# Patient Record
Sex: FEMALE | Race: WHITE | Employment: UNEMPLOYED | URBAN - METROPOLITAN AREA
[De-identification: names, ages, dates, MRNs, and addresses within clinical notes are randomized per-mention and may not be internally consistent; named-entity substitution may affect disease eponyms.]

---

## 2018-11-18 ENCOUNTER — HOSPITAL ENCOUNTER (EMERGENCY)
Age: 63
Discharge: HOME | End: 2018-11-18
Payer: COMMERCIAL

## 2018-11-18 ENCOUNTER — APPOINTMENT (OUTPATIENT)
Dept: GENERAL RADIOLOGY | Age: 63
End: 2018-11-18
Payer: COMMERCIAL

## 2018-11-18 VITALS
RESPIRATION RATE: 16 BRPM | BODY MASS INDEX: 34.02 KG/M2 | WEIGHT: 147 LBS | OXYGEN SATURATION: 96 % | DIASTOLIC BLOOD PRESSURE: 72 MMHG | HEIGHT: 55 IN | SYSTOLIC BLOOD PRESSURE: 130 MMHG | HEART RATE: 69 BPM | TEMPERATURE: 97.9 F

## 2018-11-18 DIAGNOSIS — G89.29 CHRONIC PAIN OF BOTH SHOULDERS: ICD-10-CM

## 2018-11-18 DIAGNOSIS — M79.604 RIGHT LEG PAIN: Primary | ICD-10-CM

## 2018-11-18 DIAGNOSIS — M79.601 RIGHT ARM PAIN: ICD-10-CM

## 2018-11-18 DIAGNOSIS — M25.512 CHRONIC PAIN OF BOTH SHOULDERS: ICD-10-CM

## 2018-11-18 DIAGNOSIS — M25.511 CHRONIC PAIN OF BOTH SHOULDERS: ICD-10-CM

## 2018-11-18 LAB
ALBUMIN SERPL-MCNC: 3.8 G/DL (ref 3.5–5.2)
ALP BLD-CCNC: 80 U/L (ref 35–104)
ALT SERPL-CCNC: 26 U/L (ref 0–32)
ANION GAP SERPL CALCULATED.3IONS-SCNC: 11 MMOL/L (ref 7–16)
AST SERPL-CCNC: 24 U/L (ref 0–31)
BASOPHILS ABSOLUTE: 0.03 E9/L (ref 0–0.2)
BASOPHILS RELATIVE PERCENT: 0.5 % (ref 0–2)
BILIRUB SERPL-MCNC: 0.7 MG/DL (ref 0–1.2)
BUN BLDV-MCNC: 19 MG/DL (ref 8–23)
CALCIUM SERPL-MCNC: 7.7 MG/DL (ref 8.6–10.2)
CHLORIDE BLD-SCNC: 105 MMOL/L (ref 98–107)
CO2: 28 MMOL/L (ref 22–29)
CREAT SERPL-MCNC: 0.8 MG/DL (ref 0.5–1)
EOSINOPHILS ABSOLUTE: 0.01 E9/L (ref 0.05–0.5)
EOSINOPHILS RELATIVE PERCENT: 0.2 % (ref 0–6)
GFR AFRICAN AMERICAN: >60
GFR NON-AFRICAN AMERICAN: >60 ML/MIN/1.73
GLUCOSE BLD-MCNC: 102 MG/DL (ref 74–99)
HCT VFR BLD CALC: 48.7 % (ref 34–48)
HEMOGLOBIN: 16.7 G/DL (ref 11.5–15.5)
IMMATURE GRANULOCYTES #: 0.01 E9/L
IMMATURE GRANULOCYTES %: 0.2 % (ref 0–5)
LYMPHOCYTES ABSOLUTE: 2.11 E9/L (ref 1.5–4)
LYMPHOCYTES RELATIVE PERCENT: 36.3 % (ref 20–42)
MAGNESIUM: 1.9 MG/DL (ref 1.6–2.6)
MCH RBC QN AUTO: 32.2 PG (ref 26–35)
MCHC RBC AUTO-ENTMCNC: 34.3 % (ref 32–34.5)
MCV RBC AUTO: 93.8 FL (ref 80–99.9)
MONOCYTES ABSOLUTE: 0.44 E9/L (ref 0.1–0.95)
MONOCYTES RELATIVE PERCENT: 7.6 % (ref 2–12)
NEUTROPHILS ABSOLUTE: 3.22 E9/L (ref 1.8–7.3)
NEUTROPHILS RELATIVE PERCENT: 55.2 % (ref 43–80)
PDW BLD-RTO: 13.3 FL (ref 11.5–15)
PLATELET # BLD: 132 E9/L (ref 130–450)
PMV BLD AUTO: 10.3 FL (ref 7–12)
POTASSIUM SERPL-SCNC: 4 MMOL/L (ref 3.5–5)
RBC # BLD: 5.19 E12/L (ref 3.5–5.5)
SODIUM BLD-SCNC: 144 MMOL/L (ref 132–146)
TOTAL PROTEIN: 6.6 G/DL (ref 6.4–8.3)
WBC # BLD: 5.8 E9/L (ref 4.5–11.5)

## 2018-11-18 PROCEDURE — 85025 COMPLETE CBC W/AUTO DIFF WBC: CPT

## 2018-11-18 PROCEDURE — 36415 COLL VENOUS BLD VENIPUNCTURE: CPT

## 2018-11-18 PROCEDURE — 96372 THER/PROPH/DIAG INJ SC/IM: CPT

## 2018-11-18 PROCEDURE — 72040 X-RAY EXAM NECK SPINE 2-3 VW: CPT

## 2018-11-18 PROCEDURE — 80053 COMPREHEN METABOLIC PANEL: CPT

## 2018-11-18 PROCEDURE — 83735 ASSAY OF MAGNESIUM: CPT

## 2018-11-18 PROCEDURE — 99283 EMERGENCY DEPT VISIT LOW MDM: CPT

## 2018-11-18 PROCEDURE — 6360000002 HC RX W HCPCS: Performed by: NURSE PRACTITIONER

## 2018-11-18 PROCEDURE — 6370000000 HC RX 637 (ALT 250 FOR IP): Performed by: NURSE PRACTITIONER

## 2018-11-18 RX ORDER — NAPROXEN 500 MG/1
500 TABLET ORAL 2 TIMES DAILY WITH MEALS
COMMUNITY
End: 2019-04-08 | Stop reason: ALTCHOICE

## 2018-11-18 RX ORDER — CYCLOBENZAPRINE HCL 10 MG
10 TABLET ORAL ONCE
Status: COMPLETED | OUTPATIENT
Start: 2018-11-18 | End: 2018-11-18

## 2018-11-18 RX ORDER — DEXAMETHASONE SODIUM PHOSPHATE 10 MG/ML
10 INJECTION, SOLUTION INTRAMUSCULAR; INTRAVENOUS ONCE
Status: COMPLETED | OUTPATIENT
Start: 2018-11-18 | End: 2018-11-18

## 2018-11-18 RX ORDER — CYCLOBENZAPRINE HCL 10 MG
10 TABLET ORAL 3 TIMES DAILY PRN
Qty: 15 TABLET | Refills: 0 | Status: SHIPPED | OUTPATIENT
Start: 2018-11-18 | End: 2018-11-28

## 2018-11-18 RX ORDER — PREDNISONE 20 MG/1
TABLET ORAL
Qty: 18 TABLET | Refills: 0 | Status: SHIPPED | OUTPATIENT
Start: 2018-11-18 | End: 2018-11-28

## 2018-11-18 RX ORDER — KETOROLAC TROMETHAMINE 30 MG/ML
60 INJECTION, SOLUTION INTRAMUSCULAR; INTRAVENOUS ONCE
Status: COMPLETED | OUTPATIENT
Start: 2018-11-18 | End: 2018-11-18

## 2018-11-18 RX ADMIN — KETOROLAC TROMETHAMINE 60 MG: 30 INJECTION, SOLUTION INTRAMUSCULAR at 16:24

## 2018-11-18 RX ADMIN — DEXAMETHASONE SODIUM PHOSPHATE 10 MG: 10 INJECTION INTRAMUSCULAR; INTRAVENOUS at 16:24

## 2018-11-18 RX ADMIN — CYCLOBENZAPRINE HYDROCHLORIDE 10 MG: 10 TABLET, FILM COATED ORAL at 16:23

## 2018-11-18 ASSESSMENT — PAIN DESCRIPTION - ORIENTATION: ORIENTATION: RIGHT;LEFT

## 2018-11-18 ASSESSMENT — PAIN DESCRIPTION - PAIN TYPE: TYPE: ACUTE PAIN

## 2018-11-18 ASSESSMENT — PAIN SCALES - GENERAL
PAINLEVEL_OUTOF10: 10
PAINLEVEL_OUTOF10: 10

## 2018-11-18 ASSESSMENT — PAIN DESCRIPTION - LOCATION: LOCATION: ARM;HAND;LEG

## 2018-11-18 ASSESSMENT — PAIN DESCRIPTION - ONSET: ONSET: ON-GOING

## 2018-11-18 ASSESSMENT — PAIN DESCRIPTION - DESCRIPTORS: DESCRIPTORS: ACHING

## 2018-11-18 ASSESSMENT — PAIN DESCRIPTION - FREQUENCY: FREQUENCY: CONTINUOUS

## 2018-11-18 NOTE — ED NOTES
Pt states she was diagnosed with arthritis. Pt vague and does not know how to explain her pain. Pt states both legs and arms hurt, states she fell yesterday and hit head.  No LOC     Denia Velásquez RN  11/18/18 5080

## 2018-11-19 NOTE — ED PROVIDER NOTES
diminished range with pain. Skin:  tenderness, swelling and no erythema, rash or wounds noted. Neurovascular: Motor deficit: none. Sensory deficit: none. Pulse deficit: none. Capillary refill: normal.  · Lymphatics: No lymphangitis or adenopathy noted. · Neurological:  Oriented. Motor functions intact.     Lab / Imaging Results   (All laboratory and radiology results have been personally reviewed by myself)  Labs:  Results for orders placed or performed during the hospital encounter of 11/18/18   CBC Auto Differential   Result Value Ref Range    WBC 5.8 4.5 - 11.5 E9/L    RBC 5.19 3.50 - 5.50 E12/L    Hemoglobin 16.7 (H) 11.5 - 15.5 g/dL    Hematocrit 48.7 (H) 34.0 - 48.0 %    MCV 93.8 80.0 - 99.9 fL    MCH 32.2 26.0 - 35.0 pg    MCHC 34.3 32.0 - 34.5 %    RDW 13.3 11.5 - 15.0 fL    Platelets 747 552 - 470 E9/L    MPV 10.3 7.0 - 12.0 fL    Neutrophils % 55.2 43.0 - 80.0 %    Immature Granulocytes % 0.2 0.0 - 5.0 %    Lymphocytes % 36.3 20.0 - 42.0 %    Monocytes % 7.6 2.0 - 12.0 %    Eosinophils % 0.2 0.0 - 6.0 %    Basophils % 0.5 0.0 - 2.0 %    Neutrophils # 3.22 1.80 - 7.30 E9/L    Immature Granulocytes # 0.01 E9/L    Lymphocytes # 2.11 1.50 - 4.00 E9/L    Monocytes # 0.44 0.10 - 0.95 E9/L    Eosinophils # 0.01 (L) 0.05 - 0.50 E9/L    Basophils # 0.03 0.00 - 0.20 E9/L   Comprehensive Metabolic Panel   Result Value Ref Range    Sodium 144 132 - 146 mmol/L    Potassium 4.0 3.5 - 5.0 mmol/L    Chloride 105 98 - 107 mmol/L    CO2 28 22 - 29 mmol/L    Anion Gap 11 7 - 16 mmol/L    Glucose 102 (H) 74 - 99 mg/dL    BUN 19 8 - 23 mg/dL    CREATININE 0.8 0.5 - 1.0 mg/dL    GFR Non-African American >60 >=60 mL/min/1.73    GFR African American >60     Calcium 7.7 (L) 8.6 - 10.2 mg/dL    Total Protein 6.6 6.4 - 8.3 g/dL    Alb 3.8 3.5 - 5.2 g/dL    Total Bilirubin 0.7 0.0 - 1.2 mg/dL    Alkaline Phosphatase 80 35 - 104 U/L    ALT List as of 11/18/2018  4:03 PM      START taking these medications    Details   cyclobenzaprine (FLEXERIL) 10 MG tablet Take 1 tablet by mouth 3 times daily as needed for Muscle spasms, Disp-15 tablet, R-0Print      predniSONE (DELTASONE) 20 MG tablet Sig.: Take 60mg  Po qd x 3 days, then 40mg po qd x3 days, then 20mg po qd x 3 days. QS x 9 days, Disp-18 tablet, R-0Print      diclofenac sodium (VOLTAREN) 1 % GEL Apply 4 g topically 4 times daily, Topical, 4 TIMES DAILY Starting Sun 11/18/2018, Until Tue 12/18/2018, 120 doses, Disp-5 Tube, R-0, Print           Electronically signed by EASTON Valadez CNP   DD: 11/18/18  **This report was transcribed using voice recognition software. Every effort was made to ensure accuracy; however, inadvertent computerized transcription errors may be present.   END OF ED PROVIDER NOTE      EASTON Valadez CNP  11/18/18 3815

## 2019-01-15 ENCOUNTER — HOSPITAL ENCOUNTER (OUTPATIENT)
Dept: MRI IMAGING | Age: 64
Discharge: HOME | End: 2019-01-17
Payer: COMMERCIAL

## 2019-01-15 DIAGNOSIS — M50.01 CERVICAL DISC DISORDER WITH MYELOPATHY, HIGH CERVICAL REGION: ICD-10-CM

## 2019-01-15 PROCEDURE — 72141 MRI NECK SPINE W/O DYE: CPT

## 2019-04-08 NOTE — PROGRESS NOTES
Ste[facility] PRE-ADMISSION TESTING GENERAL INSTRUCTIONS- [facility]-phone number:[phone]    GENERAL INSTRUCTIONS  ? Antibacterial Soap shower Night before and/or AM of Surgery   X  ? Chacorta wipe instruction sheet and wipes given. X ? Nothing by mouth after midnight, including gum, candy, mints, or water. X ? You may brush your teeth, gargle, but do NOT swallow water. ? Hibiclens shower  the night before and the morning of surgery. Do not use             Hibiclens on your face or head. X ? No smoking, chewing tobacco, illegal drugs, or alcohol within 24 hours of your surgery. X ? Jewelry, valuables or body piercing's should not be brought to the hospital. All body and/or tongue piercing's must be removed prior to arriving to hospital.  ALL hair pins must be removed. X ? Do not wear makeup, lotions, powders, deodorant. Nail polish as directed by the nurse. X ? Arrange transportation with a responsible adult  to and from the hospital. If you do not have a responsible adult  to transport you, you will need to make arrangements with a medical transportation company (i.e. [employer]. A Uber/taxi/bus is not appropriate unless you are accompanied by a responsible adult ). Arrange for someone to be with you for the remainder of the day and for 24 hours after your procedure due to having had anesthesia. Who will be your  for transportation?____HUSBAND, MILE______________   Who will be staying with you for 24 hrs after your procedure?_______HUSBANDMILE___________  ? Bring insurance card and photo ID. X ? Transfusion Bracelet: Please bring with you to hospital, day of surgery  ? Bring urine specimen day of surgery. Any small container is acceptable. ? Use inhalers the morning of surgery and bring with you to hospital.  ? Bring copy of living will or healthcare power of  papers to be placed in your electronic record.   ? CPAP/BI-PAP: Please bring your machine if you are to spend the night in the hospital.     PARKING INSTRUCTIONS:    X ? Arrival Time:___0530__________  ·  X ? Parking lot '\"I\"  is located on [facility] (the corner of [facility] and [facility]). To enter, press the button and the gate will lift. A free token will be provided to exit the lot. One car per patient is allowed to park in this lot. All other cars are to park on [address] [place] [address] either in the parking garage or the handicap lot. ? Free  parking is available on [address] [place] [address]. · ? To reach the [facility] lobby from [address] [place] [address], upon entering the hospital, take elevator B to the 3rd floor. EDUCATION INSTRUCTIONS:      ? Knee or hip replacement booklet & exercise pamphlets given. ? Jeni 77 placed in chart. X ? Pre-admission Testing educational folder given   X ? Incentive Spirometry,coughing & deep breathing exercises reviewed. X ? Medication information sheet(s)    X ? Fluoroscopy-Xray used in surgery reviewed with patient. Educational pamphlet placed in chart. X ? Pain: Post-op pain is normal and to be expected. You will be asked to rate your pain from 0-10(a zero is not acceptable-education is needed). Your post-op pain goal is:   X ? Ask your nurse for your pain medication. ? Joint camp offered. ? Joint replacement booklets given. ? Other:___________________________    MEDICATION INSTRUCTIONS:    X ? Bring a complete list of your medications, please write the last time you took the medicine, give this list to the nurse. X ? Take the following medications the morning of surgery with 1-2 ounces of water:  SEE LIST   X ? Stop herbal supplements and vitamins 5 days before your surgery. ? DO NOT take any diabetic medicine the morning of surgery. Follow instructions for insulin the day before surgery.   ? If you are diabetic and your blood sugar is low or you feel symptomatic, you may drink 1-2 ounces of apple juice or take a glucose tablet. The morning of your procedure, you may call the pre-op area if you have concerns about your blood sugar [phone].   ? Use your inhalers the morning of surgery. Bring your emergency inhaler with you day of surgery. X ? Follow physician instructions regarding any blood thinners you may be taking. WHAT TO EXPECT:   X ? The day of surgery you will be greeted and checked in by the [employer].  In addition, you will be registered in the [facility] by a Patient Access Representative. A nurse will greet you in accordance to the time you are needed in the pre-op area to prepare you for surgery. Please do not be discouraged if you are not greeted in the order you arrive as there are many variables that are involved in patient preparation. Your patience is greatly appreciated as you wait for your nurse. Please bring in items such as: books, magazines, newspapers, electronics, or any other items  to occupy your time in the waiting area. X ? Delays may occur with surgery and staff will make a sincere effort to keep you informed of delays. If any delays occur with your procedure, we apologize ahead of time for your inconvenience as we recognize the value of your time.

## 2019-04-16 ENCOUNTER — ANESTHESIA EVENT (OUTPATIENT)
Dept: OPERATING ROOM | Age: 64
End: 2019-04-16

## 2019-04-16 ENCOUNTER — HOSPITAL ENCOUNTER (OUTPATIENT)
Dept: PREADMISSION TESTING | Age: 64
Discharge: HOME | End: 2019-04-16
Payer: COMMERCIAL

## 2019-04-16 ENCOUNTER — HOSPITAL ENCOUNTER (OUTPATIENT)
Dept: GENERAL RADIOLOGY | Age: 64
Discharge: HOME | End: 2019-04-18
Payer: COMMERCIAL

## 2019-04-16 VITALS
RESPIRATION RATE: 20 BRPM | HEIGHT: 57 IN | TEMPERATURE: 97.6 F | HEART RATE: 60 BPM | OXYGEN SATURATION: 97 % | WEIGHT: 148 LBS | SYSTOLIC BLOOD PRESSURE: 148 MMHG | BODY MASS INDEX: 31.93 KG/M2 | DIASTOLIC BLOOD PRESSURE: 66 MMHG

## 2019-04-16 DIAGNOSIS — Z01.812 PRE-OPERATIVE LABORATORY EXAMINATION: ICD-10-CM

## 2019-04-16 DIAGNOSIS — Z01.810 PRE-OPERATIVE CARDIOVASCULAR EXAMINATION: ICD-10-CM

## 2019-04-16 LAB
ABO/RH: NORMAL
ABO/RH: NORMAL
ANTIBODY SCREEN: NORMAL
HCT VFR BLD CALC: 48.4 % (ref 34–48)
HEMOGLOBIN: 16.8 G/DL (ref 11.5–15.5)
MCH RBC QN AUTO: 31.6 PG (ref 26–35)
MCHC RBC AUTO-ENTMCNC: 34.7 % (ref 32–34.5)
MCV RBC AUTO: 91.1 FL (ref 80–99.9)
PDW BLD-RTO: 12.9 FL (ref 11.5–15)
PLATELET # BLD: 162 E9/L (ref 130–450)
PMV BLD AUTO: 9.8 FL (ref 7–12)
RBC # BLD: 5.31 E12/L (ref 3.5–5.5)
WBC # BLD: 5.8 E9/L (ref 4.5–11.5)

## 2019-04-16 PROCEDURE — 93005 ELECTROCARDIOGRAM TRACING: CPT | Performed by: NEUROLOGICAL SURGERY

## 2019-04-16 PROCEDURE — 71046 X-RAY EXAM CHEST 2 VIEWS: CPT

## 2019-04-16 PROCEDURE — 87081 CULTURE SCREEN ONLY: CPT

## 2019-04-16 PROCEDURE — 86900 BLOOD TYPING SEROLOGIC ABO: CPT

## 2019-04-16 PROCEDURE — 36415 COLL VENOUS BLD VENIPUNCTURE: CPT

## 2019-04-16 PROCEDURE — 86850 RBC ANTIBODY SCREEN: CPT

## 2019-04-16 PROCEDURE — 85027 COMPLETE CBC AUTOMATED: CPT

## 2019-04-16 PROCEDURE — 86901 BLOOD TYPING SEROLOGIC RH(D): CPT

## 2019-04-16 NOTE — PROGRESS NOTES
Saw pt in PAT, reviewed:    Surgical Procedure-reviewed procedure and post-op events:pre-op/PACU, Unit admission, PT/OT evaluation, Discharge Kenji 18 Stay expectations-reviewed importance of early mobilization. Instructions of Spine Precautions given-PT to review on evaluation. Surgical Pathway Booklet-reviewed and given  Post-op/Discharge Instructions-reviewed activity allowances/restrictions  Use of Incentive Spirometer-instructed on importance of use post-op and continued use @ home to prevent complications. Instructions on use given  Setting realistic pain goals-reaching goal before discharge. ORTHOTICS: Await orders    Is smoker, given pamphlet re: Smoking Cessation and Spine Health. Spoke to pt regarding benefits of smoking cessation and achieving better outcomes continuing to not smoke prior to/post-op    **Reviewed Cervical Fusion Discharge Instructions    Discharge Plans- home with self/family care. Verbalized understanding of all instructions and questions answered. Contact number given.     Farheen Garcia RN, Spine Navigator  [phone] Office  [phone] Cell

## 2019-04-16 NOTE — PROGRESS NOTES
Faxed & called chest xray to martín at dr Aragon Rising office Also faxed to family dr Jeronimo Purdy

## 2019-04-16 NOTE — PROGRESS NOTES
Called martín at dr Joe Givens office notified of abnormal ekg report faxed to family  who gave medical clearance  Notified martín pt coughing hx smoking for 50 years  Chest x ray ordered Notified sister who is here with pt today not to smoke around pt

## 2019-04-17 LAB
EKG ATRIAL RATE: 58 BPM
EKG P AXIS: 35 DEGREES
EKG P-R INTERVAL: 162 MS
EKG Q-T INTERVAL: 452 MS
EKG QRS DURATION: 72 MS
EKG QTC CALCULATION (BAZETT): 443 MS
EKG R AXIS: -41 DEGREES
EKG T AXIS: 18 DEGREES
EKG VENTRICULAR RATE: 58 BPM
MRSA CULTURE ONLY: NORMAL

## 2019-04-17 PROCEDURE — 93010 ELECTROCARDIOGRAM REPORT: CPT | Performed by: INTERNAL MEDICINE

## 2019-04-22 ENCOUNTER — ANESTHESIA (OUTPATIENT)
Dept: OPERATING ROOM | Age: 64
End: 2019-04-22

## 2019-05-13 ENCOUNTER — HOSPITAL ENCOUNTER (OUTPATIENT)
Age: 64
Discharge: HOME | End: 2019-05-13
Payer: COMMERCIAL

## 2019-05-13 ENCOUNTER — HOSPITAL ENCOUNTER (OUTPATIENT)
Dept: CT IMAGING | Age: 64
Discharge: HOME | End: 2019-05-15
Payer: COMMERCIAL

## 2019-05-13 DIAGNOSIS — R91.8 LUNG MASS: ICD-10-CM

## 2019-05-13 LAB
BUN BLDV-MCNC: 18 MG/DL (ref 8–23)
CREAT SERPL-MCNC: 0.7 MG/DL (ref 0.5–1)
GFR AFRICAN AMERICAN: >60
GFR NON-AFRICAN AMERICAN: >60 ML/MIN/1.73

## 2019-05-13 PROCEDURE — 71270 CT THORAX DX C-/C+: CPT

## 2019-05-13 PROCEDURE — 82565 ASSAY OF CREATININE: CPT

## 2019-05-13 PROCEDURE — 84520 ASSAY OF UREA NITROGEN: CPT

## 2019-05-13 PROCEDURE — 36415 COLL VENOUS BLD VENIPUNCTURE: CPT

## 2019-05-13 PROCEDURE — 2580000003 HC RX 258: Performed by: RADIOLOGY

## 2019-05-13 PROCEDURE — 6360000004 HC RX CONTRAST MEDICATION: Performed by: RADIOLOGY

## 2019-05-13 RX ORDER — SODIUM CHLORIDE 0.9 % (FLUSH) 0.9 %
10 SYRINGE (ML) INJECTION PRN
Status: COMPLETED | OUTPATIENT
Start: 2019-05-13 | End: 2019-05-13

## 2019-05-13 RX ADMIN — Medication 10 ML: at 12:20

## 2019-05-13 RX ADMIN — IOPAMIDOL 80 ML: 755 INJECTION, SOLUTION INTRAVENOUS at 12:25

## 2019-05-29 NOTE — PROGRESS NOTES
Stew 36 PRE-ADMISSION TESTING GENERAL INSTRUCTIONS- [facility]-phone number:[phone]    GENERAL INSTRUCTIONS  [x] Antibacterial Soap shower Night before FOLLOWED BY:  [x] Chacorta wipe instruction sheet and wipes given. [x] Nothing by mouth after midnight, including gum, candy, mints, or water. [x] You may brush your teeth, gargle, but do NOT swallow water. [x] Jewelry, valuables or body piercing's should not be brought to the hospital. All body and/or tongue piercing's must be removed prior to arriving to hospital.  ALL hair pins must be removed. [x] Do not wear makeup, lotions, powders, deodorant. Nail polish as directed by the nurse. [x] Transfusion Bracelet: Please bring with you to hospital, day of surgery     PARKING INSTRUCTIONS:   [x] Arrival Time:__0630___________  · [x] Parking lot '\"I\"  is located on [facility] (the corner of [facility] and [facility]). To enter, press the button and the gate will lift. A free token will be provided to exit the lot. One car per patient is allowed to park in this lot. All other cars are to park on [address] [place] [address] either in the parking garage or the handicap lot. [] To reach the [facility] from [address] [place] [address], upon entering the hospital, take elevator B to the 3rd floor. EDUCATION INSTRUCTIONS:    .     [] Pre-admission Testing educational folder given  [x] Incentive Spirometry,coughing & deep breathing exercises reviewed. [x]Medication information sheet(s)   [x]Fluoroscopy-Xray used in surgery reviewed with patient. Educational pamphlet placed in chart. [x]Pain: Post-op pain is normal and to be expected. You will be asked to rate your pain from 0-10(a zero is not acceptable-education is needed). Your post-op pain goal is:  [x] Ask your nurse for your pain medication.     WHAT TO EXPECT:  [x] The day of surgery you will be greeted and checked in by the [employer].  In addition, you will be registered in the [facility] by a Patient Access Representative. Please bring your photo ID and insurance card. A nurse will greet you in accordance to the time you are needed in the pre-op area to prepare you for surgery. Please do not be discouraged if you are not greeted in the order you arrive as there are many variables that are involved in patient preparation. Your patience is greatly appreciated as you wait for your nurse. Please bring in items such as: books, magazines, newspapers, electronics, or any other items  to occupy your time in the waiting area. []  Delays may occur with surgery and staff will make a sincere effort to keep you informed of delays. If any delays occur with your procedure, we apologize ahead of time for your inconvenience as we recognize the value of your time.

## 2019-05-30 ENCOUNTER — HOSPITAL ENCOUNTER (OUTPATIENT)
Dept: PREADMISSION TESTING | Age: 64
Discharge: HOME | End: 2019-05-30
Payer: COMMERCIAL

## 2019-05-30 VITALS
TEMPERATURE: 97.9 F | SYSTOLIC BLOOD PRESSURE: 136 MMHG | DIASTOLIC BLOOD PRESSURE: 60 MMHG | HEART RATE: 58 BPM | OXYGEN SATURATION: 97 % | BODY MASS INDEX: 35.68 KG/M2 | WEIGHT: 154.2 LBS | RESPIRATION RATE: 18 BRPM | HEIGHT: 55 IN

## 2019-05-30 DIAGNOSIS — Z01.812 PRE-OPERATIVE LABORATORY EXAMINATION: ICD-10-CM

## 2019-05-30 LAB
ABO/RH: NORMAL
ANTIBODY SCREEN: NORMAL
HCT VFR BLD CALC: 45.8 % (ref 34–48)
HEMOGLOBIN: 15.3 G/DL (ref 11.5–15.5)
MCH RBC QN AUTO: 31 PG (ref 26–35)
MCHC RBC AUTO-ENTMCNC: 33.4 % (ref 32–34.5)
MCV RBC AUTO: 92.7 FL (ref 80–99.9)
PDW BLD-RTO: 13.2 FL (ref 11.5–15)
PLATELET # BLD: 168 E9/L (ref 130–450)
PMV BLD AUTO: 9.7 FL (ref 7–12)
RBC # BLD: 4.94 E12/L (ref 3.5–5.5)
WBC # BLD: 5.2 E9/L (ref 4.5–11.5)

## 2019-05-30 PROCEDURE — 86900 BLOOD TYPING SEROLOGIC ABO: CPT

## 2019-05-30 PROCEDURE — 85027 COMPLETE CBC AUTOMATED: CPT

## 2019-05-30 PROCEDURE — 87081 CULTURE SCREEN ONLY: CPT

## 2019-05-30 PROCEDURE — 86850 RBC ANTIBODY SCREEN: CPT

## 2019-05-30 PROCEDURE — 36415 COLL VENOUS BLD VENIPUNCTURE: CPT

## 2019-05-30 PROCEDURE — 86901 BLOOD TYPING SEROLOGIC RH(D): CPT

## 2019-05-30 ASSESSMENT — PAIN SCALES - GENERAL: PAINLEVEL_OUTOF10: 5

## 2019-05-30 ASSESSMENT — PAIN DESCRIPTION - FREQUENCY: FREQUENCY: INTERMITTENT

## 2019-05-30 ASSESSMENT — PAIN DESCRIPTION - DESCRIPTORS: DESCRIPTORS: SHARP

## 2019-05-30 ASSESSMENT — PAIN DESCRIPTION - LOCATION: LOCATION: HEAD;NECK

## 2019-05-31 LAB — MRSA CULTURE ONLY: NORMAL

## 2019-06-02 ENCOUNTER — ANESTHESIA EVENT (OUTPATIENT)
Dept: OPERATING ROOM | Age: 64
End: 2019-06-02
Payer: COMMERCIAL

## 2019-06-03 ENCOUNTER — APPOINTMENT (OUTPATIENT)
Dept: CT IMAGING | Age: 64
End: 2019-06-03
Attending: NEUROLOGICAL SURGERY
Payer: COMMERCIAL

## 2019-06-03 ENCOUNTER — APPOINTMENT (OUTPATIENT)
Dept: GENERAL RADIOLOGY | Age: 64
End: 2019-06-03
Attending: NEUROLOGICAL SURGERY
Payer: COMMERCIAL

## 2019-06-03 ENCOUNTER — HOSPITAL ENCOUNTER (OUTPATIENT)
Age: 64
Setting detail: OBSERVATION
Discharge: HOME HEALTH | End: 2019-06-07
Attending: NEUROLOGICAL SURGERY | Admitting: NEUROLOGICAL SURGERY
Payer: COMMERCIAL

## 2019-06-03 ENCOUNTER — ANESTHESIA (OUTPATIENT)
Dept: OPERATING ROOM | Age: 64
End: 2019-06-03
Payer: COMMERCIAL

## 2019-06-03 VITALS
DIASTOLIC BLOOD PRESSURE: 87 MMHG | OXYGEN SATURATION: 100 % | RESPIRATION RATE: 19 BRPM | SYSTOLIC BLOOD PRESSURE: 147 MMHG

## 2019-06-03 DIAGNOSIS — Z01.812 PRE-OPERATIVE LABORATORY EXAMINATION: Primary | ICD-10-CM

## 2019-06-03 PROBLEM — M43.22 FUSION OF SPINE OF CERVICAL REGION: Status: ACTIVE | Noted: 2019-06-03

## 2019-06-03 PROCEDURE — 2580000003 HC RX 258: Performed by: NEUROLOGICAL SURGERY

## 2019-06-03 PROCEDURE — 6370000000 HC RX 637 (ALT 250 FOR IP): Performed by: NEUROLOGICAL SURGERY

## 2019-06-03 PROCEDURE — G0378 HOSPITAL OBSERVATION PER HR: HCPCS

## 2019-06-03 PROCEDURE — 95940 IONM IN OPERATNG ROOM 15 MIN: CPT | Performed by: AUDIOLOGIST

## 2019-06-03 PROCEDURE — 6360000002 HC RX W HCPCS: Performed by: NEUROLOGICAL SURGERY

## 2019-06-03 PROCEDURE — 7100000000 HC PACU RECOVERY - FIRST 15 MIN: Performed by: NEUROLOGICAL SURGERY

## 2019-06-03 PROCEDURE — 95938 SOMATOSENSORY TESTING: CPT | Performed by: AUDIOLOGIST

## 2019-06-03 PROCEDURE — 3700000000 HC ANESTHESIA ATTENDED CARE: Performed by: NEUROLOGICAL SURGERY

## 2019-06-03 PROCEDURE — 2500000003 HC RX 250 WO HCPCS: Performed by: NEUROLOGICAL SURGERY

## 2019-06-03 PROCEDURE — 88311 DECALCIFY TISSUE: CPT

## 2019-06-03 PROCEDURE — 2709999900 HC NON-CHARGEABLE SUPPLY: Performed by: NEUROLOGICAL SURGERY

## 2019-06-03 PROCEDURE — 2500000003 HC RX 250 WO HCPCS

## 2019-06-03 PROCEDURE — C9362 IMPLNT,BON VOID FILLER-STRIP: HCPCS | Performed by: NEUROLOGICAL SURGERY

## 2019-06-03 PROCEDURE — 3209999900 FLUORO FOR SURGICAL PROCEDURES

## 2019-06-03 PROCEDURE — 2720000010 HC SURG SUPPLY STERILE: Performed by: NEUROLOGICAL SURGERY

## 2019-06-03 PROCEDURE — 6360000002 HC RX W HCPCS

## 2019-06-03 PROCEDURE — 88304 TISSUE EXAM BY PATHOLOGIST: CPT

## 2019-06-03 PROCEDURE — 6360000002 HC RX W HCPCS: Performed by: ANESTHESIOLOGY

## 2019-06-03 PROCEDURE — 3600000004 HC SURGERY LEVEL 4 BASE: Performed by: NEUROLOGICAL SURGERY

## 2019-06-03 PROCEDURE — 86923 COMPATIBILITY TEST ELECTRIC: CPT

## 2019-06-03 PROCEDURE — 3700000001 HC ADD 15 MINUTES (ANESTHESIA): Performed by: NEUROLOGICAL SURGERY

## 2019-06-03 PROCEDURE — 7100000001 HC PACU RECOVERY - ADDTL 15 MIN: Performed by: NEUROLOGICAL SURGERY

## 2019-06-03 PROCEDURE — 72125 CT NECK SPINE W/O DYE: CPT

## 2019-06-03 PROCEDURE — C1713 ANCHOR/SCREW BN/BN,TIS/BN: HCPCS | Performed by: NEUROLOGICAL SURGERY

## 2019-06-03 PROCEDURE — 2580000003 HC RX 258

## 2019-06-03 PROCEDURE — 3600000014 HC SURGERY LEVEL 4 ADDTL 15MIN: Performed by: NEUROLOGICAL SURGERY

## 2019-06-03 DEVICE — XTEND 4.2MM VARIABLE ANGLE SCREW, SELF-DRILLING, 14MM
Type: IMPLANTABLE DEVICE | Site: SPINE CERVICAL | Status: FUNCTIONAL
Brand: XTEND

## 2019-06-03 DEVICE — IMPLANTABLE DEVICE: Type: IMPLANTABLE DEVICE | Site: SPINE CERVICAL | Status: FUNCTIONAL

## 2019-06-03 RX ORDER — VANCOMYCIN HYDROCHLORIDE 500 MG/10ML
INJECTION, POWDER, LYOPHILIZED, FOR SOLUTION INTRAVENOUS PRN
Status: DISCONTINUED | OUTPATIENT
Start: 2019-06-03 | End: 2019-06-03 | Stop reason: ALTCHOICE

## 2019-06-03 RX ORDER — DEXTROSE AND SODIUM CHLORIDE 5; .45 G/100ML; G/100ML
INJECTION, SOLUTION INTRAVENOUS CONTINUOUS
Status: DISCONTINUED | OUTPATIENT
Start: 2019-06-03 | End: 2019-06-07 | Stop reason: HOSPADM

## 2019-06-03 RX ORDER — CEFAZOLIN SODIUM 1 G/50ML
1 SOLUTION INTRAVENOUS EVERY 8 HOURS
Status: COMPLETED | OUTPATIENT
Start: 2019-06-03 | End: 2019-06-03

## 2019-06-03 RX ORDER — ZOLPIDEM TARTRATE 5 MG/1
5 TABLET ORAL NIGHTLY PRN
Status: DISCONTINUED | OUTPATIENT
Start: 2019-06-03 | End: 2019-06-07 | Stop reason: HOSPADM

## 2019-06-03 RX ORDER — SODIUM CHLORIDE, SODIUM LACTATE, POTASSIUM CHLORIDE, CALCIUM CHLORIDE 600; 310; 30; 20 MG/100ML; MG/100ML; MG/100ML; MG/100ML
INJECTION, SOLUTION INTRAVENOUS CONTINUOUS PRN
Status: COMPLETED | OUTPATIENT
Start: 2019-06-03 | End: 2019-06-03

## 2019-06-03 RX ORDER — GLYCOPYRROLATE 1 MG/5 ML
SYRINGE (ML) INTRAVENOUS PRN
Status: DISCONTINUED | OUTPATIENT
Start: 2019-06-03 | End: 2019-06-03 | Stop reason: SDUPTHER

## 2019-06-03 RX ORDER — ROCURONIUM BROMIDE 10 MG/ML
INJECTION, SOLUTION INTRAVENOUS PRN
Status: DISCONTINUED | OUTPATIENT
Start: 2019-06-03 | End: 2019-06-03 | Stop reason: SDUPTHER

## 2019-06-03 RX ORDER — SODIUM CHLORIDE 9 MG/ML
INJECTION, SOLUTION INTRAVENOUS CONTINUOUS PRN
Status: DISCONTINUED | OUTPATIENT
Start: 2019-06-03 | End: 2019-06-03 | Stop reason: SDUPTHER

## 2019-06-03 RX ORDER — METAXALONE 800 MG/1
800 TABLET ORAL 3 TIMES DAILY
Status: DISCONTINUED | OUTPATIENT
Start: 2019-06-03 | End: 2019-06-07 | Stop reason: HOSPADM

## 2019-06-03 RX ORDER — LIDOCAINE HYDROCHLORIDE 20 MG/ML
INJECTION, SOLUTION INTRAVENOUS PRN
Status: DISCONTINUED | OUTPATIENT
Start: 2019-06-03 | End: 2019-06-03 | Stop reason: SDUPTHER

## 2019-06-03 RX ORDER — SODIUM CHLORIDE 9 MG/ML
INJECTION, SOLUTION INTRAVENOUS CONTINUOUS PRN
Status: COMPLETED | OUTPATIENT
Start: 2019-06-03 | End: 2019-06-03

## 2019-06-03 RX ORDER — LABETALOL HYDROCHLORIDE 5 MG/ML
10 INJECTION, SOLUTION INTRAVENOUS EVERY 10 MIN PRN
Status: DISCONTINUED | OUTPATIENT
Start: 2019-06-03 | End: 2019-06-03 | Stop reason: HOSPADM

## 2019-06-03 RX ORDER — NEOSTIGMINE METHYLSULFATE 1 MG/ML
INJECTION, SOLUTION INTRAVENOUS PRN
Status: DISCONTINUED | OUTPATIENT
Start: 2019-06-03 | End: 2019-06-03 | Stop reason: SDUPTHER

## 2019-06-03 RX ORDER — SODIUM CHLORIDE 0.9 % (FLUSH) 0.9 %
10 SYRINGE (ML) INJECTION PRN
Status: DISCONTINUED | OUTPATIENT
Start: 2019-06-03 | End: 2019-06-03 | Stop reason: HOSPADM

## 2019-06-03 RX ORDER — FENTANYL CITRATE 50 UG/ML
INJECTION, SOLUTION INTRAMUSCULAR; INTRAVENOUS PRN
Status: DISCONTINUED | OUTPATIENT
Start: 2019-06-03 | End: 2019-06-03 | Stop reason: SDUPTHER

## 2019-06-03 RX ORDER — LIDOCAINE HYDROCHLORIDE AND EPINEPHRINE 10; 10 MG/ML; UG/ML
INJECTION, SOLUTION INFILTRATION; PERINEURAL PRN
Status: DISCONTINUED | OUTPATIENT
Start: 2019-06-03 | End: 2019-06-03 | Stop reason: ALTCHOICE

## 2019-06-03 RX ORDER — SODIUM CHLORIDE 0.9 % (FLUSH) 0.9 %
10 SYRINGE (ML) INJECTION EVERY 12 HOURS SCHEDULED
Status: DISCONTINUED | OUTPATIENT
Start: 2019-06-03 | End: 2019-06-03 | Stop reason: HOSPADM

## 2019-06-03 RX ORDER — PROPOFOL 10 MG/ML
INJECTION, EMULSION INTRAVENOUS PRN
Status: DISCONTINUED | OUTPATIENT
Start: 2019-06-03 | End: 2019-06-03 | Stop reason: SDUPTHER

## 2019-06-03 RX ORDER — PROMETHAZINE HYDROCHLORIDE 25 MG/ML
12.5 INJECTION, SOLUTION INTRAMUSCULAR; INTRAVENOUS
Status: COMPLETED | OUTPATIENT
Start: 2019-06-03 | End: 2019-06-03

## 2019-06-03 RX ORDER — DEXAMETHASONE SODIUM PHOSPHATE 10 MG/ML
INJECTION INTRAMUSCULAR; INTRAVENOUS PRN
Status: DISCONTINUED | OUTPATIENT
Start: 2019-06-03 | End: 2019-06-03 | Stop reason: SDUPTHER

## 2019-06-03 RX ORDER — MIDAZOLAM HYDROCHLORIDE 1 MG/ML
INJECTION INTRAMUSCULAR; INTRAVENOUS PRN
Status: DISCONTINUED | OUTPATIENT
Start: 2019-06-03 | End: 2019-06-03 | Stop reason: SDUPTHER

## 2019-06-03 RX ORDER — MORPHINE SULFATE 2 MG/ML
2 INJECTION, SOLUTION INTRAMUSCULAR; INTRAVENOUS
Status: DISCONTINUED | OUTPATIENT
Start: 2019-06-03 | End: 2019-06-07 | Stop reason: HOSPADM

## 2019-06-03 RX ORDER — ACETAMINOPHEN 650 MG
TABLET, EXTENDED RELEASE ORAL PRN
Status: DISCONTINUED | OUTPATIENT
Start: 2019-06-03 | End: 2019-06-03 | Stop reason: ALTCHOICE

## 2019-06-03 RX ORDER — MORPHINE SULFATE 2 MG/ML
1 INJECTION, SOLUTION INTRAMUSCULAR; INTRAVENOUS EVERY 5 MIN PRN
Status: DISCONTINUED | OUTPATIENT
Start: 2019-06-03 | End: 2019-06-03 | Stop reason: HOSPADM

## 2019-06-03 RX ADMIN — Medication 0.6 MG: at 12:02

## 2019-06-03 RX ADMIN — ROCURONIUM BROMIDE 30 MG: 10 INJECTION, SOLUTION INTRAVENOUS at 09:02

## 2019-06-03 RX ADMIN — FENTANYL CITRATE 100 MCG: 50 INJECTION, SOLUTION INTRAMUSCULAR; INTRAVENOUS at 09:33

## 2019-06-03 RX ADMIN — FENTANYL CITRATE 50 MCG: 50 INJECTION, SOLUTION INTRAMUSCULAR; INTRAVENOUS at 09:42

## 2019-06-03 RX ADMIN — SODIUM CHLORIDE: 9 INJECTION, SOLUTION INTRAVENOUS at 09:48

## 2019-06-03 RX ADMIN — Medication 2 G: at 08:48

## 2019-06-03 RX ADMIN — PROPOFOL 100 MG: 10 INJECTION, EMULSION INTRAVENOUS at 08:38

## 2019-06-03 RX ADMIN — MORPHINE SULFATE 2 MG: 2 INJECTION, SOLUTION INTRAMUSCULAR; INTRAVENOUS at 16:44

## 2019-06-03 RX ADMIN — LIDOCAINE HYDROCHLORIDE 100 MG: 20 INJECTION, SOLUTION INTRAVENOUS at 08:38

## 2019-06-03 RX ADMIN — MIDAZOLAM HYDROCHLORIDE 2 MG: 1 INJECTION, SOLUTION INTRAMUSCULAR; INTRAVENOUS at 08:34

## 2019-06-03 RX ADMIN — PHENYLEPHRINE HYDROCHLORIDE 100 MCG: 10 INJECTION INTRAVENOUS at 10:50

## 2019-06-03 RX ADMIN — SODIUM CHLORIDE: 9 INJECTION, SOLUTION INTRAVENOUS at 08:34

## 2019-06-03 RX ADMIN — DEXAMETHASONE SODIUM PHOSPHATE 10 MG: 10 INJECTION INTRAMUSCULAR; INTRAVENOUS at 08:58

## 2019-06-03 RX ADMIN — PHENYLEPHRINE HYDROCHLORIDE 100 MCG: 10 INJECTION INTRAVENOUS at 10:55

## 2019-06-03 RX ADMIN — Medication 3 MG: at 12:02

## 2019-06-03 RX ADMIN — CEFAZOLIN SODIUM 1 G: 1 SOLUTION INTRAVENOUS at 19:01

## 2019-06-03 RX ADMIN — PROMETHAZINE HYDROCHLORIDE 6.25 MG: 25 INJECTION INTRAMUSCULAR; INTRAVENOUS at 13:42

## 2019-06-03 RX ADMIN — ROCURONIUM BROMIDE 50 MG: 10 INJECTION, SOLUTION INTRAVENOUS at 08:38

## 2019-06-03 RX ADMIN — ROCURONIUM BROMIDE 10 MG: 10 INJECTION, SOLUTION INTRAVENOUS at 11:18

## 2019-06-03 RX ADMIN — FENTANYL CITRATE 50 MCG: 50 INJECTION, SOLUTION INTRAMUSCULAR; INTRAVENOUS at 08:38

## 2019-06-03 RX ADMIN — FENTANYL CITRATE 50 MCG: 50 INJECTION, SOLUTION INTRAMUSCULAR; INTRAVENOUS at 10:40

## 2019-06-03 RX ADMIN — ROCURONIUM BROMIDE 10 MG: 10 INJECTION, SOLUTION INTRAVENOUS at 11:37

## 2019-06-03 RX ADMIN — FENTANYL CITRATE 50 MCG: 50 INJECTION, SOLUTION INTRAMUSCULAR; INTRAVENOUS at 09:16

## 2019-06-03 RX ADMIN — METAXALONE 800 MG: 800 TABLET ORAL at 22:49

## 2019-06-03 ASSESSMENT — PULMONARY FUNCTION TESTS
PIF_VALUE: 22
PIF_VALUE: 24
PIF_VALUE: 24
PIF_VALUE: 23
PIF_VALUE: 24
PIF_VALUE: 25
PIF_VALUE: 22
PIF_VALUE: 24
PIF_VALUE: 22
PIF_VALUE: 24
PIF_VALUE: 29
PIF_VALUE: 25
PIF_VALUE: 23
PIF_VALUE: 22
PIF_VALUE: 23
PIF_VALUE: 25
PIF_VALUE: 25
PIF_VALUE: 26
PIF_VALUE: 25
PIF_VALUE: 22
PIF_VALUE: 24
PIF_VALUE: 24
PIF_VALUE: 0
PIF_VALUE: 22
PIF_VALUE: 24
PIF_VALUE: 22
PIF_VALUE: 21
PIF_VALUE: 24
PIF_VALUE: 24
PIF_VALUE: 25
PIF_VALUE: 22
PIF_VALUE: 21
PIF_VALUE: 22
PIF_VALUE: 24
PIF_VALUE: 24
PIF_VALUE: 25
PIF_VALUE: 25
PIF_VALUE: 23
PIF_VALUE: 21
PIF_VALUE: 28
PIF_VALUE: 22
PIF_VALUE: 30
PIF_VALUE: 24
PIF_VALUE: 22
PIF_VALUE: 24
PIF_VALUE: 22
PIF_VALUE: 25
PIF_VALUE: 22
PIF_VALUE: 24
PIF_VALUE: 0
PIF_VALUE: 1
PIF_VALUE: 24
PIF_VALUE: 22
PIF_VALUE: 0
PIF_VALUE: 22
PIF_VALUE: 28
PIF_VALUE: 23
PIF_VALUE: 25
PIF_VALUE: 23
PIF_VALUE: 24
PIF_VALUE: 22
PIF_VALUE: 29
PIF_VALUE: 25
PIF_VALUE: 25
PIF_VALUE: 22
PIF_VALUE: 22
PIF_VALUE: 25
PIF_VALUE: 22
PIF_VALUE: 25
PIF_VALUE: 24
PIF_VALUE: 22
PIF_VALUE: 24
PIF_VALUE: 25
PIF_VALUE: 23
PIF_VALUE: 23
PIF_VALUE: 24
PIF_VALUE: 25
PIF_VALUE: 24
PIF_VALUE: 4
PIF_VALUE: 1
PIF_VALUE: 25
PIF_VALUE: 5
PIF_VALUE: 25
PIF_VALUE: 24
PIF_VALUE: 24
PIF_VALUE: 22
PIF_VALUE: 24
PIF_VALUE: 24
PIF_VALUE: 23
PIF_VALUE: 24
PIF_VALUE: 22
PIF_VALUE: 22
PIF_VALUE: 4
PIF_VALUE: 0
PIF_VALUE: 24
PIF_VALUE: 22
PIF_VALUE: 21
PIF_VALUE: 25
PIF_VALUE: 24
PIF_VALUE: 23
PIF_VALUE: 25
PIF_VALUE: 22
PIF_VALUE: 24
PIF_VALUE: 23
PIF_VALUE: 22
PIF_VALUE: 24
PIF_VALUE: 22
PIF_VALUE: 5
PIF_VALUE: 25
PIF_VALUE: 24
PIF_VALUE: 22
PIF_VALUE: 25
PIF_VALUE: 24
PIF_VALUE: 22
PIF_VALUE: 24
PIF_VALUE: 26
PIF_VALUE: 24
PIF_VALUE: 23
PIF_VALUE: 24
PIF_VALUE: 5
PIF_VALUE: 27
PIF_VALUE: 22
PIF_VALUE: 23
PIF_VALUE: 21
PIF_VALUE: 22
PIF_VALUE: 24
PIF_VALUE: 22
PIF_VALUE: 21
PIF_VALUE: 22
PIF_VALUE: 22
PIF_VALUE: 23
PIF_VALUE: 22
PIF_VALUE: 23
PIF_VALUE: 22
PIF_VALUE: 0
PIF_VALUE: 22
PIF_VALUE: 24
PIF_VALUE: 23
PIF_VALUE: 22
PIF_VALUE: 1
PIF_VALUE: 24
PIF_VALUE: 22
PIF_VALUE: 25
PIF_VALUE: 24
PIF_VALUE: 28
PIF_VALUE: 22
PIF_VALUE: 23
PIF_VALUE: 22
PIF_VALUE: 27
PIF_VALUE: 24
PIF_VALUE: 24
PIF_VALUE: 22
PIF_VALUE: 22
PIF_VALUE: 24
PIF_VALUE: 25
PIF_VALUE: 0
PIF_VALUE: 22
PIF_VALUE: 22
PIF_VALUE: 27
PIF_VALUE: 23
PIF_VALUE: 22
PIF_VALUE: 0
PIF_VALUE: 22
PIF_VALUE: 3
PIF_VALUE: 23
PIF_VALUE: 24
PIF_VALUE: 26
PIF_VALUE: 25
PIF_VALUE: 22
PIF_VALUE: 23
PIF_VALUE: 21
PIF_VALUE: 25
PIF_VALUE: 22
PIF_VALUE: 24
PIF_VALUE: 22
PIF_VALUE: 23
PIF_VALUE: 24
PIF_VALUE: 4
PIF_VALUE: 23
PIF_VALUE: 24
PIF_VALUE: 24
PIF_VALUE: 22
PIF_VALUE: 24
PIF_VALUE: 22
PIF_VALUE: 27
PIF_VALUE: 22
PIF_VALUE: 24

## 2019-06-03 ASSESSMENT — PAIN DESCRIPTION - DESCRIPTORS: DESCRIPTORS: NUMBNESS;TINGLING

## 2019-06-03 ASSESSMENT — PAIN SCALES - GENERAL
PAINLEVEL_OUTOF10: 0
PAINLEVEL_OUTOF10: 0
PAINLEVEL_OUTOF10: 5
PAINLEVEL_OUTOF10: 0
PAINLEVEL_OUTOF10: 0
PAINLEVEL_OUTOF10: 5
PAINLEVEL_OUTOF10: 1
PAINLEVEL_OUTOF10: 0

## 2019-06-03 ASSESSMENT — PAIN - FUNCTIONAL ASSESSMENT
PAIN_FUNCTIONAL_ASSESSMENT: PREVENTS OR INTERFERES WITH MANY ACTIVE NOT PASSIVE ACTIVITIES
PAIN_FUNCTIONAL_ASSESSMENT: 0-10

## 2019-06-03 NOTE — PROGRESS NOTES
Called dr Rivas Greer for orders and he wants patient to go to a monitored floor unit, he will put in orders asap and ok to send patient to floor when recovered.  Za Kevin

## 2019-06-03 NOTE — PROGRESS NOTES
INTRAOPERATIVE MONITORING EMG REPORT    Diagnosis: Cervical stenosis  Procedure: ACDF C3-5, corpectomy C4   Anesthesia: Isoflurane, Fentanyl  Surgeon: Alena Shaw M.D. Intra-op Monitorin.75 hours    Procedure:     EMG recording electrodes were placed over the trapezious, deltoid, bicep and triceps musles for recording spontaneous EMG activity. A silent control was performed to test the integrity of the system prior to the procedure. Results:  Spontaneous EMG: No spontaneous EMG activity was observed throughout the procedure. However, muscle relaxant was used throughout the procedure.

## 2019-06-03 NOTE — BRIEF OP NOTE
Brief Postoperative Note  ______________________________________________________________    Patient: Kell Rothman  YOB: 1955  MRN: [id number]  Date of Procedure: 6/3/2019    Pre-Op Diagnosis: CERVICAL HERNIATED DISC, RADICULOPATHY    Post-Op Diagnosis: Same       Procedure(s):  ANTERIOR CERVICAL FUSION C3-C4, C4-C5  AND CORPECTOMY C4  WITH PLATES, SCREWS, BONE GRAFT, SSEP -- GLOBUS    Anesthesia: General    Surgeon(s):  Lilliam Villa MD    Assistant: Garrett Dickerson    Estimated Blood Loss (mL): 167    Complications: None    Specimens:   ID Type Source Tests Collected by Time Destination   A : [address]  Lilliam Villa MD 6/3/2019 1049        Implants:  Implant Name Type Inv. Item Serial No.  Lot No. LRB No. Used   PAVAN-GRAFT BONE TRICORT ILIUM STRIP 5.5X2. 2CM - [id number] Bone/Graft/Tissue/Human/Synth PAVAN-GRAFT BONE TRICORT ILIUM STRIP 5.5X2. 2CM [id number] MUSCULOSKELETAL TRANSPLANT FND  N/A 1   SCREW AZAEL ANGL XTEND SELF-DRILL 4.2X14MM Spine SCREW AZAEL ANGL XTEND SELF-DRILL 4.2X14MM  [employer]  N/A 4   GLOBUS PLATE 34 MM      N/A 1         Drains: * No LDAs found *    Findings: As Dariana Vu MD  Date: 6/3/2019  Time: 6:29 PM

## 2019-06-03 NOTE — ANESTHESIA POSTPROCEDURE EVALUATION
Department of Anesthesiology  Postprocedure Note    Patient: Rosemary Bennett  MRN: [id number]  YOB: 1955  Date of evaluation: 6/3/2019  Time:  1:38 PM     Procedure Summary     Date:  06/03/19 [place]  [facility] [place] [address]    Anesthesia Start:  8725 Anesthesia Stop:  2161    Procedure:  ANTERIOR CERVICAL FUSION C3-C4, C4-C5  AND CORPECTOMY C4  WITH PLATES, SCREWS, BONE GRAFT, SSEP -- GLOBUS (N/A ) Diagnosis:  (CERVICAL HERNIATED DISC, RADICULOPATHY)    Surgeon:  Cat Corbett MD Responsible Provider:  Kizzy Rahman MD    Anesthesia Type:  general ASA Status:  2          Anesthesia Type: general    Fili Phase I: Fili Score: 10    Fili Phase II:      Last vitals: Reviewed and per EMR flowsheets.        Anesthesia Post Evaluation    Patient location during evaluation: PACU  Patient participation: complete - patient participated  Level of consciousness: awake  Pain score: 0  Airway patency: patent  Nausea & Vomiting: no nausea and no vomiting  Complications: no  Cardiovascular status: hemodynamically stable  Respiratory status: acceptable  Hydration status: euvolemic

## 2019-06-03 NOTE — ANESTHESIA PRE PROCEDURE
Time of last solid consumption: 1730                        Date of last liquid consumption: 06/02/19                        Date of last solid food consumption: 06/02/19    BMI:   Wt Readings from Last 3 Encounters:   06/03/19 154 lb (69.9 kg)   05/30/19 154 lb 3.2 oz (69.9 kg)   04/08/19 148 lb (67.1 kg)     Body mass index is 35.79 kg/m². CBC:   Lab Results   Component Value Date    WBC 5.2 05/30/2019    RBC 4.94 05/30/2019    HGB 15.3 05/30/2019    HCT 45.8 05/30/2019    MCV 92.7 05/30/2019    RDW 13.2 05/30/2019     05/30/2019       CMP:   Lab Results   Component Value Date     11/18/2018    K 4.0 11/18/2018     11/18/2018    CO2 28 11/18/2018    BUN 18 05/13/2019    CREATININE 0.7 05/13/2019    GFRAA >60 05/13/2019    LABGLOM >60 05/13/2019    GLUCOSE 102 11/18/2018    PROT 6.6 11/18/2018    CALCIUM 7.7 11/18/2018    BILITOT 0.7 11/18/2018    ALKPHOS 80 11/18/2018    AST 24 11/18/2018    ALT 26 11/18/2018       POC Tests: No results for input(s): POCGLU, POCNA, POCK, POCCL, POCBUN, POCHEMO, POCHCT in the last 72 hours.     Coags: No results found for: PROTIME, INR, APTT    HCG (If Applicable): No results found for: PREGTESTUR, PREGSERUM, HCG, HCGQUANT     ABGs: No results found for: PHART, PO2ART, GCN0SOE, CEV9KKZ, BEART, K6BJSREN     Type & Screen (If Applicable):  No results found for: LABABO, LABRH     EKG 4/16/19  Ventricular Rate 58  BPM Final 04/16/2019 10:01 AM [facility]   Atrial Rate 58  BPM Final 04/16/2019 10:01 AM [facility]   P-R Interval 162  ms Final 04/16/2019 10:01 AM [facility]   QRS Duration 72  ms Final 04/16/2019 10:01 AM [facility]   Q-T Interval 452  ms Final 04/16/2019 10:01 AM [facility]   QTc Calculation (Bazett) 443  ms Final 04/16/2019 10:01 AM [facility]   P [place] 35  degrees Final 04/16/2019 10:01 AM [facility]   R [place] -41  degrees Final 04/16/2019 10:01 AM [facility]   T [place] 18  degrees Final 04/16/2019 10:01 AM [facility]   Testing Performed By     Lab - Abbreviation Name Director Address Valid Date Range   [facility] Unknown Unknown 04/18/16 0721-Present   Narrative   Performed by: [facility]   Sinus bradycardia  Left axis deviation  Septal infarct , age undetermined  Inferior infarct , age undetermined  Abnormal ECG         Anesthesia Evaluation  Patient summary reviewed and Nursing notes reviewed no history of anesthetic complications:   Airway: Mallampati: III  TM distance: >3 FB   Neck ROM: limited  Mouth opening: > = 3 FB Dental:    (+) upper dentures, lower dentures and edentulous      Pulmonary:normal exam  breath sounds clear to auscultation      Smoker: Former smoker- quit 3/5/19. Cardiovascular:Negative CV ROS          ECG reviewed  Rhythm: regular  Rate: normal                    Neuro/Psych:                ROS comment: Right leg pain  Chronic pain of both shoulders  Right arm pain     GI/Hepatic/Renal:   (+) GERD: no interval change,           Endo/Other:    (+) : arthritis:., .                 Abdominal:           Vascular: negative vascular ROS. Anesthesia Plan      general     ASA 2     (#20 R Hand)  Induction: intravenous. MIPS: Postoperative opioids intended and Prophylactic antiemetics administered. Anesthetic plan and risks discussed with patient. Use of blood products discussed with patient whom consented to blood products. Plan discussed with CRNA and attending.                   Fabiola Cuello RN   6/3/2019

## 2019-06-04 PROCEDURE — 97535 SELF CARE MNGMENT TRAINING: CPT

## 2019-06-04 PROCEDURE — G0378 HOSPITAL OBSERVATION PER HR: HCPCS

## 2019-06-04 PROCEDURE — 97530 THERAPEUTIC ACTIVITIES: CPT

## 2019-06-04 PROCEDURE — 6370000000 HC RX 637 (ALT 250 FOR IP): Performed by: NEUROLOGICAL SURGERY

## 2019-06-04 PROCEDURE — 97166 OT EVAL MOD COMPLEX 45 MIN: CPT

## 2019-06-04 PROCEDURE — 97162 PT EVAL MOD COMPLEX 30 MIN: CPT

## 2019-06-04 RX ADMIN — METAXALONE 800 MG: 800 TABLET ORAL at 14:19

## 2019-06-04 RX ADMIN — METAXALONE 800 MG: 800 TABLET ORAL at 20:43

## 2019-06-04 RX ADMIN — METAXALONE 800 MG: 800 TABLET ORAL at 09:07

## 2019-06-04 ASSESSMENT — PAIN SCALES - GENERAL
PAINLEVEL_OUTOF10: 0

## 2019-06-04 NOTE — OP NOTE
[address]                  [address] [facility],  [facility]                                OPERATIVE REPORT    PATIENT NAME: Freddie Cosby                   :        1955  MED REC NO:   [id number]                            ROOM:       45  ACCOUNT NO:   [de-identified]                           ADMIT DATE: 2019  PROVIDER:     Torrie Yoon MD    DATE OF PROCEDURE:  2019    PREOPERATIVE DIAGNOSIS:  Severe spinal canal stenosis, C3-C4, C4-C5 with  myelopathy. POSTOPERATIVE DIAGNOSES:  Severe spinal canal stenosis, C3-C4, C4-C5  with myelopathy and  instability of the level of C3-C4, C4-C5. PROCEDURES:  1. Anterior cervical diskectomy and fusion C3 to C5 by cervical  corpectomy C4 under microscopic magnification. 2.  Stabilization using [employer] XTEND plate and four screws. 3.  Intraoperative monitoring of EMG, SSEP and use of C-arm. SURGEON:  Torrie Yoon MD    OPERATIVE PROCEDURE:  The patient was placed on the operating room table  in the supine position and after satisfactory endotracheal general  anesthesia had been administered, the patient's head was fixed in the  Gunn headrest.  The neck was slightly hyperextended. The anterior  part of the neck and upper part of the chest were prepared with Betadine  scrub and solution and routinely draped. A horizontal incision was made  in the neck on the right side in the middle crease of the neck which  extended from the midline laterally for 5 cm. The incision was taken  down through the skin and subcutaneous tissue. The fibers of the  platysma muscle were split along the longitudinal axis of the fibers. A  plane was developed between the sternocleidomastoid muscles laterally  and the strap muscles of the neck medially. This plane was taken down  through the anterior aspect of the vertebral body.   The trachea,  esophagus, and the strap muscles were retracted medially while the  carotid vessels and the sternocleidomastoid muscles were retracted  laterally. The prevertebral fascia was opened. The level of C3-C4 was  identified and confirmed by axilla of the cervical spine using  fluoroscopy. The longus coli muscle on either side of the midline was   from its attachment to the anterior aspect of the vertebral  body and self-retaining Shadow-Line retractor was used to keep them  retracted. Then, the spur arising from the lower part of the body of C3  and upper part of the body of C4 was trimmed with the [place] rongeur. The disk space entered into and it was cleaned up of the degenerated  disk fragments as much as possible. In a similar fashion, incision are  made in the anterior longitudinal ligament at the level of C4-C5. Disk  space was cleaned up of degenerated disk fragment as much as possible. Then, using [employer] drill, a rectangular hole was made in the body of C4  which included the lower part of body of C3 and upper part of body of  C5. This was taken down through the middle of the body. Then, further  dissection was carried out until the posterior longitudinal ligament  could be visualized at the level of C3-C4 and C4-C5. Then, using micro  K[place] rongeurs, the posterior plate of C4 was removed piecemeal.  It  was noted that there was a large disk herniation which was partially  calcified and was stuck to the dura and to the posterior longitudinal  ligament at the level of C4 more so on the right side. It extended  again to the body of C3 to some extent. This was stuck to the dura. It  was  from the dura and removed piecemeal under microscopic  magnification.   The lower part of body of C3 and upper part of body of  C5 were also trimmed, thus decompressing the canal.  The herniated disk  and the tissue which was apparently stuck to the dura which was  granulation tissue which was partially calcified were removed piecemeal,  thus decompressing the canal from the level of C3 to C5. Bilateral  neuroforaminotomy accomplished and nerve roots were followed on the  neural foramina. Then, ledge was created in the upper part of body of  C5 to seat the bone plug and also in the lower part of body of C3. Microscope was moved away from the field. Using [place] vertebral body  distractor, the disk space at C3-C4 and C4-C5 was distracted. The  distance between the lower part of body of C3 and upper part of body of  C5 was measured. It was about 28 mm. A bone strut was cut out of the  tricortical iliac crest bone to fit in the hole between C3 and C5. The  bone plug was jammed into the space between C3 to C5 and it was held in  place once [place] distraction was removed. Then, it was decided to  stabilize C3 to C5 as there was moderate amount of instability noted  during procedure. An XTEND plate from [employer] was then used to stabilize  C3 to C5. This 34-mm plate was attached to the body of C3 with two  14-mm screws and to the body of C5 with two 14-mm screws. This was  accomplished under fluoroscopic guidance using AP and lateral  fluoroscopy. Once the plate had been fixed and hemostasis secured, the  self-retaining retractors were removed. The trachea, esophagus, strap  muscles, carotid vessels, and sternocleidomastoid muscles were let to  fall into their normal anatomical positions. The incision area was  irrigated with antibiotic solution. Vancomycin powder was sprinkled  over the closure and the exposure. The fascia covering the  sternocleidomastoid muscles and the strap muscles was approximated with  3-0 Surgilon suture. The fibers of the platysma muscle were also  approximated with 3-0 Surgilon suture. The subcuticular closure was  accomplished using 3-0 and 4-0 Vicryl sutures. Steri-Strips were  applied over the incision area. The patient was sent to recovery room  in satisfactory state. The patient tolerated the procedure well.    Estimated blood loss was about 400 to 450 mL.         Agustín Sanchez MD    D: 06/03/2019 18:44:33       T: 06/03/2019 21:27:47     [email or web address]  Job#: [id number]     Doc#: [id number]    CC:

## 2019-06-04 NOTE — PROGRESS NOTES
Occupational Therapy  OCCUPATIONAL THERAPY INITIAL EVALUATION      Date:2019  Patient Name: Tone Marie  MRN: [id number]  : 1955  Room: [address] [place] [address]     Evaluating OT: Layne Roca OTR/L [id number]  AM-PAC Daily Activity Raw Score:   Recommended Adaptive Equipment: tub bench, AE & rw     Diagnosis: cervical herniated disc with radiculopathy, s/p ACDF C3-4, C4-5 & corpectomy C4    Modified Palomo Scale (MRS)  Score     Description  0             No symptoms  1             No significant disability despite symptoms  2             Slight disability; able to look after own affairs  3             Moderate disability; able to ambulate without assist/ requires assist with ADLs  4             Moderate/Severe disability;requires assist to ambulate/assist with ADLs  5             Severe disability;bedridden/incontinent   6               Score:  4  Pertinent Medical History: arthritis, hx catarcts  Precautions:  Falls, spinal precautions    Home Living: Pt lives with , sister & nephew in ground level apt. [address], 0 handrail- Sister report high step up into the apt building  Bathroom setup: tub-curtain.  Per pt & sister she sponged bathed prior to admission due to fear of falling in the shower  Equipment owned: rollator    Prior Level of Function: assistance w/ bathing, independent with additional ADLs, assistance with IADLs; ambulated w/ rollator  Driving: no    Pain Level: Pt c/o 6/10 pain in her neck & throat     Cognition: A&O: 3/4; Follows 1-2 step directions- impulsivity noted during OT session     Memory:  Fair   Sequencing:  fair    Problem solving:  fair -   Judgement/safety:  fair -     Functional Assessment:   Initial Eval Status  Date: 19 Treatment Status  Date: Short Term Goals  Treatment frequency: PRN    Feeding Min A- assist to open small packages & containers   Modified Floyd    Grooming Minimal Assist   SBA standing   UB Dressing Mod A  Min A   LB Dressing Mod A   Min A life.    mod  Profile and History- med (extensive chart review)  Assessment of Occupational Performance and Identification of Deficits- med  Clinical Decision Making- med    Evaluation time includes thorough review of current medical information, gathering information on past medical history/social history and prior level of function, completion of standardized testing/informal observation of tasks, assessment of data, and development of POC/Goals. Assessment of current deficits   Functional mobility [x]  ADLs [x] Strength [x]  Cognition []  Functional transfers  [x] IADLs [x] Safety Awareness [x]  Endurance [x]  Fine Motor Coordination [x] Balance [x] Vision/perception [] Sensation []   Gross Motor Coordination [x] ROM [x] Delirium []                  Motor Control [x]    Plan of Care:   ADL retraining [x]   Equipment needs [x]   Neuromuscular re-education [x] Energy Conservation Techniques [x]  Functional Transfer training [x] Patient and/or Family Education [x]  Functional Mobility training [x]  Environmental Modifications [x]  Cognitive re-training []   Compensatory techniques for ADLs [x]  Splinting Needs []   Positioning to improve overall function [x]   Therapeutic Activity [x]  Therapeutic Exercise  [x]  Visual/Perceptual: []    Delirium prevention/treatment  []   Other:  []    Rehab Potential: Good for established goals    Patient / Family Goal:  \"Go to the bathroom & wipe myself. \"     Patient and/or family were instructed on diagnosis, prognosis/goals and plan of care. Family demonstrated good- understanding. Family involved in pt's care    [] Malnutrition indicators have been identified and nursing has been notified to ensure a dietitian consult is ordered.        Mod Evaluation completed +  Timed Treatment:  15 minutes  Tx Time in: 1355  TxTime out: [address] OTR/L [id number]

## 2019-06-04 NOTE — CARE COORDINATION
Spoke with therapy. They are recommending ARU. Met with the patient, her sister Tylor Polanco, and her  Shan Pagan at the bedside. Discussed input fromn therapy and recommendation for ARU for rehab prior to returning home. Choices offered, they would like to stay here at [facility]. PM&R consult obtained from Dr Marissa Gonzalez and call to [facility], liaison with ARU at [facility]. Therapy to place notes soon. Await acceptance and initiation of pre-cert. Will continue to follow.      Oliver Katz.

## 2019-06-04 NOTE — PROGRESS NOTES
Physical Therapy  Initial Assessment     Name: Bita Milligan  : 1955  MRN: [id number]    Date of Service: 2019    Evaluating PT: Kalia Maldonado, PT, DPT [id number]    Room #:  8029/5334-Z    Diagnosis: Cervical herniated disc with radiculopathy, cervical fusion  Precautions: Falls, spinal neutral, soft cervical collar, bed alarm, impulsive  Procedures: Anterior cervical fusion C3-C4, C4-C5, and corpectomy C4 (6/3)      Pt lives with sister and  on first floor of apartment with 1 stair and 0 rails to enter. All living activities are on one floor, patient has assistance available at all times. Pt ambulated with rollator walker Mod Independent prior to admission. Initial Evaluation  Date: 19 Treatment Date: Short Term/ Long Term   Goals   AM-PAC 6 Clicks      Was pt agreeable to Eval/treatment? yes     Does pt have pain? Yes, mild pain around anterior neck incision from surgery and in C-Spine     Bed Mobility  Rolling: Min A  Supine to sit: Min A  Sit to supine: NT  Scooting: Min A toward EOB  Rolling: Supervision  Supine to sit: Supervision  Sit to supine: Supervision  Scooting: Supervision   Transfers Sit to stand: Min A  Stand to sit: Min A  Stand pivot: Min A with [employer]  Sit to stand: Supervision  Stand to sit: Supervision  Stand pivot: Supervision with [employer]   Ambulation   15 feet with [employer] with Min A  50 feet with [employer] with Supervision   Stair negotiation: NT  1 stair with 1 rail with SBA   ROM B UE: See OT Note  B LE: WFL     Strength B UE: See OT Note  B LE: 4/5 Globally     Balance Sitting EOB: Supervision  Dynamic standing: Min A with [employer]  Sitting EOB: Independent  Dynamic standing: Supervision with [employer]     Pt is A & O x: x4 person, place, month/year, and situation. Sensation: Pt reports B UE numbness and tingling. Coordination: NT   Edema: NT    ASSESSMENT    Patient education  Pt educated on cervical precautions.     Patient response to education:   Pt verbalized understanding Pt demonstrated skill Pt requires further education in this area   yes partial yes     Comments:  Pt was supine in bed upon entry, was agreeable to PT. Pt is very motivated and impulsive. Pt was on 2 L O2/min and O2 sat was 95% at rest prior to activity. Pt was removed from O2 and saturation levels ranged from 93-97% on RA. Vitals and symptoms monitored throughout session; RN cleared pt to remain on RA. Pt was able to stand and ambulate, reported fatigue, nausea, and neck irritation afterwards. Pt ambulated with slow, mildly unsteady gait and required occasional assistance for [employer] management in close quarters. With sitting, nausea and fatigue subsided. Pt was left with OT seated in chair next to bed with all needs met. RN and CM notified of pt's performance. Pts/family goals:  Return to home with use of [employer]. Patient and or family understand(s) diagnosis, prognosis, and plan of care:  yes    PLAN  PT care will be provided in accordance with the objectives noted above. Whenever appropriate, clear delegation orders will be provided for nursing staff. Exercises and functional mobility practice will be used as well as appropriate assistive devices or modalities to obtain goals. Patient and family education will also be administered as needed. Frequency of treatments: 2-5x/week x 3-5 days.     Time in: 1325  Time out: Yeni [address], [address] [facility], D[place]  [id number]

## 2019-06-04 NOTE — PROGRESS NOTES
Acute Rehab Pre-Admission Screen      Referral date: 6/4/19  Onset/Hospital Admit Date: 6/3/2019  5:55 AM  Current Location: [address] [place] [address]  Admitting Diagnosis: Fusion of cervical spine   Surgery /  Date:  6/3/19 ANTERIOR CERVICAL FUSION C3-C4, C4-C5  AND CORPECTOMY C4  WITH PLATES, SCREWS, BONE GRAFT, SSEP -- GLOBUS  Name: William Shore  YOB: 1955  Age: 71 y.o. Address: [address] [place] [address].  [address] [place] [address]  Home Phone: [phone] (home)  Social Security #:     Sex: female  Race:   Marital Status:    Ethnic/Cultural/Denominational Considerations: Jainism    Advanced Directives: [x] Full Code  [] [address] [] Medications only       [] Living Will  [] DPOA      []Organ donor      [] No mechanical breathing or ventilation     [] no tube feeding, nutrition or hydration      [x] Patient does not have advanced directives or living will      COVERAGE INFORMATION   Primary Insurer: [employer]  Payor Contact:   Phone:   FAX:  Authorization #:   Medicare #:   Medicaid #:   Verified coverage: [] Patient  [] Family/caregiver    [x] financial department [] insurance carrier    PHYSICIAN / Mai Torrez INFORMATION    Referring Physician: Soniya Monahan  Attending Physician: Dl Peace MD  Primary Care Physician: Alan Ortega MD  Consultants/Opinions (see full consult notes on chart): none    SOCIAL INFORMATION    Primary  Contact: Memory Graft  Relationship: spouse  Primary Phone: [phone]    Secondary  Contact: Mary Ann Dawson  Relationship: sister  Primary Phone: [phone]      Previous Community Services: family assisted   Caregiver available: [x] Yes [] No Hours per day available: as needed  Patient previously employed:  [] Yes [] Part Time [] Full Time [x] No [] Retired  Occupation/Profession: unemployed  Prior living arrangements: [x] Home  [] Assisted living  [] SNF [] Other  Lived with:  [] Alone  [] Spouse  [x] Family  [] Other  Lived with: Harvey Sever phone: [phone]  Home:  [address] [employer] home  1 entry steps  Rails: 0   Bedroom: [x] 1st floor  [] 2nd floor    Bathroom:  [x] 1st floor  [] 2nd floor    Prior Functional Level: Independent for: ADLs except bathing, ambulated with rollator  Assistance for: assist with bathing, IADLs  Dependent for: driving  Dominant hand: [x] Right  [] Left    Previous Home Equipment:  [] Cane [] Grab bars [] Orthotic / prosthetic   [] Shower chair [] Tub bench  [] 3-in-1 Commode [] Long handle sponge   [] Oxygen [] Sock aide  [] Wheelchair  [] motorized wc/scooter  [] Wheelchair cushion   [] Crutches [] Long handle shoehorn  [] Reachers [] Toilet seat elevator  [] Walker(wheeled)   [x]rollator [] Mechanical lift    [] None of the above    MEDICAL UPDATE:  History of present admission: Pt with cervical stenosis and admitted on 6/3/19 for ANTERIOR CERVICAL FUSION C3-C4, C4-C5  AND CORPECTOMY C4. Past Medical:  Past Medical History:   Diagnosis Date    Arthritis         Influenza vaccine given:  [] yes   [] no Date:  [] n/a (out of season)    Has patient had 2 or more falls in the past year? [] yes   [] no Date  Has patient had any fall with injury in the past year? [] yes   [] no  [] unknown  Has patient had major surgery during past 100 days prior to admission?    [] yes   [] no Type/ Date:       Surgical History:  Past Surgical History:   Procedure Laterality Date    CATARACT REMOVAL      CERVICAL FUSION N/A 6/3/2019    ANTERIOR CERVICAL FUSION C3-C4, C4-C5  AND CORPECTOMY C4  WITH PLATES, SCREWS, BONE GRAFT, SSEP -- GLOBUS performed by Livan Resendiz MD at [address]         Current Co-morbidities:  [] Alzheimer's   [] Dysphasia     [] Parkinsonism  [] Amputation   [] Edema of larynx  [] Peripheral artery disease   [] Anemia      [] Encephalopathy  [] Peripheral vascular disease  [] Anxiety   [] Gangrene   [] Pneumonia  [] Aphasia   [] Gout    [] Polyneuropathy  [x] Arthritis   [] Heart Failure (diastolic) [] Post-polio syndrome  [] Contact   [] Respiratory   [] Protective     [] Droplet    [] Weight Bearing precautions:         [] Non Weight Bearing        [] Toe Touch Weight Bearing        [] Partial Weight Bearing        [] Weight Bearing as Tolerated        [] Fall Risk:   [] Recent history of falls [] Falls risk level (Martinez Scale):      [] Bed Alarm    [] Do not leave alone in the bathroom    [] Chair Alarm    [] Cognitive impairment      [] One to One supervision  [] Sitter / Maria Fernanda Aver sitter   [] Safety enclosure bed  [] Decreased balance     SPECIAL REHABILITATION NEEDS:   [] IV Therapy: [] PRN Adapter  [] Midline  [] PICC      [] Central Line    [] TPN       [] Oxygen: [] Trach [] Bi-PAP [] CPAP  [] Nasal cannula  [] Liters:     [] Wound Care:   [] Pressure ulcers(stage and location)    [] Wound vac   [] Wound or incision care    [] Pain Management (level of pain, meds):      [] Incontinence Bladder [] Huerta  Insertion date:   []Hemodialysis and  Frequency:   [] Incontinence Bowel    [] Last bowel movement :    [] Substance use history:  [] Tobacco  [] Alcohol  [] Other     [] Ethnic  [] Cultural  [] Spiritual  [] Language [] Needs  [] Other than English  [] Hearing Impaired  [] Visually Impaired  [] Speaking Impaired  [] Blind    [] Special equipment:  [] Devices/Splints  [] Type   [] Brace   [] Type  [] Bariatric bed  [] Extra wide commode  [] Extra wide wheelchair [] Extra wide walker  [] Tariq walker  [] Tariq wheelchair  [] Transfer lift    [] Other equipment     FUNCTIONAL STATUS PT / [place] / Soha Arroyo:  FIM / EVAL Discipline Initial: 6/4/19 Follow Up:  Current:    Eating OT Minimum assistance       Grooming OT Minimum assistance       Bathing OT Moderate Assist       Dressing Upper Extremity OT Moderate Assist       Dressing Lower Extremity OT Moderate Assist       Toileting OT nt     Toilet Transfers OT Minimum assistance       Tub/Shower Transfers OT nt     Homemaking OT nt     Bed Mobility PT Minimum assistance Bed/Wheelchair Transfers PT Minimum assistance       Locomotion Walk / Wheelchair  Device:  Distance: PT 15 feet with [employer] with [employer] A     Endurance PT      Expression SP      Social Interaction SP      Problem Solving SP      Memory SP      Comprehension SP      Swallowing SP      Bowel Management NSG      Bladder Management NSG        Comments on Functional Status:     [x] Able to participate a minimum of 3 hours per day of therapy intervention    Required treatments/services: [x] Rehabilitation nursing [] Dietitian / nurtition                 [x] Case management  [] Respiratory Therapy      [x] Social work   [] Other     Required Therapy:  Therapy Hours per Day Days per Week Therapeutic Interventions Required   [x] Physical Therapy      [x] Occupational Therapy      [] Speech Pathology      [] Prosthetics / Orthotics       []         Anticipated Discharge Plan:   Anticipated DME Needs:  [] Home     [] Commode   [] Alone    [] Wheelchair   [] Supervised    [] Walker   [] Assist    [] Oxygen  [] LTAC     [] Hospital Bed  [] Assisted Living    [] Ramp  [] [facility]   [] To Be Determined      Anticipated Home Health Services:  Anticipated Outpatient Services:  [] PT       [] PT  [] OT      [] OT  [] Speech     [] Speech  [] Nursing     [] Dialysis  [] Aide      [] To Be Determined  [] To Be Determined    Anticipated support group:  [] Amputation  [] Multiple Sclerosis  [] Stroke  [] Brain Injury  [] Spinal cord injury  [] Other     Barriers to discharge:     Discharge Support: [] Patient lives alone and does not have a caregiver available     [] Patient has a caregiver available     [] Discharge plan has been verified with patient's caregiver    [] Caregiver is in agreement with the discharge plan     Expected functional status for safe discharge:     Patient/support person goals:     Expected length of stay:     Discussed expected length of stay and agreeable to IRF plan: [] Yes   [] No    Impairment ____________________________________________________________________  ____________________________________________________________________  ____________________________________________________________________  ____________________________________________________________________  ____________________________________________________________________                                                                                                                                                                                                                                                                                                                                            Physician Signature:_____________________________________    Print Signature:_________________________________________    Date:     Time:

## 2019-06-04 NOTE — PROGRESS NOTES
Met with pt to discuss ARU. She is agreeable. Discussed with Dr. Tiera Ross and will accept pt to ARU pending precert, medical stability and completion of testing. Will initiate pre-cert today.

## 2019-06-04 NOTE — PROGRESS NOTES
1. Interval anterior cervical fusion with bone graft placement with   screws noted at the C3 and C6 vertebral body. Postoperative changes   and air noted at the postoperative site in the right paratracheal   region.

## 2019-06-05 PROCEDURE — 6370000000 HC RX 637 (ALT 250 FOR IP): Performed by: NEUROLOGICAL SURGERY

## 2019-06-05 PROCEDURE — 97530 THERAPEUTIC ACTIVITIES: CPT

## 2019-06-05 PROCEDURE — G0378 HOSPITAL OBSERVATION PER HR: HCPCS

## 2019-06-05 RX ADMIN — METAXALONE 800 MG: 800 TABLET ORAL at 16:06

## 2019-06-05 RX ADMIN — METAXALONE 800 MG: 800 TABLET ORAL at 08:29

## 2019-06-05 RX ADMIN — METAXALONE 800 MG: 800 TABLET ORAL at 23:23

## 2019-06-05 ASSESSMENT — PAIN SCALES - GENERAL
PAINLEVEL_OUTOF10: 0
PAINLEVEL_OUTOF10: 5

## 2019-06-05 ASSESSMENT — PAIN DESCRIPTION - FREQUENCY: FREQUENCY: CONTINUOUS

## 2019-06-05 ASSESSMENT — PAIN DESCRIPTION - LOCATION: LOCATION: NECK

## 2019-06-05 ASSESSMENT — PAIN DESCRIPTION - ORIENTATION: ORIENTATION: ANTERIOR;POSTERIOR

## 2019-06-05 ASSESSMENT — PAIN - FUNCTIONAL ASSESSMENT: PAIN_FUNCTIONAL_ASSESSMENT: PREVENTS OR INTERFERES SOME ACTIVE ACTIVITIES AND ADLS

## 2019-06-05 ASSESSMENT — PAIN DESCRIPTION - PROGRESSION: CLINICAL_PROGRESSION: GRADUALLY IMPROVING

## 2019-06-05 ASSESSMENT — PAIN DESCRIPTION - PAIN TYPE: TYPE: ACUTE PAIN;SURGICAL PAIN

## 2019-06-05 ASSESSMENT — PAIN DESCRIPTION - ONSET: ONSET: ON-GOING

## 2019-06-05 ASSESSMENT — PAIN DESCRIPTION - DESCRIPTORS: DESCRIPTORS: ACHING;SORE;DISCOMFORT

## 2019-06-05 NOTE — PRE-CERTIFICATION NOTE
Precertification initiated and clinicals faxed to payor source. Will advise when determination is made.

## 2019-06-05 NOTE — PRE-CERTIFICATION NOTE
Have placed 5 phone calls to payor source and spoke to 4 different people regarding precertification. Left voice message on the precertification line requesting to speak to a representative in that department to proceed with precert as no other person was able to assist in this endeavor. Will await return call. Luanne Longoria ,  notified of this situation.

## 2019-06-05 NOTE — PROGRESS NOTES
Physical Therapy  Facility/Department: [address] NEURO SPINE  Daily Treatment Note  NAME: Clarissa Young  : 1955  MRN: [id number]    Date of Service: 2019    Evaluating PT: Leonardo Ken, PT, DPT [id number]     Room #:  4030/8152-E     Diagnosis: Cervical herniated disc with radiculopathy, cervical fusion  Precautions: Falls, spinal neutral, soft cervical collar, bed alarm, impulsive  Procedures: Anterior cervical fusion C3-C4, C4-C5, and corpectomy C4 (6/3)        Pt lives with sister and  on first floor of apartment with 1 stair and 0 rails to enter. All living activities are on one floor, patient has assistance available at all times. Pt ambulated with rollator walker Mod Independent prior to admission.                Initial Evaluation  Date: 19 Treatment Date: 19 Short Term/ Long Term   Goals   AM-PAC 6 Clicks 59/99       Was pt agreeable to Eval/treatment? yes  yes     Does pt have pain?  Yes, mild pain around anterior neck incision from surgery and in C-Spine Yes, pain around neck incision and in C-spine, tightness in L temporal region; RN notified      Bed Mobility  Rolling: Min A  Supine to sit: Min A  Sit to supine: NT  Scooting: Min A toward EOB Rolling: Min A  Supine to sit: Min A  Sit to supine: Min A  Scooting: Min A toward EOB Rolling: Supervision  Supine to sit: Supervision  Sit to supine: Supervision  Scooting: Supervision   Transfers Sit to stand: Min A  Stand to sit: Min A  Stand pivot: Min A with [address] Sit to stand: Min A  Stand to sit: Min A  Stand pivot: Min A with [address] Sit to stand: Supervision  Stand to sit: Supervision  Stand pivot: Supervision with [address]   Ambulation   15 feet with [address] with Min A 50 feet with [address] with Min A 200 feet with [address] with Supervision   Stair negotiation: NT NT 1 stair with 1 rail with SBA   ROM B UE: See OT Note  B LE: WFL NT     Strength B UE: See OT Note  B LE: 4/5 Globally NT     Balance Sitting EOB: Supervision  Dynamic standing: Min A with [address] Sitting EOB: SBA  Dynamic Standing: Min A with [employer] Sitting EOB: Independent  Dynamic standing: Supervision with [employer]     Patient education  Pt educated on safety during transfers. Patient response to education:   Pt verbalized understanding Pt demonstrated skill Pt requires further education in this area   yes partial yes     Comments:  Pt was asleep in bed supine upon entry, awoke and they were agreeable to treatment. Pt appeared to be fatigued but was motivated to perform activities. Pt was instructed on proper supine to sit and sit to stand transfers, and required moderate cueing to perform; assistance required for trunk mobility. Pt was able to ambulate down hallway with [employer], but deviates to the right and was able to correct with cueing for [employer] management. Pt was impulsive and required cueing for safety. Pt was left sitting in bed with nurse and with all needs met. Patient is making fair progress toward established physical therapy goals. Continue with physical therapy current plan of care.     Time in: 1515  Time out: 10 [facility], [address] [facility], [place]  [id number]

## 2019-06-05 NOTE — PROGRESS NOTES
Admit Date: 6/3/2019    Subjective:     Still with tingling hands     Objective:     Patient Vitals for the past 8 hrs:   BP Temp Temp src Pulse Resp SpO2   06/05/19 0540 138/64 98.4 °F (36.9 °C) Temporal 80 18 --   06/04/19 2315 (!) 124/56 99.2 °F (37.3 °C) Oral 74 16 95 %     I/O last 3 completed shifts: In: 720 [P.O.:720]  Out: 500 [Urine:500]  I/O this shift:  In: 120 [P.O.:120]  Out: -     HEENT: Normal  NECK: Thyroid normal. No carotid bruit. No lymphphadenopathy. CVS: RRR  RS: Clear. No wheeze. No rhonchi. Good airflow bilaterally. ABD: Soft. Non tender. No mass. Normal BS. EXT: No edema. Non tender. Pulses present. Skin intact.   NEURO: no focal deficit       Scheduled Meds:   metaxalone  800 mg Oral TID     Continuous Infusions:   dextrose 5 % and 0.45 % NaCl         CBC with Differential:    Lab Results   Component Value Date    WBC 5.2 05/30/2019    RBC 4.94 05/30/2019    HGB 15.3 05/30/2019    HCT 45.8 05/30/2019     05/30/2019    MCV 92.7 05/30/2019    MCH 31.0 05/30/2019    MCHC 33.4 05/30/2019    RDW 13.2 05/30/2019    LYMPHOPCT 36.3 11/18/2018    MONOPCT 7.6 11/18/2018    BASOPCT 0.5 11/18/2018    MONOSABS 0.44 11/18/2018    LYMPHSABS 2.11 11/18/2018    EOSABS 0.01 11/18/2018    BASOSABS 0.03 11/18/2018     CMP:    Lab Results   Component Value Date     11/18/2018    K 4.0 11/18/2018     11/18/2018    CO2 28 11/18/2018    BUN 18 05/13/2019    CREATININE 0.7 05/13/2019    GFRAA >60 05/13/2019    LABGLOM >60 05/13/2019    PROT 6.6 11/18/2018    LABALBU 3.8 11/18/2018    CALCIUM 7.7 11/18/2018    BILITOT 0.7 11/18/2018    ALKPHOS 80 11/18/2018    AST 24 11/18/2018    ALT 26 11/18/2018     PT/INR:  No results found for: PROTIME, INR    Assessment:     Active Problems:    Fusion of spine of cervical region      Plan:   Continue same await rehab.transfer

## 2019-06-05 NOTE — CARE COORDINATION
Spoke with Yonas Farfan, liaison with ARU at [facility]. Authorization initiated for pre-cert for transition to ARU. Await authorization for the patient to transition to rehab.      Annette Duarte.

## 2019-06-06 PROCEDURE — 97530 THERAPEUTIC ACTIVITIES: CPT

## 2019-06-06 PROCEDURE — G0378 HOSPITAL OBSERVATION PER HR: HCPCS

## 2019-06-06 PROCEDURE — 6370000000 HC RX 637 (ALT 250 FOR IP): Performed by: NEUROLOGICAL SURGERY

## 2019-06-06 RX ADMIN — METAXALONE 800 MG: 800 TABLET ORAL at 14:48

## 2019-06-06 RX ADMIN — METAXALONE 800 MG: 800 TABLET ORAL at 20:34

## 2019-06-06 RX ADMIN — METAXALONE 800 MG: 800 TABLET ORAL at 10:00

## 2019-06-06 ASSESSMENT — PAIN DESCRIPTION - ORIENTATION: ORIENTATION: ANTERIOR

## 2019-06-06 ASSESSMENT — PAIN DESCRIPTION - ONSET: ONSET: ON-GOING

## 2019-06-06 ASSESSMENT — PAIN SCALES - GENERAL: PAINLEVEL_OUTOF10: 4

## 2019-06-06 ASSESSMENT — PAIN DESCRIPTION - LOCATION: LOCATION: NECK

## 2019-06-06 ASSESSMENT — PAIN DESCRIPTION - PAIN TYPE: TYPE: ACUTE PAIN;SURGICAL PAIN

## 2019-06-06 ASSESSMENT — PAIN - FUNCTIONAL ASSESSMENT: PAIN_FUNCTIONAL_ASSESSMENT: PREVENTS OR INTERFERES SOME ACTIVE ACTIVITIES AND ADLS

## 2019-06-06 ASSESSMENT — PAIN DESCRIPTION - PROGRESSION: CLINICAL_PROGRESSION: GRADUALLY IMPROVING

## 2019-06-06 ASSESSMENT — PAIN DESCRIPTION - FREQUENCY: FREQUENCY: CONTINUOUS

## 2019-06-06 ASSESSMENT — PAIN DESCRIPTION - DESCRIPTORS: DESCRIPTORS: ACHING;DULL;SORE

## 2019-06-06 NOTE — PROGRESS NOTES
Strength in the extremities improving. Rehab placement in progress. Incision healthy.   Feeling better

## 2019-06-06 NOTE — PROGRESS NOTES
Spoke with representative at [employer]. Patient does not meet medical necessity for an ARU stay. Will consider [facility]  Level of care.

## 2019-06-06 NOTE — PROGRESS NOTES
Admit Date: 6/3/2019    Subjective:     Doing fine still with tingling     Objective:     Patient Vitals for the past 8 hrs:   BP Temp Temp src Pulse Resp SpO2   06/06/19 0800 (!) 116/59 97.6 °F (36.4 °C) Temporal 84 16 93 %   06/06/19 0318 138/61 98.5 °F (36.9 °C) Temporal 70 16 --     I/O last 3 completed shifts: In: 240 [P.O.:240]  Out: -   No intake/output data recorded. HEENT: Normal  NECK: Thyroid normal. No carotid bruit. No lymphphadenopathy. CVS: RRR  RS: Clear. No wheeze. No rhonchi. Good airflow bilaterally. ABD: Soft. Non tender. No mass. Normal BS. EXT: No edema. Non tender. Pulses present. Skin intact.   NEURO: no focal deficit       Scheduled Meds:   metaxalone  800 mg Oral TID     Continuous Infusions:   dextrose 5 % and 0.45 % NaCl         CBC with Differential:    Lab Results   Component Value Date    WBC 5.2 05/30/2019    RBC 4.94 05/30/2019    HGB 15.3 05/30/2019    HCT 45.8 05/30/2019     05/30/2019    MCV 92.7 05/30/2019    MCH 31.0 05/30/2019    MCHC 33.4 05/30/2019    RDW 13.2 05/30/2019    LYMPHOPCT 36.3 11/18/2018    MONOPCT 7.6 11/18/2018    BASOPCT 0.5 11/18/2018    MONOSABS 0.44 11/18/2018    LYMPHSABS 2.11 11/18/2018    EOSABS 0.01 11/18/2018    BASOSABS 0.03 11/18/2018     CMP:    Lab Results   Component Value Date     11/18/2018    K 4.0 11/18/2018     11/18/2018    CO2 28 11/18/2018    BUN 18 05/13/2019    CREATININE 0.7 05/13/2019    GFRAA >60 05/13/2019    LABGLOM >60 05/13/2019    PROT 6.6 11/18/2018    LABALBU 3.8 11/18/2018    CALCIUM 7.7 11/18/2018    BILITOT 0.7 11/18/2018    ALKPHOS 80 11/18/2018    AST 24 11/18/2018    ALT 26 11/18/2018     PT/INR:  No results found for: PROTIME, INR    Assessment:     Active Problems:    Fusion of spine of cervical region    Depression       Plan:   Continue same as per neurosurgery

## 2019-06-06 NOTE — CARE COORDINATION
We are still awaiting the auth for the patient to go to acute rehab , if gets denied the patient can return home as she  lives with her  and sister in a 1 story home with 1 step to get in. The patient ambulates with a Rollator at home.  I will follow Thanks Nahum-Juan M

## 2019-06-06 NOTE — PROGRESS NOTES
Modified Lares    Grooming Minimal Assist  Mod A  Mod A to comb hair while seated, decreased by hand . Min A to wash hands while standing at sink.  SBA standing   UB Dressing Mod A Mod A  To don/doff gown while seated  Min A   LB Dressing Mod A  Min A  To don/doff socks while seated EOB.  Min A with AE    Bathing Moderate Assist  Mod A  Per eval Minimal Assist    Toileting NT  Min A- toilet transfer  Cuing for safety  Minimal Assist    Bed Mobility  Sit to supine: NT N/T  Rolling: Supervision   Supine to sit: Stand by Assist   Sit to supine: Stand by Assist    Functional Transfers Min A using a rw & vc's for safety & insight SBA- sit<->stand  Cuing for safety  CGA with a rw & in vc's for safety   Functional Mobility Min A with a rw & vc's for safety & to ensure G  with her R hand Min A  To and from the bathroom using no AD  CGA with a rw   Balance Sitting: SBA (static)  Min A (dynamic)  Standing: Min A w/ ww Sitting: SBA (static)  CGA (dynamic)  Standing: Min A      Activity Tolerance F Fair  Fair+   Visual/  Perceptual WFL                     Comments: Upon arrival pt standing at sink. Pt educated on cervical precautions, demonstrating fair understanding. Pt educated on techniques to increase independence and safety during ADL's and functional transfers. At end of session pt left seated in bedside chair, call light within reach. · Pt has made fair progress towards set goals.      · Continue with current plan of care    Time in: 8:30  Time out: 8:45  Total Tx Time: 15 mins    Rico Tony

## 2019-06-06 NOTE — PROGRESS NOTES
Physical Therapy  Facility/Department: [address] NEURO SPINE  Daily Treatment Note  NAME: Arnoldo Holland  : 1955  MRN: [id number]    Date of Service: 2019    Evaluating PT: Harley Pa, PT, DPT [id number]     Room #:  4434 B      Diagnosis: Cervical herniated disc with radiculopathy, cervical fusion  Precautions: Falls, spinal neutral, soft cervical collar, bed alarm, impulsive  Procedures: Anterior cervical fusion C3-C4, C4-C5, and corpectomy C4 (6/3)        Pt lives with sister and  on first floor of apartment with 1 stair and 0 rails to enter. All living activities are on one floor, patient has assistance available at all times. Pt ambulated with rollator walker Mod Independent prior to admission.                Initial Evaluation  Date: 19 Treatment Date: 19 Short Term/ Long Term   Goals   AM-PAC 6 Clicks 96/35  63/27     Was pt agreeable to Eval/treatment? yes  yes     Does pt have pain?  Yes, mild pain around anterior neck incision from surgery and in C-Spine Yes, pain around cervical rates pain as 4/10      Bed Mobility  Rolling: Min A  Supine to sit: Min A  Sit to supine: NT  Scooting: Min A toward EOB Rolling: Min A  Supine to sit: NT  Sit to supine: Min A  Scooting: SBA along EOB Rolling: Supervision  Supine to sit: Supervision  Sit to supine: Supervision  Scooting: Supervision   Transfers Sit to stand: Min A  Stand to sit: Min A  Stand pivot: Min A with [employer] Sit to stand: Min A  Stand to sit: Min A  Stand pivot: Min A with [employer] Sit to stand: Supervision  Stand to sit: Supervision  Stand pivot: Supervision with [employer]   Ambulation   15 feet with [employer] with Min A 50 feet x2 with [employer] with Min A 200 feet with [employer] with Supervision   Stair negotiation: NT 2 steps step-to L rail min A ascending; ModA descending 1 stair with 1 rail with SBA   ROM B UE: See OT Note  B LE: WFL NT     Strength B UE: See OT Note  B LE: 4/5 Globally BL LE grossly 4/5     Balance Sitting EOB: Supervision  Dynamic standing: Min A with [employer] Sitting EOB: SBA  Dynamic Standing: Min A with [employer] Sitting EOB: Independent  Dynamic standing: Supervision with [employer]     Pt is alert and oriented x3    Pt performed therapeutic exercise of the following: LAQ, ankle pumps, sit <> stand x3      Patient education  Pt educated on cervical spinal precautions, hand placement with transfers, safety, seating posture and posture in bed. Patient response to education:   Pt verbalized understanding Pt demonstrated skill Pt requires further education in this area   yes yes yes     Comments:  Patient presents inclined in bed with cervical collar on. Patient has c/o dulled sensory perception in both UE to fingers and LE to toes. Patient ambulated 50 feet with seated rest prior to performing 2 stairs with L rail with greater difficulty descending. Patient ambulated back to bedside and performed sit<>stand x2 to EOB cuing for hand placement. Pt mildly unsteady with gait. Patient left inclined in bed in semi-chair position with pillow support for both arms, cervical collar in place; call light left by patient    Trial 4\" platform step next session. Patient is making good progress toward established physical therapy goals. Continue with physical therapy current plan of care.     Time in: 40 [facility]  Time out: 54 [address]

## 2019-06-07 VITALS
HEART RATE: 74 BPM | OXYGEN SATURATION: 99 % | TEMPERATURE: 97 F | DIASTOLIC BLOOD PRESSURE: 57 MMHG | HEIGHT: 55 IN | BODY MASS INDEX: 35.64 KG/M2 | RESPIRATION RATE: 18 BRPM | SYSTOLIC BLOOD PRESSURE: 122 MMHG | WEIGHT: 154 LBS

## 2019-06-07 PROCEDURE — G0378 HOSPITAL OBSERVATION PER HR: HCPCS

## 2019-06-07 PROCEDURE — 6370000000 HC RX 637 (ALT 250 FOR IP): Performed by: NEUROLOGICAL SURGERY

## 2019-06-07 PROCEDURE — 97530 THERAPEUTIC ACTIVITIES: CPT

## 2019-06-07 RX ORDER — TIZANIDINE 4 MG/1
2 TABLET ORAL EVERY 8 HOURS PRN
Qty: 60 TABLET | Refills: 0 | Status: SHIPPED | OUTPATIENT
Start: 2019-06-07 | End: 2019-06-27

## 2019-06-07 RX ORDER — ACETAMINOPHEN 325 MG/1
650 TABLET ORAL EVERY 4 HOURS PRN
Status: DISCONTINUED | OUTPATIENT
Start: 2019-06-07 | End: 2019-06-07 | Stop reason: HOSPADM

## 2019-06-07 RX ADMIN — ACETAMINOPHEN 650 MG: 325 TABLET, FILM COATED ORAL at 15:42

## 2019-06-07 RX ADMIN — METAXALONE 800 MG: 800 TABLET ORAL at 09:28

## 2019-06-07 RX ADMIN — METAXALONE 800 MG: 800 TABLET ORAL at 14:05

## 2019-06-07 ASSESSMENT — PAIN DESCRIPTION - PROGRESSION: CLINICAL_PROGRESSION: GRADUALLY IMPROVING

## 2019-06-07 ASSESSMENT — PAIN DESCRIPTION - ONSET: ONSET: ON-GOING

## 2019-06-07 ASSESSMENT — PAIN DESCRIPTION - ORIENTATION: ORIENTATION: ANTERIOR

## 2019-06-07 ASSESSMENT — PAIN SCALES - GENERAL
PAINLEVEL_OUTOF10: 4
PAINLEVEL_OUTOF10: 1
PAINLEVEL_OUTOF10: 0

## 2019-06-07 ASSESSMENT — PAIN DESCRIPTION - FREQUENCY: FREQUENCY: INTERMITTENT

## 2019-06-07 ASSESSMENT — PAIN DESCRIPTION - PAIN TYPE: TYPE: ACUTE PAIN;SURGICAL PAIN

## 2019-06-07 ASSESSMENT — PAIN DESCRIPTION - DESCRIPTORS: DESCRIPTORS: ACHING;DULL;SORE

## 2019-06-07 ASSESSMENT — PAIN DESCRIPTION - LOCATION: LOCATION: NECK

## 2019-06-07 NOTE — PROGRESS NOTES
PO#5  ACF C3-C5  Strength in the extremities improving. Incision healthy.   Feeling better   Will be discharged with home care

## 2019-06-07 NOTE — CARE COORDINATION
Received a call  From [facility] that they are not in network with  [facility] ref made to [address] [place] [address] to Gena morataya if acceppted.  I did make a ref to Kendra Leahy for the patients walker all info faxed to Kendra Leahy and the walker will be delivered this pm. Thanks [employer]

## 2019-06-07 NOTE — CARE COORDINATION
The patient insurance denied the patient to go to [facility] and I spoke to the patient and the her  and the plan is for the patient to go home hopefully later today. They are asking for [facility] and a wheeled walker and we will need orders. Ref made to [facility] and a ref was made to Angel Lucas for the wheeled walker to be delivered to the patient s home later tonight.  Thanks Colovore-Juan M

## 2019-06-07 NOTE — PLAN OF CARE
Problem: Falls - Risk of:  Goal: Will remain free from falls  Description  Will remain free from falls  6/6/2019 1011 by Alessandra Tucker RN  Outcome: Met This Shift
Problem: Falls - Risk of:  Goal: Will remain free from falls  Description  Will remain free from falls  Outcome: Met This Shift  Goal: Absence of physical injury  Description  Absence of physical injury  Outcome: Met This Shift     Problem: Pain:  Goal: Pain level will decrease  Description  Pain level will decrease  Outcome: Met This Shift
Problem: Falls - Risk of:  Goal: Will remain free from falls  Description  Will remain free from falls  Outcome: Met This Shift  Goal: Absence of physical injury  Description  Absence of physical injury  Outcome: Met This Shift     Problem: Pain:  Goal: Pain level will decrease  Description  Pain level will decrease  Outcome: Met This Shift  Goal: Control of acute pain  Description  Control of acute pain  Outcome: Met This Shift  Goal: Control of chronic pain  Description  Control of chronic pain  Outcome: Met This Shift
Problem: Falls - Risk of:  Goal: Will remain free from falls  Description  Will remain free from falls  Outcome: Ongoing  Goal: Absence of physical injury  Description  Absence of physical injury  Outcome: Ongoing     Problem: Pain:  Goal: Pain level will decrease  Description  Pain level will decrease  Outcome: Ongoing
Problem: Falls - Risk of:  Goal: Will remain free from falls  Description  Will remain free from falls  Outcome: Ongoing  Goal: Absence of physical injury  Description  Absence of physical injury  Outcome: Ongoing     Problem: Pain:  Goal: Pain level will decrease  Description  Pain level will decrease  Outcome: Ongoing  Goal: Control of acute pain  Description  Control of acute pain  Outcome: Ongoing     Problem: Discharge Planning:  Goal: Discharged to appropriate level of care  Description  Discharged to appropriate level of care  Outcome: Ongoing     Problem: Infection - Surgical Site:  Goal: Will show no infection signs and symptoms  Description  Will show no infection signs and symptoms  Outcome: Ongoing     Problem: Mobility - Impaired:  Goal: Mobility will improve to maximum level  Description  Mobility will improve to maximum level  Outcome: Ongoing  Goal: Able to ambulate independently  Description  Able to ambulate independently  Outcome: Ongoing  Goal: Compliance with physical therapy regimen will improve  Description  Compliance with physical therapy regimen will improve  Outcome: Ongoing  Goal: Able to perform range-of-motion exercises independently  Description  Able to perform range-of-motion exercises independently  Outcome: Ongoing     Problem: Urinary Retention:  Goal: Urinary elimination within specified parameters  Description  Urinary elimination within specified parameters  Outcome: Ongoing     Problem: Venous Thromboembolism:  Goal: Will show no signs or symptoms of venous thromboembolism  Description  Will show no signs or symptoms of venous thromboembolism  Outcome: Ongoing
Ongoing  Goal: Able to ambulate independently  Description  Able to ambulate independently  6/7/2019 1614 by Sravani Muhammad RN  Outcome: Ongoing  6/7/2019 0939 by Sravani Muhammad RN  Outcome: Ongoing  Goal: Compliance with physical therapy regimen will improve  Description  Compliance with physical therapy regimen will improve  Outcome: Ongoing  Goal: Able to perform range-of-motion exercises independently  Description  Able to perform range-of-motion exercises independently  Outcome: Ongoing

## 2019-06-07 NOTE — PROGRESS NOTES
Physical Therapy  Facility/Department: [address] NEURO SPINE  Daily Treatment Note  NAME: Wendy Avina  : 1955  MRN: [id number]     Date of Service: 2019     Evaluating PT: Salome Fernandez PT, DPT [id number]     Room #:  9174 B      Diagnosis: Cervical herniated disc with radiculopathy, cervical fusion  Precautions: Falls, spinal neutral, soft cervical collar, bed alarm, impulsive  Procedures: Anterior cervical fusion C3-C4, C4-C5, and corpectomy C4 (6/3)        Pt lives with sister and  on first floor of apartment with 1 stair and 0 rails to enter. All living activities are on one floor, patient has assistance available at all times. Pt ambulated with rollator walker Mod Independent prior to admission.      Initial Evaluation  Date: 19 Treatment Date: 19 Short Term/ Long Term   Goals   AM-PAC 6 Clicks 96/57       Was pt agreeable to Eval/treatment? yes  yes     Does pt have pain?  Yes, mild pain around anterior neck incision from surgery and in C-Spine Yes,  4/10      Bed Mobility  Rolling: Min A  Supine to sit: Min A  Sit to supine: NT  Scooting: Min A toward EOB Rolling: Min A  Supine to sit: NT  Sit to supine: Min A  Scooting: SBA along EOB Rolling: Supervision  Supine to sit: Supervision  Sit to supine: Supervision  Scooting: Supervision   Transfers Sit to stand: Min A  Stand to sit: Min A  Stand pivot: Min A with [employer] Sit to stand: Min A  Stand to sit: Min A  Stand pivot: Min A with [employer] Sit to stand: Supervision  Stand to sit: Supervision  Stand pivot: Supervision with [employer]   Ambulation   15 feet with [facility] Min A 100 feet x 2 with [facility] Min A   200 feet with [employer] with Supervision   Stair negotiation: NT  1 stair with 1 rail with SBA   ROM B UE: See OT Note  B LE: WFL NT     Strength B UE: See OT Note  B LE: 4/5 Globally BL LE grossly 4/5     Balance Sitting EOB: Supervision  Dynamic standing: Min A with [employer] Sitting EOB: SBA  Dynamic Standing: Min A with [employer] Sitting EOB: Independent  Dynamic standing: Supervision with [employer]      Pt is alert and oriented x3     Pt performed therapeutic exercise of the following: LAQ, ankle pumps, resisted hip ab and hip add.       Patient education  Pt educated on cervical spinal precautions, hand placement with transfers, safety, seating posture and posture in bed.      Patient response to education:   Pt verbalized understanding Pt demonstrated skill Pt requires further education in this area   yes yes yes      Comments:  Patient presents inclined in bed with cervical collar on. Patient continues to report  dulled sensory perception in both UE to fingers and LE to toes. Patient ambulated 100 x 2 reps   Patient ambulated back to bedside and performed BLE exercises with cues for sequencing. Following treatment patient up in bedside chair for lunch. Pt mildly unsteady with gait. Patient is making good progress toward established physical therapy goals.   Continue with physical therapy current plan of care.     Time in: 1 Eddie George  Time out: 1600 [facility], [address] [facility]

## 2019-06-08 NOTE — DISCHARGE SUMMARY
[address]                  [address] [facility]  [facility]                               DISCHARGE SUMMARY    PATIENT NAME: Millie Brito                   :        1955  MED REC NO:   [id number]                            ROOM:       5216  ACCOUNT NO:   [de-identified]                           ADMIT DATE: 2019  PROVIDER:     Syed Hartman MD                  DISCHARGE DATE:  2019    HOSPITAL COURSE:  This 79-year-old female was admitted for surgery on  2019 because of pain in the neck and weakness in the upper and  lower extremities. Her symptoms have progressively worsened over the  past one year. The pain involved both shoulder areas and head. She has  profound weakness in the upper extremities with strength in the right  handgrip 2/5, left handgrip 3/5, biceps and triceps 3/5 on the right,  and 3+/5 on the left. Shoulder abduction and biceps and triceps is 3/5  on the right and 3+/5 on the left, while the shoulder abduction is 2/5  on the left and 3/5 on the right. Dorsiflexion of the right foot 2/5  and 3+/5, and 3/5 dorsiflexion on the left. Hyperreflexia in the legs  _____. Following admission, she underwent corpectomy of C4 and anterior  cervical diskectomy and stabilization C3 to C5 and microscopic  magnification. The surgical area was stabilized with a cervical plate. Postoperatively, she did very well and showed improvement in her  behavior and in the shoulder abduction. The handgrip and strength in  the upper and lower extremities have improved. Incision is healthy. Postop CAT scan the following day after surgery was satisfactory. She is being discharged and will follow up in the office in the near  future, in fact, in the meanwhile, she had been advised to take some  physical therapy. FINAL DIAGNOSES:  Cervical radiculopathy, cervical instability, and  cervical stenosis.     POSTOPERATIVE DIAGNOSES: Cervical radiculopathy, cervical instability,  and cervical stenosis. PROCEDURE:  Anterior cervical discectomy and fusion by C4 corpectomy. She has shown significant improvement in strength in the upper and lower  extremities following the surgery. She will followup in the office for  further management.         Sara Yang MD    D: 06/07/2019 16:05:33       T: 06/07/2019 17:54:06     [email or web address]  Job#: [id number]     Doc#: [id number]    CC:

## 2019-06-14 LAB
BLOOD BANK DISPENSE STATUS: NORMAL
BLOOD BANK DISPENSE STATUS: NORMAL
BLOOD BANK PRODUCT CODE: NORMAL
BLOOD BANK PRODUCT CODE: NORMAL
BPU ID: NORMAL
BPU ID: NORMAL
DESCRIPTION BLOOD BANK: NORMAL
DESCRIPTION BLOOD BANK: NORMAL

## 2022-02-03 ENCOUNTER — APPOINTMENT (OUTPATIENT)
Dept: CT IMAGING | Age: 67
DRG: 177 | End: 2022-02-03
Payer: MEDICARE

## 2022-02-03 ENCOUNTER — APPOINTMENT (OUTPATIENT)
Dept: GENERAL RADIOLOGY | Age: 67
DRG: 177 | End: 2022-02-03
Payer: MEDICARE

## 2022-02-03 ENCOUNTER — HOSPITAL ENCOUNTER (INPATIENT)
Age: 67
LOS: 8 days | Discharge: OTHER INSTITUTION | DRG: 177 | End: 2022-02-11
Attending: EMERGENCY MEDICINE | Admitting: INTERNAL MEDICINE
Payer: MEDICARE

## 2022-02-03 DIAGNOSIS — J18.9 PNEUMONIA DUE TO INFECTIOUS ORGANISM, UNSPECIFIED LATERALITY, UNSPECIFIED PART OF LUNG: ICD-10-CM

## 2022-02-03 DIAGNOSIS — E83.51 HYPOCALCEMIA: Primary | ICD-10-CM

## 2022-02-03 DIAGNOSIS — R17 ELEVATED BILIRUBIN: ICD-10-CM

## 2022-02-03 DIAGNOSIS — E83.42 HYPOMAGNESEMIA: ICD-10-CM

## 2022-02-03 DIAGNOSIS — R62.7 FAILURE TO THRIVE IN ADULT: ICD-10-CM

## 2022-02-03 PROBLEM — E87.8 ELECTROLYTE ABNORMALITY: Status: ACTIVE | Noted: 2022-02-03

## 2022-02-03 LAB
ACETAMINOPHEN LEVEL: <5 MCG/ML (ref 10–30)
ALBUMIN SERPL-MCNC: 3.1 G/DL (ref 3.5–5.2)
ALBUMIN SERPL-MCNC: 3.2 G/DL (ref 3.5–5.2)
ALP BLD-CCNC: 80 U/L (ref 35–104)
ALP BLD-CCNC: 82 U/L (ref 35–104)
ALT SERPL-CCNC: 178 U/L (ref 0–32)
ALT SERPL-CCNC: 181 U/L (ref 0–32)
AMMONIA: 13 UMOL/L (ref 11–51)
AMPHETAMINE SCREEN, URINE: NOT DETECTED
ANION GAP SERPL CALCULATED.3IONS-SCNC: 14 MMOL/L (ref 7–16)
AST SERPL-CCNC: 131 U/L (ref 0–31)
AST SERPL-CCNC: 135 U/L (ref 0–31)
BACTERIA: ABNORMAL /HPF
BARBITURATE SCREEN URINE: NOT DETECTED
BASOPHILS ABSOLUTE: 0.04 E9/L (ref 0–0.2)
BASOPHILS RELATIVE PERCENT: 0.7 % (ref 0–2)
BENZODIAZEPINE SCREEN, URINE: NOT DETECTED
BILIRUB SERPL-MCNC: 1.6 MG/DL (ref 0–1.2)
BILIRUB SERPL-MCNC: 1.6 MG/DL (ref 0–1.2)
BILIRUBIN DIRECT: 0.7 MG/DL (ref 0–0.3)
BILIRUBIN URINE: ABNORMAL
BILIRUBIN, INDIRECT: 0.9 MG/DL (ref 0–1)
BLOOD, URINE: ABNORMAL
BUN BLDV-MCNC: 12 MG/DL (ref 6–23)
CALCIUM IONIZED: 0.71 MMOL/L (ref 1.15–1.33)
CALCIUM SERPL-MCNC: 5.4 MG/DL (ref 8.6–10.2)
CANNABINOID SCREEN URINE: NOT DETECTED
CHLORIDE BLD-SCNC: 100 MMOL/L (ref 98–107)
CLARITY: ABNORMAL
CO2: 27 MMOL/L (ref 22–29)
COCAINE METABOLITE SCREEN URINE: NOT DETECTED
COLOR: YELLOW
CREAT SERPL-MCNC: 0.8 MG/DL (ref 0.5–1)
EOSINOPHILS ABSOLUTE: 0.15 E9/L (ref 0.05–0.5)
EOSINOPHILS RELATIVE PERCENT: 2.6 % (ref 0–6)
EPITHELIAL CELLS, UA: ABNORMAL /HPF
ETHANOL: <10 MG/DL (ref 0–0.08)
FENTANYL SCREEN, URINE: NOT DETECTED
GFR AFRICAN AMERICAN: >60
GFR NON-AFRICAN AMERICAN: >60 ML/MIN/1.73
GLUCOSE BLD-MCNC: 97 MG/DL (ref 74–99)
GLUCOSE URINE: NEGATIVE MG/DL
HCT VFR BLD CALC: 50.1 % (ref 34–48)
HEMOGLOBIN: 17 G/DL (ref 11.5–15.5)
IMMATURE GRANULOCYTES #: 0.02 E9/L
IMMATURE GRANULOCYTES %: 0.3 % (ref 0–5)
KETONES, URINE: ABNORMAL MG/DL
LACTIC ACID: 1.6 MMOL/L (ref 0.5–2.2)
LACTIC ACID: 2.2 MMOL/L (ref 0.5–2.2)
LEUKOCYTE ESTERASE, URINE: ABNORMAL
LYMPHOCYTES ABSOLUTE: 1.59 E9/L (ref 1.5–4)
LYMPHOCYTES RELATIVE PERCENT: 27.8 % (ref 20–42)
Lab: NORMAL
MAGNESIUM: 1.4 MG/DL (ref 1.6–2.6)
MCH RBC QN AUTO: 31.2 PG (ref 26–35)
MCHC RBC AUTO-ENTMCNC: 33.9 % (ref 32–34.5)
MCV RBC AUTO: 91.9 FL (ref 80–99.9)
METHADONE SCREEN, URINE: NOT DETECTED
MONOCYTES ABSOLUTE: 0.5 E9/L (ref 0.1–0.95)
MONOCYTES RELATIVE PERCENT: 8.7 % (ref 2–12)
NEUTROPHILS ABSOLUTE: 3.42 E9/L (ref 1.8–7.3)
NEUTROPHILS RELATIVE PERCENT: 59.9 % (ref 43–80)
NITRITE, URINE: POSITIVE
OPIATE SCREEN URINE: NOT DETECTED
OXYCODONE URINE: NOT DETECTED
PDW BLD-RTO: 15.2 FL (ref 11.5–15)
PH UA: 5.5 (ref 5–9)
PHENCYCLIDINE SCREEN URINE: NOT DETECTED
PLATELET # BLD: 179 E9/L (ref 130–450)
PMV BLD AUTO: 11 FL (ref 7–12)
POTASSIUM REFLEX MAGNESIUM: 4 MMOL/L (ref 3.5–5)
PROTEIN UA: 30 MG/DL
RBC # BLD: 5.45 E12/L (ref 3.5–5.5)
RBC UA: ABNORMAL /HPF (ref 0–2)
REASON FOR REJECTION: NORMAL
REJECTED TEST: NORMAL
SALICYLATE, SERUM: <0.3 MG/DL (ref 0–30)
SARS-COV-2, NAAT: NOT DETECTED
SODIUM BLD-SCNC: 141 MMOL/L (ref 132–146)
SPECIFIC GRAVITY UA: 1.02 (ref 1–1.03)
TOTAL PROTEIN: 6.6 G/DL (ref 6.4–8.3)
TOTAL PROTEIN: 6.7 G/DL (ref 6.4–8.3)
TRICYCLIC ANTIDEPRESSANTS SCREEN SERUM: NEGATIVE NG/ML
TROPONIN, HIGH SENSITIVITY: 13 NG/L (ref 0–9)
TROPONIN, HIGH SENSITIVITY: 22 NG/L (ref 0–9)
UROBILINOGEN, URINE: 4 E.U./DL
WBC # BLD: 5.7 E9/L (ref 4.5–11.5)
WBC UA: ABNORMAL /HPF (ref 0–5)

## 2022-02-03 PROCEDURE — 82077 ASSAY SPEC XCP UR&BREATH IA: CPT

## 2022-02-03 PROCEDURE — 87040 BLOOD CULTURE FOR BACTERIA: CPT

## 2022-02-03 PROCEDURE — 80143 DRUG ASSAY ACETAMINOPHEN: CPT

## 2022-02-03 PROCEDURE — 2580000003 HC RX 258: Performed by: EMERGENCY MEDICINE

## 2022-02-03 PROCEDURE — 83970 ASSAY OF PARATHORMONE: CPT

## 2022-02-03 PROCEDURE — 6360000002 HC RX W HCPCS: Performed by: EMERGENCY MEDICINE

## 2022-02-03 PROCEDURE — 93005 ELECTROCARDIOGRAM TRACING: CPT

## 2022-02-03 PROCEDURE — 83605 ASSAY OF LACTIC ACID: CPT

## 2022-02-03 PROCEDURE — 80076 HEPATIC FUNCTION PANEL: CPT

## 2022-02-03 PROCEDURE — 2140000000 HC CCU INTERMEDIATE R&B

## 2022-02-03 PROCEDURE — 87635 SARS-COV-2 COVID-19 AMP PRB: CPT

## 2022-02-03 PROCEDURE — 0202U NFCT DS 22 TRGT SARS-COV-2: CPT

## 2022-02-03 PROCEDURE — 84484 ASSAY OF TROPONIN QUANT: CPT

## 2022-02-03 PROCEDURE — 80053 COMPREHEN METABOLIC PANEL: CPT

## 2022-02-03 PROCEDURE — 80179 DRUG ASSAY SALICYLATE: CPT

## 2022-02-03 PROCEDURE — 82140 ASSAY OF AMMONIA: CPT

## 2022-02-03 PROCEDURE — 80307 DRUG TEST PRSMV CHEM ANLYZR: CPT

## 2022-02-03 PROCEDURE — 85025 COMPLETE CBC W/AUTO DIFF WBC: CPT

## 2022-02-03 PROCEDURE — 71045 X-RAY EXAM CHEST 1 VIEW: CPT

## 2022-02-03 PROCEDURE — 74177 CT ABD & PELVIS W/CONTRAST: CPT

## 2022-02-03 PROCEDURE — 87150 DNA/RNA AMPLIFIED PROBE: CPT

## 2022-02-03 PROCEDURE — 81001 URINALYSIS AUTO W/SCOPE: CPT

## 2022-02-03 PROCEDURE — 6360000004 HC RX CONTRAST MEDICATION: Performed by: RADIOLOGY

## 2022-02-03 PROCEDURE — 87088 URINE BACTERIA CULTURE: CPT

## 2022-02-03 PROCEDURE — 82330 ASSAY OF CALCIUM: CPT

## 2022-02-03 PROCEDURE — 36415 COLL VENOUS BLD VENIPUNCTURE: CPT

## 2022-02-03 PROCEDURE — 99285 EMERGENCY DEPT VISIT HI MDM: CPT

## 2022-02-03 PROCEDURE — 83735 ASSAY OF MAGNESIUM: CPT

## 2022-02-03 PROCEDURE — 70450 CT HEAD/BRAIN W/O DYE: CPT

## 2022-02-03 RX ORDER — MAGNESIUM SULFATE IN WATER 40 MG/ML
2000 INJECTION, SOLUTION INTRAVENOUS ONCE
Status: COMPLETED | OUTPATIENT
Start: 2022-02-03 | End: 2022-02-04

## 2022-02-03 RX ADMIN — CALCIUM GLUCONATE 1000 MG: 98 INJECTION, SOLUTION INTRAVENOUS at 23:33

## 2022-02-03 RX ADMIN — IOPAMIDOL 90 ML: 755 INJECTION, SOLUTION INTRAVENOUS at 21:41

## 2022-02-03 RX ADMIN — MAGNESIUM SULFATE 2000 MG: 2 INJECTION INTRAVENOUS at 23:34

## 2022-02-03 NOTE — LETTER
[place] [employer] Medicaid  CERTIFICATION OF NECESSITY  FOR NON-EMERGENCY TRANSPORTATION   BY GROUND AMBULANCE      Individual Information   1. Name: Devorah Yeager 2. [place] Medicaid Billing Number:    3. Address: [address]  [address] [place] [address]      Transportation Provider Information   4. Provider Name:    5. [place] [employer] Provider Number:  National Provider Identifier (NPI):      Certification  7. Criteria:  During transport, this individual requires:  [x] Medical treatment or continuous     supervision by an EMT. [] The administration or regulation of oxygen by another person. [] Supervised protective restraint. 8. Period Beginning Date:    5. Length   [x] Not more than 1 day(s)  [] One Year     Additional Information Relevant to Certification   10. Comments or Explanations, If Necessary or Appropriate   Covid pneumonia, Electrolyte abnormality, unable to maintain BUE and unable to sit without assist, decreased balance, decreased functional mobility, Hypocalcemia, Hypomagnesemia, Failure to thrive in adult, Electrolyte abnormality, Elevated bilirubin      Certifying Practitioner Information   11. Name of Practitioner: Larry Ventura MD   12. [place] Medicaid Provider Number, If Applicable:  [address] Provider Identifier (NPI):      Signature Information   14. Date of Signature:  13. Name of Person Signing: Obie Trujillo   12. Signature and Professional Designation:      [facility] [id number]  Rev. 7/2015    [address] [place] [address] Encounter Date/Time: 2/3/2022 [id number] Wal[address] Miguel Baezpatricia Account: [de-identified]    MRN: [id number]    Patient: Devorah Yeager    Contact Serial #: [id number]      ENCOUNTER          Patient Class: I Private Enc?   No Unit RM [facility] Service: Intermediate   Encounter DX: Hypocalcemia [E83.51]   ADM Provider: Larry Ventura MD   Procedure:     ATT Provider: Larry Ventura MD   REF Provider:        Admission DX: Hypocalcemia, Hypomagnesemia, Failure to

## 2022-02-04 LAB
ADENOVIRUS BY PCR: NOT DETECTED
ANION GAP SERPL CALCULATED.3IONS-SCNC: 19 MMOL/L (ref 7–16)
BORDETELLA PARAPERTUSSIS BY PCR: NOT DETECTED
BORDETELLA PERTUSSIS BY PCR: NOT DETECTED
BUN BLDV-MCNC: 10 MG/DL (ref 6–23)
CALCIUM SERPL-MCNC: 6.1 MG/DL (ref 8.6–10.2)
CHLAMYDOPHILIA PNEUMONIAE BY PCR: NOT DETECTED
CHLORIDE BLD-SCNC: 96 MMOL/L (ref 98–107)
CO2: 21 MMOL/L (ref 22–29)
CORONAVIRUS 229E BY PCR: NOT DETECTED
CORONAVIRUS HKU1 BY PCR: NOT DETECTED
CORONAVIRUS NL63 BY PCR: NOT DETECTED
CORONAVIRUS OC43 BY PCR: NOT DETECTED
CREAT SERPL-MCNC: 0.6 MG/DL (ref 0.5–1)
GFR AFRICAN AMERICAN: >60
GFR NON-AFRICAN AMERICAN: >60 ML/MIN/1.73
GLUCOSE BLD-MCNC: 78 MG/DL (ref 74–99)
HUMAN METAPNEUMOVIRUS BY PCR: NOT DETECTED
HUMAN RHINOVIRUS/ENTEROVIRUS BY PCR: NOT DETECTED
INFLUENZA A BY PCR: NOT DETECTED
INFLUENZA B BY PCR: NOT DETECTED
MAGNESIUM: 2 MG/DL (ref 1.6–2.6)
MYCOPLASMA PNEUMONIAE BY PCR: NOT DETECTED
PARAINFLUENZA VIRUS 1 BY PCR: NOT DETECTED
PARAINFLUENZA VIRUS 2 BY PCR: NOT DETECTED
PARAINFLUENZA VIRUS 3 BY PCR: NOT DETECTED
PARAINFLUENZA VIRUS 4 BY PCR: NOT DETECTED
PARATHYROID HORMONE INTACT: 18 PG/ML (ref 15–65)
POTASSIUM SERPL-SCNC: 3.6 MMOL/L (ref 3.5–5)
RESPIRATORY SYNCYTIAL VIRUS BY PCR: NOT DETECTED
SARS-COV-2, PCR: DETECTED
SODIUM BLD-SCNC: 136 MMOL/L (ref 132–146)

## 2022-02-04 PROCEDURE — 2580000003 HC RX 258: Performed by: EMERGENCY MEDICINE

## 2022-02-04 PROCEDURE — 36415 COLL VENOUS BLD VENIPUNCTURE: CPT

## 2022-02-04 PROCEDURE — 83735 ASSAY OF MAGNESIUM: CPT

## 2022-02-04 PROCEDURE — 6370000000 HC RX 637 (ALT 250 FOR IP): Performed by: INTERNAL MEDICINE

## 2022-02-04 PROCEDURE — 2140000000 HC CCU INTERMEDIATE R&B

## 2022-02-04 PROCEDURE — 80048 BASIC METABOLIC PNL TOTAL CA: CPT

## 2022-02-04 PROCEDURE — 2500000003 HC RX 250 WO HCPCS: Performed by: EMERGENCY MEDICINE

## 2022-02-04 PROCEDURE — 6360000002 HC RX W HCPCS: Performed by: EMERGENCY MEDICINE

## 2022-02-04 RX ORDER — ZINC SULFATE 50(220)MG
50 CAPSULE ORAL DAILY
Status: DISCONTINUED | OUTPATIENT
Start: 2022-02-04 | End: 2022-02-11 | Stop reason: HOSPADM

## 2022-02-04 RX ORDER — ASCORBIC ACID 500 MG
500 TABLET ORAL DAILY
Status: DISCONTINUED | OUTPATIENT
Start: 2022-02-04 | End: 2022-02-11 | Stop reason: HOSPADM

## 2022-02-04 RX ORDER — VITAMIN B COMPLEX
1000 TABLET ORAL DAILY
Status: DISCONTINUED | OUTPATIENT
Start: 2022-02-04 | End: 2022-02-05

## 2022-02-04 RX ADMIN — Medication 50 MG: at 11:31

## 2022-02-04 RX ADMIN — DOXYCYCLINE 100 MG: 100 INJECTION, POWDER, LYOPHILIZED, FOR SOLUTION INTRAVENOUS at 00:48

## 2022-02-04 RX ADMIN — DOXYCYCLINE 100 MG: 100 INJECTION, POWDER, LYOPHILIZED, FOR SOLUTION INTRAVENOUS at 21:45

## 2022-02-04 RX ADMIN — OXYCODONE HYDROCHLORIDE AND ACETAMINOPHEN 500 MG: 500 TABLET ORAL at 11:31

## 2022-02-04 RX ADMIN — DOXYCYCLINE 100 MG: 100 INJECTION, POWDER, LYOPHILIZED, FOR SOLUTION INTRAVENOUS at 11:20

## 2022-02-04 RX ADMIN — Medication 1000 UNITS: at 11:32

## 2022-02-04 RX ADMIN — CEFTRIAXONE 1000 MG: 1 INJECTION, POWDER, FOR SOLUTION INTRAMUSCULAR; INTRAVENOUS at 00:49

## 2022-02-04 ASSESSMENT — ENCOUNTER SYMPTOMS
NAUSEA: 0
COUGH: 0
TROUBLE SWALLOWING: 0
ABDOMINAL PAIN: 0
DIARRHEA: 0
BACK PAIN: 0
VOMITING: 0
SHORTNESS OF BREATH: 0
PHOTOPHOBIA: 0
BLOOD IN STOOL: 0
WHEEZING: 0
VOICE CHANGE: 0

## 2022-02-04 ASSESSMENT — PAIN SCALES - GENERAL
PAINLEVEL_OUTOF10: 0

## 2022-02-04 NOTE — ED PROVIDER NOTES
78y.o. year old female presenting to the emergency room with complaints of fatigue and weakness x 1 week. EMS reports that family that is in close proximity with patient all recently had COVID. Family reported that patient has been having increased fatigue over the past week, and has not gotten out of bed secondary to weakness. EMS reports that patient had some difficulty answering questions orientations on the ambulance. Currently ANO x4. Patient reports that symptom's onset week prior. Worsen with nothing. Improves with nothing. Patient report severity of moderate, 0 out of 10 pain, with no radiation. Symptoms are constant in timing. Symptoms described as weakness. Patient reports no associated symptoms . Patient  in  no acute distress. Review of Systems   Constitutional: Positive for fatigue. Negative for chills and fever. HENT: Positive for congestion. Negative for trouble swallowing and voice change. Eyes: Negative for photophobia and visual disturbance. Respiratory: Negative for cough, shortness of breath and wheezing. Cardiovascular: Negative for chest pain and palpitations. Gastrointestinal: Negative for abdominal pain, blood in stool, diarrhea, nausea and vomiting. Genitourinary: Negative for frequency and urgency. Musculoskeletal: Negative for arthralgias, back pain and neck pain. Skin: Positive for rash. Negative for wound. Neurological: Positive for weakness. Negative for dizziness, syncope, numbness and headaches. Psychiatric/Behavioral: Positive for confusion. Negative for behavioral problems. Physical Exam  Vitals reviewed. Constitutional:       General: She is not in acute distress. Interventions: She is not intubated. HENT:      Head: Normocephalic. Right Ear: External ear normal.      Left Ear: External ear normal.      Nose: Nose normal.      Mouth/Throat:      Pharynx: Oropharynx is clear.    Eyes:      General:         Right eye: No discharge. Left eye: No discharge. Conjunctiva/sclera: Conjunctivae normal.      Pupils: Pupils are equal, round, and reactive to light. Cardiovascular:      Rate and Rhythm: Normal rate and regular rhythm. Pulses: Normal pulses. Heart sounds: No gallop. Pulmonary:      Effort: Pulmonary effort is normal. No accessory muscle usage or respiratory distress. She is not intubated. Breath sounds: Rhonchi present. No wheezing or rales. Abdominal:      General: Abdomen is flat. Bowel sounds are normal.      Palpations: Abdomen is soft. Tenderness: There is no abdominal tenderness. There is no right CVA tenderness, left CVA tenderness, guarding or rebound. Musculoskeletal:         General: No tenderness. Normal range of motion. Cervical back: Normal range of motion and neck supple. No rigidity or tenderness. Skin:     General: Skin is warm. Capillary Refill: Capillary refill takes less than 2 seconds. Findings: Rash present. Neurological:      Mental Status: She is alert and oriented to person, place, and time. Sensory: No sensory deficit. Motor: No weakness. Psychiatric:         Behavior: Behavior is slowed. Behavior is cooperative. Procedures     MDM  Number of Diagnoses or Management Options  Elevated bilirubin  Failure to thrive in adult  Hypocalcemia  Hypomagnesemia  Pneumonia due to infectious organism, unspecified laterality, unspecified part of lung  Diagnosis management comments: 53-year-old female presenting to the emergency department with complaints of fatigue x1 week. Patient in no acute distress on exam, greater than 90% of triage on room air. Patient ANO x4, but displays some abnormal mentation. Rapid Covid negative. Urinalysis demonstrated findings concerning for UTI. Urine drug screen calcium found to be 5.4, calcium gluconate started.   ALT AST elevated ionized calcium 0.71 magnesium one-point4 CT head demonstrates no Leukocyte Esterase, Urine TRACE (A) Negative   Lactic Acid, Plasma   Result Value Ref Range    Lactic Acid 2.2 0.5 - 2.2 mmol/L   Comprehensive Metabolic Panel w/ Reflex to MG   Result Value Ref Range    Sodium 141 132 - 146 mmol/L    Potassium reflex Magnesium 4.0 3.5 - 5.0 mmol/L    Chloride 100 98 - 107 mmol/L    CO2 27 22 - 29 mmol/L    Anion Gap 14 7 - 16 mmol/L    Glucose 97 74 - 99 mg/dL    BUN 12 6 - 23 mg/dL    CREATININE 0.8 0.5 - 1.0 mg/dL    GFR Non-African American >60 >=60 mL/min/1.73    GFR African American >60     Calcium 5.4 (LL) 8.6 - 10.2 mg/dL    Total Protein 6.7 6.4 - 8.3 g/dL    Albumin 3.2 (L) 3.5 - 5.2 g/dL    Total Bilirubin 1.6 (H) 0.0 - 1.2 mg/dL    Alkaline Phosphatase 82 35 - 104 U/L     (H) 0 - 32 U/L     (H) 0 - 31 U/L   Troponin   Result Value Ref Range    Troponin, High Sensitivity 22 (H) 0 - 9 ng/L   SPECIMEN REJECTION   Result Value Ref Range    Rejected Test CMP, TRP     Reason for Rejection see below    Microscopic Urinalysis   Result Value Ref Range    WBC, UA 0-1 0 - 5 /HPF    RBC, UA 2-5 0 - 2 /HPF    Epithelial Cells, UA FEW /HPF    Bacteria, UA MODERATE (A) None Seen /HPF   Hepatic function panel   Result Value Ref Range    Total Protein 6.6 6.4 - 8.3 g/dL    Albumin 3.1 (L) 3.5 - 5.2 g/dL    Alkaline Phosphatase 80 35 - 104 U/L     (H) 0 - 32 U/L     (H) 0 - 31 U/L    Total Bilirubin 1.6 (H) 0.0 - 1.2 mg/dL    Bilirubin, Direct 0.7 (H) 0.0 - 0.3 mg/dL    Bilirubin, Indirect 0.9 0.0 - 1.0 mg/dL   CALCIUM, IONIZED   Result Value Ref Range    Calcium, Ion 0.71 (LL) 1.15 - 1.33 mmol/L   Magnesium   Result Value Ref Range    Magnesium 1.4 (L) 1.6 - 2.6 mg/dL   Troponin   Result Value Ref Range    Troponin, High Sensitivity 13 (H) 0 - 9 ng/L   Lactic Acid, Plasma   Result Value Ref Range    Lactic Acid 1.6 0.5 - 2.2 mmol/L   Ammonia   Result Value Ref Range    Ammonia 13.0 11.0 - 51.0 umol/L   Serum Drug Screen   Result Value Ref Range    Ethanol Lvl <10 mg/dL    Acetaminophen Level <5.0 (L) 10.0 - 53.6 mcg/mL    Salicylate, Serum <4.7 0.0 - 30.0 mg/dL    TCA Scrn NEGATIVE Cutoff:300 ng/mL   Urine Drug Screen   Result Value Ref Range    Amphetamine Screen, Urine NOT DETECTED Negative <1000 ng/mL    Barbiturate Screen, Ur NOT DETECTED Negative < 200 ng/mL    Benzodiazepine Screen, Urine NOT DETECTED Negative < 200 ng/mL    Cannabinoid Scrn, Ur NOT DETECTED Negative < 50ng/mL    Cocaine Metabolite Screen, Urine NOT DETECTED Negative < 300 ng/mL    Opiate Scrn, Ur NOT DETECTED Negative < 300ng/mL    PCP Screen, Urine NOT DETECTED Negative < 25 ng/mL    Methadone Screen, Urine NOT DETECTED Negative <300 ng/mL    Oxycodone Urine NOT DETECTED Negative <100 ng/mL    FENTANYL SCREEN, URINE NOT DETECTED Negative <1 ng/mL    Drug Screen Comment: see below        RADIOLOGY:  CT ABDOMEN PELVIS W IV CONTRAST Additional Contrast? None   Final Result   Minimal patchy opacities in the lung bases, concerning for early infiltrates,   possibly due to viral infection. Correlate clinically. Fluid attenuation in the central uterus may represent fluid in the   endometrial canal.  A follow-up nonemergent pelvic ultrasound is recommended   to further assess. Findings suggest minimal biliary ductal prominence in the hepatic hilum,   although not significantly changed compared with CT of the chest dated   05/13/2019 and therefore of questionable significance. Correlate clinically. If indicated, MRCP could also be considered. Findings are consistent with chronic small bowel malrotation as detailed   above but no acute bowel related inflammatory change identified. RECOMMENDATIONS:   Unavailable         CT Head WO Contrast   Final Result   No acute intracranial abnormality. Age-appropriate atrophy and small-vessel ischemic disease. Pansinusitis. Mucosal thickening is also noted in the right mastoid air   cells.       RECOMMENDATIONS:   Unavailable         XR CHEST PORTABLE   Final Result   Hazy and patchy perihilar and bibasilar densities concerning for pneumonia. EKG:  This EKG is signed and interpreted by me. Rate: 55  Rhythm: Sinus  Interpretation: sinus bradycardia  Comparison: changes compared to previous EKG and no previous EKG available      ------------------------- NURSING NOTES AND VITALS REVIEWED ---------------------------  Date / Time Roomed:  2/3/2022  7:18 PM  ED Bed Assignment:  20/20    The nursing notes within the ED encounter and vital signs as below have been reviewed. Patient Vitals for the past 24 hrs:   BP Temp Pulse Resp SpO2 Height Weight   02/04/22 0100 (!) 181/69  60 22 94 %     02/04/22 0030 (!) 157/46  54 17 98 %     02/04/22 0015   58 21 96 %     02/04/22 0000 132/65  54 17 94 %     02/03/22 2345   56 15      02/03/22 2337 (!) 154/59    93 %     02/03/22 2330   59 21 92 %     02/03/22 2130   58 15      02/03/22 1933 (!) 156/63  58 19 95 %     02/03/22 1920 136/78 97.9 °F (36.6 °C) 62 18 95 % 4' 7\" (1.397 m) 150 lb (68 kg)       Oxygen Saturation Interpretation: Normal    ------------------------------------------ PROGRESS NOTES ------------------------------------------  Re-evaluation(s):  Patients symptoms show no change  Repeat physical examination is not changed    Counseling:  I have spoken with the patient and discussed todays results, in addition to providing specific details for the plan of care and counseling regarding the diagnosis and prognosis. Their questions are answered at this time and they are agreeable with the plan of admission.    --------------------------------- ADDITIONAL PROVIDER NOTES ---------------------------------  Consultations:   Spoke with Dr. Ellen Santillan. Discussed case. They will admit the patient.   This patient's ED course included: a personal history and physicial examination, re-evaluation prior to disposition, multiple bedside re-evaluations, IV medications, cardiac monitoring and continuous pulse oximetry    This patient has remained hemodynamically stable during their ED course. Diagnosis:  1. Hypocalcemia    2. Pneumonia due to infectious organism, unspecified laterality, unspecified part of lung    3. Hypomagnesemia    4. Elevated bilirubin    5. Failure to thrive in adult        Disposition:  Patient's disposition: Admit   Patient's condition is stable.                Josefa Cowan DO  Resident  02/04/22 0130

## 2022-02-04 NOTE — ED NOTES
Bed: 20  Expected date:   Expected time:   Means of arrival:   Comments:  Room [address] [place] [address], [address] [place] [address]  02/03/22 2244

## 2022-02-04 NOTE — CARE COORDINATION
Care Coordination + covid  The patient was admitted into the Ed due to complaints of fatigue and weakness x 1 week. The patients whole family  has had covid recently. Chest film is + for hazy and patchy perihilar and bibasilar densities concerning for pneumonia. Ct of the head is negative. General diet. Blood c/s to be done, ua c/s, blood c/s x . The patient is on iv doxy 100 mg iv q12. The patient is on room air 94%. ua was done and its turbid and yellow + nitrates sangita is 6.1. I called the patients  Mr Krzysztof Meléndez @ [phone] and he told me that  His wife lives with him in a 1 story home with 1 step pta. Has a Rolator at home. Has no pmh of skilled care now [facility], her PCP is Dr Marah Ely and she gets her meds filled at [facility] . He said she was independent pta. Pt Ot has been ordered. Plan is home unless does not do well with therapies. Her  is her ride home.  I will follow

## 2022-02-04 NOTE — ED NOTES
Floor called, SBAR faxed and patient placed in transport      [place], [address] [facility]  02/04/22 2296

## 2022-02-04 NOTE — ED NOTES
Bed: 34  Expected date:   Expected time:   Means of arrival:   Comments:  lanes Hartley Phillips, RN  02/03/22 1918

## 2022-02-04 NOTE — H&P
Candy Walker is a 78 y.o. female  presenting to the emergency room with complaints of fatigue and weakness x 1 week. EMS reports that family that is in close proximity with patient all recently had COVID. Family reported that patient has been having increased fatigue over the past week, and has not gotten out of bed secondary to weakness. EMS reports that patient had some difficulty answering questions orientations on the ambulance. Patient reports that symptom's onset week prior. Worsen with nothing. Improves with nothing. Patient report severity of moderate. Symptoms are constant in timing. Symptoms described as weakness. Patient reports no associated symptoms . Patient  in  no acute distress. found with hypocalcemia, Covid 19 pneumonia patient same what confused and difficult to get much information  given Calcium and admitted for treatment     Past Medical History:   Diagnosis Date    Arthritis        Past Surgical History:   Procedure Laterality Date    CATARACT REMOVAL      CERVICAL FUSION N/A 6/3/2019    ANTERIOR CERVICAL FUSION C3-C4, C4-C5  AND CORPECTOMY C4  WITH PLATES, SCREWS, BONE GRAFT, SSEP -- GLOBUS performed by Krupa Salas MD at [address] [place] [address]       History reviewed. No pertinent family history. Prior to Admission medications    Not on File        Allergies: Patient has no known allergies.     Social History     Tobacco Use    Smoking status: Former Smoker     Packs/day: 1.00     Types: Cigarettes    Smokeless tobacco: Never Used   Substance Use Topics    Alcohol use: No        Review of Systems:  Respiratory: negative for cough and hemoptysis  Cardiovascular: negative for chest pain and dyspnea  Gastrointestinal: negative for abdominal pain, diarrhea, nausea and vomiting  Genitourinary:negative for dysuria and hematuria  Derm: negative for rash and skin lesion(s)  Neurological: negative for seizures and tremors  Endocrine: negative for diabetic symptoms including polydipsia and polyuria    Physical Exam:  Vitals:    02/04/22 0215   BP: (!) 137/52   Pulse: 56   Resp: 22   Temp: 97.7 °F (36.5 °C)   SpO2: 95%      Skin:  Warm and dry. No rash or bruises  HEENT:  PERRLA, EOMI  Neck:  No JVD, No thyromegaly, No carotid bruit  Cardiac:  RRR, No gallop or murmur  Lungs:  Rales rhonchi   Abdomen: Normal bowel sounds, no HSM, non-tender  Extremities:  No clubbing, edema or cyanosis  Neurological:  Moves all extremities. Labs:    CBC with Differential:    Lab Results   Component Value Date    WBC 5.7 02/03/2022    RBC 5.45 02/03/2022    HGB 17.0 02/03/2022    HCT 50.1 02/03/2022     02/03/2022    MCV 91.9 02/03/2022    MCH 31.2 02/03/2022    MCHC 33.9 02/03/2022    RDW 15.2 02/03/2022    LYMPHOPCT 27.8 02/03/2022    MONOPCT 8.7 02/03/2022    BASOPCT 0.7 02/03/2022    MONOSABS 0.50 02/03/2022    LYMPHSABS 1.59 02/03/2022    EOSABS 0.15 02/03/2022    BASOSABS 0.04 02/03/2022     CMP:    Lab Results   Component Value Date     02/04/2022    K 3.6 02/04/2022    K 4.0 02/03/2022    CL 96 02/04/2022    CO2 21 02/04/2022    BUN 10 02/04/2022    CREATININE 0.6 02/04/2022    GFRAA >60 02/04/2022    LABGLOM >60 02/04/2022    GLUCOSE 78 02/04/2022    PROT 6.6 02/03/2022    LABALBU 3.1 02/03/2022    CALCIUM 6.1 02/04/2022    BILITOT 1.6 02/03/2022    ALKPHOS 80 02/03/2022     02/03/2022     02/03/2022      Imaging:Ct scan abdomen :  Minimal patchy opacities in the lung bases, concerning for early infiltrates,   possibly due to viral infection.  Correlate clinically.       Fluid attenuation in the central uterus may represent fluid in the   endometrial canal.  A follow-up nonemergent pelvic ultrasound is recommended   to further assess.       Findings suggest minimal biliary ductal prominence in the hepatic hilum,   although not significantly changed compared with CT of the chest dated   05/13/2019 and therefore of questionable significance.  Correlate clinically.    If indicated, MRCP could also be considered.       Findings are consistent with chronic small bowel malrotation as detailed   above but no acute bowel related inflammatory change identified. Ct scan head:  No acute intracranial abnormality.       Age-appropriate atrophy and small-vessel ischemic disease.       Pansinusitis.  Mucosal thickening is also noted in the right mastoid air   cells.          Assessment and Plan:    Patient Active Problem List   Diagnosis    Hypocalcemia     Hypomagnesemia     covid 19 pneumonia     Sinusitis     Smoker

## 2022-02-05 LAB
ACINETOBACTER CALCOAC BAUMANNII COMPLEX BY PCR: NOT DETECTED
ALBUMIN SERPL-MCNC: 3 G/DL (ref 3.5–5.2)
ALP BLD-CCNC: 81 U/L (ref 35–104)
ALT SERPL-CCNC: 170 U/L (ref 0–32)
ANION GAP SERPL CALCULATED.3IONS-SCNC: 19 MMOL/L (ref 7–16)
AST SERPL-CCNC: 110 U/L (ref 0–31)
BACTEROIDES FRAGILIS BY PCR: NOT DETECTED
BILIRUB SERPL-MCNC: 1.5 MG/DL (ref 0–1.2)
BOTTLE TYPE: NORMAL
BUN BLDV-MCNC: 11 MG/DL (ref 6–23)
CALCIUM SERPL-MCNC: 6 MG/DL (ref 8.6–10.2)
CANDIDA ALBICANS BY PCR: NOT DETECTED
CANDIDA AURIS BY PCR: NOT DETECTED
CANDIDA GLABRATA BY PCR: NOT DETECTED
CANDIDA KRUSEI BY PCR: NOT DETECTED
CANDIDA PARAPSILOSIS BY PCR: NOT DETECTED
CANDIDA TROPICALIS BY PCR: NOT DETECTED
CHLORIDE BLD-SCNC: 99 MMOL/L (ref 98–107)
CO2: 21 MMOL/L (ref 22–29)
CREAT SERPL-MCNC: 0.6 MG/DL (ref 0.5–1)
CRYPTOCOCCUS NEOFORMANS/GATTII BY PCR: NOT DETECTED
EKG ATRIAL RATE: 55 BPM
EKG P AXIS: 90 DEGREES
EKG P-R INTERVAL: 172 MS
EKG Q-T INTERVAL: 504 MS
EKG QRS DURATION: 74 MS
EKG QTC CALCULATION (BAZETT): 482 MS
EKG R AXIS: -47 DEGREES
EKG T AXIS: 29 DEGREES
EKG VENTRICULAR RATE: 55 BPM
ENTEROBACTER CLOACAE COMPLEX BY PCR: NOT DETECTED
ENTEROBACTERALES BY PCR: NOT DETECTED
ENTEROCOCCUS FAECALIS BY PCR: NOT DETECTED
ENTEROCOCCUS FAECIUM BY PCR: NOT DETECTED
ESCHERICHIA COLI BY PCR: NOT DETECTED
GFR AFRICAN AMERICAN: >60
GFR NON-AFRICAN AMERICAN: >60 ML/MIN/1.73
GLUCOSE BLD-MCNC: 82 MG/DL (ref 74–99)
HAEMOPHILUS INFLUENZAE BY PCR: NOT DETECTED
KLEBSIELLA AEROGENES BY PCR: NOT DETECTED
KLEBSIELLA OXYTOCA BY PCR: NOT DETECTED
KLEBSIELLA PNEUMONIAE GROUP BY PCR: NOT DETECTED
LISTERIA MONOCYTOGENES BY PCR: NOT DETECTED
NEISSERIA MENINGITIDIS BY PCR: NOT DETECTED
ORDER NUMBER: NORMAL
POTASSIUM SERPL-SCNC: 3.5 MMOL/L (ref 3.5–5)
PROTEUS SPECIES BY PCR: NOT DETECTED
PSEUDOMONAS AERUGINOSA BY PCR: NOT DETECTED
SALMONELLA SPECIES BY PCR: NOT DETECTED
SERRATIA MARCESCENS BY PCR: NOT DETECTED
SODIUM BLD-SCNC: 139 MMOL/L (ref 132–146)
SOURCE OF BLOOD CULTURE: NORMAL
STAPHYLOCOCCUS AUREUS BY PCR: NOT DETECTED
STAPHYLOCOCCUS EPIDERMIDIS BY PCR: NOT DETECTED
STAPHYLOCOCCUS LUGDUNENSIS BY PCR: NOT DETECTED
STAPHYLOCOCCUS SPECIES BY PCR: NOT DETECTED
STENOTROPHOMONAS MALTOPHILIA BY PCR: NOT DETECTED
STREPTOCOCCUS AGALACTIAE BY PCR: NOT DETECTED
STREPTOCOCCUS PNEUMONIAE BY PCR: NOT DETECTED
STREPTOCOCCUS PYOGENES  BY PCR: NOT DETECTED
STREPTOCOCCUS SPECIES BY PCR: NOT DETECTED
TOTAL PROTEIN: 6.6 G/DL (ref 6.4–8.3)
VITAMIN D 25-HYDROXY: 8 NG/ML (ref 30–100)

## 2022-02-05 PROCEDURE — 6370000000 HC RX 637 (ALT 250 FOR IP): Performed by: INTERNAL MEDICINE

## 2022-02-05 PROCEDURE — 93010 ELECTROCARDIOGRAM REPORT: CPT | Performed by: INTERNAL MEDICINE

## 2022-02-05 PROCEDURE — 80053 COMPREHEN METABOLIC PANEL: CPT

## 2022-02-05 PROCEDURE — 6360000002 HC RX W HCPCS: Performed by: INTERNAL MEDICINE

## 2022-02-05 PROCEDURE — 2580000003 HC RX 258: Performed by: INTERNAL MEDICINE

## 2022-02-05 PROCEDURE — 2580000003 HC RX 258: Performed by: EMERGENCY MEDICINE

## 2022-02-05 PROCEDURE — 2140000000 HC CCU INTERMEDIATE R&B

## 2022-02-05 PROCEDURE — 2500000003 HC RX 250 WO HCPCS: Performed by: EMERGENCY MEDICINE

## 2022-02-05 PROCEDURE — 82306 VITAMIN D 25 HYDROXY: CPT

## 2022-02-05 PROCEDURE — 36415 COLL VENOUS BLD VENIPUNCTURE: CPT

## 2022-02-05 PROCEDURE — 2500000003 HC RX 250 WO HCPCS: Performed by: INTERNAL MEDICINE

## 2022-02-05 RX ORDER — CALCIUM ACETATE 667 MG/1
1 CAPSULE ORAL
Status: DISCONTINUED | OUTPATIENT
Start: 2022-02-05 | End: 2022-02-11 | Stop reason: HOSPADM

## 2022-02-05 RX ORDER — CHOLECALCIFEROL (VITAMIN D3) 50 MCG
2000 TABLET ORAL DAILY
Status: DISCONTINUED | OUTPATIENT
Start: 2022-02-06 | End: 2022-02-08

## 2022-02-05 RX ORDER — CALCIUM GLUCONATE 94 MG/ML
1000 INJECTION, SOLUTION INTRAVENOUS 2 TIMES DAILY
Status: DISCONTINUED | OUTPATIENT
Start: 2022-02-05 | End: 2022-02-05 | Stop reason: SDUPTHER

## 2022-02-05 RX ADMIN — Medication 50 MG: at 09:00

## 2022-02-05 RX ADMIN — Medication 1000 MG: at 15:04

## 2022-02-05 RX ADMIN — CALCIUM ACETATE 667 MG: 667 CAPSULE ORAL at 17:26

## 2022-02-05 RX ADMIN — Medication 1000 MG: at 20:04

## 2022-02-05 RX ADMIN — OXYCODONE HYDROCHLORIDE AND ACETAMINOPHEN 500 MG: 500 TABLET ORAL at 09:00

## 2022-02-05 RX ADMIN — DOXYCYCLINE 100 MG: 100 INJECTION, POWDER, LYOPHILIZED, FOR SOLUTION INTRAVENOUS at 10:21

## 2022-02-05 RX ADMIN — DOXYCYCLINE 100 MG: 100 INJECTION, POWDER, LYOPHILIZED, FOR SOLUTION INTRAVENOUS at 21:36

## 2022-02-05 RX ADMIN — Medication 1000 UNITS: at 09:01

## 2022-02-05 ASSESSMENT — PAIN SCALES - GENERAL
PAINLEVEL_OUTOF10: 0

## 2022-02-05 NOTE — PROGRESS NOTES
Admit Date: 2/3/2022    Subjective:     Still weak little better Ca+ still low PTH normal     Objective:     Patient Vitals for the past 8 hrs:   Temp Pulse Resp SpO2   02/05/22 0845 97 °F (36.1 °C) 79 16 96 %     I/O last 3 completed shifts: In: 220 [P.O.:120; IV Piggyback:100]  Out: 400 [Urine:400]  I/O this shift:  In: -   Out: 300 [Urine:300]    HEENT: Normal  NECK: Thyroid normal. No carotid bruit. No lymphphadenopathy. CVS: RRR  RS: Clear. No wheeze. No rhonchi. Good airflow bilaterally. ABD: Soft. Non tender. No mass. Normal BS. EXT: No edema. Non tender. Pulses present. Luz Triana   NEURO: no focal deficit       Scheduled Meds:   zinc sulfate  50 mg Oral Daily    Vitamin D  1,000 Units Oral Daily    ascorbic acid  500 mg Oral Daily    doxycycline (VIBRAMYCIN) IV  100 mg IntraVENous Q12H     Continuous Infusions:    CBC with Differential:    Lab Results   Component Value Date    WBC 5.7 02/03/2022    RBC 5.45 02/03/2022    HGB 17.0 02/03/2022    HCT 50.1 02/03/2022     02/03/2022    MCV 91.9 02/03/2022    MCH 31.2 02/03/2022    MCHC 33.9 02/03/2022    RDW 15.2 02/03/2022    LYMPHOPCT 27.8 02/03/2022    MONOPCT 8.7 02/03/2022    BASOPCT 0.7 02/03/2022    MONOSABS 0.50 02/03/2022    LYMPHSABS 1.59 02/03/2022    EOSABS 0.15 02/03/2022    BASOSABS 0.04 02/03/2022     CMP:    Lab Results   Component Value Date     02/05/2022    K 3.5 02/05/2022    K 4.0 02/03/2022    CL 99 02/05/2022    CO2 21 02/05/2022    BUN 11 02/05/2022    CREATININE 0.6 02/05/2022    GFRAA >60 02/05/2022    LABGLOM >60 02/05/2022    PROT 6.6 02/05/2022    LABALBU 3.0 02/05/2022    CALCIUM 6.0 02/05/2022    BILITOT 1.5 02/05/2022    ALKPHOS 81 02/05/2022     02/05/2022     02/05/2022     PT/INR:  No results found for: PROTIME, INR    Assessment:     Active Problems:       Hypocalcemia     Hypomagnesemia     covid 19 pneumonia     Sinusitis     Smoker          Plan:   Check Vitamin D level ,Ca+ supplement ,monttor

## 2022-02-05 NOTE — PLAN OF CARE
Farhana speaking to fr/fam on ph when they called. Contd nonprod cough and wet respirs. Willing to sit up in chair. Also, tried her on bedside commode. Pt unable to bear weight and assist in xfers. Had to be carried. However stated she liked sitting up in chair. Waits for assist.   Leans a bit at times but maintains good posture. Claims no bm x 4 d however po intake remains light. No impaction palpated.

## 2022-02-06 LAB
ACINETOBACTER CALCOAC BAUMANNII COMPLEX BY PCR: NOT DETECTED
ALBUMIN SERPL-MCNC: 3 G/DL (ref 3.5–5.2)
ALP BLD-CCNC: 82 U/L (ref 35–104)
ALT SERPL-CCNC: 163 U/L (ref 0–32)
ANION GAP SERPL CALCULATED.3IONS-SCNC: 16 MMOL/L (ref 7–16)
AST SERPL-CCNC: 103 U/L (ref 0–31)
BACTEROIDES FRAGILIS BY PCR: NOT DETECTED
BILIRUB SERPL-MCNC: 1.5 MG/DL (ref 0–1.2)
BOTTLE TYPE: NORMAL
BUN BLDV-MCNC: 10 MG/DL (ref 6–23)
CALCIUM SERPL-MCNC: 6.2 MG/DL (ref 8.6–10.2)
CANDIDA ALBICANS BY PCR: NOT DETECTED
CANDIDA AURIS BY PCR: NOT DETECTED
CANDIDA GLABRATA BY PCR: NOT DETECTED
CANDIDA KRUSEI BY PCR: NOT DETECTED
CANDIDA PARAPSILOSIS BY PCR: NOT DETECTED
CANDIDA TROPICALIS BY PCR: NOT DETECTED
CHLORIDE BLD-SCNC: 101 MMOL/L (ref 98–107)
CO2: 23 MMOL/L (ref 22–29)
CREAT SERPL-MCNC: 0.6 MG/DL (ref 0.5–1)
CRYPTOCOCCUS NEOFORMANS/GATTII BY PCR: NOT DETECTED
ENTEROBACTER CLOACAE COMPLEX BY PCR: NOT DETECTED
ENTEROBACTERALES BY PCR: NOT DETECTED
ENTEROCOCCUS FAECALIS BY PCR: NOT DETECTED
ENTEROCOCCUS FAECIUM BY PCR: NOT DETECTED
ESCHERICHIA COLI BY PCR: NOT DETECTED
GFR AFRICAN AMERICAN: >60
GFR NON-AFRICAN AMERICAN: >60 ML/MIN/1.73
GLUCOSE BLD-MCNC: 90 MG/DL (ref 74–99)
HAEMOPHILUS INFLUENZAE BY PCR: NOT DETECTED
KLEBSIELLA AEROGENES BY PCR: NOT DETECTED
KLEBSIELLA OXYTOCA BY PCR: NOT DETECTED
KLEBSIELLA PNEUMONIAE GROUP BY PCR: NOT DETECTED
LISTERIA MONOCYTOGENES BY PCR: NOT DETECTED
METER GLUCOSE: 108 MG/DL (ref 74–99)
NEISSERIA MENINGITIDIS BY PCR: NOT DETECTED
ORDER NUMBER: NORMAL
ORGANISM: ABNORMAL
POTASSIUM SERPL-SCNC: 3.4 MMOL/L (ref 3.5–5)
PROTEUS SPECIES BY PCR: NOT DETECTED
PSEUDOMONAS AERUGINOSA BY PCR: NOT DETECTED
SALMONELLA SPECIES BY PCR: NOT DETECTED
SERRATIA MARCESCENS BY PCR: NOT DETECTED
SODIUM BLD-SCNC: 140 MMOL/L (ref 132–146)
SOURCE OF BLOOD CULTURE: NORMAL
STAPHYLOCOCCUS AUREUS BY PCR: NOT DETECTED
STAPHYLOCOCCUS EPIDERMIDIS BY PCR: NOT DETECTED
STAPHYLOCOCCUS LUGDUNENSIS BY PCR: NOT DETECTED
STAPHYLOCOCCUS SPECIES BY PCR: NOT DETECTED
STENOTROPHOMONAS MALTOPHILIA BY PCR: NOT DETECTED
STREPTOCOCCUS AGALACTIAE BY PCR: NOT DETECTED
STREPTOCOCCUS PNEUMONIAE BY PCR: NOT DETECTED
STREPTOCOCCUS PYOGENES  BY PCR: NOT DETECTED
STREPTOCOCCUS SPECIES BY PCR: NOT DETECTED
TOTAL PROTEIN: 6.3 G/DL (ref 6.4–8.3)
URINE CULTURE, ROUTINE: ABNORMAL
URINE CULTURE, ROUTINE: ABNORMAL

## 2022-02-06 PROCEDURE — 2580000003 HC RX 258: Performed by: INTERNAL MEDICINE

## 2022-02-06 PROCEDURE — 2500000003 HC RX 250 WO HCPCS: Performed by: INTERNAL MEDICINE

## 2022-02-06 PROCEDURE — 82962 GLUCOSE BLOOD TEST: CPT

## 2022-02-06 PROCEDURE — 6360000002 HC RX W HCPCS: Performed by: INTERNAL MEDICINE

## 2022-02-06 PROCEDURE — 6370000000 HC RX 637 (ALT 250 FOR IP): Performed by: INTERNAL MEDICINE

## 2022-02-06 PROCEDURE — 80053 COMPREHEN METABOLIC PANEL: CPT

## 2022-02-06 PROCEDURE — 2140000000 HC CCU INTERMEDIATE R&B

## 2022-02-06 PROCEDURE — 36415 COLL VENOUS BLD VENIPUNCTURE: CPT

## 2022-02-06 PROCEDURE — 2580000003 HC RX 258: Performed by: EMERGENCY MEDICINE

## 2022-02-06 PROCEDURE — 2500000003 HC RX 250 WO HCPCS: Performed by: EMERGENCY MEDICINE

## 2022-02-06 RX ORDER — ONDANSETRON 2 MG/ML
4 INJECTION INTRAMUSCULAR; INTRAVENOUS EVERY 6 HOURS PRN
Status: DISCONTINUED | OUTPATIENT
Start: 2022-02-06 | End: 2022-02-11 | Stop reason: HOSPADM

## 2022-02-06 RX ORDER — SODIUM CHLORIDE 9 MG/ML
INJECTION, SOLUTION INTRAVENOUS CONTINUOUS
Status: DISCONTINUED | OUTPATIENT
Start: 2022-02-06 | End: 2022-02-11

## 2022-02-06 RX ORDER — TRAMADOL HYDROCHLORIDE 50 MG/1
50 TABLET ORAL EVERY 6 HOURS PRN
Status: DISCONTINUED | OUTPATIENT
Start: 2022-02-06 | End: 2022-02-11

## 2022-02-06 RX ORDER — ACETAMINOPHEN 325 MG/1
650 TABLET ORAL EVERY 4 HOURS PRN
Status: DISCONTINUED | OUTPATIENT
Start: 2022-02-06 | End: 2022-02-08

## 2022-02-06 RX ADMIN — ACETAMINOPHEN 650 MG: 325 TABLET ORAL at 04:16

## 2022-02-06 RX ADMIN — Medication 2000 UNITS: at 08:50

## 2022-02-06 RX ADMIN — TRAMADOL HYDROCHLORIDE 50 MG: 50 TABLET, COATED ORAL at 16:54

## 2022-02-06 RX ADMIN — SODIUM CHLORIDE: 9 INJECTION, SOLUTION INTRAVENOUS at 16:50

## 2022-02-06 RX ADMIN — CALCIUM ACETATE 667 MG: 667 CAPSULE ORAL at 13:00

## 2022-02-06 RX ADMIN — Medication 50 MG: at 08:51

## 2022-02-06 RX ADMIN — CALCIUM ACETATE 667 MG: 667 CAPSULE ORAL at 08:51

## 2022-02-06 RX ADMIN — DOXYCYCLINE 100 MG: 100 INJECTION, POWDER, LYOPHILIZED, FOR SOLUTION INTRAVENOUS at 22:21

## 2022-02-06 RX ADMIN — ACETAMINOPHEN 650 MG: 325 TABLET ORAL at 08:50

## 2022-02-06 RX ADMIN — DOXYCYCLINE 100 MG: 100 INJECTION, POWDER, LYOPHILIZED, FOR SOLUTION INTRAVENOUS at 10:40

## 2022-02-06 RX ADMIN — CALCIUM ACETATE 667 MG: 667 CAPSULE ORAL at 18:00

## 2022-02-06 RX ADMIN — Medication 1000 MG: at 08:50

## 2022-02-06 RX ADMIN — OXYCODONE HYDROCHLORIDE AND ACETAMINOPHEN 500 MG: 500 TABLET ORAL at 08:51

## 2022-02-06 RX ADMIN — Medication 1000 MG: at 20:59

## 2022-02-06 ASSESSMENT — PAIN SCALES - GENERAL
PAINLEVEL_OUTOF10: 10
PAINLEVEL_OUTOF10: 0
PAINLEVEL_OUTOF10: 5
PAINLEVEL_OUTOF10: 3
PAINLEVEL_OUTOF10: 6

## 2022-02-06 NOTE — PROGRESS NOTES
Admit Date: 2/3/2022    Subjective:     Awake doing little better still quit weak     Objective:     Patient Vitals for the past 8 hrs:   BP Temp Pulse Resp SpO2   02/06/22 0745 139/63 98.1 °F (36.7 °C) 69 18 94 %     I/O last 3 completed shifts: In: 260 [P.O.:260]  Out: 600 [Urine:600]  No intake/output data recorded. HEENT: Normal  NECK: Thyroid normal. No carotid bruit. No lymphphadenopathy. CVS: RRR  RS: Clear. No wheeze. No rhonchi. Good airflow bilaterally. ABD: Soft. Non tender. No mass. Normal BS. EXT: No edema. Non tender. Pulses present. Skin intact.   NEURO: no focal deficit       Scheduled Meds:   Vitamin D  2,000 Units Oral Daily    calcium acetate  1 capsule Oral TID WC    calcium gluconate  1,000 mg IntraVENous BID    zinc sulfate  50 mg Oral Daily    ascorbic acid  500 mg Oral Daily    doxycycline (VIBRAMYCIN) IV  100 mg IntraVENous Q12H     Continuous Infusions:    CBC with Differential:    Lab Results   Component Value Date    WBC 5.7 02/03/2022    RBC 5.45 02/03/2022    HGB 17.0 02/03/2022    HCT 50.1 02/03/2022     02/03/2022    MCV 91.9 02/03/2022    MCH 31.2 02/03/2022    MCHC 33.9 02/03/2022    RDW 15.2 02/03/2022    LYMPHOPCT 27.8 02/03/2022    MONOPCT 8.7 02/03/2022    BASOPCT 0.7 02/03/2022    MONOSABS 0.50 02/03/2022    LYMPHSABS 1.59 02/03/2022    EOSABS 0.15 02/03/2022    BASOSABS 0.04 02/03/2022     CMP:    Lab Results   Component Value Date     02/06/2022    K 3.4 02/06/2022    K 4.0 02/03/2022     02/06/2022    CO2 23 02/06/2022    BUN 10 02/06/2022    CREATININE 0.6 02/06/2022    GFRAA >60 02/06/2022    LABGLOM >60 02/06/2022    PROT 6.3 02/06/2022    LABALBU 3.0 02/06/2022    CALCIUM 6.2 02/06/2022    BILITOT 1.5 02/06/2022    ALKPHOS 82 02/06/2022     02/06/2022     02/06/2022     PT/INR:  No results found for: PROTIME, INR    Assessment:     Active Problems:    Hypocalcemia     Hypomagnesemia    Vitamin D deficiency     Covid 19 infection     Smoker       Plan:   Continue CA+ and vitamin D supplement

## 2022-02-06 NOTE — PROGRESS NOTES
Patient states her pain is not controlled with current medication. RN repositioned. Dr. Monica Johnson paged.

## 2022-02-06 NOTE — PROGRESS NOTES
RN contacted Dr. Alejandro Lema for patient c/o pain not controlled by tylenol. Patient also has had low urine output today and orders received for fluids and additional pain medication.

## 2022-02-07 LAB
ANION GAP SERPL CALCULATED.3IONS-SCNC: 12 MMOL/L (ref 7–16)
BLOOD CULTURE, ROUTINE: ABNORMAL
BUN BLDV-MCNC: 11 MG/DL (ref 6–23)
CALCIUM SERPL-MCNC: 6.7 MG/DL (ref 8.6–10.2)
CHLORIDE BLD-SCNC: 101 MMOL/L (ref 98–107)
CO2: 23 MMOL/L (ref 22–29)
CREAT SERPL-MCNC: 0.5 MG/DL (ref 0.5–1)
GFR AFRICAN AMERICAN: >60
GFR NON-AFRICAN AMERICAN: >60 ML/MIN/1.73
GLUCOSE BLD-MCNC: 97 MG/DL (ref 74–99)
ORGANISM: ABNORMAL
POTASSIUM SERPL-SCNC: 3.6 MMOL/L (ref 3.5–5)
SODIUM BLD-SCNC: 136 MMOL/L (ref 132–146)

## 2022-02-07 PROCEDURE — 97161 PT EVAL LOW COMPLEX 20 MIN: CPT

## 2022-02-07 PROCEDURE — 36415 COLL VENOUS BLD VENIPUNCTURE: CPT

## 2022-02-07 PROCEDURE — 97166 OT EVAL MOD COMPLEX 45 MIN: CPT

## 2022-02-07 PROCEDURE — 80048 BASIC METABOLIC PNL TOTAL CA: CPT

## 2022-02-07 PROCEDURE — 6370000000 HC RX 637 (ALT 250 FOR IP): Performed by: EMERGENCY MEDICINE

## 2022-02-07 PROCEDURE — 6370000000 HC RX 637 (ALT 250 FOR IP): Performed by: INTERNAL MEDICINE

## 2022-02-07 PROCEDURE — 2140000000 HC CCU INTERMEDIATE R&B

## 2022-02-07 PROCEDURE — 97535 SELF CARE MNGMENT TRAINING: CPT

## 2022-02-07 PROCEDURE — 97530 THERAPEUTIC ACTIVITIES: CPT

## 2022-02-07 PROCEDURE — 2580000003 HC RX 258: Performed by: INTERNAL MEDICINE

## 2022-02-07 PROCEDURE — 6360000002 HC RX W HCPCS: Performed by: INTERNAL MEDICINE

## 2022-02-07 RX ORDER — CALCIUM CARBONATE 200(500)MG
500 TABLET,CHEWABLE ORAL 3 TIMES DAILY PRN
Status: DISCONTINUED | OUTPATIENT
Start: 2022-02-07 | End: 2022-02-08

## 2022-02-07 RX ORDER — DOXYCYCLINE HYCLATE 100 MG/1
100 CAPSULE ORAL EVERY 12 HOURS SCHEDULED
Status: DISCONTINUED | OUTPATIENT
Start: 2022-02-07 | End: 2022-02-11 | Stop reason: HOSPADM

## 2022-02-07 RX ADMIN — Medication 1000 MG: at 20:19

## 2022-02-07 RX ADMIN — SODIUM CHLORIDE: 9 INJECTION, SOLUTION INTRAVENOUS at 21:36

## 2022-02-07 RX ADMIN — TRAMADOL HYDROCHLORIDE 50 MG: 50 TABLET, COATED ORAL at 20:17

## 2022-02-07 RX ADMIN — TRAMADOL HYDROCHLORIDE 50 MG: 50 TABLET, COATED ORAL at 11:45

## 2022-02-07 RX ADMIN — CALCIUM CARBONATE 500 MG: 500 TABLET, CHEWABLE ORAL at 11:45

## 2022-02-07 RX ADMIN — Medication 2000 UNITS: at 11:45

## 2022-02-07 RX ADMIN — DOXYCYCLINE 100 MG: 100 CAPSULE ORAL at 12:46

## 2022-02-07 RX ADMIN — SODIUM CHLORIDE: 9 INJECTION, SOLUTION INTRAVENOUS at 05:13

## 2022-02-07 RX ADMIN — OXYCODONE HYDROCHLORIDE AND ACETAMINOPHEN 500 MG: 500 TABLET ORAL at 11:45

## 2022-02-07 RX ADMIN — Medication 1000 MG: at 09:45

## 2022-02-07 RX ADMIN — Medication 50 MG: at 11:45

## 2022-02-07 RX ADMIN — DOXYCYCLINE 100 MG: 100 CAPSULE ORAL at 20:17

## 2022-02-07 RX ADMIN — ONDANSETRON 4 MG: 2 INJECTION INTRAMUSCULAR; INTRAVENOUS at 08:33

## 2022-02-07 RX ADMIN — TRAMADOL HYDROCHLORIDE 50 MG: 50 TABLET, COATED ORAL at 00:06

## 2022-02-07 ASSESSMENT — PAIN DESCRIPTION - PAIN TYPE: TYPE: OTHER (COMMENT)

## 2022-02-07 ASSESSMENT — PAIN SCALES - GENERAL
PAINLEVEL_OUTOF10: 6
PAINLEVEL_OUTOF10: 4
PAINLEVEL_OUTOF10: 7
PAINLEVEL_OUTOF10: 6
PAINLEVEL_OUTOF10: 3
PAINLEVEL_OUTOF10: 3

## 2022-02-07 ASSESSMENT — PAIN DESCRIPTION - LOCATION: LOCATION: GENERALIZED

## 2022-02-07 NOTE — PROGRESS NOTES
Physical Therapy   Initial Assessment     Name: Aneesh Rajput  : 1955  MRN: [id number]      Date of Service: 2022    Evaluating PT:  Kamaljit Mckenzie Adjutant, [id number]    Room #:  2000/6470-K  Diagnosis:  Hypocalcemia [E83.51]  Hypomagnesemia [E83.42]  Failure to thrive in adult [R62.7]  Electrolyte abnormality [E87.8]  Elevated bilirubin [R17]  Pneumonia due to infectious organism, unspecified laterality, unspecified part of lung [J18.9]  PMHx/PSHx:     has a past medical history of Arthritis. has a past surgical history that includes Cataract removal and cervical fusion (N/A, 6/3/2019). Precautions:  Fall risk, droplet+, ataxic BUE    SUBJECTIVE:    Pt lives with her , sister, and nephew in a 1 story home with 2 stairs to enter and no rail. Pt ambulated with no AD PTA, pt reports her  owns a walker. OBJECTIVE:   Initial Evaluation  Date: 22 Treatment Short Term/ Long Term   Goals   AM-PAC 6 Clicks      Was pt agreeable to Eval/treatment? Yes     Does pt have pain?  Denied at rest, stomach pain with movement     Bed Mobility  Rolling: MaxA  Supine to sit: MaxA  Sit to supine: MaxA  Scooting: MaxA  Pedrito   Transfers Sit to stand: Dependent  Stand to sit: Dependent  Stand pivot: NT  Pedrito   Ambulation    NT  No ambulation goal at this time   Stair negotiation: ascended and descended  NT  No stair goal at this time   BLE ROM WFL     BLE Strength Grossly 4-/5     Balance Sitting: Min/ModA  Standing: Depedent  Sitting: Supervision  Standing: Pedrito     Pt is A & O x 4  Sensation:  Reported B arm tingline  Edema:  Mild BLE edema    Therapeutic Exercises:    Seated balance activities at EOB:  -static holds with cues for hand placement with Pedrito  -weight shifts in coronal and sagittal planes with Pedrito  -ipsilateral and contralateral reaches outside SUKUMAR with ModA  -PNF hold-relax vs therapist resistance to increase core strength with [address]    Patient education  Pt educated on bed mobility, sitting balance strategies, safety concerns with mobility. Patient response to education:   Pt verbalized understanding Pt demonstrated skill Pt requires further education in this area   Yes With cues/assist Yes     ASSESSMENT:    Conditions Requiring Skilled Therapeutic Intervention:    [x]Decreased strength     []Decreased ROM  [x]Decreased functional mobility  [x]Decreased balance   [x]Decreased endurance   []Decreased posture  []Decreased sensation  []Decreased coordination   []Decreased vision  [x]Decreased safety awareness   []Increased pain       Comments: The pt required heavy assistance to sit EOB, had difficulty maintain BUE on bed to increase her base and improve her seated balance, able to sit SBA for only very brief periods of time and required more assistance when asked to perform reaching or resisted sitting. The pt was assisted to the EOB to place her feet on the floor, became anxious and did not follow instruction, ataxia and anxiety caused her to slide from the EOB, due to her short stature she was assisted to a standing position prior to returning to supine, the pt is not safe to sit EOB, discussed this with nurse and nursing assistants as the pt is an extremely high risk for falls. Treatment:  Patient practiced and was instructed in the following treatment:     Bed mobility training - pt given verbal and tactile cues to facilitate proper sequencing and safety during rolling and supine>sit as well as provided with physical assistance to complete task   Seated balance activities: performed to improve core strength/stability, increase [place] with seated ADLs, and progress toward advanced transfers.  Skilled repositioning in bed to promote improved posture and improved cardiorespiratory function. Pt positioned with bed in semi-chair position to enhance skin/joint protection. Pt's/ family goals   1. To return home    Prognosis is good for reaching above PT goals.     Patient and or family understand(s) diagnosis, prognosis, and plan of care. Yes    PHYSICAL THERAPY PLAN OF CARE:    PT POC is established based on physician order and patient diagnosis     Referring provider/PT Order:  Yee Parisi MD  Diagnosis:  Hypocalcemia [E83.51]  Hypomagnesemia [E83.42]  Failure to thrive in adult [R62.7]  Electrolyte abnormality [E87.8]  Elevated bilirubin [R17]  Pneumonia due to infectious organism, unspecified laterality, unspecified part of lung [J18.9]  Specific instructions for next treatment:  Progress ambulation as able    Current Treatment Recommendations:     [x] Strengthening to improve independence with functional mobility   [] ROM to improve independence with functional mobility   [x] Balance Training to improve static/dynamic balance and to reduce fall risk  [x] Endurance Training to improve activity tolerance during functional mobility   [x] Transfer Training to improve safety and independence with all functional transfers   [x] Gait Training to improve gait mechanics, endurance and assess need for appropriate assistive device  [x] Stair Training in preparation for safe discharge home and/or into the community   [] Positioning to prevent skin breakdown and contractures  [x] Safety and Education Training   [] Patient/Caregiver Education   [] HEP  [] Other     PT long term treatment goals are located in above grid    Frequency of treatments: 2-5x/week x 1-2 weeks. Time in  1100  Time out  1125    Total Treatment Time  10 minutes     Evaluation Time includes thorough review of current medical information, gathering information on past medical history/social history and prior level of function, completion of standardized testing/informal observation of tasks, assessment of data and education on plan of care and goals.     CPT codes:  [x] Low Complexity PT evaluation 64510  [] Moderate Complexity PT evaluation 12380  [] High Complexity PT evaluation 51459  [] PT Re-evaluation 18311  [] Gait training 81906 0 minutes  [] Manual therapy 43723 0 minutes  [x] Therapeutic activities 59755 10 minutes  [] Therapeutic exercises 23757 0 minutes  [] Neuromuscular reeducation 74921 0 minutes     Julene Meckel.  [place], [place]  [id number]

## 2022-02-07 NOTE — PROGRESS NOTES
[address]      Date:2022                                                  Patient Name: Shayla Dominguez  MRN: [id number]  : 1955  Room: [address] [place] [address]    Evaluating OT: Cely Jason, [address] [place], OTR/L [id number]  Referring Marcos Joiner MD  Specific Provider Orders: OT eval and treat 2/4  Recommended Adaptive Equipment: 24 hr physical assist     Diagnosis: failure to thrive   Surgery: none  Pertinent Medical History: none on file    Precautions:  Fall Risk, BUE and BLE ataxia, droplet +    Assessment of current deficits   [x] Functional mobility  [x]ADLs  [x] Strength               [x]Cognition   [x] Functional transfers   [x] IADLs         [x] Safety Awareness   [x]Endurance   [] Fine Coordination              [x] Balance      [] Vision/perception   [x]Sensation    []Gross Motor Coordination  [] ROM  [] Delirium                   [] Motor Control     OT PLAN OF CARE   OT POC based on physician orders, patient diagnosis and results of clinical assessment    Frequency/Duration: 2-3 days/wk for 2 weeks PRN   Specific OT Treatment to include:   * Instruction/training on adapted ADL techniques and AE recommendations to increase functional independence within precautions       * Training on energy conservation strategies, correct breathing pattern and techniques to improve independence/tolerance for self-care routine  * Functional transfer/mobility training/DME recommendations for increased independence, safety, and fall prevention  * Patient/Family education to increase follow through with safety techniques and functional independence  * Recommendation of environmental modifications for increased safety with functional transfers/mobility and ADLs  * Therapeutic exercise to improve motor endurance, ROM, and functional strength for ADLs/functional transfers  * Therapeutic activities to facilitate/challenge dynamic balance, stand tolerance for increased safety and independence with ADLs  * Neuro-muscular re-education: facilitation of righting/equilibrium reactions, midline orientation, scapular stability/mobility, normalization of muscle tone, and facilitation of volitional active controled movement    Home Living: Pt lives with  and sister in an apartment; bed/bath on 1 level when entering   Bathroom setup: tub shower unit   Equipment owned: none  Prior Level of Function: IND with ADLs , assist with IADLs; using no AD for functional mobility   Driving: yes    Pain Level: 0/10  Cognition: A&O: 3/4; Follows 1 step directions, with repetition and increased time   Memory:  fair    Sequencing:  fair    Problem solving:  fair    Judgement/safety:  fair     Functional Assessment:  AM-PAC Daily Activity Raw Score: 9/24   Initial Eval Status  Date: 2/7/22 Treatment Status  Date: STGs=LTGs  Time Frame: 10-14 days   Feeding Mod A (assist with packages and cutting up food, steadying drink to mouth)   Min A   Grooming Mod A (assist with oral care, completed facial washing)   Min A   UB Dressing Max A (due to limited ROM)   Mod A   LB Dressing Dep (assist with socks)  Max A    Bathing Dep (simulated, 2 person)  Max A    Toileting Dep (2 person assist with bed level)  Max A   Bed Mobility  Log roll: Max A  Supine to sit: Max A   Sit to supine: Max A   Log roll Min A  Supine to sit: Min A   Sit to supine: Min A   Functional Transfers Sit to stand:attempted, unsafe with 1 assist   Stand to sit:NT  Commode: NT  Mod A   Functional Mobility NT  TBA   Balance Sitting:  Mod A (ataxic)  Standing: attempted, unsafe with 1 assist     Activity Tolerance Fair-     Visual/  Perceptual Glasses: no              UE ROM: BUE: elbow flex WFL, shoulder flex grossly 80'  Strength: RUE: grossly 3/5 LUE: grossly 3/5   Strength: fair  Fine Motor Coordination: poor, ataxic, finger to nose assessment poor    Hearing: WFL  Sensation: c/o numbness/tingling in BLEs  Tone:  WFL  Edema: BLEs                            Comments:Cleared by RN to see pt. Upon arrival, patient supine in bed and agreeable to OT session. At end of session, patient sitting upright in bed with call light and phone within reach, all lines and tubes intact. Pt would benefit from continued OT to increase functional independence and quality of life. Treatment:  Pt appeared latent with following commands and answering questions. Required 2 person assist for repositioning to [facility]. Pt required vc's and physical assist for proper technique/safety with hand placement/body mechanics/posture for bed mobility/ADLs/functional tranfers. Pt required vc's for sequencing/initiation of ADLs/functional transfers. Pt able to  sit EOB ~10 mins to increase core strength/balance/activity tolerance for ease with ADLs. Pt required increased time to complete ADLs/functional transfers due to safety, ataxia, and physical assist. Pt required skilled monitoring of SpO2 during session, which was >93% RA. Pt instructed on use of call light for assistance and fall prevention. Pt demo'ing fair understanding of education provided. Continue to educate. Eval Complexity: moderate  · History: Expanded chart review of medical records and additional review of physical, cognitive, or psychosocial history related to current functional performance  · Exam: 3+ performance deficits  · Assistance/Modification: mod/max assistance or modifications required to perform tasks. May have comorbidities that affect occupational performance. Rehab Potential: Good for established goals, pt. assisted in establishment of goals. LTG: maximize independence with ADLs to return to PLOF    Patient  instructed on diagnosis, prognosis/goals and plan of care. Demonstrated fair understanding. [] Malnutrition indicators have been identified and nursing has been notified to ensure a dietitian consult is ordered.      Evaluation time includes thorough review of current medical information, gathering information on past medical & social history & PLOF, completion of standardized testing, informal observation of tasks, consultation with other medical professions/disciplines, assessment of data & development of POC/goals.      Time In: 9213       Time Out: 0920     Total treatment time: 15       Treatment Charges: Mins Units   OT Eval Low 81592     OT Eval Medium 72652 X    OT Eval High [id number]     OT Re-Eval [id number]     Ther Ex  [id number]       Manual Therapy 47346       Thera Activities 28796       ADL/Home Mgt 12002 15    Neuro Re-ed 81363       Group Therapy        Orthotic manage/training  21991       Non-Billable Time           Francoise Go, OTR/L [id number]

## 2022-02-07 NOTE — PROGRESS NOTES
Admit Date: 2/3/2022    Subjective:     Feels better intolerance to po calcium     Objective:     No data found. I/O last 3 completed shifts: In: 100 [P.O.:100]  Out: 700 [Urine:700]  No intake/output data recorded. HEENT: Normal  NECK: Thyroid normal. No carotid bruit. No lymphphadenopathy. CVS: RRR  RS: Clear. No wheeze. No rhonchi. Good airflow bilaterally. ABD: Soft. Non tender. No mass. Normal BS. EXT: No edema. Non tender. Pulses present. Skin intact.   NEURO: Intact      Scheduled Meds:   Vitamin D  2,000 Units Oral Daily    [Held by provider] calcium acetate  1 capsule Oral TID WC    calcium gluconate  1,000 mg IntraVENous BID    zinc sulfate  50 mg Oral Daily    ascorbic acid  500 mg Oral Daily    doxycycline (VIBRAMYCIN) IV  100 mg IntraVENous Q12H     Continuous Infusions:   sodium chloride 75 mL/hr at 02/07/22 0513       CBC with Differential:    Lab Results   Component Value Date    WBC 5.7 02/03/2022    RBC 5.45 02/03/2022    HGB 17.0 02/03/2022    HCT 50.1 02/03/2022     02/03/2022    MCV 91.9 02/03/2022    MCH 31.2 02/03/2022    MCHC 33.9 02/03/2022    RDW 15.2 02/03/2022    LYMPHOPCT 27.8 02/03/2022    MONOPCT 8.7 02/03/2022    BASOPCT 0.7 02/03/2022    MONOSABS 0.50 02/03/2022    LYMPHSABS 1.59 02/03/2022    EOSABS 0.15 02/03/2022    BASOSABS 0.04 02/03/2022     CMP:    Lab Results   Component Value Date     02/07/2022    K 3.6 02/07/2022    K 4.0 02/03/2022     02/07/2022    CO2 23 02/07/2022    BUN 11 02/07/2022    CREATININE 0.5 02/07/2022    GFRAA >60 02/07/2022    LABGLOM >60 02/07/2022    PROT 6.3 02/06/2022    LABALBU 3.0 02/06/2022    CALCIUM 6.7 02/07/2022    BILITOT 1.5 02/06/2022    ALKPHOS 82 02/06/2022     02/06/2022     02/06/2022     PT/INR:  No results found for: PROTIME, INR    Assessment:     Active Problems:   Hypocalcemia improving     Hypomagnesemia    Vitamin D deficiency     Covid 19 infection     Smoker       Plan:   Continue same

## 2022-02-07 NOTE — PROGRESS NOTES
Notification of IV to PO conversion: This patient's order for doxycycline IV has been changed to PO as approved by the Pharmacy & Therapeutics and Medical Executive Committees. If the patient should become strict NPO while on this therapy, contact the prescriber for further orders.     Thank you,     Brenden Johnson, Pharm D 2/7/2022 9:48 AM

## 2022-02-08 ENCOUNTER — APPOINTMENT (OUTPATIENT)
Dept: ULTRASOUND IMAGING | Age: 67
DRG: 177 | End: 2022-02-08
Payer: MEDICARE

## 2022-02-08 ENCOUNTER — CLINICAL DOCUMENTATION (OUTPATIENT)
Dept: GENERAL RADIOLOGY | Age: 67
End: 2022-02-08

## 2022-02-08 ENCOUNTER — APPOINTMENT (OUTPATIENT)
Dept: CT IMAGING | Age: 67
DRG: 177 | End: 2022-02-08
Payer: MEDICARE

## 2022-02-08 LAB
ANION GAP SERPL CALCULATED.3IONS-SCNC: 11 MMOL/L (ref 7–16)
BUN BLDV-MCNC: 11 MG/DL (ref 6–23)
CALCIUM SERPL-MCNC: 6.6 MG/DL (ref 8.6–10.2)
CHLORIDE BLD-SCNC: 102 MMOL/L (ref 98–107)
CO2: 22 MMOL/L (ref 22–29)
CREAT SERPL-MCNC: 0.5 MG/DL (ref 0.5–1)
GFR AFRICAN AMERICAN: >60
GFR NON-AFRICAN AMERICAN: >60 ML/MIN/1.73
GLUCOSE BLD-MCNC: 87 MG/DL (ref 74–99)
POTASSIUM SERPL-SCNC: 4 MMOL/L (ref 3.5–5)
SODIUM BLD-SCNC: 135 MMOL/L (ref 132–146)

## 2022-02-08 PROCEDURE — 80048 BASIC METABOLIC PNL TOTAL CA: CPT

## 2022-02-08 PROCEDURE — 6360000002 HC RX W HCPCS: Performed by: INTERNAL MEDICINE

## 2022-02-08 PROCEDURE — 76856 US EXAM PELVIC COMPLETE: CPT

## 2022-02-08 PROCEDURE — 2580000003 HC RX 258: Performed by: INTERNAL MEDICINE

## 2022-02-08 PROCEDURE — 6370000000 HC RX 637 (ALT 250 FOR IP): Performed by: EMERGENCY MEDICINE

## 2022-02-08 PROCEDURE — 36415 COLL VENOUS BLD VENIPUNCTURE: CPT

## 2022-02-08 PROCEDURE — 2500000003 HC RX 250 WO HCPCS: Performed by: INTERNAL MEDICINE

## 2022-02-08 PROCEDURE — 6370000000 HC RX 637 (ALT 250 FOR IP): Performed by: INTERNAL MEDICINE

## 2022-02-08 PROCEDURE — 71250 CT THORAX DX C-: CPT

## 2022-02-08 PROCEDURE — 2140000000 HC CCU INTERMEDIATE R&B

## 2022-02-08 RX ORDER — CALCIUM CARBONATE 200(500)MG
500 TABLET,CHEWABLE ORAL 3 TIMES DAILY
Status: DISCONTINUED | OUTPATIENT
Start: 2022-02-08 | End: 2022-02-11 | Stop reason: HOSPADM

## 2022-02-08 RX ORDER — CHOLECALCIFEROL (VITAMIN D3) 50 MCG
4000 TABLET ORAL DAILY
Status: DISCONTINUED | OUTPATIENT
Start: 2022-02-08 | End: 2022-02-11 | Stop reason: HOSPADM

## 2022-02-08 RX ORDER — ACETAMINOPHEN 325 MG/1
650 TABLET ORAL EVERY 4 HOURS PRN
Status: DISCONTINUED | OUTPATIENT
Start: 2022-02-08 | End: 2022-02-11 | Stop reason: HOSPADM

## 2022-02-08 RX ORDER — HYDROCODONE BITARTRATE AND ACETAMINOPHEN 5; 325 MG/1; MG/1
1 TABLET ORAL EVERY 4 HOURS PRN
Status: DISCONTINUED | OUTPATIENT
Start: 2022-02-08 | End: 2022-02-09

## 2022-02-08 RX ADMIN — CALCIUM CARBONATE 500 MG: 500 TABLET, CHEWABLE ORAL at 14:45

## 2022-02-08 RX ADMIN — CALCIUM CARBONATE 500 MG: 500 TABLET, CHEWABLE ORAL at 21:44

## 2022-02-08 RX ADMIN — HYDROCODONE BITARTRATE AND ACETAMINOPHEN 1 TABLET: 5; 325 TABLET ORAL at 18:19

## 2022-02-08 RX ADMIN — Medication 1000 MG: at 21:45

## 2022-02-08 RX ADMIN — Medication 50 MG: at 09:10

## 2022-02-08 RX ADMIN — OXYCODONE HYDROCHLORIDE AND ACETAMINOPHEN 500 MG: 500 TABLET ORAL at 09:11

## 2022-02-08 RX ADMIN — DOXYCYCLINE 100 MG: 100 CAPSULE ORAL at 09:10

## 2022-02-08 RX ADMIN — Medication 1000 MG: at 09:10

## 2022-02-08 RX ADMIN — HYDROCODONE BITARTRATE AND ACETAMINOPHEN 1 TABLET: 5; 325 TABLET ORAL at 09:11

## 2022-02-08 RX ADMIN — CALCIUM CARBONATE 500 MG: 500 TABLET, CHEWABLE ORAL at 09:10

## 2022-02-08 RX ADMIN — HYDROCODONE BITARTRATE AND ACETAMINOPHEN 1 TABLET: 5; 325 TABLET ORAL at 21:44

## 2022-02-08 RX ADMIN — TRAMADOL HYDROCHLORIDE 50 MG: 50 TABLET, COATED ORAL at 02:59

## 2022-02-08 RX ADMIN — ACETAMINOPHEN 650 MG: 325 TABLET ORAL at 00:13

## 2022-02-08 RX ADMIN — Medication 4000 UNITS: at 09:17

## 2022-02-08 RX ADMIN — DOXYCYCLINE 100 MG: 100 CAPSULE ORAL at 21:44

## 2022-02-08 ASSESSMENT — PAIN SCALES - GENERAL
PAINLEVEL_OUTOF10: 9
PAINLEVEL_OUTOF10: 8
PAINLEVEL_OUTOF10: 8
PAINLEVEL_OUTOF10: 9
PAINLEVEL_OUTOF10: 6
PAINLEVEL_OUTOF10: 4
PAINLEVEL_OUTOF10: 6

## 2022-02-08 NOTE — PROGRESS NOTES
Admit Date: 2/3/2022    Subjective:     Feels better Ca+ still low     Objective:     No data found. I/O last 3 completed shifts: In: 200 [P.O.:200]  Out: 700 [Urine:700]  I/O this shift:  In: -   Out: 400 [Urine:400]    HEENT: Normal  NECK: Thyroid normal. No carotid bruit. No lymphphadenopathy. CVS: RRR  RS: Clear. No wheeze. No rhonchi. Good airflow bilaterally. ABD: Soft. Non tender. No mass. Normal BS. EXT: No edema. Non tender. Pulses present. Skin intact.   NEURO: Intact      Scheduled Meds:   calcium carbonate  500 mg Oral TID    doxycycline hyclate  100 mg Oral 2 times per day    vitamin D  2,000 Units Oral Daily    [Held by provider] calcium acetate  1 capsule Oral TID WC    calcium gluconate  1,000 mg IntraVENous BID    zinc sulfate  50 mg Oral Daily    ascorbic acid  500 mg Oral Daily     Continuous Infusions:   sodium chloride 75 mL/hr at 02/07/22 2136       CBC with Differential:    Lab Results   Component Value Date    WBC 5.7 02/03/2022    RBC 5.45 02/03/2022    HGB 17.0 02/03/2022    HCT 50.1 02/03/2022     02/03/2022    MCV 91.9 02/03/2022    MCH 31.2 02/03/2022    MCHC 33.9 02/03/2022    RDW 15.2 02/03/2022    LYMPHOPCT 27.8 02/03/2022    MONOPCT 8.7 02/03/2022    BASOPCT 0.7 02/03/2022    MONOSABS 0.50 02/03/2022    LYMPHSABS 1.59 02/03/2022    EOSABS 0.15 02/03/2022    BASOSABS 0.04 02/03/2022     CMP:    Lab Results   Component Value Date     02/08/2022    K 4.0 02/08/2022    K 4.0 02/03/2022     02/08/2022    CO2 22 02/08/2022    BUN 11 02/08/2022    CREATININE 0.5 02/08/2022    GFRAA >60 02/08/2022    LABGLOM >60 02/08/2022    PROT 6.3 02/06/2022    LABALBU 3.0 02/06/2022    CALCIUM 6.6 02/08/2022    BILITOT 1.5 02/06/2022    ALKPHOS 82 02/06/2022     02/06/2022     02/06/2022     PT/INR:  No results found for: PROTIME, INR    Assessment:     Active Problems:   Hypocalcemia improving     Hypomagnesemia    Vitamin D deficiency     Covid 19 infection   Smoker       Plan:   Continue Ca+ supplement monitor

## 2022-02-08 NOTE — PROGRESS NOTES
Spoke with Kaylin Cherry on [employer]. She was aware patient cannot get breast ultrasound here at this time due to isolation precautions. Per Helene Parmar CT scan was ordered.

## 2022-02-09 LAB
ANION GAP SERPL CALCULATED.3IONS-SCNC: 11 MMOL/L (ref 7–16)
BUN BLDV-MCNC: 8 MG/DL (ref 6–23)
CALCIUM SERPL-MCNC: 6.7 MG/DL (ref 8.6–10.2)
CHLORIDE BLD-SCNC: 99 MMOL/L (ref 98–107)
CO2: 24 MMOL/L (ref 22–29)
CREAT SERPL-MCNC: 0.5 MG/DL (ref 0.5–1)
CULTURE, BLOOD 2: ABNORMAL
GFR AFRICAN AMERICAN: >60
GFR NON-AFRICAN AMERICAN: >60 ML/MIN/1.73
GLUCOSE BLD-MCNC: 88 MG/DL (ref 74–99)
ORGANISM: ABNORMAL
POTASSIUM SERPL-SCNC: 3.6 MMOL/L (ref 3.5–5)
SODIUM BLD-SCNC: 134 MMOL/L (ref 132–146)

## 2022-02-09 PROCEDURE — 2140000000 HC CCU INTERMEDIATE R&B

## 2022-02-09 PROCEDURE — 2580000003 HC RX 258: Performed by: INTERNAL MEDICINE

## 2022-02-09 PROCEDURE — 2500000003 HC RX 250 WO HCPCS: Performed by: INTERNAL MEDICINE

## 2022-02-09 PROCEDURE — 6370000000 HC RX 637 (ALT 250 FOR IP): Performed by: INTERNAL MEDICINE

## 2022-02-09 PROCEDURE — 6370000000 HC RX 637 (ALT 250 FOR IP): Performed by: EMERGENCY MEDICINE

## 2022-02-09 PROCEDURE — 36415 COLL VENOUS BLD VENIPUNCTURE: CPT

## 2022-02-09 PROCEDURE — 6360000002 HC RX W HCPCS: Performed by: INTERNAL MEDICINE

## 2022-02-09 PROCEDURE — 80048 BASIC METABOLIC PNL TOTAL CA: CPT

## 2022-02-09 RX ORDER — DOCUSATE SODIUM 100 MG/1
100 CAPSULE, LIQUID FILLED ORAL 2 TIMES DAILY
Status: DISCONTINUED | OUTPATIENT
Start: 2022-02-09 | End: 2022-02-11 | Stop reason: HOSPADM

## 2022-02-09 RX ADMIN — CALCIUM CARBONATE 500 MG: 500 TABLET, CHEWABLE ORAL at 20:34

## 2022-02-09 RX ADMIN — Medication 1000 MG: at 20:34

## 2022-02-09 RX ADMIN — DOXYCYCLINE 100 MG: 100 CAPSULE ORAL at 20:34

## 2022-02-09 RX ADMIN — DOCUSATE SODIUM 100 MG: 100 CAPSULE, LIQUID FILLED ORAL at 08:56

## 2022-02-09 RX ADMIN — ONDANSETRON 4 MG: 2 INJECTION INTRAMUSCULAR; INTRAVENOUS at 20:48

## 2022-02-09 RX ADMIN — Medication 4000 UNITS: at 08:56

## 2022-02-09 RX ADMIN — Medication 1000 MG: at 13:51

## 2022-02-09 RX ADMIN — DOXYCYCLINE 100 MG: 100 CAPSULE ORAL at 10:39

## 2022-02-09 RX ADMIN — TRAMADOL HYDROCHLORIDE 50 MG: 50 TABLET, COATED ORAL at 20:48

## 2022-02-09 RX ADMIN — SODIUM CHLORIDE: 9 INJECTION, SOLUTION INTRAVENOUS at 20:49

## 2022-02-09 RX ADMIN — HYDROCODONE BITARTRATE AND ACETAMINOPHEN 1 TABLET: 5; 325 TABLET ORAL at 01:55

## 2022-02-09 RX ADMIN — OXYCODONE HYDROCHLORIDE AND ACETAMINOPHEN 500 MG: 500 TABLET ORAL at 08:56

## 2022-02-09 RX ADMIN — CALCIUM CARBONATE 500 MG: 500 TABLET, CHEWABLE ORAL at 13:51

## 2022-02-09 RX ADMIN — CALCIUM CARBONATE 500 MG: 500 TABLET, CHEWABLE ORAL at 08:56

## 2022-02-09 RX ADMIN — Medication 1000 MG: at 08:56

## 2022-02-09 RX ADMIN — ONDANSETRON 4 MG: 2 INJECTION INTRAMUSCULAR; INTRAVENOUS at 06:22

## 2022-02-09 RX ADMIN — Medication 50 MG: at 08:56

## 2022-02-09 RX ADMIN — DOCUSATE SODIUM 100 MG: 100 CAPSULE, LIQUID FILLED ORAL at 20:34

## 2022-02-09 ASSESSMENT — PAIN SCALES - GENERAL
PAINLEVEL_OUTOF10: 7
PAINLEVEL_OUTOF10: 8
PAINLEVEL_OUTOF10: 0
PAINLEVEL_OUTOF10: 0
PAINLEVEL_OUTOF10: 7

## 2022-02-09 NOTE — PLAN OF CARE
Problem: Airway Clearance - Ineffective  Goal: Achieve or maintain patent airway  Outcome: Met This Shift     Problem: Gas Exchange - Impaired  Goal: Absence of hypoxia  Outcome: Met This Shift     Problem: Gas Exchange - Impaired  Goal: Promote optimal lung function  Outcome: Met This Shift     Problem: Breathing Pattern - Ineffective  Goal: Ability to achieve and maintain a regular respiratory rate  Outcome: Met This Shift     Problem: Body Temperature -  Risk of, Imbalanced  Goal: Ability to maintain a body temperature within defined limits  Outcome: Met This Shift     Problem: Body Temperature -  Risk of, Imbalanced  Goal: Will regain or maintain usual level of consciousness  Outcome: Met This Shift     Problem:  Body Temperature -  Risk of, Imbalanced  Goal: Complications related to the disease process, condition or treatment will be avoided or minimized  Outcome: Met This Shift     Problem: Isolation Precautions - Risk of Spread of Infection  Goal: Prevent transmission of infection  Outcome: Met This Shift     Problem: Nutrition Deficits  Goal: Optimize nutritional status  Outcome: Met This Shift     Problem: Risk for Fluid Volume Deficit  Goal: Maintain normal heart rhythm  Outcome: Met This Shift     Problem: Risk for Fluid Volume Deficit  Goal: Maintain absence of muscle cramping  Outcome: Ongoing     Problem: Risk for Fluid Volume Deficit  Goal: Maintain normal serum potassium, sodium, calcium, phosphorus, and pH  Outcome: Ongoing     Problem: Loneliness or Risk for Loneliness  Goal: Demonstrate positive use of time alone when socialization is not possible  Outcome: Met This Shift     Problem: Fatigue  Goal: Verbalize increase energy and improved vitality  Outcome: Ongoing     Problem: Skin Integrity:  Goal: Will show no infection signs and symptoms  Description: Will show no infection signs and symptoms  Outcome: Met This Shift     Problem: Skin Integrity:  Goal: Absence of new skin breakdown  Description: Absence of new skin breakdown  Outcome: Met This Shift     Problem: Falls - Risk of:  Goal: Will remain free from falls  Description: Will remain free from falls  Outcome: Met This Shift     Problem: Falls - Risk of:  Goal: Absence of physical injury  Description: Absence of physical injury  Outcome: Met This Shift

## 2022-02-09 NOTE — CARE COORDINATION
Care Coordination:  Call to  and then he placed sister on phone as well. They do not feel they can care for her at home at the moment. Will need short term rehab. They will call into room and discuss with her as well. I attempted to call to room as covid isolation and no answer. Therapy scores on 2/7/22 are 8/24. Family agreeable to [facility] and if no beds, with [facility] as second choice as pt is only day 6.  Referral made to [facility] at [facility] and spoke to Farhan Garcia in rehab for updated notes first thing in morning    Carolina Sorensen

## 2022-02-09 NOTE — PROGRESS NOTES
Admit Date: 2/3/2022    Subjective:     Doing little better constipated     Objective:     No data found. I/O last 3 completed shifts:  In: -   Out: 400 [Urine:400]  No intake/output data recorded. HEENT: Normal  NECK: Thyroid normal. No carotid bruit. No lymphphadenopathy. CVS: RRR  RS: Clear. No wheeze. No rhonchi. Good airflow bilaterally. ABD: Soft. Non tender. No mass. Normal BS. EXT: No edema. Non tender. Pulses present. Skin intact.   NEURO: no focal deficit       Scheduled Meds:   calcium gluconate  1,000 mg IntraVENous TID    calcium carbonate  500 mg Oral TID    vitamin D  4,000 Units Oral Daily    doxycycline hyclate  100 mg Oral 2 times per day    [Held by provider] calcium acetate  1 capsule Oral TID WC    zinc sulfate  50 mg Oral Daily    ascorbic acid  500 mg Oral Daily     Continuous Infusions:   sodium chloride 75 mL/hr at 02/07/22 2136       CBC with Differential:    Lab Results   Component Value Date    WBC 5.7 02/03/2022    RBC 5.45 02/03/2022    HGB 17.0 02/03/2022    HCT 50.1 02/03/2022     02/03/2022    MCV 91.9 02/03/2022    MCH 31.2 02/03/2022    MCHC 33.9 02/03/2022    RDW 15.2 02/03/2022    LYMPHOPCT 27.8 02/03/2022    MONOPCT 8.7 02/03/2022    BASOPCT 0.7 02/03/2022    MONOSABS 0.50 02/03/2022    LYMPHSABS 1.59 02/03/2022    EOSABS 0.15 02/03/2022    BASOSABS 0.04 02/03/2022     CMP:    Lab Results   Component Value Date     02/09/2022    K 3.6 02/09/2022    K 4.0 02/03/2022    CL 99 02/09/2022    CO2 24 02/09/2022    BUN 8 02/09/2022    CREATININE 0.5 02/09/2022    GFRAA >60 02/09/2022    LABGLOM >60 02/09/2022    PROT 6.3 02/06/2022    LABALBU 3.0 02/06/2022    CALCIUM 6.7 02/09/2022    BILITOT 1.5 02/06/2022    ALKPHOS 82 02/06/2022     02/06/2022     02/06/2022     PT/INR:  No results found for: PROTIME, INR    Assessment:     Active Problems:    Hypocalcemia improving     Hypomagnesemia    Vitamin D deficiency     Covid 19 infection   Smoker     Constipation due to narcotic       Plan:    Will increase Ca+ supplement ,will hold norco ,stool softener ,monitor

## 2022-02-10 LAB
ANION GAP SERPL CALCULATED.3IONS-SCNC: 12 MMOL/L (ref 7–16)
BUN BLDV-MCNC: 8 MG/DL (ref 6–23)
CALCIUM SERPL-MCNC: 7.3 MG/DL (ref 8.6–10.2)
CHLORIDE BLD-SCNC: 97 MMOL/L (ref 98–107)
CO2: 26 MMOL/L (ref 22–29)
CREAT SERPL-MCNC: 0.6 MG/DL (ref 0.5–1)
GFR AFRICAN AMERICAN: >60
GFR NON-AFRICAN AMERICAN: >60 ML/MIN/1.73
GLUCOSE BLD-MCNC: 93 MG/DL (ref 74–99)
POTASSIUM SERPL-SCNC: 3.2 MMOL/L (ref 3.5–5)
SODIUM BLD-SCNC: 135 MMOL/L (ref 132–146)

## 2022-02-10 PROCEDURE — 36415 COLL VENOUS BLD VENIPUNCTURE: CPT

## 2022-02-10 PROCEDURE — 97530 THERAPEUTIC ACTIVITIES: CPT

## 2022-02-10 PROCEDURE — 2140000000 HC CCU INTERMEDIATE R&B

## 2022-02-10 PROCEDURE — 6370000000 HC RX 637 (ALT 250 FOR IP): Performed by: EMERGENCY MEDICINE

## 2022-02-10 PROCEDURE — 97535 SELF CARE MNGMENT TRAINING: CPT

## 2022-02-10 PROCEDURE — 80048 BASIC METABOLIC PNL TOTAL CA: CPT

## 2022-02-10 PROCEDURE — 6370000000 HC RX 637 (ALT 250 FOR IP): Performed by: INTERNAL MEDICINE

## 2022-02-10 PROCEDURE — 6360000002 HC RX W HCPCS: Performed by: INTERNAL MEDICINE

## 2022-02-10 PROCEDURE — 2580000003 HC RX 258: Performed by: INTERNAL MEDICINE

## 2022-02-10 RX ADMIN — SODIUM CHLORIDE: 9 INJECTION, SOLUTION INTRAVENOUS at 13:32

## 2022-02-10 RX ADMIN — Medication 50 MG: at 09:09

## 2022-02-10 RX ADMIN — ONDANSETRON 4 MG: 2 INJECTION INTRAMUSCULAR; INTRAVENOUS at 20:31

## 2022-02-10 RX ADMIN — CALCIUM CARBONATE 500 MG: 500 TABLET, CHEWABLE ORAL at 09:09

## 2022-02-10 RX ADMIN — DOCUSATE SODIUM 100 MG: 100 CAPSULE, LIQUID FILLED ORAL at 20:26

## 2022-02-10 RX ADMIN — Medication 1000 MG: at 14:15

## 2022-02-10 RX ADMIN — CALCIUM CARBONATE 500 MG: 500 TABLET, CHEWABLE ORAL at 14:15

## 2022-02-10 RX ADMIN — DOXYCYCLINE 100 MG: 100 CAPSULE ORAL at 09:08

## 2022-02-10 RX ADMIN — DOXYCYCLINE 100 MG: 100 CAPSULE ORAL at 20:26

## 2022-02-10 RX ADMIN — Medication 1000 MG: at 07:43

## 2022-02-10 RX ADMIN — Medication 4000 UNITS: at 09:08

## 2022-02-10 RX ADMIN — Medication 1000 MG: at 20:26

## 2022-02-10 RX ADMIN — CALCIUM CARBONATE 500 MG: 500 TABLET, CHEWABLE ORAL at 20:26

## 2022-02-10 RX ADMIN — OXYCODONE HYDROCHLORIDE AND ACETAMINOPHEN 500 MG: 500 TABLET ORAL at 09:09

## 2022-02-10 RX ADMIN — TRAMADOL HYDROCHLORIDE 50 MG: 50 TABLET, COATED ORAL at 20:31

## 2022-02-10 RX ADMIN — DOCUSATE SODIUM 100 MG: 100 CAPSULE, LIQUID FILLED ORAL at 09:09

## 2022-02-10 ASSESSMENT — PAIN SCALES - GENERAL
PAINLEVEL_OUTOF10: 4
PAINLEVEL_OUTOF10: 0

## 2022-02-10 NOTE — PROGRESS NOTES
Physical Therapy   Treatment    Name: Sahil Patel  : 1955  MRN: [id number]      Date of Service: 2/10/2022    Evaluating PT:  Christina Posada Rian Cooper, [id number]    Room #:  4712/8851-S  Diagnosis:  Hypocalcemia [E83.51]  Hypomagnesemia [E83.42]  Failure to thrive in adult [R62.7]  Electrolyte abnormality [E87.8]  Elevated bilirubin [R17]  Pneumonia due to infectious organism, unspecified laterality, unspecified part of lung [J18.9]  PMHx/PSHx:     has a past medical history of Arthritis. has a past surgical history that includes Cataract removal and cervical fusion (N/A, 6/3/2019). Precautions:  Fall risk, droplet+, ataxic BUE    SUBJECTIVE:    Pt lives with her , sister, and nephew in a 1 story home with 2 stairs to enter and no rail. Pt ambulated with no AD PTA, pt reports her  owns a walker. OBJECTIVE:   Initial Evaluation  Date: 22 Treatment  2/10/2022 Short Term/ Long Term   Goals   AM-PAC 6 Clicks     Was pt agreeable to Eval/treatment? Yes Yes    Does pt have pain? Denied at rest, stomach pain with movement Generalized BUE, BLE    Bed Mobility  Rolling: MaxA  Supine to sit: MaxA  Sit to supine: MaxA  Scooting: MaxA Rolling: MaxA   Supine to sit: ModA x2  Sit to supine: MaxA x2  Scooting: MaxA to EOB Pedrito   Transfers Sit to stand: Dependent  Stand to sit: Dependent  Stand pivot: NT Sit to stand: MaxA x2 (partial)  Stand to sit: MaxA x2  Stand pivot: NT Pedrito   Ambulation    NT NT No ambulation goal at this time   Stair negotiation: ascended and descended  NT NT No stair goal at this time   BLE ROM WFL     BLE Strength Grossly 4-/5     Balance Sitting: Min/ModA  Standing: Depedent Sitting: ModA  Standing: MaxA x2 (partial stand) Sitting: Supervision  Standing: Pedrito     Pt is A & O x 4  Sensation:  Reported numbness/tingling to B hands and B feet  Edema:  Unremarkable    Vitals:  SpO2 94% at rest.  HR 65, SpO2 95% during activity.   HR 88, SpO2 95% following activity. Therapeutic Exercises:    - Seated anterior/posterior weight shifts (x5)  - Seated lateral weight shifts (x5 to R, x5 to L) with emphasis on bearing weight through UE    Patient education  Pt educated on safety during functional mobility, use of call light for assistance. Patient response to education:   Pt verbalized understanding Pt demonstrated skill Pt requires further education in this area   Yes Yes Yes     ASSESSMENT:    Conditions Requiring Skilled Therapeutic Intervention:    [x]Decreased strength     []Decreased ROM  [x]Decreased functional mobility  [x]Decreased balance   [x]Decreased endurance   [x]Decreased posture  [x]Decreased sensation  []Decreased coordination   []Decreased vision  [x]Decreased safety awareness   [x]Increased pain       Comments:  Patient semi-supine in bed upon arrival, agreeable to PT session. Required increased time, assistance of trunk and BLE to perform bed mobility. Exhibited anterior trunk lean in sitting. Tolerated sitting EOB for extended period of time, requiring BUE support, B knee block and steadying assistance to maintain static sitting balance. Patient demonstrated poor control of BUE. Patient attempted multiple sit <> stand transfers with B knee block; unable to complete full stand. Activity limited by fatigue. Vitals monitored and noted above. Patient left semi-supine in bed, positioned optimally, with call light in reach.     Treatment:  Patient practiced and was instructed in the following treatment:     Bed mobility - verbal cues to facilitate proper positioning and sequencing; physical assistance provided during activity   Static sitting - performed to promote upright tolerance, postural awareness, and balance maintenance   Dynamic sitting - anterior/posterior and lateral weight shifts to promote balance maintenance   Functional transfers - multiple sit <> stand transfer attempts; verbal cues to facilitate proper positioning and sequencing, particularly related to hand and foot placement; physical assistance provided during activity   Skilled positioning - patient positioned optimally to protect skin/joint integrity and promote comfort      PHYSICAL THERAPY PLAN OF CARE:    PT POC is established based on physician order and patient diagnosis     Referring provider/PT Order:  Adriana Allen MD  Diagnosis:  Hypocalcemia [E83.51]  Hypomagnesemia [E83.42]  Failure to thrive in adult [R62.7]  Electrolyte abnormality [E87.8]  Elevated bilirubin [R17]  Pneumonia due to infectious organism, unspecified laterality, unspecified part of lung [J18.9]  Specific instructions for next treatment:  Continue to facilitate safe performance of functional mobility; progress as appropriate    Current Treatment Recommendations:     [x] Strengthening to improve independence with functional mobility   [] ROM to improve independence with functional mobility   [x] Balance Training to improve static/dynamic balance and to reduce fall risk  [x] Endurance Training to improve activity tolerance during functional mobility   [x] Transfer Training to improve safety and independence with all functional transfers   [x] Gait Training to improve gait mechanics, endurance and assess need for appropriate assistive device  [x] Stair Training in preparation for safe discharge home and/or into the community   [x] Positioning to prevent skin breakdown and contractures  [x] Safety and Education Training   [x] Patient/Caregiver Education   [] HEP  [] Other     PT long term treatment goals are located in above grid    Frequency of treatments: 2-5x/week x 1-2 weeks.     Time in  0810  Time out  0835    Total Treatment Time  25 minutes     Evaluation Time includes thorough review of current medical information, gathering information on past medical history/social history and prior level of function, completion of standardized testing/informal observation of tasks, assessment of data and education on plan of care and goals.     CPT codes:  [] Low Complexity PT evaluation 09727  [] Moderate Complexity PT evaluation 08804  [] High Complexity PT evaluation 52897  [] PT Re-evaluation 21689  [] Gait training 01635 0 minutes  [] Manual therapy 51600 0 minutes  [x] Therapeutic activities 82077 25 minutes  [] Therapeutic exercises 64479 0 minutes  [] Neuromuscular reeducation 59135 0 minutes     Laverne Larson, PT, DPT  [id number]

## 2022-02-10 NOTE — PROGRESS NOTES
Comprehensive Nutrition Assessment    Type and Reason for Visit:  Initial,RD Nutrition Re-Screen/LOS    Nutrition Recommendations/Plan: continue current diet regimen. Nutrition Assessment:  Pt adm d/t weakness with recent COVID-19. Pt w/ hypocalcemia, hypomagnesemia. Pt tolerating current diet with good intake, continue current regimen and monitor. Nutrition Related Findings:  active bowel sounds, noted constipation, currently on  room air, tolerating diet at 75% on average. Wounds:  None       Current Nutrition Therapies:    ADULT DIET; Regular    Anthropometric Measures:  · Height: 4' 7\" (139.7 cm)  · Current Body Weight: 150 lb (68 kg) (2/3- estimated)   · Admission Body Weight:      · Usual Body Weight:  (UTO- no diet hx available in EMR)     · Ideal Body Weight: 75 lbs; % Ideal Body Weight 200 %   · BMI: 34.9   · BMI Categories: Obese Class 1 (BMI 30.0-34. 9)       Nutrition Diagnosis:   No nutrition diagnosis at this time     Nutrition Interventions:   Food and/or Nutrient Delivery:  Continue Current Diet  Nutrition Education/Counseling:  No recommendation at this time   Coordination of Nutrition Care:  Continue to monitor while inpatient    Goals:  consume >75% of meals.        Nutrition Monitoring and Evaluation:   Food/Nutrient Intake Outcomes:  Food and Nutrient Intake  Physical Signs/Symptoms Outcomes:  Biochemical Data,Constipation,Weight,Nutrition Focused Physical Findings     Discharge Planning:    No discharge needs at this time     Electronically signed by Thelma Weston RD, LD on 2/10/22 at 4:29 PM EST    Contact: ext [phone]

## 2022-02-10 NOTE — PROGRESS NOTES
Admit Date: 2/3/2022    Subjective:     Awake weak Ca+ up 7.3    Objective:     Patient Vitals for the past 8 hrs:   BP Temp Temp src Pulse Resp SpO2   02/10/22 0000 137/65 97.9 °F (36.6 °C) Axillary 95 18 95 %     I/O last 3 completed shifts:  In: -   Out: 1150 [Urine:1150]  No intake/output data recorded. HEENT: Normal  NECK: Thyroid normal. No carotid bruit. No lymphphadenopathy. CVS: RRR  RS: scattered rhonchi   ABD: Soft. Non tender. No mass. Normal BS. EXT: No edema. Non tender. Pulses present. Skin intact.   NEURO:moving all extremities       Scheduled Meds:   calcium gluconate  1,000 mg IntraVENous TID    docusate sodium  100 mg Oral BID    calcium carbonate  500 mg Oral TID    vitamin D  4,000 Units Oral Daily    doxycycline hyclate  100 mg Oral 2 times per day    [Held by provider] calcium acetate  1 capsule Oral TID WC    zinc sulfate  50 mg Oral Daily    ascorbic acid  500 mg Oral Daily     Continuous Infusions:   sodium chloride 75 mL/hr at 02/09/22 2049       CBC with Differential:    Lab Results   Component Value Date    WBC 5.7 02/03/2022    RBC 5.45 02/03/2022    HGB 17.0 02/03/2022    HCT 50.1 02/03/2022     02/03/2022    MCV 91.9 02/03/2022    MCH 31.2 02/03/2022    MCHC 33.9 02/03/2022    RDW 15.2 02/03/2022    LYMPHOPCT 27.8 02/03/2022    MONOPCT 8.7 02/03/2022    BASOPCT 0.7 02/03/2022    MONOSABS 0.50 02/03/2022    LYMPHSABS 1.59 02/03/2022    EOSABS 0.15 02/03/2022    BASOSABS 0.04 02/03/2022     CMP:    Lab Results   Component Value Date     02/10/2022    K 3.2 02/10/2022    K 4.0 02/03/2022    CL 97 02/10/2022    CO2 26 02/10/2022    BUN 8 02/10/2022    CREATININE 0.6 02/10/2022    GFRAA >60 02/10/2022    LABGLOM >60 02/10/2022    PROT 6.3 02/06/2022    LABALBU 3.0 02/06/2022    CALCIUM 7.3 02/10/2022    BILITOT 1.5 02/06/2022    ALKPHOS 82 02/06/2022     02/06/2022     02/06/2022     PT/INR:  No results found for: PROTIME, INR    Assessment: Active Problems:   Hypocalcemia improving     Hypomagnesemia    Vitamin D deficiency     Covid 19 infection     Smoker     Constipation due to narcotic       Plan:   Improvement,continue same transfer next 24 hr if ok

## 2022-02-10 NOTE — PROGRESS NOTES
[address]      Date:2/10/2022                                                  Patient Name: Erum Currie  MRN: [id number]  : 1955  Room: [address] [place] [address]    Evaluating OT: Ledy Pennington, [facility]L [id number]  Referring Temi Phan MD  Specific Provider Orders: OT eval and treat 2/4  Recommended Adaptive Equipment: 24 hr physical assist     Diagnosis: failure to thrive   Surgery: none  Pertinent Medical History: none on file    Precautions:  Fall Risk, BUE and BLE ataxia, droplet +    Assessment of current deficits   [x] Functional mobility  [x]ADLs  [x] Strength               [x]Cognition   [x] Functional transfers   [x] IADLs         [x] Safety Awareness   [x]Endurance   [] Fine Coordination              [x] Balance      [] Vision/perception   [x]Sensation    []Gross Motor Coordination  [] ROM  [] Delirium                   [] Motor Control     OT PLAN OF CARE   OT POC based on physician orders, patient diagnosis and results of clinical assessment    Frequency/Duration: 2-3 days/wk for 2 weeks PRN   Specific OT Treatment to include:   * Instruction/training on adapted ADL techniques and AE recommendations to increase functional independence within precautions       * Training on energy conservation strategies, correct breathing pattern and techniques to improve independence/tolerance for self-care routine  * Functional transfer/mobility training/DME recommendations for increased independence, safety, and fall prevention  * Patient/Family education to increase follow through with safety techniques and functional independence  * Recommendation of environmental modifications for increased safety with functional transfers/mobility and ADLs  * Therapeutic exercise to improve motor endurance, ROM, and functional strength for ADLs/functional transfers  * Therapeutic activities to facilitate/challenge dynamic balance, stand tolerance for increased safety and independence with ADLs  * Neuro-muscular re-education: facilitation of righting/equilibrium reactions, midline orientation, scapular stability/mobility, normalization of muscle tone, and facilitation of volitional active controled movement    Home Living: Pt lives with  and sister in an apartment; bed/bath on 1 level when entering   Bathroom setup: tub shower unit   Equipment owned: none  Prior Level of Function: IND with ADLs , assist with IADLs; using no AD for functional mobility   Driving: yes    Pain Level: 0/10  Cognition: A&O: 3/4; Follows 2 step directions   Memory:  fair    Sequencing:  fair    Problem solving:  fair    Judgement/safety:  fair     Functional Assessment:  AM-PAC Daily Activity Raw Score: 9/24   Initial Eval Status  Date: 2/7/22 Treatment Status  Date: 2/10 tx STGs=LTGs  Time Frame: 10-14 days   Feeding Mod A (assist with packages and cutting up food, steadying drink to mouth)  Max A    + ataxia; required hand over hand assist. Increased spillage Min A   Grooming Mod A (assist with oral care, completed facial washing)  Mod A    Face / hand wash; + ataxia; steadying assist  Min A   UB Dressing Max A (due to limited ROM)  Max A      [place] / donning gown; assist threading UEs Mod A   LB Dressing Dep (assist with socks) Dep Max A    Bathing Dep (simulated, 2 person) Dep Max A    Toileting Dep (2 person assist with bed level) Dep Max A   Bed Mobility  Log roll: Max A  Supine to sit: Max A   Sit to supine: Max A  Max A x2 Log roll Min A  Supine to sit: Min A   Sit to supine: Min A   Functional Transfers Sit to stand:attempted, unsafe with 1 assist   Stand to sit:NT  Commode: NT Dep x2    Partial sit to stand Mod A   Functional Mobility NT NT TBA   Balance Sitting: Mod A (ataxic)  Standing: attempted, unsafe with 1 assist Sitting;  Mod - max A    (+ ataxia)    Activity Tolerance Fair-     Visual/  Perceptual Glasses: no                Vitals: On RA  O2 sat 94-97%    Comments: Upon arrival pt supine in bed and agreeable to OT session. At end of session semi-supine in bed all lines and tubes intact, call light within reach. Treatment: Facilitation of bed mobility,  sitting balance (impacting ADLs; addressing posture, weight shifting, dynamic reaching) and attempted sit to stand transfer- skilled cuing on sequencing, hand over hand assist, hand placement, posture, body mechanics, energy conservation techniques and safety. Therapist facilitated self-care retraining: UB/LB self-care tasks (gown, partial bathing, socks), toileting hygiene task,  grooming tasks and self-feeding tasks while providing hand over hand steadying assist due to ataxia and cuing on sequencing, modified techniques, posture, safety and energy conservation techniques. Skilled monitoring of HR, O2 sats and pts response to treatment. · Pt has made fair progress towards set goals. · Continue with current plan of care: addressing adl retraining, transfer training and functional ambulation.        Treatment Time In:755            Treatment Time Out: 840              Treatment Charges: Mins Units   Ther Ex  20393     Manual Therapy 29377     Thera Activities 20404 13 1   ADL/Home Mgt 89185 27 2   Neuro Re-ed 71029     Group Therapy      Orthotic manage/training  43561     Non-Billable Time     Total Timed Treatment 40 [address] OTR/L #[id number]

## 2022-02-10 NOTE — CARE COORDINATION
Care Coordination: spoke to [facility] and will initiate precert as patient was accepted last evening.  Ambulance form completed, Hens initiated    Gonzalez Holm

## 2022-02-10 NOTE — PLAN OF CARE
Problem: Airway Clearance - Ineffective  Goal: Achieve or maintain patent airway  2/10/2022 0128 by Carlos John RN  Outcome: Met This Shift  2/9/2022 1631 by Ben Balderas RN  Outcome: Met This Shift     Problem: Gas Exchange - Impaired  Goal: Absence of hypoxia  2/10/2022 0128 by Carlos John RN  Outcome: Met This Shift  2/9/2022 1631 by Ben Balderas RN  Outcome: Met This Shift  Goal: Promote optimal lung function  2/10/2022 0128 by Carlos John RN  Outcome: Met This Shift  2/9/2022 1631 by Ben Balderas RN  Outcome: Met This Shift     Problem: Breathing Pattern - Ineffective  Goal: Ability to achieve and maintain a regular respiratory rate  2/10/2022 0128 by Carlos John RN  Outcome: Met This Shift  2/9/2022 1631 by Ben Balderas RN  Outcome: Met This Shift     Problem:  Body Temperature -  Risk of, Imbalanced  Goal: Ability to maintain a body temperature within defined limits  2/10/2022 0128 by Carlos John RN  Outcome: Met This Shift  2/9/2022 1631 by Ben Balderas RN  Outcome: Met This Shift  Goal: Will regain or maintain usual level of consciousness  2/10/2022 0128 by Carlos John RN  Outcome: Met This Shift  2/9/2022 1631 by Ben Balderas RN  Outcome: Met This Shift  Goal: Complications related to the disease process, condition or treatment will be avoided or minimized  2/10/2022 0128 by Carlos John RN  Outcome: Met This Shift  2/9/2022 1631 by Ben Balderas RN  Outcome: Met This Shift     Problem: Isolation Precautions - Risk of Spread of Infection  Goal: Prevent transmission of infection  2/10/2022 0128 by Carlos John RN  Outcome: Met This Shift  2/9/2022 1631 by Ben Balderas RN  Outcome: Met This Shift     Problem: Nutrition Deficits  Goal: Optimize nutritional status  2/10/2022 0128 by Carlos John RN  Outcome: Met This Shift  2/9/2022 1631 by Ben Balderas RN  Outcome: Met This Shift     Problem: Risk for Fluid Volume Deficit  Goal: Maintain normal heart rhythm  2/10/2022 0128 by Luann Cleveland RN  Outcome: Met This Shift  2/9/2022 1631 by Siri Turner RN  Outcome: Met This Shift  Goal: Maintain absence of muscle cramping  2/10/2022 0128 by Luann Cleveland RN  Outcome: Met This Shift  2/9/2022 1631 by Siri Turner RN  Outcome: Ongoing  Goal: Maintain normal serum potassium, sodium, calcium, phosphorus, and pH  2/10/2022 0128 by Luann Cleveland RN  Outcome: Met This Shift  2/9/2022 1631 by Siri Turner RN  Outcome: Ongoing     Problem: Loneliness or Risk for Loneliness  Goal: Demonstrate positive use of time alone when socialization is not possible  2/10/2022 0128 by Luann Cleveland RN  Outcome: Met This Shift  2/9/2022 1631 by Siri Turner RN  Outcome: Met This Shift     Problem: Fatigue  Goal: Verbalize increase energy and improved vitality  2/10/2022 0128 by Luann Cleveland RN  Outcome: Met This Shift  2/9/2022 1631 by Siri Turner RN  Outcome: Ongoing     Problem: Patient Education: Go to Patient Education Activity  Goal: Patient/Family Education  Outcome: Met This Shift     Problem: Skin Integrity:  Goal: Will show no infection signs and symptoms  Description: Will show no infection signs and symptoms  2/10/2022 0128 by Luann Cleveland RN  Outcome: Met This Shift  2/9/2022 1631 by Siri Turner RN  Outcome: Met This Shift  Goal: Absence of new skin breakdown  Description: Absence of new skin breakdown  2/10/2022 0128 by Luann Cleveland RN  Outcome: Met This Shift  2/9/2022 1631 by Siri Turner RN  Outcome: Met This Shift     Problem: Falls - Risk of:  Goal: Will remain free from falls  Description: Will remain free from falls  2/10/2022 0128 by Luann Cleveland RN  Outcome: Met This Shift  2/9/2022 1631 by Siri Turner RN  Outcome: Met This Shift  Goal: Absence of physical injury  Description: Absence of physical injury  2/10/2022 0128 by Paulette Prader Estelita Byrne RN  Outcome: Met This Shift  2/9/2022 1631 by Luke Landeros RN  Outcome: Met This Shift     Problem: Pain:  Goal: Pain level will decrease  Description: Pain level will decrease  Outcome: Met This Shift  Goal: Control of acute pain  Description: Control of acute pain  Outcome: Met This Shift  Goal: Control of chronic pain  Description: Control of chronic pain  Outcome: Met This Shift

## 2022-02-11 VITALS
TEMPERATURE: 98.1 F | SYSTOLIC BLOOD PRESSURE: 104 MMHG | BODY MASS INDEX: 34.71 KG/M2 | WEIGHT: 150 LBS | HEIGHT: 55 IN | HEART RATE: 92 BPM | RESPIRATION RATE: 19 BRPM | OXYGEN SATURATION: 93 % | DIASTOLIC BLOOD PRESSURE: 71 MMHG

## 2022-02-11 PROBLEM — R41.0 DELIRIUM: Status: ACTIVE | Noted: 2022-02-11

## 2022-02-11 LAB
ANION GAP SERPL CALCULATED.3IONS-SCNC: 12 MMOL/L (ref 7–16)
BUN BLDV-MCNC: 11 MG/DL (ref 6–23)
CALCIUM SERPL-MCNC: 7.7 MG/DL (ref 8.6–10.2)
CHLORIDE BLD-SCNC: 98 MMOL/L (ref 98–107)
CO2: 26 MMOL/L (ref 22–29)
CREAT SERPL-MCNC: 0.6 MG/DL (ref 0.5–1)
GFR AFRICAN AMERICAN: >60
GFR NON-AFRICAN AMERICAN: >60 ML/MIN/1.73
GLUCOSE BLD-MCNC: 84 MG/DL (ref 74–99)
POTASSIUM SERPL-SCNC: 3.3 MMOL/L (ref 3.5–5)
SODIUM BLD-SCNC: 136 MMOL/L (ref 132–146)

## 2022-02-11 PROCEDURE — 2580000003 HC RX 258: Performed by: INTERNAL MEDICINE

## 2022-02-11 PROCEDURE — 36415 COLL VENOUS BLD VENIPUNCTURE: CPT

## 2022-02-11 PROCEDURE — 6360000002 HC RX W HCPCS: Performed by: INTERNAL MEDICINE

## 2022-02-11 PROCEDURE — 80048 BASIC METABOLIC PNL TOTAL CA: CPT

## 2022-02-11 PROCEDURE — 6370000000 HC RX 637 (ALT 250 FOR IP): Performed by: INTERNAL MEDICINE

## 2022-02-11 PROCEDURE — 99221 1ST HOSP IP/OBS SF/LOW 40: CPT | Performed by: NURSE PRACTITIONER

## 2022-02-11 PROCEDURE — 6370000000 HC RX 637 (ALT 250 FOR IP): Performed by: EMERGENCY MEDICINE

## 2022-02-11 RX ORDER — DIVALPROEX SODIUM 125 MG/1
125 CAPSULE, COATED PELLETS ORAL EVERY 12 HOURS SCHEDULED
Status: DISCONTINUED | OUTPATIENT
Start: 2022-02-11 | End: 2022-02-11 | Stop reason: HOSPADM

## 2022-02-11 RX ORDER — ASCORBIC ACID 500 MG
500 TABLET ORAL DAILY
Qty: 30 TABLET | Refills: 3 | Status: SHIPPED | OUTPATIENT
Start: 2022-02-12

## 2022-02-11 RX ORDER — DOCUSATE SODIUM 100 MG/1
100 CAPSULE, LIQUID FILLED ORAL 2 TIMES DAILY
Qty: 60 CAPSULE | Refills: 0 | Status: SHIPPED | OUTPATIENT
Start: 2022-02-11

## 2022-02-11 RX ORDER — CHOLECALCIFEROL (VITAMIN D3) 50 MCG
4000 TABLET ORAL DAILY
Qty: 30 TABLET | Refills: 0 | Status: SHIPPED | OUTPATIENT
Start: 2022-02-12

## 2022-02-11 RX ORDER — DIVALPROEX SODIUM 125 MG/1
125 CAPSULE, COATED PELLETS ORAL EVERY 12 HOURS SCHEDULED
Qty: 14 CAPSULE | Refills: 0 | Status: SHIPPED | OUTPATIENT
Start: 2022-02-11 | End: 2022-02-18

## 2022-02-11 RX ORDER — LANOLIN ALCOHOL/MO/W.PET/CERES
3 CREAM (GRAM) TOPICAL DAILY
Status: DISCONTINUED | OUTPATIENT
Start: 2022-02-11 | End: 2022-02-11 | Stop reason: HOSPADM

## 2022-02-11 RX ORDER — POTASSIUM CHLORIDE 20 MEQ/1
40 TABLET, EXTENDED RELEASE ORAL ONCE
Status: COMPLETED | OUTPATIENT
Start: 2022-02-11 | End: 2022-02-11

## 2022-02-11 RX ORDER — LANOLIN ALCOHOL/MO/W.PET/CERES
3 CREAM (GRAM) TOPICAL NIGHTLY
Qty: 10 TABLET | Refills: 0 | Status: SHIPPED | OUTPATIENT
Start: 2022-02-11 | End: 2022-02-21

## 2022-02-11 RX ORDER — ZINC SULFATE 50(220)MG
50 CAPSULE ORAL DAILY
Qty: 30 CAPSULE | Refills: 3 | COMMUNITY
Start: 2022-02-12

## 2022-02-11 RX ORDER — CALCIUM CARBONATE 200(500)MG
500 TABLET,CHEWABLE ORAL 3 TIMES DAILY
Qty: 90 TABLET | Refills: 0 | Status: SHIPPED | OUTPATIENT
Start: 2022-02-11 | End: 2022-03-13

## 2022-02-11 RX ADMIN — Medication 4000 UNITS: at 08:18

## 2022-02-11 RX ADMIN — POTASSIUM CHLORIDE 40 MEQ: 20 TABLET, EXTENDED RELEASE ORAL at 08:25

## 2022-02-11 RX ADMIN — DOCUSATE SODIUM 100 MG: 100 CAPSULE, LIQUID FILLED ORAL at 08:18

## 2022-02-11 RX ADMIN — CALCIUM CARBONATE 500 MG: 500 TABLET, CHEWABLE ORAL at 08:18

## 2022-02-11 RX ADMIN — SERTRALINE 50 MG: 50 TABLET, FILM COATED ORAL at 08:21

## 2022-02-11 RX ADMIN — DOXYCYCLINE 100 MG: 100 CAPSULE ORAL at 08:18

## 2022-02-11 RX ADMIN — OXYCODONE HYDROCHLORIDE AND ACETAMINOPHEN 500 MG: 500 TABLET ORAL at 08:20

## 2022-02-11 RX ADMIN — SODIUM CHLORIDE: 9 INJECTION, SOLUTION INTRAVENOUS at 03:43

## 2022-02-11 RX ADMIN — Medication 1000 MG: at 08:25

## 2022-02-11 RX ADMIN — CALCIUM CARBONATE 500 MG: 500 TABLET, CHEWABLE ORAL at 14:00

## 2022-02-11 RX ADMIN — Medication 50 MG: at 08:18

## 2022-02-11 NOTE — PROGRESS NOTES
Dr. Min Mckeon paged via office to check for discharge. Awaiting call back. Dr. Min Mckeon returned call and states pt is okay for discharge. Medications reviewed at this time. Dr. Min Mckeon notified of need for required documentation to transportation.

## 2022-02-11 NOTE — PROGRESS NOTES
Spoke with Pablito Medeiros NP with psych who states pt is delirious at this time but is okay for discharge from psych standpoint.

## 2022-02-11 NOTE — CARE COORDINATION
Care Coordination: paged attending for dc disposition for [facility]. Precert obtained and will need documentation on chart for profound weakness d/t hypocalcemia and hypmagesemia. Pt unable to stand/pivot or ambulate and pt also with poor trunk control and only able to sit for short amount of time with supervision.  Also noted per nurse, AMS this am.  Awaiting return call from attending for dc clearance    Ivelisse Martinez

## 2022-02-11 NOTE — PROGRESS NOTES
Admit Date: 2/3/2022    Subjective:     Anxious scarred see people in room trying to hurt her     Objective:     Patient Vitals for the past 8 hrs:   BP Temp Temp src Pulse Resp   02/10/22 2330 128/78 98.1 °F (36.7 °C) Axillary 84 18     I/O last 3 completed shifts: In: 2851.4 [I.V.:2441.8; IV Piggyback:409.6]  Out: 800 [Urine:800]  No intake/output data recorded. HEENT: Normal  NECK: Thyroid normal. No carotid bruit. No lymphphadenopathy. CVS: RRR  RS: Clear. No wheeze. No rhonchi. Good airflow bilaterally. ABD: Soft. Non tender. No mass. Normal BS. EXT: No edema. Non tender. Pulses present.    NEURO: no focal deficit       Scheduled Meds:   calcium gluconate  1,000 mg IntraVENous TID    docusate sodium  100 mg Oral BID    calcium carbonate  500 mg Oral TID    vitamin D  4,000 Units Oral Daily    doxycycline hyclate  100 mg Oral 2 times per day    [Held by provider] calcium acetate  1 capsule Oral TID WC    zinc sulfate  50 mg Oral Daily    ascorbic acid  500 mg Oral Daily     Continuous Infusions:   sodium chloride 75 mL/hr at 02/11/22 0343       CBC with Differential:    Lab Results   Component Value Date    WBC 5.7 02/03/2022    RBC 5.45 02/03/2022    HGB 17.0 02/03/2022    HCT 50.1 02/03/2022     02/03/2022    MCV 91.9 02/03/2022    MCH 31.2 02/03/2022    MCHC 33.9 02/03/2022    RDW 15.2 02/03/2022    LYMPHOPCT 27.8 02/03/2022    MONOPCT 8.7 02/03/2022    BASOPCT 0.7 02/03/2022    MONOSABS 0.50 02/03/2022    LYMPHSABS 1.59 02/03/2022    EOSABS 0.15 02/03/2022    BASOSABS 0.04 02/03/2022     CMP:    Lab Results   Component Value Date     02/11/2022    K 3.3 02/11/2022    K 4.0 02/03/2022    CL 98 02/11/2022    CO2 26 02/11/2022    BUN 11 02/11/2022    CREATININE 0.6 02/11/2022    GFRAA >60 02/11/2022    LABGLOM >60 02/11/2022    PROT 6.3 02/06/2022    LABALBU 3.0 02/06/2022    CALCIUM 7.7 02/11/2022    BILITOT 1.5 02/06/2022    ALKPHOS 82 02/06/2022     02/06/2022     02/06/2022     PT/INR:  No results found for: PROTIME, INR    Assessment:     Active Problems:   Hypocalcemia improving     Hypomagnesemia  Hypokalemia     Vitamin D deficiency     Covid 19 infection     Smoker     Constipation due to narcotic   Anxiety depression    paranoia       Plan:   K+ supplement ,continue Ca+, add Zoloft ,hold tramadol ,psych.  Evaluation

## 2022-02-11 NOTE — CONSULTS
Reason for consult: Paranoia      Consulting Physician: Dr Alecia Moss      HPI  Patient was presented to [facility] emergency department on February 3 via EMS. After experiencing weakness and increased fatigue. Psychiatric unit she was found to have hypocalcemia, and pneumonia, hypomagnesia, elevated bilirubin and diagnosed with allergy to thrive adult. Psychiatry was consulted due to hallucinations    Upon assessment today the patient is confused, and appears to have some delirium se agrees to speak with me and NP Kaleigh Vigil. She is minimally able to cooperate with assessment though remains pleasant during assessment. When asked if she is hearing voices or seeing people who are not there she denies this. She does not appear to be internally stimulated on this encounter. She is unable to tell me where she is however is able to recall the year. When asked why she is in the hospital she states \"I don't know\" Her thought process appears to be disorganized. Highly suspicious of delirium. MSE  Unable to perform     DX  Delirium    Plan/Recommendations: The patient case, plan of care and recommendations has been discussed with Dr Barbra Kingston and the collaborative psychiatric care team.     There is no acute need for psychiatric intervention and the patient does not meet criteria for inpatient psych hospitalization. She appears and interacts as though she is confused and delirious. We recommend the use of the following to help clear the delirium. Delirium may take several weeks to fully clear. Depakote Sprinkle 125 mg twice daily for 7 days to reduce confusion  Melatonin 3 mg daily at 1800 for 10 days to reduce confusion and reset sleep wake cycle. Okay to continue zoloft however do not recommend increasing the dose. Thank you for the consult. Psychiatry signs off.

## 2022-02-11 NOTE — CARE COORDINATION
Care coordination: spoke with [address] [place] [address], precert remains pending for loraine, they will place call to loraine today    Gerardo Wanamingo

## 2022-02-11 NOTE — CARE COORDINATION
Care Coordination: Sister notified of time of discharge.  In agreement that ambulette would not be safe and ambulance with needed    Darwyn Prophet

## 2022-02-11 NOTE — FLOWSHEET NOTE
IV Team placed IV site this nurse went in to hook Up her IV and pt had already pulled the line out. Call placed to Dr. Susie Fernandez he said to leave it out right now.

## 2022-02-12 NOTE — DISCHARGE SUMMARY
Discharge Summary    Date: 2/12/2022  Patient Name: Candy Walker YOB: 1955 Age: 78 y.o. Admit Date: 2/3/2022  Discharge Date: 2/11/2022  Discharge Condition: [address]    Admission Diagnosis  Hypocalcemia (E83.51); Hypomagnesemia (E83.42); Failure to thrive in adult (R62.7); Electrolyte abnormality (E87.8); Elevated bilirubin (R17); Pneumonia due to infectious organism, unspecified laterality, unspecified part of lung (J18.9)     Discharge Diagnosis  Active Problems: Electrolyte abnormality DeliriumResolved Problems: * No resolved hospital problems. [facility] Stay  Narrative of Hospital Course:  Admitted from ER with exterme weakness found with hypocalcemia and vitamin D deficiency her PTH was normal also noted to have pneumonia and was covid positive treated with IV fluid ATB Ca+ supplement as well as vitamin D slowly improved had an episode of dilution /paranoia seen by psych. Started on Zoloft and depakote and transferred to SNF. Consultants:  IP CONSULT TO HOSPITALISTIP CONSULT TO PSYCHIATRY    Surgeries/procedures Performed:       Treatments:           Discharge Plan/Disposition:  To [facility]/Incidental Findings Requiring Follow Up:    Patient Instructions:    Diet: Regular Diet    Activity:Activity as Tolerated  For number of days (if applicable): Other Instructions:    Provider Follow-Up:   No follow-ups on file. Significant Diagnostic Studies:    Recent Labs:  Admission on 02/03/2022, Discharged on 02/11/2022No results displayed because visit has over 200 results. ------------    Radiology last 7 days:  CT CHEST WO CONTRASTResult Date: 2/8/20221. There are new bibasilar opacities suggest pneumonia and or atelectasis 2. Stable right upper lung 3 mm subpleural nodule. 3.  Cholelithiasis RECOMMENDATIONS: Unavailable US PELVIS COMPLETEResult Date: 2/8/2022Extremely limited evaluation of the uterus on transabdominal examination with no definite abnormality. Neither ovary is visualized. [unfilled]    Discharge Medications    Discharge Medication List as of 2/11/2022  7:05 PMSTART taking these medicationscalcium carbonate (TUMS) 500 MG chewable tabletTake 1 tablet by mouth 3 times daily, Disp-90 tablet, R-0Normalsertraline (ZOLOFT) 50 MG tabletTake 1 tablet by mouth daily, Disp-30 tablet, R-3Normaldocusate sodium (COLACE) 100 MG capsuleTake 1 capsule by mouth 2 times daily, Disp-60 capsule, R-0Normalzinc sulfate (ZINCATE) 220 (50 Zn) MG capsuleTake 1 capsule by mouth daily, Disp-30 capsule, R-3OTCascorbic acid (VITAMIN C) 500 MG tabletTake 1 tablet by mouth daily, Disp-30 tablet, R-3Normalvitamin D (CHOLECALCIFEROL) 50 MCG (2000 UT) TABS tabletTake 2 tablets by mouth daily, Disp-30 tablet, R-0Normaldivalproex (DEPAKOTE SPRINKLE) 125 MG capsuleTake 1 capsule by mouth every 12 hours for 14 doses Total 7 days per psych, Disp-14 capsule, R-0Normalmelatonin (RA MELATONIN) 3 MG TABS tabletTake 1 tablet by mouth nightly for 10 days Take at 1800, Disp-10 tablet, R-0Normal    Discharge Medication List as of 2/11/2022  7:05 PM    Discharge Medication List as of 2/11/2022  7:05 PM    Discharge Medication List as of 2/11/2022  7:05 PM    Time Spent on Discharge:3E] minutes were spent in patient examination, evaluation, counseling as well as medication reconciliation, prescriptions for required medications, discharge plan, and follow up.     Electronically signed by [facility] MD MARY on 2/12/22 at 5:56 PM EST

## 2022-02-12 NOTE — PLAN OF CARE
of physical injury  Description: Absence of physical injury  Outcome: Completed     Problem: Pain:  Goal: Pain level will decrease  Description: Pain level will decrease  Outcome: Completed  Goal: Control of acute pain  Description: Control of acute pain  Outcome: Completed  Goal: Control of chronic pain  Description: Control of chronic pain  Outcome: Completed

## 2022-11-11 ENCOUNTER — TELEPHONE (OUTPATIENT)
Dept: PRIMARY CARE CLINIC | Age: 67
End: 2022-11-11

## 2022-11-11 NOTE — TELEPHONE ENCOUNTER
Pt wanted referral sent for neurologist appointment to the office of Dr. Dagoberto Jacques. And if we could write on the referral that they need to contact Corrinne Pike   ([phone]) to make an appointment for there due to Andre Marie being at [facility] and unable to answer for herself.

## 2022-11-12 NOTE — TELEPHONE ENCOUNTER
Pt is in nursing home and they will take care of anything they think she needs. Also Dr Sallie Koenig retired. Please inform patient's .

## 2022-11-16 ENCOUNTER — TELEPHONE (OUTPATIENT)
Dept: PRIMARY CARE CLINIC | Age: 67
End: 2022-11-16

## 2022-11-16 NOTE — TELEPHONE ENCOUNTER
I did call to let Artie () know that Dr. Daniels retired. They are wanting to have the neuro referral sent elsewhere ASAP then wherever we think she should go. And then call Artie and tell him what office we chose.   [phone]

## 2023-02-27 ENCOUNTER — OFFICE VISIT (OUTPATIENT)
Dept: NEUROLOGY | Age: 68
End: 2023-02-27
Payer: COMMERCIAL

## 2023-02-27 ENCOUNTER — TELEPHONE (OUTPATIENT)
Dept: NEUROLOGY | Age: 68
End: 2023-02-27

## 2023-02-27 VITALS
HEART RATE: 67 BPM | TEMPERATURE: 97.9 F | DIASTOLIC BLOOD PRESSURE: 57 MMHG | OXYGEN SATURATION: 93 % | SYSTOLIC BLOOD PRESSURE: 109 MMHG

## 2023-02-27 DIAGNOSIS — R20.0 NUMBNESS AND TINGLING OF BOTH UPPER EXTREMITIES: ICD-10-CM

## 2023-02-27 DIAGNOSIS — R29.90 COVID-19 LONG HAULER MANIFESTING CHRONIC NEUROLOGIC SYMPTOMS: ICD-10-CM

## 2023-02-27 DIAGNOSIS — R20.0 NUMBNESS AND TINGLING OF BOTH LOWER EXTREMITIES: Primary | ICD-10-CM

## 2023-02-27 DIAGNOSIS — R20.2 NUMBNESS AND TINGLING OF BOTH UPPER EXTREMITIES: ICD-10-CM

## 2023-02-27 DIAGNOSIS — U09.9 COVID-19 LONG HAULER MANIFESTING CHRONIC NEUROLOGIC SYMPTOMS: ICD-10-CM

## 2023-02-27 DIAGNOSIS — R20.2 NUMBNESS AND TINGLING OF BOTH LOWER EXTREMITIES: Primary | ICD-10-CM

## 2023-02-27 PROCEDURE — 1036F TOBACCO NON-USER: CPT | Performed by: NURSE PRACTITIONER

## 2023-02-27 PROCEDURE — 1123F ACP DISCUSS/DSCN MKR DOCD: CPT | Performed by: NURSE PRACTITIONER

## 2023-02-27 PROCEDURE — G8484 FLU IMMUNIZE NO ADMIN: HCPCS | Performed by: NURSE PRACTITIONER

## 2023-02-27 PROCEDURE — 3017F COLORECTAL CA SCREEN DOC REV: CPT | Performed by: NURSE PRACTITIONER

## 2023-02-27 PROCEDURE — 1090F PRES/ABSN URINE INCON ASSESS: CPT | Performed by: NURSE PRACTITIONER

## 2023-02-27 PROCEDURE — 99203 OFFICE O/P NEW LOW 30 MIN: CPT | Performed by: NURSE PRACTITIONER

## 2023-02-27 PROCEDURE — G8427 DOCREV CUR MEDS BY ELIG CLIN: HCPCS | Performed by: NURSE PRACTITIONER

## 2023-02-27 PROCEDURE — G8421 BMI NOT CALCULATED: HCPCS | Performed by: NURSE PRACTITIONER

## 2023-02-27 PROCEDURE — G8400 PT W/DXA NO RESULTS DOC: HCPCS | Performed by: NURSE PRACTITIONER

## 2023-02-27 RX ORDER — NITROGLYCERIN 0.4 MG/1
0.4 TABLET SUBLINGUAL EVERY 5 MIN PRN
COMMUNITY

## 2023-02-27 RX ORDER — CALCIUM CARBONATE 500(1250)
500 TABLET ORAL DAILY
COMMUNITY

## 2023-02-27 RX ORDER — MIRTAZAPINE 15 MG/1
TABLET, FILM COATED ORAL
COMMUNITY
Start: 2023-01-30

## 2023-02-27 RX ORDER — GABAPENTIN 100 MG/1
100 CAPSULE ORAL 3 TIMES DAILY
Qty: 90 CAPSULE | Refills: 5 | Status: SHIPPED | OUTPATIENT
Start: 2023-02-27 | End: 2023-08-26

## 2023-02-27 NOTE — TELEPHONE ENCOUNTER
Corie from [facility] left us a voicemail, they have some questions about her after visit summary.   Callback # [phone] ext. [phone]

## 2023-02-27 NOTE — PROGRESS NOTES
[facility]. Gabino Woods M.D., F.A.C.P. Freddy Mann, DNP, APRN, ACNS-BC  Ivana Tolentino. Giovany Milner, MSN, APRN-FNP-C  Beto Jung, MSN, APRN-FNP-C  KIERSTEN Garcia, PARandyC  Alda Mari, MSN, APRN-FNP-C  [address], [id number] [place] [address]  Phone: [phone]  Fax: [phone]       Darling Edward is a 75 y.o. right handed female     Patient referred for Alzheimer's dementia     She is alone from NH     Past Medical History:     Past Medical History:   Diagnosis Date    Arthritis      No history of liver, lung or kidney disease. No history of strokes or seizures. No history of connective tissue disorders or cancers. No history of exposures to toxins or chemicals. Past Surgical History:       Past Surgical History:   Procedure Laterality Date    CATARACT REMOVAL      CERVICAL FUSION N/A 6/3/2019    ANTERIOR CERVICAL FUSION C3-C4, C4-C5  AND CORPECTOMY C4  WITH PLATES, SCREWS, BONE GRAFT, SSEP -- GLOBUS performed by Jericho Dominguez MD at [address] [facility] [address]:       Patient has no known allergies. Medications:     Prior to Admission medications    Medication Sig Start Date End Date Taking? Authorizing Provider   mirtazapine (REMERON) 15 MG tablet  1/30/23  Yes Historical Provider, MD   calcium carbonate (OSCAL) 500 MG TABS tablet Take 500 mg by mouth daily   Yes Historical Provider, MD   nitroGLYCERIN (NITROSTAT) 0.4 MG SL tablet Place 0.4 mg under the tongue every 5 minutes as needed for Chest pain up to max of 3 total doses. If no relief after 1 dose, call 911.    Yes Historical Provider, MD   docusate sodium (COLACE) 100 MG capsule Take 1 capsule by mouth 2 times daily 2/11/22  Yes Jere Schwab, MD   zinc sulfate (ZINCATE) 220 (50 Zn) MG capsule Take 1 capsule by mouth daily 2/12/22  Yes Jere Schwab, MD   ascorbic acid (VITAMIN C) 500 MG tablet Take 1 tablet by mouth daily 2/12/22  Yes Jere Schwab, MD   vitamin D (CHOLECALCIFEROL) 50 MCG (2000 UT) TABS tablet Take 2 tablets by mouth daily 2/12/22  Yes Saw Michelle MD   sertraline (ZOLOFT) 50 MG tablet Take 1 tablet by mouth daily  Patient not taking: Reported on 2/27/2023 2/12/22   Saw Michelle MD   divalproex (DEPAKOTE SPRINKLE) 125 MG capsule Take 1 capsule by mouth every 12 hours for 14 doses Total 7 days per psych 2/11/22 2/18/22  Saw Michelle MD   melatonin (RA MELATONIN) 3 MG TABS tablet Take 1 tablet by mouth nightly for 10 days Take at 1800 2/11/22 2/21/22  Reynaldo Rutherford MD     Social History:       She reports that she has quit smoking. Her smoking use included cigarettes. She smoked an average of 1 pack per day. She has never used smokeless tobacco. She reports that she does not drink alcohol and does not use drugs. Review of Systems:     Sleep: takes sleep aid    No issues with chewing or swallowing  No chest pain or palpitations  No vertigo, lightheadedness or loss of consciousness  No itching or bruising appreciated    ROS is otherwise negative    Family History:     No family history on file. History of Present Illness:     Patient referred for Alzheimer's dementia but notes she is not demented and does not have Alzheimer's. She was upset about the referral.  She has PMH of ACDF, depression. Her  was supposed to meet her here but was not at this appointment but he did arrive later and I was able to speak with him. He confirms that patient is not being seen for Alzheimer's and she does have numbness/tingling. He is not sure it is related to Covid. Today she has complaints of numbness/tingling in her hands and feet s/p Covid. She had Covid last year and was hospitalized for 9 days also due to low Calcium level. Her numbness and tingling started after she had Covid. Her numbness/tingling is limiting her walking and she is currently wheelchair bound.   She is staying at [facility] and wants to go home but has to be able to walk.  She is working with PT/OT but unable to stand as she cannot feel her feet. She has some pain in her feet intermittently. She has had no testing and is not on any medications for her symptoms.       Objective:     BP (!) 109/57 (Site: Right Upper Arm)   Pulse 67   Temp 97.9 °F (36.6 °C)   SpO2 93%     General appearance: alert, appears stated age, cooperative and in no distress, sitting in wheelchair  Head: normocephalic, without obvious abnormality, atraumatic  Eyes: conjunctivae/corneas clear; no drainage  Neck: supple, symmetrical, trachea midline   Lungs: rhonchi and wheezes to auscultation bilaterally  Heart: regular rate and rhythm, S1, S2 normal  Abdomen: soft, non-tender; bowel sounds normal  Extremities: normal, atraumatic, no cyanosis or edema  Skin:  color, texture, turgor normal--no rashes or lesions      Mental Status: alert and oriented x 4    Appropriate attention/concentration  Intact fundus of knowledge  Repetition intact  Memories intact    Speech: no dysarthria  Language: no aphasias---reading, writing, repetition, and object identification intact    Cranial Nerves:  I: smell Intact   II: visual acuity     II: visual fields Full    II: pupils REHAN   III,VII: ptosis None   III,IV,VI: extraocular muscles  EOMI without nystagmus   V: mastication Normal   V: facial light touch sensation  Normal   V,VII: corneal reflex     VII: facial muscle function - upper  Normal   VII: facial muscle function - lower Normal   VIII: hearing Normal   IX: soft palate elevation  Normal   IX,X: gag reflex    XI: trapezius strength  5/5   XI: sternocleidomastoid strength 5/5   XI: neck extension strength  5/5   XII: tongue strength  Normal     Motor:  3/5 throughout  Normal bulk and tone  No drift   No abnormal movements    Sensory:  LT and PP normal except decreased at hand and feet   Vibration normal except decreased at right hand and ankles    Coordination:   FN, FFM and DARIAN with difficulty    Gait:  In wheelchair    DTR:   BE throughout     No other pathological reflexes    Laboratory/Radiology:  ry/Radiology:     CBC with Differential:    Lab Results   Component Value Date/Time    WBC 5.7 02/03/2022 07:39 PM    RBC 5.45 02/03/2022 07:39 PM    HGB 17.0 02/03/2022 07:39 PM    HCT 50.1 02/03/2022 07:39 PM     02/03/2022 07:39 PM    MCV 91.9 02/03/2022 07:39 PM    MCH 31.2 02/03/2022 07:39 PM    MCHC 33.9 02/03/2022 07:39 PM    RDW 15.2 02/03/2022 07:39 PM    LYMPHOPCT 27.8 02/03/2022 07:39 PM    MONOPCT 8.7 02/03/2022 07:39 PM    BASOPCT 0.7 02/03/2022 07:39 PM    MONOSABS 0.50 02/03/2022 07:39 PM    LYMPHSABS 1.59 02/03/2022 07:39 PM    EOSABS 0.15 02/03/2022 07:39 PM    BASOSABS 0.04 02/03/2022 07:39 PM     CMP:    Lab Results   Component Value Date/Time     02/11/2022 05:17 AM    K 3.3 02/11/2022 05:17 AM    K 4.0 02/03/2022 08:21 PM    CL 98 02/11/2022 05:17 AM    CO2 26 02/11/2022 05:17 AM    BUN 11 02/11/2022 05:17 AM    CREATININE 0.6 02/11/2022 05:17 AM    GFRAA >60 02/11/2022 05:17 AM    LABGLOM >60 02/11/2022 05:17 AM    GLUCOSE 84 02/11/2022 05:17 AM    PROT 6.3 02/06/2022 06:51 AM    LABALBU 3.0 02/06/2022 06:51 AM    CALCIUM 7.7 02/11/2022 05:17 AM    BILITOT 1.5 02/06/2022 06:51 AM    ALKPHOS 82 02/06/2022 06:51 AM     02/06/2022 06:51 AM     02/06/2022 06:51 AM     Hepatic Function Panel:    Lab Results   Component Value Date/Time    ALKPHOS 82 02/06/2022 06:51 AM     02/06/2022 06:51 AM     02/06/2022 06:51 AM    PROT 6.3 02/06/2022 06:51 AM    BILITOT 1.5 02/06/2022 06:51 AM    BILIDIR 0.7 02/03/2022 09:27 PM    IBILI 0.9 02/03/2022 09:27 PM    LABALBU 3.0 02/06/2022 06:51 AM     FLP:    Lab Results   Component Value Date/Time    TRIG 118 02/08/2016 09:21 AM    HDL 40 02/08/2016 09:21 AM    LDLCALC 73 02/08/2016 09:21 AM    LABVLDL 24 02/08/2016 09:21 AM     All labs and images were personally reviewed at the time of this visit    Assessment:     Numbness and tingling of extremities s/p Covid 19   --- no recent testing, hx of cervical fusion   --- unable to walk in wheelchair  --- will obtain EMG to rule out CTS, radiculopathy which can also cause these symptoms     Plan:     EMG of extremities     Started gabapentin 100 mg TID    Call with any questions or concerns      EASTON Rust - CNP  9:27 AM  2/27/2023    I spent 35 minutes with this patient obtaining the HPI and discussing the exam with greater than 50% of the time providing counseling and education on medications and other treatment plans. All questions were answered prior to leaving my office.

## 2023-02-27 NOTE — TELEPHONE ENCOUNTER
What is the question? She told me she was not taking the Zoloft. I started her on gabapentin. I saw her for numbness/tingling not dementia.

## 2023-02-28 NOTE — TELEPHONE ENCOUNTER
Corie at [facility] notified of Luis A's response.   Electronically signed by Gus Stokes MA on 2/28/2023 at 7:36 AM

## 2023-07-19 ENCOUNTER — OFFICE VISIT (OUTPATIENT)
Dept: NEUROLOGY | Age: 68
End: 2023-07-19
Payer: MEDICARE

## 2023-07-19 VITALS
DIASTOLIC BLOOD PRESSURE: 49 MMHG | OXYGEN SATURATION: 94 % | WEIGHT: 160 LBS | HEART RATE: 60 BPM | HEIGHT: 55 IN | BODY MASS INDEX: 37.03 KG/M2 | TEMPERATURE: 97.3 F | SYSTOLIC BLOOD PRESSURE: 110 MMHG | RESPIRATION RATE: 16 BRPM

## 2023-07-19 DIAGNOSIS — R29.90 COVID-19 LONG HAULER MANIFESTING CHRONIC NEUROLOGIC SYMPTOMS: ICD-10-CM

## 2023-07-19 DIAGNOSIS — R20.0 NUMBNESS AND TINGLING OF BOTH LOWER EXTREMITIES: Primary | ICD-10-CM

## 2023-07-19 DIAGNOSIS — R20.2 NUMBNESS AND TINGLING OF BOTH LOWER EXTREMITIES: Primary | ICD-10-CM

## 2023-07-19 DIAGNOSIS — U09.9 COVID-19 LONG HAULER MANIFESTING CHRONIC NEUROLOGIC SYMPTOMS: ICD-10-CM

## 2023-07-19 DIAGNOSIS — M54.16 LUMBAR RADICULOPATHY: ICD-10-CM

## 2023-07-19 PROCEDURE — 1036F TOBACCO NON-USER: CPT | Performed by: NURSE PRACTITIONER

## 2023-07-19 PROCEDURE — 1090F PRES/ABSN URINE INCON ASSESS: CPT | Performed by: NURSE PRACTITIONER

## 2023-07-19 PROCEDURE — 1123F ACP DISCUSS/DSCN MKR DOCD: CPT | Performed by: NURSE PRACTITIONER

## 2023-07-19 PROCEDURE — 3017F COLORECTAL CA SCREEN DOC REV: CPT | Performed by: NURSE PRACTITIONER

## 2023-07-19 PROCEDURE — 99214 OFFICE O/P EST MOD 30 MIN: CPT | Performed by: NURSE PRACTITIONER

## 2023-07-19 PROCEDURE — G8427 DOCREV CUR MEDS BY ELIG CLIN: HCPCS | Performed by: NURSE PRACTITIONER

## 2023-07-19 PROCEDURE — G8400 PT W/DXA NO RESULTS DOC: HCPCS | Performed by: NURSE PRACTITIONER

## 2023-07-19 PROCEDURE — G8417 CALC BMI ABV UP PARAM F/U: HCPCS | Performed by: NURSE PRACTITIONER

## 2023-07-19 RX ORDER — GABAPENTIN 300 MG/1
300 CAPSULE ORAL 3 TIMES DAILY
Qty: 90 CAPSULE | Refills: 5 | Status: SHIPPED | OUTPATIENT
Start: 2023-07-19 | End: 2024-01-15

## 2023-07-19 NOTE — PROGRESS NOTES
other motor radiculopathies or intracanalicular lesions. Sensory radiculopathies cannot be evaluated by electrodiagnostic means. Clinically, the patient presented with marked difficulties walking and sensory loss in both legs and arms. On brief neurological examination, she displayed adequate strength throughout, but decreased sensation to both thighs and forearms. There was also possible sensory ataxia and/or tardive dyskinesias. A sensory neuronopathy must be excluded; pyridoxine levels and paraneoplastic work-up must be considered. In addition, there was lumbosacral radicular disease. MRIs of the lumbosacral spine were also recommended. All labs and images were personally reviewed at the time of this visit    Assessment:     Numbness and tingling of extremities s/p Covid 19   --- hx of cervical fusion   --- unable to walk in wheelchair  --- EMG showed right lumbosacral motor radiculopathies or intracanalicular lesions also recommended MRI L spine as well as pyridoxine levels and paraneoplastic workup    This was discussed with patient regarding doing a LP to rule out any paraneoplastic and she refused. She would rather have a MRI L spine done and labs right now. She does not want any needles in her back. Explained to her why this testing is important and pt stated she still did not want it done.       Plan:     MRI L spine ordered     Labs ordered: vitamin B1, B12 & Folate, Vitamin B6    Continue and increase gabapentin 300 mg TID    Follow up in 4 months     Call with any questions or concerns      EASTON Fowler CNP  10:20 AM  7/19/2023

## 2023-08-11 ENCOUNTER — HOSPITAL ENCOUNTER (OUTPATIENT)
Dept: MRI IMAGING | Age: 68
End: 2023-08-11
Payer: MEDICARE

## 2023-08-11 DIAGNOSIS — R20.2 NUMBNESS AND TINGLING OF BOTH LOWER EXTREMITIES: ICD-10-CM

## 2023-08-11 DIAGNOSIS — R20.0 NUMBNESS AND TINGLING OF BOTH LOWER EXTREMITIES: ICD-10-CM

## 2023-08-11 PROCEDURE — 72148 MRI LUMBAR SPINE W/O DYE: CPT

## 2023-08-15 ENCOUNTER — TELEPHONE (OUTPATIENT)
Dept: NEUROLOGY | Age: 68
End: 2023-08-15

## 2023-08-15 DIAGNOSIS — M54.16 LUMBAR RADICULOPATHY: Primary | ICD-10-CM

## 2023-08-15 NOTE — TELEPHONE ENCOUNTER
----- Message from Diana Alonso, [address] [place] [address] - CNP sent at 8/14/2023  2:40 PM EDT -----  Would either of you please let patient know she does have some mild disc bulging seen on her MRI L spine. She can be referred to PT and/or NSGY if she would like. Please let me know.

## 2023-08-15 NOTE — TELEPHONE ENCOUNTER
Alena's sister Denice Lopez was notified of results and wants Daniel Yañez to be referred to Neurosurgery. Once referral is ordered she wants us to send the order to [facility].

## 2023-09-11 ENCOUNTER — OFFICE VISIT (OUTPATIENT)
Dept: NEUROSURGERY | Age: 68
End: 2023-09-11
Payer: MEDICARE

## 2023-09-11 ENCOUNTER — HOSPITAL ENCOUNTER (OUTPATIENT)
Age: 68
Discharge: HOME | End: 2023-09-11
Payer: MEDICARE

## 2023-09-11 VITALS — RESPIRATION RATE: 16 BRPM | WEIGHT: 201 LBS | BODY MASS INDEX: 45.21 KG/M2 | HEIGHT: 56 IN

## 2023-09-11 DIAGNOSIS — R20.0 NUMBNESS AND TINGLING OF BOTH LOWER EXTREMITIES: ICD-10-CM

## 2023-09-11 DIAGNOSIS — R20.2 NUMBNESS AND TINGLING OF BOTH LOWER EXTREMITIES: ICD-10-CM

## 2023-09-11 DIAGNOSIS — G95.9 CERVICAL MYELOPATHY (HCC): Primary | ICD-10-CM

## 2023-09-11 DIAGNOSIS — G95.9 CERVICAL MYELOPATHY (HCC): ICD-10-CM

## 2023-09-11 LAB
BUN SERPL-MCNC: 20 MG/DL (ref 6–23)
CREAT SERPL-MCNC: 0.6 MG/DL (ref 0.5–1)
FOLATE SERPL-MCNC: 11.8 NG/ML (ref 4.8–24.2)
GFR SERPL CREATININE-BSD FRML MDRD: >60 ML/MIN/1.73M2
VIT B12 SERPL-MCNC: 806 PG/ML (ref 211–946)

## 2023-09-11 PROCEDURE — 84207 ASSAY OF VITAMIN B-6: CPT

## 2023-09-11 PROCEDURE — G8427 DOCREV CUR MEDS BY ELIG CLIN: HCPCS | Performed by: PHYSICIAN ASSISTANT

## 2023-09-11 PROCEDURE — 84520 ASSAY OF UREA NITROGEN: CPT

## 2023-09-11 PROCEDURE — 82565 ASSAY OF CREATININE: CPT

## 2023-09-11 PROCEDURE — 1123F ACP DISCUSS/DSCN MKR DOCD: CPT | Performed by: PHYSICIAN ASSISTANT

## 2023-09-11 PROCEDURE — 84425 ASSAY OF VITAMIN B-1: CPT

## 2023-09-11 PROCEDURE — 99202 OFFICE O/P NEW SF 15 MIN: CPT

## 2023-09-11 PROCEDURE — 1090F PRES/ABSN URINE INCON ASSESS: CPT | Performed by: PHYSICIAN ASSISTANT

## 2023-09-11 PROCEDURE — 3017F COLORECTAL CA SCREEN DOC REV: CPT | Performed by: PHYSICIAN ASSISTANT

## 2023-09-11 PROCEDURE — 4004F PT TOBACCO SCREEN RCVD TLK: CPT | Performed by: PHYSICIAN ASSISTANT

## 2023-09-11 PROCEDURE — G8400 PT W/DXA NO RESULTS DOC: HCPCS | Performed by: PHYSICIAN ASSISTANT

## 2023-09-11 PROCEDURE — 82607 VITAMIN B-12: CPT

## 2023-09-11 PROCEDURE — 99203 OFFICE O/P NEW LOW 30 MIN: CPT | Performed by: PHYSICIAN ASSISTANT

## 2023-09-11 PROCEDURE — 82746 ASSAY OF FOLIC ACID SERUM: CPT

## 2023-09-11 PROCEDURE — G8417 CALC BMI ABV UP PARAM F/U: HCPCS | Performed by: PHYSICIAN ASSISTANT

## 2023-09-11 PROCEDURE — 36415 COLL VENOUS BLD VENIPUNCTURE: CPT

## 2023-09-11 ASSESSMENT — ENCOUNTER SYMPTOMS
RESPIRATORY NEGATIVE: 1
ALLERGIC/IMMUNOLOGIC NEGATIVE: 1
BACK PAIN: 1
EYES NEGATIVE: 1
GASTROINTESTINAL NEGATIVE: 1

## 2023-09-11 NOTE — PROGRESS NOTES
Subjective:      Patient ID: Munir Davis is a 70 y.o. female. Back Pain  This is a chronic problem. The current episode started more than 1 year ago. The problem occurs daily. The problem is unchanged. The pain is present in the lumbar spine. The quality of the pain is described as aching. The pain is at a severity of 2/10. Pertinent negatives include no bladder incontinence. (Legs \"tingle\" constantly. She had not ambulated in the past year due to leg weakness.) Treatments tried: PT, tylenol, aleve, gabapentin. The treatment provided mild relief. Neck Pain   This is a chronic problem. The current episode started more than 1 year ago. The pain is present in the midline. The pain is at a severity of 2/10. The symptoms are aggravated by twisting, stress and bending. Associated symptoms comments: Bilateral arm numbness and weakness for the past year. .       Review of Systems   Constitutional: Negative. HENT: Negative. Eyes: Negative. Respiratory: Negative. Cardiovascular: Negative. Gastrointestinal: Negative. Endocrine: Negative. Genitourinary: Negative. Negative for bladder incontinence. Musculoskeletal:  Positive for back pain and neck pain. Skin: Negative. Allergic/Immunologic: Negative. Neurological: Negative. Hematological: Negative. Psychiatric/Behavioral: Negative. Objective:   Physical Exam  Constitutional:       Appearance: Normal appearance. HENT:      Head: Normocephalic and atraumatic. Nose: Nose normal.   Eyes:      Pupils: Pupils are equal, round, and reactive to light. Pulmonary:      Effort: Pulmonary effort is normal.   Abdominal:      General: There is no distension. Skin:     General: Skin is warm and dry. Neurological:      Mental Status: She is alert. GCS: GCS eye subscore is 4. GCS verbal subscore is 5. GCS motor subscore is 6. Sensory: Sensory deficit present. Motor: Weakness present.       Deep Tendon Reflexes:

## 2023-09-15 LAB
PYRIDOXAL PHOS SERPL-SCNC: 21.4 NMOL/L (ref 20–125)
VIT B1 PYROPHOSHATE BLD-SCNC: 158 NMOL/L (ref 70–180)

## 2023-09-17 LAB
SEND OUT REPORT: NORMAL
TEST NAME: NORMAL

## 2023-09-29 ENCOUNTER — HOSPITAL ENCOUNTER (OUTPATIENT)
Dept: MRI IMAGING | Age: 68
End: 2023-09-29
Payer: MEDICARE

## 2023-09-29 DIAGNOSIS — G95.9 CERVICAL MYELOPATHY (HCC): ICD-10-CM

## 2023-09-29 PROCEDURE — 72156 MRI NECK SPINE W/O & W/DYE: CPT

## 2023-09-29 PROCEDURE — A9579 GAD-BASE MR CONTRAST NOS,1ML: HCPCS | Performed by: RADIOLOGY

## 2023-09-29 PROCEDURE — 6360000004 HC RX CONTRAST MEDICATION: Performed by: RADIOLOGY

## 2023-09-29 RX ADMIN — GADOTERIDOL 20 ML: 279.3 INJECTION, SOLUTION INTRAVENOUS at 13:53

## 2023-10-17 ENCOUNTER — OFFICE VISIT (OUTPATIENT)
Dept: NEUROSURGERY | Age: 68
End: 2023-10-17
Payer: MEDICARE

## 2023-10-17 DIAGNOSIS — R20.0 BILATERAL HAND NUMBNESS: Primary | ICD-10-CM

## 2023-10-17 PROCEDURE — G8484 FLU IMMUNIZE NO ADMIN: HCPCS | Performed by: PHYSICIAN ASSISTANT

## 2023-10-17 PROCEDURE — 1123F ACP DISCUSS/DSCN MKR DOCD: CPT | Performed by: PHYSICIAN ASSISTANT

## 2023-10-17 PROCEDURE — 1090F PRES/ABSN URINE INCON ASSESS: CPT | Performed by: PHYSICIAN ASSISTANT

## 2023-10-17 PROCEDURE — 4004F PT TOBACCO SCREEN RCVD TLK: CPT | Performed by: PHYSICIAN ASSISTANT

## 2023-10-17 PROCEDURE — G8417 CALC BMI ABV UP PARAM F/U: HCPCS | Performed by: PHYSICIAN ASSISTANT

## 2023-10-17 PROCEDURE — G8427 DOCREV CUR MEDS BY ELIG CLIN: HCPCS | Performed by: PHYSICIAN ASSISTANT

## 2023-10-17 PROCEDURE — 99213 OFFICE O/P EST LOW 20 MIN: CPT | Performed by: PHYSICIAN ASSISTANT

## 2023-10-17 PROCEDURE — 99212 OFFICE O/P EST SF 10 MIN: CPT

## 2023-10-17 PROCEDURE — 3017F COLORECTAL CA SCREEN DOC REV: CPT | Performed by: PHYSICIAN ASSISTANT

## 2023-10-17 PROCEDURE — G8400 PT W/DXA NO RESULTS DOC: HCPCS | Performed by: PHYSICIAN ASSISTANT

## 2023-10-17 ASSESSMENT — ENCOUNTER SYMPTOMS
BACK PAIN: 1
GASTROINTESTINAL NEGATIVE: 1
RESPIRATORY NEGATIVE: 1
EYES NEGATIVE: 1
ALLERGIC/IMMUNOLOGIC NEGATIVE: 1

## 2023-10-17 NOTE — PROGRESS NOTES
Subjective:      Patient ID: Rachelle Chadwick is a 70 y.o. female. Back Pain  This is a chronic problem. The current episode started more than 1 year ago. The problem occurs daily. The problem is unchanged. The pain is present in the lumbar spine. The quality of the pain is described as aching. The pain is at a severity of 2/10. Pertinent negatives include no bladder incontinence. (Legs \"tingle\" constantly. She had not ambulated in the past year due to leg weakness.) Treatments tried: PT, tylenol, aleve, gabapentin. The treatment provided mild relief. Neck Pain   This is a chronic problem. The current episode started more than 1 year ago. The pain is present in the midline. The pain is at a severity of 2/10. The symptoms are aggravated by twisting, stress and bending. Associated symptoms comments: Bilateral arm numbness and weakness for the past year. .       Review of Systems   Constitutional: Negative. HENT: Negative. Eyes: Negative. Respiratory: Negative. Cardiovascular: Negative. Gastrointestinal: Negative. Endocrine: Negative. Genitourinary: Negative. Negative for bladder incontinence. Musculoskeletal:  Positive for back pain and neck pain. Skin: Negative. Allergic/Immunologic: Negative. Neurological: Negative. Hematological: Negative. Psychiatric/Behavioral: Negative. Objective:   Physical Exam  Constitutional:       Appearance: Normal appearance. HENT:      Head: Normocephalic and atraumatic. Nose: Nose normal.   Eyes:      Pupils: Pupils are equal, round, and reactive to light. Pulmonary:      Effort: Pulmonary effort is normal.   Abdominal:      General: There is no distension. Skin:     General: Skin is warm and dry. Neurological:      Mental Status: She is alert. GCS: GCS eye subscore is 4. GCS verbal subscore is 5. GCS motor subscore is 6. Sensory: Sensory deficit present. Motor: Weakness present.       Deep Tendon Reflexes:

## 2024-02-21 ENCOUNTER — HOSPITAL ENCOUNTER (INPATIENT)
Age: 69
LOS: 9 days | Discharge: NURSING, REHAB OR LONG-TERM CARE | End: 2024-03-01
Attending: EMERGENCY MEDICINE | Admitting: INTERNAL MEDICINE
Payer: MEDICARE

## 2024-02-21 ENCOUNTER — APPOINTMENT (OUTPATIENT)
Dept: GENERAL RADIOLOGY | Age: 69
End: 2024-02-21
Payer: MEDICARE

## 2024-02-21 DIAGNOSIS — N39.0 URINARY TRACT INFECTION WITH HEMATURIA, SITE UNSPECIFIED: ICD-10-CM

## 2024-02-21 DIAGNOSIS — R31.9 URINARY TRACT INFECTION WITH HEMATURIA, SITE UNSPECIFIED: ICD-10-CM

## 2024-02-21 DIAGNOSIS — J18.9 PNEUMONIA OF RIGHT UPPER LOBE DUE TO INFECTIOUS ORGANISM: ICD-10-CM

## 2024-02-21 DIAGNOSIS — R41.82 ALTERED MENTAL STATUS, UNSPECIFIED ALTERED MENTAL STATUS TYPE: ICD-10-CM

## 2024-02-21 DIAGNOSIS — R65.21 SEPTIC SHOCK (HCC): Primary | ICD-10-CM

## 2024-02-21 DIAGNOSIS — N17.9 AKI (ACUTE KIDNEY INJURY) (HCC): ICD-10-CM

## 2024-02-21 DIAGNOSIS — R78.81 BACTEREMIA: ICD-10-CM

## 2024-02-21 DIAGNOSIS — A41.9 SEPTIC SHOCK (HCC): Primary | ICD-10-CM

## 2024-02-21 LAB
ALBUMIN SERPL-MCNC: 3.3 G/DL (ref 3.5–5.2)
ALP SERPL-CCNC: 86 U/L (ref 35–104)
ALT SERPL-CCNC: 32 U/L (ref 0–32)
AMORPH SED URNS QL MICRO: PRESENT
ANION GAP SERPL CALCULATED.3IONS-SCNC: 15 MMOL/L (ref 7–16)
AST SERPL-CCNC: 46 U/L (ref 0–31)
B PARAP IS1001 DNA NPH QL NAA+NON-PROBE: NOT DETECTED
B PERT DNA SPEC QL NAA+PROBE: NOT DETECTED
BACTERIA URNS QL MICRO: ABNORMAL
BASOPHILS # BLD: 0.04 K/UL (ref 0–0.2)
BASOPHILS NFR BLD: 0 % (ref 0–2)
BILIRUB DIRECT SERPL-MCNC: 0.8 MG/DL (ref 0–0.3)
BILIRUB INDIRECT SERPL-MCNC: 0.9 MG/DL (ref 0–1)
BILIRUB SERPL-MCNC: 1.7 MG/DL (ref 0–1.2)
BILIRUB UR QL STRIP: ABNORMAL
BUN SERPL-MCNC: 45 MG/DL (ref 6–23)
C PNEUM DNA NPH QL NAA+NON-PROBE: NOT DETECTED
CALCIUM SERPL-MCNC: 7.6 MG/DL (ref 8.6–10.2)
CHLORIDE SERPL-SCNC: 105 MMOL/L (ref 98–107)
CLARITY UR: ABNORMAL
CO2 SERPL-SCNC: 21 MMOL/L (ref 22–29)
COLOR UR: ABNORMAL
CREAT SERPL-MCNC: 2.9 MG/DL (ref 0.5–1)
EOSINOPHIL # BLD: 0.3 K/UL (ref 0.05–0.5)
EOSINOPHILS RELATIVE PERCENT: 2 % (ref 0–6)
ERYTHROCYTE [DISTWIDTH] IN BLOOD BY AUTOMATED COUNT: 16.1 % (ref 11.5–15)
FLUAV RNA NPH QL NAA+NON-PROBE: NOT DETECTED
FLUBV RNA NPH QL NAA+NON-PROBE: NOT DETECTED
GFR SERPL CREATININE-BSD FRML MDRD: 17 ML/MIN/1.73M2
GLUCOSE SERPL-MCNC: 108 MG/DL (ref 74–99)
GLUCOSE UR STRIP-MCNC: NEGATIVE MG/DL
HADV DNA NPH QL NAA+NON-PROBE: NOT DETECTED
HCOV 229E RNA NPH QL NAA+NON-PROBE: NOT DETECTED
HCOV HKU1 RNA NPH QL NAA+NON-PROBE: NOT DETECTED
HCOV NL63 RNA NPH QL NAA+NON-PROBE: NOT DETECTED
HCOV OC43 RNA NPH QL NAA+NON-PROBE: NOT DETECTED
HCT VFR BLD AUTO: 42 % (ref 37–54)
HCT VFR BLD AUTO: 43 % (ref 34–48)
HGB BLD-MCNC: 14.2 G/DL (ref 11.5–15.5)
HGB UR QL STRIP.AUTO: ABNORMAL
HMPV RNA NPH QL NAA+NON-PROBE: NOT DETECTED
HPIV1 RNA NPH QL NAA+NON-PROBE: NOT DETECTED
HPIV2 RNA NPH QL NAA+NON-PROBE: NOT DETECTED
HPIV3 RNA NPH QL NAA+NON-PROBE: NOT DETECTED
HPIV4 RNA NPH QL NAA+NON-PROBE: NOT DETECTED
IMM GRANULOCYTES # BLD AUTO: 0.29 K/UL (ref 0–0.58)
IMM GRANULOCYTES NFR BLD: 2 % (ref 0–5)
INR PPP: 1.4
KETONES UR STRIP-MCNC: 15 MG/DL
LACTATE BLDV-SCNC: 0.9 MMOL/L (ref 0.5–1.9)
LACTATE BLDV-SCNC: 2.2 MMOL/L (ref 0.5–2.2)
LEUKOCYTE ESTERASE UR QL STRIP: ABNORMAL
LIPASE SERPL-CCNC: 5 U/L (ref 13–60)
LYMPHOCYTES NFR BLD: 1.33 K/UL (ref 1.5–4)
LYMPHOCYTES RELATIVE PERCENT: 7 % (ref 20–42)
M PNEUMO DNA NPH QL NAA+NON-PROBE: NOT DETECTED
MCH RBC QN AUTO: 30.8 PG (ref 26–35)
MCHC RBC AUTO-ENTMCNC: 33 G/DL (ref 32–34.5)
MCV RBC AUTO: 93.3 FL (ref 80–99.9)
MONOCYTES NFR BLD: 1.76 K/UL (ref 0.1–0.95)
MONOCYTES NFR BLD: 9 % (ref 2–12)
NEGATIVE BASE EXCESS, ART: 3.6 MMOL/L
NEUTROPHILS NFR BLD: 81 % (ref 43–80)
NEUTS SEG NFR BLD: 15.91 K/UL (ref 1.8–7.3)
NITRITE UR QL STRIP: NEGATIVE
O2 DELIVERY DEVICE: ABNORMAL
PH UR STRIP: 8 [PH] (ref 5–9)
PLATELET # BLD AUTO: 124 K/UL (ref 130–450)
PMV BLD AUTO: 11 FL (ref 7–12)
POC HCO3: 21.1 MMOL/L (ref 22–26)
POC HEMOGLOBIN (CALC): 14.2 G/DL (ref 12.5–15.5)
POC O2 SATURATION: 96.8 % (ref 92–98.5)
POC PCO2: 36.4 MM HG (ref 35–45)
POC PH: 7.37 (ref 7.35–7.45)
POC PO2: 90.1 MM HG (ref 60–80)
POTASSIUM SERPL-SCNC: 4.2 MMOL/L (ref 3.5–5)
PROT SERPL-MCNC: 7.2 G/DL (ref 6.4–8.3)
PROT UR STRIP-MCNC: 100 MG/DL
PROTHROMBIN TIME: 15.1 SEC (ref 9.3–12.4)
RBC # BLD AUTO: 4.61 M/UL (ref 3.5–5.5)
RBC # BLD: ABNORMAL 10*6/UL
RBC # BLD: ABNORMAL 10*6/UL
RBC #/AREA URNS HPF: ABNORMAL /HPF
RSV RNA NPH QL NAA+NON-PROBE: NOT DETECTED
RV+EV RNA NPH QL NAA+NON-PROBE: NOT DETECTED
SAMPLE SITE: ABNORMAL
SARS-COV-2 RNA NPH QL NAA+NON-PROBE: NOT DETECTED
SODIUM SERPL-SCNC: 141 MMOL/L (ref 132–146)
SP GR UR STRIP: 1.02 (ref 1–1.03)
SPECIMEN DESCRIPTION: NORMAL
TROPONIN I SERPL HS-MCNC: 104 NG/L (ref 0–9)
TROPONIN I SERPL HS-MCNC: 73 NG/L (ref 0–9)
UROBILINOGEN UR STRIP-ACNC: 0.2 EU/DL (ref 0–1)
WBC # BLD: ABNORMAL 10*3/UL
WBC #/AREA URNS HPF: ABNORMAL /HPF
WBC OTHER # BLD: 19.6 K/UL (ref 4.5–11.5)

## 2024-02-21 PROCEDURE — 6360000002 HC RX W HCPCS: Performed by: INTERNAL MEDICINE

## 2024-02-21 PROCEDURE — 3E033XZ INTRODUCTION OF VASOPRESSOR INTO PERIPHERAL VEIN, PERCUTANEOUS APPROACH: ICD-10-PCS | Performed by: STUDENT IN AN ORGANIZED HEALTH CARE EDUCATION/TRAINING PROGRAM

## 2024-02-21 PROCEDURE — 80053 COMPREHEN METABOLIC PANEL: CPT

## 2024-02-21 PROCEDURE — 6370000000 HC RX 637 (ALT 250 FOR IP): Performed by: STUDENT IN AN ORGANIZED HEALTH CARE EDUCATION/TRAINING PROGRAM

## 2024-02-21 PROCEDURE — 6360000002 HC RX W HCPCS: Performed by: STUDENT IN AN ORGANIZED HEALTH CARE EDUCATION/TRAINING PROGRAM

## 2024-02-21 PROCEDURE — 03HY32Z INSERTION OF MONITORING DEVICE INTO UPPER ARTERY, PERCUTANEOUS APPROACH: ICD-10-PCS | Performed by: INTERNAL MEDICINE

## 2024-02-21 PROCEDURE — 99285 EMERGENCY DEPT VISIT HI MDM: CPT

## 2024-02-21 PROCEDURE — 85025 COMPLETE CBC W/AUTO DIFF WBC: CPT

## 2024-02-21 PROCEDURE — 2580000003 HC RX 258: Performed by: STUDENT IN AN ORGANIZED HEALTH CARE EDUCATION/TRAINING PROGRAM

## 2024-02-21 PROCEDURE — 96366 THER/PROPH/DIAG IV INF ADDON: CPT

## 2024-02-21 PROCEDURE — 85014 HEMATOCRIT: CPT

## 2024-02-21 PROCEDURE — 96365 THER/PROPH/DIAG IV INF INIT: CPT

## 2024-02-21 PROCEDURE — 96372 THER/PROPH/DIAG INJ SC/IM: CPT

## 2024-02-21 PROCEDURE — 4A133B1 MONITORING OF ARTERIAL PRESSURE, PERIPHERAL, PERCUTANEOUS APPROACH: ICD-10-PCS | Performed by: INTERNAL MEDICINE

## 2024-02-21 PROCEDURE — 0202U NFCT DS 22 TRGT SARS-COV-2: CPT

## 2024-02-21 PROCEDURE — 85610 PROTHROMBIN TIME: CPT

## 2024-02-21 PROCEDURE — 2000000000 HC ICU R&B

## 2024-02-21 PROCEDURE — 87040 BLOOD CULTURE FOR BACTERIA: CPT

## 2024-02-21 PROCEDURE — 71045 X-RAY EXAM CHEST 1 VIEW: CPT

## 2024-02-21 PROCEDURE — 2500000003 HC RX 250 WO HCPCS: Performed by: STUDENT IN AN ORGANIZED HEALTH CARE EDUCATION/TRAINING PROGRAM

## 2024-02-21 PROCEDURE — 87077 CULTURE AEROBIC IDENTIFY: CPT

## 2024-02-21 PROCEDURE — 82803 BLOOD GASES ANY COMBINATION: CPT

## 2024-02-21 PROCEDURE — 2700000000 HC OXYGEN THERAPY PER DAY

## 2024-02-21 PROCEDURE — 02HV33Z INSERTION OF INFUSION DEVICE INTO SUPERIOR VENA CAVA, PERCUTANEOUS APPROACH: ICD-10-PCS | Performed by: STUDENT IN AN ORGANIZED HEALTH CARE EDUCATION/TRAINING PROGRAM

## 2024-02-21 PROCEDURE — 87086 URINE CULTURE/COLONY COUNT: CPT

## 2024-02-21 PROCEDURE — 4A133J1 MONITORING OF ARTERIAL PULSE, PERIPHERAL, PERCUTANEOUS APPROACH: ICD-10-PCS | Performed by: INTERNAL MEDICINE

## 2024-02-21 PROCEDURE — 84484 ASSAY OF TROPONIN QUANT: CPT

## 2024-02-21 PROCEDURE — 99223 1ST HOSP IP/OBS HIGH 75: CPT | Performed by: STUDENT IN AN ORGANIZED HEALTH CARE EDUCATION/TRAINING PROGRAM

## 2024-02-21 PROCEDURE — 81001 URINALYSIS AUTO W/SCOPE: CPT

## 2024-02-21 PROCEDURE — 83690 ASSAY OF LIPASE: CPT

## 2024-02-21 PROCEDURE — 83605 ASSAY OF LACTIC ACID: CPT

## 2024-02-21 PROCEDURE — 87154 CUL TYP ID BLD PTHGN 6+ TRGT: CPT

## 2024-02-21 PROCEDURE — 96367 TX/PROPH/DG ADDL SEQ IV INF: CPT

## 2024-02-21 PROCEDURE — 82248 BILIRUBIN DIRECT: CPT

## 2024-02-21 PROCEDURE — 87081 CULTURE SCREEN ONLY: CPT

## 2024-02-21 RX ORDER — SODIUM CHLORIDE 9 MG/ML
INJECTION, SOLUTION INTRAVENOUS CONTINUOUS
Status: DISCONTINUED | OUTPATIENT
Start: 2024-02-21 | End: 2024-02-22

## 2024-02-21 RX ORDER — SODIUM CHLORIDE 0.9 % (FLUSH) 0.9 %
5-40 SYRINGE (ML) INJECTION EVERY 12 HOURS SCHEDULED
Status: DISCONTINUED | OUTPATIENT
Start: 2024-02-21 | End: 2024-02-22

## 2024-02-21 RX ORDER — ACETAMINOPHEN 650 MG/1
650 SUPPOSITORY RECTAL ONCE
Status: COMPLETED | OUTPATIENT
Start: 2024-02-21 | End: 2024-02-21

## 2024-02-21 RX ORDER — SODIUM CHLORIDE 0.9 % (FLUSH) 0.9 %
5-40 SYRINGE (ML) INJECTION PRN
Status: DISCONTINUED | OUTPATIENT
Start: 2024-02-21 | End: 2024-02-29 | Stop reason: SDUPTHER

## 2024-02-21 RX ORDER — DEXTROSE MONOHYDRATE 100 MG/ML
INJECTION, SOLUTION INTRAVENOUS CONTINUOUS PRN
Status: DISCONTINUED | OUTPATIENT
Start: 2024-02-21 | End: 2024-03-01 | Stop reason: HOSPADM

## 2024-02-21 RX ORDER — POLYETHYLENE GLYCOL 3350 17 G/17G
17 POWDER, FOR SOLUTION ORAL DAILY PRN
Status: DISCONTINUED | OUTPATIENT
Start: 2024-02-21 | End: 2024-03-01 | Stop reason: HOSPADM

## 2024-02-21 RX ORDER — SODIUM CHLORIDE 0.9 % (FLUSH) 0.9 %
5-40 SYRINGE (ML) INJECTION EVERY 12 HOURS SCHEDULED
Status: DISCONTINUED | OUTPATIENT
Start: 2024-02-21 | End: 2024-03-01 | Stop reason: HOSPADM

## 2024-02-21 RX ORDER — SODIUM CHLORIDE 9 MG/ML
INJECTION, SOLUTION INTRAVENOUS PRN
Status: DISCONTINUED | OUTPATIENT
Start: 2024-02-21 | End: 2024-03-01 | Stop reason: HOSPADM

## 2024-02-21 RX ORDER — ALUMINA, MAGNESIA, AND SIMETHICONE 2400; 2400; 240 MG/30ML; MG/30ML; MG/30ML
10 SUSPENSION ORAL EVERY 4 HOURS PRN
Status: ON HOLD | COMMUNITY
End: 2024-03-01 | Stop reason: HOSPADM

## 2024-02-21 RX ORDER — ACETAMINOPHEN 325 MG/1
650 TABLET ORAL EVERY 4 HOURS PRN
COMMUNITY

## 2024-02-21 RX ORDER — ACETAMINOPHEN 650 MG/1
650 SUPPOSITORY RECTAL EVERY 6 HOURS PRN
Status: DISCONTINUED | OUTPATIENT
Start: 2024-02-21 | End: 2024-03-01 | Stop reason: HOSPADM

## 2024-02-21 RX ORDER — ONDANSETRON 2 MG/ML
4 INJECTION INTRAMUSCULAR; INTRAVENOUS EVERY 6 HOURS PRN
Status: DISCONTINUED | OUTPATIENT
Start: 2024-02-21 | End: 2024-03-01 | Stop reason: HOSPADM

## 2024-02-21 RX ORDER — SODIUM CHLORIDE 9 MG/ML
INJECTION, SOLUTION INTRAVENOUS PRN
Status: DISCONTINUED | OUTPATIENT
Start: 2024-02-21 | End: 2024-02-21 | Stop reason: SDUPTHER

## 2024-02-21 RX ORDER — NAPROXEN SODIUM 220 MG
220 TABLET ORAL DAILY PRN
Status: ON HOLD | COMMUNITY
End: 2024-03-01 | Stop reason: HOSPADM

## 2024-02-21 RX ORDER — NOREPINEPHRINE BITARTRATE 0.06 MG/ML
1-100 INJECTION, SOLUTION INTRAVENOUS CONTINUOUS
Status: DISCONTINUED | OUTPATIENT
Start: 2024-02-21 | End: 2024-02-22

## 2024-02-21 RX ORDER — OYSTER SHELL CALCIUM WITH VITAMIN D 500; 200 MG/1; [IU]/1
1 TABLET, FILM COATED ORAL 2 TIMES DAILY
Status: ON HOLD | COMMUNITY
End: 2024-03-01 | Stop reason: HOSPADM

## 2024-02-21 RX ORDER — MINERAL OIL 100 G/100G
1 OIL RECTAL
Status: ON HOLD | COMMUNITY
End: 2024-03-01 | Stop reason: HOSPADM

## 2024-02-21 RX ORDER — GABAPENTIN 300 MG/1
300 CAPSULE ORAL 3 TIMES DAILY
COMMUNITY

## 2024-02-21 RX ORDER — CALCIUM CARBONATE 500 MG/1
1 TABLET, CHEWABLE ORAL 3 TIMES DAILY
COMMUNITY

## 2024-02-21 RX ORDER — SODIUM CHLORIDE 0.9 % (FLUSH) 0.9 %
5-40 SYRINGE (ML) INJECTION PRN
Status: DISCONTINUED | OUTPATIENT
Start: 2024-02-21 | End: 2024-03-01 | Stop reason: HOSPADM

## 2024-02-21 RX ORDER — BISACODYL 10 MG
10 SUPPOSITORY, RECTAL RECTAL
Status: ON HOLD | COMMUNITY
End: 2024-03-01 | Stop reason: HOSPADM

## 2024-02-21 RX ORDER — ACETAMINOPHEN 325 MG/1
650 TABLET ORAL EVERY 6 HOURS PRN
Status: DISCONTINUED | OUTPATIENT
Start: 2024-02-21 | End: 2024-03-01 | Stop reason: HOSPADM

## 2024-02-21 RX ORDER — GLUCAGON 1 MG/ML
1 KIT INJECTION PRN
Status: DISCONTINUED | OUTPATIENT
Start: 2024-02-21 | End: 2024-03-01 | Stop reason: HOSPADM

## 2024-02-21 RX ORDER — HEPARIN SODIUM 10000 [USP'U]/ML
5000 INJECTION, SOLUTION INTRAVENOUS; SUBCUTANEOUS EVERY 8 HOURS SCHEDULED
Status: DISCONTINUED | OUTPATIENT
Start: 2024-02-21 | End: 2024-03-01 | Stop reason: HOSPADM

## 2024-02-21 RX ORDER — POLYETHYLENE GLYCOL 3350 17 G/17G
17 POWDER, FOR SOLUTION ORAL DAILY PRN
Status: ON HOLD | COMMUNITY
End: 2024-03-01 | Stop reason: HOSPADM

## 2024-02-21 RX ORDER — ONDANSETRON 4 MG/1
4 TABLET, ORALLY DISINTEGRATING ORAL EVERY 8 HOURS PRN
Status: DISCONTINUED | OUTPATIENT
Start: 2024-02-21 | End: 2024-03-01 | Stop reason: HOSPADM

## 2024-02-21 RX ORDER — 0.9 % SODIUM CHLORIDE 0.9 %
2000 INTRAVENOUS SOLUTION INTRAVENOUS ONCE
Status: COMPLETED | OUTPATIENT
Start: 2024-02-21 | End: 2024-02-21

## 2024-02-21 RX ADMIN — VANCOMYCIN HYDROCHLORIDE 1750 MG: 10 INJECTION, POWDER, LYOPHILIZED, FOR SOLUTION INTRAVENOUS at 14:24

## 2024-02-21 RX ADMIN — Medication 5 MCG/MIN: at 13:32

## 2024-02-21 RX ADMIN — HYDROCORTISONE SODIUM SUCCINATE 100 MG: 100 INJECTION, POWDER, FOR SOLUTION INTRAMUSCULAR; INTRAVENOUS at 23:01

## 2024-02-21 RX ADMIN — ACETAMINOPHEN 650 MG: 650 SUPPOSITORY RECTAL at 12:44

## 2024-02-21 RX ADMIN — HEPARIN SODIUM 5000 UNITS: 10000 INJECTION INTRAVENOUS; SUBCUTANEOUS at 14:58

## 2024-02-21 RX ADMIN — PIPERACILLIN AND TAZOBACTAM 4500 MG: 4; .5 INJECTION, POWDER, FOR SOLUTION INTRAVENOUS at 23:07

## 2024-02-21 RX ADMIN — SODIUM CHLORIDE 2000 ML: 9 INJECTION, SOLUTION INTRAVENOUS at 12:44

## 2024-02-21 RX ADMIN — HEPARIN SODIUM 5000 UNITS: 10000 INJECTION INTRAVENOUS; SUBCUTANEOUS at 23:01

## 2024-02-21 RX ADMIN — SODIUM CHLORIDE: 9 INJECTION, SOLUTION INTRAVENOUS at 14:56

## 2024-02-21 RX ADMIN — PIPERACILLIN AND TAZOBACTAM 4500 MG: 4; .5 INJECTION, POWDER, FOR SOLUTION INTRAVENOUS at 12:52

## 2024-02-21 ASSESSMENT — PAIN - FUNCTIONAL ASSESSMENT: PAIN_FUNCTIONAL_ASSESSMENT: NONE - DENIES PAIN

## 2024-02-21 ASSESSMENT — LIFESTYLE VARIABLES: HOW OFTEN DO YOU HAVE A DRINK CONTAINING ALCOHOL: NEVER

## 2024-02-21 NOTE — H&P
Hospitalist History & Physical      PCP: Vipin Aguayo MD    Date of Admission: 2/21/2024    Date of Service: Pt seen/examined on 2/21/2024 and is admitted to Inpatient with expected LOS greater than two midnights due to medical therapy.        Chief Complaint: Respiratory distress    History Of Present Illness:  69 YO F with medical history consistent with chronic pain, arthritis, depression, morbid obesity, unspecified dementia, vitamin D deficiency who was brought in to the ER from nursing facility due to respiratory distress.  History from patient is limited due to her mental status.  Information is gathered from EMR and the ER physician.  Per report patient was having difficulty breathing early this morning and upon EMS arrival patient was found to be hypoxic and placed on nonrebreather mask.  Patient had fever overnight for which she was given Tylenol.  Interval to the hospital patient was hypotensive, somnolent, not talking just nodding her head to questions.  Her roommate has recently tested for COVID but patient has repeatedly tested negative  On ER arrival here BP 84/51 with MAP of 62, pulse 94, RR 14, SpO2 94% on 15 L nonrebreather mask.  No temperature documented  Labs significant for white blood cells 19.6 with a left shift, hemoglobin 14.2, platelets 124.  CO2 21.  Glucose 108.  BUN 45 creatinine 2.9 with GFR 17.  pH 7.37 pO2 1 9 eating pCO2 36 on nonrebreather mask.  First troponin 104 and repeat is 73.  Urinalysis with large hematuria, large leukocyte esterase, numerous white blood cells and RBC, bacteriuria and ketones.  Lactic acid normal 0.9.  Blood culture sent urine culture sent.  Negative respiratory molecular panel  6 x-ray with patchy opacity in the right upper mid and left lower lung, compatible with possible pneumonia  Patient was given IV fluids bolus 2 L normal saline, vancomycin and Zosyn.  Started on Levophed for septic shock with admission into ICU  [facility] Hospitalist

## 2024-02-21 NOTE — PROGRESS NOTES
Pharmacy Consultation Note  (Antibiotic Dosing and Monitoring)    Initial consult date: 2/21/24  Consulting physician/provider: Dr. Araujo  Drug: Vancomycin  Indication: Pneumonia    Age/  Gender Height Weight IBW  Allergy Information   68 y.o./female   83.9 kg (185 lb)     Ideal body weight: 43.9 kg (96 lb 13.9 oz)  Adjusted ideal body weight: 59.9 kg (132 lb 1.9 oz)   Patient has no known allergies.      Renal Function:  Recent Labs     02/21/24  1159   BUN 45*   CREATININE 2.9*       Intake/Output Summary (Last 24 hours) at 2/21/2024 1728  Last data filed at 2/21/2024 1332  Gross per 24 hour   Intake 101.05 ml   Output --   Net 101.05 ml       Vancomycin Monitoring:  Trough:  No results for input(s): \"VANCOTROUGH\" in the last 72 hours.  Random:  No results for input(s): \"VANCORANDOM\" in the last 72 hours.    Vancomycin Administration Times:  Recent vancomycin administrations                     vancomycin (VANCOCIN) 1,750 mg in sodium chloride 0.9 % 500 mL IVPB (mg) 1,750 mg New Bag 02/21/24 1424                    Assessment:  Patient is a 68 y.o. female who has been initiated on vancomycin  Estimated Creatinine Clearance: 17 mL/min (A) (based on SCr of 2.9 mg/dL (H)).  Patient received Vancomycin 1750 mg (20 mg/kg) IV x1 in ED today at 1424      Plan:  Due to BALDEMAR, no additional vancomycin dose needed today, check random level tomorrow AM  Will continue to monitor renal function   Pharmacy to follow      King Fernandez RPH 2/21/2024 5:28 PM    LEANNE: [phone]  SEY: [phone]  SJW: [phone]

## 2024-02-21 NOTE — ED PROVIDER NOTES
[facility] EMERGENCY DEPARTMENT  EMERGENCY DEPARTMENT ENCOUNTER      Pt Name: Alena Busby  MRN: [id number]  Birthdate 1955  Date of evaluation: 2/21/2024  Provider: Benjie Moore DO  PCP: Vipin Aguayo MD  Note Started: 1:03 PM EST 2/21/24    CHIEF COMPLAINT       Chief Complaint   Patient presents with    Respiratory Distress     Arrived on 15L NRB mask       HISTORY OF PRESENT ILLNESS: 1 or more Elements   History From: EMS  Limitations to history : Altered Mental Status    Alena Busby is a 68 y.o. female who presents to the ED due to respiratory distress.  Patient reportedly became more altered last night per her nursing facility.  She started having difficulty breathing this morning and upon EMS arrival was hypoxic and placed on a nonrebreather.  Her roommate tested positive for COVID although she was tested negative on multiple occasions in the nursing facility.  Patient was found to be febrile last night given a dose of Tylenol.  EMS also reports that she was hypotensive on transport to the ED.  Patient is somnolent and able to nod yes and no to questioning but not speaking at this time.  She does not appear to have any focal deficits.  She states that she is not in any pain at this time.  Remainder of history limited given patient's current mental state.    Nursing Notes were all reviewed and agreed with or any disagreements were addressed in the HPI.    REVIEW OF SYSTEMS :    Positives and Pertinent negatives as per HPI.     PAST MEDICAL HISTORY/Chronic Conditions Affecting Care    has a past medical history of Arthritis.     SURGICAL HISTORY     Past Surgical History:   Procedure Laterality Date    CATARACT REMOVAL      CERVICAL FUSION N/A 6/3/2019    ANTERIOR CERVICAL FUSION C3-C4, C4-C5  AND CORPECTOMY C4  WITH PLATES, SCREWS, BONE GRAFT, SSEP -- GLOBUS performed by Daniele Daniels MD at [facility]       CURRENTMEDICATIONS       Previous Medications

## 2024-02-21 NOTE — PROGRESS NOTES
RENAL DOSE ADJUSTMENT MADE PER P/T PROTOCOL    PREVIOUS ORDER:  Zosyn 3.375g q12hr    Estimated Creatinine Clearance: 17 mL/min (A) (based on SCr of 2.9 mg/dL (H)).  Recent Labs     02/21/24  1159   BUN 45*   CREATININE 2.9*   *   INR 1.4     NEW RENALLY ADJUSTED ORDER:  Zosyn 4.5g q12hr    Jonathan Ramirez RPH  2/21/2024 3:27 PM

## 2024-02-22 PROBLEM — Z51.5 PALLIATIVE CARE ENCOUNTER: Status: ACTIVE | Noted: 2024-02-22

## 2024-02-22 PROBLEM — Z71.89 GOALS OF CARE, COUNSELING/DISCUSSION: Status: ACTIVE | Noted: 2024-02-22

## 2024-02-22 LAB
ALBUMIN SERPL-MCNC: 3.2 G/DL (ref 3.5–5.2)
ALP SERPL-CCNC: 72 U/L (ref 35–104)
ALT SERPL-CCNC: 23 U/L (ref 0–32)
ANION GAP SERPL CALCULATED.3IONS-SCNC: 16 MMOL/L (ref 7–16)
ANION GAP SERPL CALCULATED.3IONS-SCNC: 19 MMOL/L (ref 7–16)
AST SERPL-CCNC: 31 U/L (ref 0–31)
BASOPHILS # BLD: 0.03 K/UL (ref 0–0.2)
BASOPHILS # BLD: 0.16 K/UL (ref 0–0.2)
BASOPHILS NFR BLD: 0 % (ref 0–2)
BASOPHILS NFR BLD: 1 % (ref 0–2)
BILIRUB SERPL-MCNC: 1.4 MG/DL (ref 0–1.2)
BUN SERPL-MCNC: 36 MG/DL (ref 6–23)
BUN SERPL-MCNC: 36 MG/DL (ref 6–23)
CA-I BLD-SCNC: 0.96 MMOL/L (ref 1.15–1.33)
CALCIUM SERPL-MCNC: 6.8 MG/DL (ref 8.6–10.2)
CALCIUM SERPL-MCNC: 7.7 MG/DL (ref 8.6–10.2)
CHLORIDE SERPL-SCNC: 106 MMOL/L (ref 98–107)
CHLORIDE SERPL-SCNC: 107 MMOL/L (ref 98–107)
CO2 SERPL-SCNC: 17 MMOL/L (ref 22–29)
CO2 SERPL-SCNC: 19 MMOL/L (ref 22–29)
CREAT SERPL-MCNC: 1.4 MG/DL (ref 0.5–1)
CREAT SERPL-MCNC: 1.6 MG/DL (ref 0.5–1)
CREAT UR-MCNC: 92.7 MG/DL (ref 29–226)
DATE LAST DOSE: NORMAL
EOSINOPHIL # BLD: 0 K/UL (ref 0.05–0.5)
EOSINOPHIL # BLD: 0 K/UL (ref 0.05–0.5)
EOSINOPHILS RELATIVE PERCENT: 0 % (ref 0–6)
EOSINOPHILS RELATIVE PERCENT: 0 % (ref 0–6)
ERYTHROCYTE [DISTWIDTH] IN BLOOD BY AUTOMATED COUNT: 16.2 % (ref 11.5–15)
ERYTHROCYTE [DISTWIDTH] IN BLOOD BY AUTOMATED COUNT: 16.3 % (ref 11.5–15)
GFR SERPL CREATININE-BSD FRML MDRD: 36 ML/MIN/1.73M2
GFR SERPL CREATININE-BSD FRML MDRD: 40 ML/MIN/1.73M2
GLUCOSE SERPL-MCNC: 124 MG/DL (ref 74–99)
GLUCOSE SERPL-MCNC: 141 MG/DL (ref 74–99)
HCT VFR BLD AUTO: 41.4 % (ref 34–48)
HCT VFR BLD AUTO: 42.9 % (ref 34–48)
HGB BLD-MCNC: 13.3 G/DL (ref 11.5–15.5)
HGB BLD-MCNC: 14.2 G/DL (ref 11.5–15.5)
IMM GRANULOCYTES # BLD AUTO: 0.14 K/UL (ref 0–0.58)
IMM GRANULOCYTES NFR BLD: 1 % (ref 0–5)
LACTATE BLDV-SCNC: 1.9 MMOL/L (ref 0.5–2.2)
LYMPHOCYTES NFR BLD: 0.89 K/UL (ref 1.5–4)
LYMPHOCYTES NFR BLD: 0.97 K/UL (ref 1.5–4)
LYMPHOCYTES RELATIVE PERCENT: 5 % (ref 20–42)
LYMPHOCYTES RELATIVE PERCENT: 6 % (ref 20–42)
MAGNESIUM SERPL-MCNC: 1.6 MG/DL (ref 1.6–2.6)
MAGNESIUM SERPL-MCNC: 2.5 MG/DL (ref 1.6–2.6)
MCH RBC QN AUTO: 30.4 PG (ref 26–35)
MCH RBC QN AUTO: 30.9 PG (ref 26–35)
MCHC RBC AUTO-ENTMCNC: 32.1 G/DL (ref 32–34.5)
MCHC RBC AUTO-ENTMCNC: 33.1 G/DL (ref 32–34.5)
MCV RBC AUTO: 93.3 FL (ref 80–99.9)
MCV RBC AUTO: 94.5 FL (ref 80–99.9)
METAMYELOCYTES ABSOLUTE COUNT: 0.32 K/UL (ref 0–0.12)
METAMYELOCYTES: 2 % (ref 0–1)
MONOCYTES NFR BLD: 0.92 K/UL (ref 0.1–0.95)
MONOCYTES NFR BLD: 0.97 K/UL (ref 0.1–0.95)
MONOCYTES NFR BLD: 5 % (ref 2–12)
MONOCYTES NFR BLD: 6 % (ref 2–12)
MYELOCYTES ABSOLUTE COUNT: 0.16 K/UL
MYELOCYTES: 1 %
NEUTROPHILS NFR BLD: 84 % (ref 43–80)
NEUTROPHILS NFR BLD: 90 % (ref 43–80)
NEUTS SEG NFR BLD: 13.52 K/UL (ref 1.8–7.3)
NEUTS SEG NFR BLD: 17.17 K/UL (ref 1.8–7.3)
PHOSPHATE SERPL-MCNC: 4.4 MG/DL (ref 2.5–4.5)
PHOSPHATE SERPL-MCNC: 4.4 MG/DL (ref 2.5–4.5)
PLATELET CONFIRMATION: NORMAL
PLATELET, FLUORESCENCE: 113 K/UL (ref 130–450)
PLATELET, FLUORESCENCE: 97 K/UL (ref 130–450)
PMV BLD AUTO: 10.6 FL (ref 7–12)
PMV BLD AUTO: 11.1 FL (ref 7–12)
POTASSIUM SERPL-SCNC: 3.7 MMOL/L (ref 3.5–5)
POTASSIUM SERPL-SCNC: 3.9 MMOL/L (ref 3.5–5)
PROCALCITONIN SERPL-MCNC: 58.14 NG/ML (ref 0–0.08)
PROT SERPL-MCNC: 6.8 G/DL (ref 6.4–8.3)
RBC # BLD AUTO: 4.38 M/UL (ref 3.5–5.5)
RBC # BLD AUTO: 4.6 M/UL (ref 3.5–5.5)
RBC # BLD: ABNORMAL 10*6/UL
SODIUM SERPL-SCNC: 141 MMOL/L (ref 132–146)
SODIUM SERPL-SCNC: 143 MMOL/L (ref 132–146)
SODIUM UR-SCNC: 39 MMOL/L
TME LAST DOSE: NORMAL H
TROPONIN I SERPL HS-MCNC: 40 NG/L (ref 0–9)
VANCOMYCIN DOSE: NORMAL MG
VANCOMYCIN SERPL-MCNC: 13.4 UG/ML (ref 5–40)
WBC OTHER # BLD: 16.1 K/UL (ref 4.5–11.5)
WBC OTHER # BLD: 19.2 K/UL (ref 4.5–11.5)

## 2024-02-22 PROCEDURE — 87070 CULTURE OTHR SPECIMN AEROBIC: CPT

## 2024-02-22 PROCEDURE — 82570 ASSAY OF URINE CREATININE: CPT

## 2024-02-22 PROCEDURE — 84484 ASSAY OF TROPONIN QUANT: CPT

## 2024-02-22 PROCEDURE — 85025 COMPLETE CBC W/AUTO DIFF WBC: CPT

## 2024-02-22 PROCEDURE — 2580000003 HC RX 258

## 2024-02-22 PROCEDURE — 83605 ASSAY OF LACTIC ACID: CPT

## 2024-02-22 PROCEDURE — 84145 PROCALCITONIN (PCT): CPT

## 2024-02-22 PROCEDURE — 82330 ASSAY OF CALCIUM: CPT

## 2024-02-22 PROCEDURE — 99291 CRITICAL CARE FIRST HOUR: CPT | Performed by: INTERNAL MEDICINE

## 2024-02-22 PROCEDURE — 2580000003 HC RX 258: Performed by: STUDENT IN AN ORGANIZED HEALTH CARE EDUCATION/TRAINING PROGRAM

## 2024-02-22 PROCEDURE — 84100 ASSAY OF PHOSPHORUS: CPT

## 2024-02-22 PROCEDURE — 84300 ASSAY OF URINE SODIUM: CPT

## 2024-02-22 PROCEDURE — 36620 INSERTION CATHETER ARTERY: CPT

## 2024-02-22 PROCEDURE — 6360000002 HC RX W HCPCS: Performed by: STUDENT IN AN ORGANIZED HEALTH CARE EDUCATION/TRAINING PROGRAM

## 2024-02-22 PROCEDURE — 80202 ASSAY OF VANCOMYCIN: CPT

## 2024-02-22 PROCEDURE — 6360000002 HC RX W HCPCS

## 2024-02-22 PROCEDURE — 87205 SMEAR GRAM STAIN: CPT

## 2024-02-22 PROCEDURE — 80048 BASIC METABOLIC PNL TOTAL CA: CPT

## 2024-02-22 PROCEDURE — 99232 SBSQ HOSP IP/OBS MODERATE 35: CPT | Performed by: STUDENT IN AN ORGANIZED HEALTH CARE EDUCATION/TRAINING PROGRAM

## 2024-02-22 PROCEDURE — 83735 ASSAY OF MAGNESIUM: CPT

## 2024-02-22 PROCEDURE — 2000000000 HC ICU R&B

## 2024-02-22 PROCEDURE — 2700000000 HC OXYGEN THERAPY PER DAY

## 2024-02-22 PROCEDURE — 6360000002 HC RX W HCPCS: Performed by: INTERNAL MEDICINE

## 2024-02-22 PROCEDURE — 80053 COMPREHEN METABOLIC PANEL: CPT

## 2024-02-22 PROCEDURE — 6370000000 HC RX 637 (ALT 250 FOR IP): Performed by: STUDENT IN AN ORGANIZED HEALTH CARE EDUCATION/TRAINING PROGRAM

## 2024-02-22 PROCEDURE — 2500000003 HC RX 250 WO HCPCS: Performed by: STUDENT IN AN ORGANIZED HEALTH CARE EDUCATION/TRAINING PROGRAM

## 2024-02-22 PROCEDURE — 99221 1ST HOSP IP/OBS SF/LOW 40: CPT | Performed by: CLINICAL NURSE SPECIALIST

## 2024-02-22 PROCEDURE — 6370000000 HC RX 637 (ALT 250 FOR IP)

## 2024-02-22 RX ORDER — PANTOPRAZOLE SODIUM 40 MG/1
40 TABLET, DELAYED RELEASE ORAL
Status: DISCONTINUED | OUTPATIENT
Start: 2024-02-22 | End: 2024-03-01 | Stop reason: HOSPADM

## 2024-02-22 RX ORDER — LIDOCAINE HYDROCHLORIDE 10 MG/ML
INJECTION, SOLUTION EPIDURAL; INFILTRATION; INTRACAUDAL; PERINEURAL
Status: DISPENSED
Start: 2024-02-22 | End: 2024-02-22

## 2024-02-22 RX ORDER — FENTANYL CITRATE 50 UG/ML
INJECTION, SOLUTION INTRAMUSCULAR; INTRAVENOUS
Status: COMPLETED
Start: 2024-02-22 | End: 2024-02-22

## 2024-02-22 RX ORDER — FENTANYL CITRATE 50 UG/ML
25 INJECTION, SOLUTION INTRAMUSCULAR; INTRAVENOUS ONCE
Status: COMPLETED | OUTPATIENT
Start: 2024-02-22 | End: 2024-02-22

## 2024-02-22 RX ORDER — MAGNESIUM SULFATE IN WATER 40 MG/ML
2000 INJECTION, SOLUTION INTRAVENOUS ONCE
Status: COMPLETED | OUTPATIENT
Start: 2024-02-22 | End: 2024-02-22

## 2024-02-22 RX ADMIN — PETROLATUM: 420 OINTMENT TOPICAL at 20:58

## 2024-02-22 RX ADMIN — HEPARIN SODIUM 5000 UNITS: 10000 INJECTION INTRAVENOUS; SUBCUTANEOUS at 16:54

## 2024-02-22 RX ADMIN — MEROPENEM 1000 MG: 1 INJECTION, POWDER, FOR SOLUTION INTRAVENOUS at 10:44

## 2024-02-22 RX ADMIN — FENTANYL CITRATE 25 MCG: 50 INJECTION INTRAMUSCULAR; INTRAVENOUS at 00:26

## 2024-02-22 RX ADMIN — HYDROCORTISONE SODIUM SUCCINATE 50 MG: 100 INJECTION, POWDER, FOR SOLUTION INTRAMUSCULAR; INTRAVENOUS at 06:14

## 2024-02-22 RX ADMIN — FENTANYL CITRATE 25 MCG: 50 INJECTION, SOLUTION INTRAMUSCULAR; INTRAVENOUS at 00:26

## 2024-02-22 RX ADMIN — HEPARIN SODIUM 5000 UNITS: 10000 INJECTION INTRAVENOUS; SUBCUTANEOUS at 22:02

## 2024-02-22 RX ADMIN — HEPARIN SODIUM 5000 UNITS: 10000 INJECTION INTRAVENOUS; SUBCUTANEOUS at 06:13

## 2024-02-22 RX ADMIN — SODIUM CHLORIDE, PRESERVATIVE FREE 10 ML: 5 INJECTION INTRAVENOUS at 10:33

## 2024-02-22 RX ADMIN — CALCIUM GLUCONATE 2000 MG: 98 INJECTION, SOLUTION INTRAVENOUS at 05:04

## 2024-02-22 RX ADMIN — MICONAZOLE NITRATE: 20.6 POWDER TOPICAL at 20:58

## 2024-02-22 RX ADMIN — MEROPENEM 1000 MG: 1 INJECTION, POWDER, FOR SOLUTION INTRAVENOUS at 20:04

## 2024-02-22 RX ADMIN — MAGNESIUM SULFATE HEPTAHYDRATE 2000 MG: 40 INJECTION, SOLUTION INTRAVENOUS at 03:38

## 2024-02-22 RX ADMIN — SODIUM CHLORIDE, PRESERVATIVE FREE 10 ML: 5 INJECTION INTRAVENOUS at 20:58

## 2024-02-22 RX ADMIN — PANTOPRAZOLE SODIUM 40 MG: 40 TABLET, DELAYED RELEASE ORAL at 06:14

## 2024-02-22 NOTE — PROGRESS NOTES
Patient admitted to MICU with the following belongings:  None. The following belongings admitted with the patient, None, were sent home with the patient's family.

## 2024-02-22 NOTE — PROCEDURES
Arterial line placement    Procedure: Left/Right radial arterial line placement.     Indications: Continuous monitoring of blood pressure in a patient with hypotension +/- shock, on Levophed.     Anesthesia: Local infiltration of 1% lidocaine.     Consent:  The patient provided verbal consent for this procedure.    Technique: Time Out: Immediately prior to the procedure a \"timeout\" was called to verify the correct patient and procedure. Procedure was done using strict aseptic technique. Christiano's test was performed and was normal. right radial site was cleaned with chloraprep and draped.  Radial artery was identified, then Lidocaine 1% was infiltrated locally.  Radial arterial line was inserted, a good blood flow was obtained, after which guidewire was inserted all the way with no resistance. Then the canula was inserted and needle with guidewire was withdrawn. Pulsatile bright red blood flow was observed. The canula was connected to BP monitoring apparatus and a good quality waveform was noted. Then the canula was secured with 2 stay sutures of 3-0 silk after Lidocaine infiltration, following which dressing was applied.     Number of sticks: 2.     Number of Kits used: 1.     Complications: No immediate complication.      Estimated blood loss: About 3 ml.     Comment: Patient tolerated the procedure well.     Sarah Gray MD PGY-2  2/22/2024 1:38 AM

## 2024-02-22 NOTE — PROGRESS NOTES
Pharmacy Consultation Note  (Antibiotic Dosing and Monitoring)    Initial consult date: 2/21/24  Consulting physician/provider: Dr. Araujo  Drug: Vancomycin  Indication: Pneumonia    Vancomycin has been discontinued. Clinical pharmacy will sign off, please reconsult if further assistance is needed.       Vesta Lopez, PharmD, BCPS, BCCCP 2/22/2024 9:49 AM

## 2024-02-22 NOTE — DISCHARGE INSTR - COC
Molecular, with COVID-19 (Restricted: peds pts or suitable admitted adults)   22 Infection                        Nurse Assessment:  Last Vital Signs: BP 94/73   Pulse (!) 114   Temp 97.8 °F (36.6 °C) (Axillary)   Resp 27   Ht 1.397 m (4' 7\")   Wt 88.5 kg (195 lb)   SpO2 93%   BMI 45.32 kg/m²     Last documented pain score (0-10 scale):    Last Weight:   Wt Readings from Last 1 Encounters:   24 88.5 kg (195 lb)     Mental Status:  oriented and alert    IV Access:  - None    Nursing Mobility/ADLs:  Walking   Dependent  Transfer  Dependent  Bathing  Dependent  Dressing  Dependent  Toileting  Dependent  Feeding  Dependent  Med Admin  Dependent  Med Delivery   whole    Wound Care Documentation and Therapy:  Wound 24 Other (Comment) Right gluteal fold (Active)   Wound Cleansed Soap and water 24 08   Dressing/Treatment Open to air 24 08   Wound Length (cm) 6 cm 24 08   Wound Width (cm) 2 cm 24 08   Wound Depth (cm) 0 cm 24 0800   Wound Surface Area (cm^2) 12 cm^2 24 0800   Wound Volume (cm^3) 0 cm^3 24 08   Wound Assessment Non-blanchable erythema 24 0800   Drainage Amount None (dry) 24 08   [place]-wound Assessment Intact 24 08   Number of days: 0       Wound 24 Other (Comment) Left gluteal fold (Active)   Wound Cleansed Soap and water 24 08   Dressing/Treatment Open to air 24 0800   Wound Length (cm) 3 cm 24 0800   Wound Width (cm) 2 cm 24 08   Wound Depth (cm) 0 cm 24 0800   Wound Surface Area (cm^2) 6 cm^2 24 0800   Wound Volume (cm^3) 0 cm^3 24 08   Wound Assessment Non-blanchable erythema 24 0800   Drainage Amount None (dry) 24 08   [place]-wound Assessment Intact 24 0800   Number of days: 0       Wound 24 Coccyx (Active)   Wound Cleansed Soap and water 24 0800   Wound Length (cm) 3 cm 24 0800   Wound Width (cm) 3 cm 24  [place]  Address:  Phone:  Fax:    Dialysis Facility (if applicable)   Name:  Address:  Dialysis Schedule:  Phone:  Fax:    / signature: Electronically signed by Raghav Tello RN on 2/22/24 at 2:59 PM EST    PHYSICIAN SECTION    Prognosis: {Prognosis:7312482977}    Condition at Discharge: { Patient Condition:231846991}    Rehab Potential (if transferring to Rehab): {Prognosis:7715703824}    Recommended Labs or Other Treatments After Discharge: ***    Physician Certification: I certify the above information and transfer of Alena Busby  is necessary for the continuing treatment of the diagnosis listed and that she requires {Admit to Appropriate Level of Care:38314} for {GREATER/LESS:311482171} 30 days.     Update Admission H&P: No change in H&P    PHYSICIAN SIGNATURE:  Electronically signed by Yves Wilkinson MD on 3/1/24 at 10:01 AM EST

## 2024-02-22 NOTE — PROGRESS NOTES
[facility] Hospitalist Progress Note    SYNOPSIS: Patient admitted on 2024 for Septic shock (HCC)   69 YO F with medical history consistent with chronic pain, arthritis, depression, morbid obesity, unspecified dementia, vitamin D deficiency who was brought in to the ER from nursing facility due to respiratory distress.  History from patient is limited due to her mental status.  Information is gathered from EMR and the ER physician.  Per report patient was having difficulty breathing early this morning and upon EMS arrival patient was found to be hypoxic and placed on nonrebreather mask.  Patient had fever overnight for which she was given Tylenol.  Interval to the hospital patient was hypotensive, somnolent, not talking just nodding her head to questions.  Her roommate has recently tested for COVID but patient has repeatedly tested negative  On ER arrival here BP 84/51 with MAP of 62, pulse 94, RR 14, SpO2 94% on 15 L nonrebreather mask.  No temperature documented  Labs significant for white blood cells 19.6 with a left shift, hemoglobin 14.2, platelets 124.  CO2 21.  Glucose 108.  BUN 45 creatinine 2.9 with GFR 17.  pH 7.37 pO2 1 9 eating pCO2 36 on nonrebreather mask.  First troponin 104 and repeat is 73.  Urinalysis with large hematuria, large leukocyte esterase, numerous white blood cells and RBC, bacteriuria and ketones.  Lactic acid normal 0.9.  Blood culture sent urine culture sent.  Negative respiratory molecular panel  6 x-ray with patchy opacity in the right upper mid and left lower lung, compatible with possible pneumonia  Patient was given IV fluids bolus 2 L normal saline, vancomycin and Zosyn.  Started on Levophed for septic shock with admission into ICU    SUBJECTIVE:  Stable overnight. No other overnight issues reported.   Patient seen and examined  Records reviewed.           Temp (24hrs), Av.8 °F (37.1 °C), Min:97.8 °F (36.6 °C), Max:100 °F (37.8 °C)    DIET: ADULT DIET; Easy to

## 2024-02-22 NOTE — PROGRESS NOTES
I met with a sister, while the patient rested. The family has been through some challenges prior to arriving at [facility]. She was sick in another facility and not making progress toward health. The family is grateful to be here.    Their hope is to regain some health and look toward next steps. We prayed, and asked for God's help and philip.    Should you need more support, please reach out to Spiritual Care at x [phone].    Willie Garcia; DELFINO Yousif

## 2024-02-22 NOTE — CARE COORDINATION
Care Coordination: LOS 1 day. Per Vesta at [facility], pt is long term bed hold and can return.  Requested [facility]  fax in admission summary with list of meds, spoke to Vesta- awaiting fax    Electronically signed by Amina Page RN on 2/22/2024 at 10:47 AM

## 2024-02-22 NOTE — PLAN OF CARE
Problem: Respiratory - Adult  Goal: Achieves optimal ventilation and oxygenation  Outcome: Not Progressing     Problem: Skin/Tissue Integrity - Adult  Goal: Skin integrity remains intact  Outcome: Not Progressing     Problem: Musculoskeletal - Adult  Goal: Return mobility to safest level of function  Outcome: Not Progressing  Goal: Return ADL status to a safe level of function  Outcome: Not Progressing     Problem: Gastrointestinal - Adult  Goal: Maintains adequate nutritional intake  Outcome: Not Progressing     Problem: Infection - Adult  Goal: Absence of infection at discharge  Outcome: Not Progressing  Goal: Absence of infection during hospitalization  Outcome: Not Progressing

## 2024-02-22 NOTE — PROGRESS NOTES
4 Eyes Skin Assessment     NAME:  Alena Busby  YOB: 1955  MEDICAL RECORD NUMBER:  [id number]    The patient is being assessed for  Admission    I agree that at least one RN has performed a thorough Head to Toe Skin Assessment on the patient. ALL assessment sites listed below have been assessed.      Areas assessed by both nurses:    Head, Face, Ears, Shoulders, Back, Chest, Arms, Elbows, Hands, Sacrum. Buttock, Coccyx, Ischium, Legs. Feet and Heels, and Under Medical Devices         Does the Patient have a Wound? No noted wound(s)       Ramon Prevention initiated by RN: Yes  Wound Care Orders initiated by RN: No    Pressure Injury (Stage 3,4, Unstageable, DTI, NWPT, and Complex wounds) if present, place Wound referral order by RN under : No    New Ostomies, if present place, Ostomy referral order under : No     Nurse 1 eSignature: Electronically signed by Sea Lee RN on 2/21/24 at 10:24 PM EST    **SHARE this note so that the co-signing nurse can place an eSignature**    Nurse 2 eSignature: {Esignature:[id number]}

## 2024-02-22 NOTE — CARE COORDINATION
Chart reviewed for transition of care at discharge. Admitted with septic shock, pneumonia, and acute hypoxic respiratory insufficiency. Patient is a bed hold from [facility] per liaison Vesta. Currently on IV Merrem q 12 hours. Met with patient who stated her d/c plan is to return back to [facility] when medically stable. She stated she is wheel chair bound at facility. Her  Don is her next of kin. Destination and carlo updated. Ambulance form in envelope on soft chart. CM will follow.  Electronically signed by Raghav Tello RN on 2/22/2024 at 3:00 PM

## 2024-02-23 ENCOUNTER — APPOINTMENT (OUTPATIENT)
Dept: GENERAL RADIOLOGY | Age: 69
End: 2024-02-23
Payer: MEDICARE

## 2024-02-23 LAB
ANION GAP SERPL CALCULATED.3IONS-SCNC: 9 MMOL/L (ref 7–16)
BASOPHILS # BLD: 0.02 K/UL (ref 0–0.2)
BASOPHILS NFR BLD: 0 % (ref 0–2)
BUN SERPL-MCNC: 34 MG/DL (ref 6–23)
CA-I BLD-SCNC: 0.99 MMOL/L (ref 1.15–1.33)
CA-I BLD-SCNC: 1.04 MMOL/L (ref 1.15–1.33)
CALCIUM SERPL-MCNC: 7 MG/DL (ref 8.6–10.2)
CHLORIDE SERPL-SCNC: 109 MMOL/L (ref 98–107)
CO2 SERPL-SCNC: 24 MMOL/L (ref 22–29)
CREAT SERPL-MCNC: 0.9 MG/DL (ref 0.5–1)
EOSINOPHIL # BLD: 0.04 K/UL (ref 0.05–0.5)
EOSINOPHILS RELATIVE PERCENT: 0 % (ref 0–6)
ERYTHROCYTE [DISTWIDTH] IN BLOOD BY AUTOMATED COUNT: 15.9 % (ref 11.5–15)
GFR SERPL CREATININE-BSD FRML MDRD: >60 ML/MIN/1.73M2
GLUCOSE SERPL-MCNC: 94 MG/DL (ref 74–99)
HCT VFR BLD AUTO: 35.6 % (ref 34–48)
HGB BLD-MCNC: 11.7 G/DL (ref 11.5–15.5)
IMM GRANULOCYTES # BLD AUTO: 0.08 K/UL (ref 0–0.58)
IMM GRANULOCYTES NFR BLD: 1 % (ref 0–5)
LYMPHOCYTES NFR BLD: 1.82 K/UL (ref 1.5–4)
LYMPHOCYTES RELATIVE PERCENT: 15 % (ref 20–42)
MAGNESIUM SERPL-MCNC: 2.4 MG/DL (ref 1.6–2.6)
MCH RBC QN AUTO: 30.2 PG (ref 26–35)
MCHC RBC AUTO-ENTMCNC: 32.9 G/DL (ref 32–34.5)
MCV RBC AUTO: 92 FL (ref 80–99.9)
MICROORGANISM SPEC CULT: ABNORMAL
MICROORGANISM SPEC CULT: ABNORMAL
MICROORGANISM SPEC CULT: NORMAL
MICROORGANISM/AGENT SPEC: ABNORMAL
MONOCYTES NFR BLD: 0.8 K/UL (ref 0.1–0.95)
MONOCYTES NFR BLD: 7 % (ref 2–12)
NEUTROPHILS NFR BLD: 77 % (ref 43–80)
NEUTS SEG NFR BLD: 9.44 K/UL (ref 1.8–7.3)
PHOSPHATE SERPL-MCNC: 2.1 MG/DL (ref 2.5–4.5)
PLATELET, FLUORESCENCE: 104 K/UL (ref 130–450)
PMV BLD AUTO: 11.7 FL (ref 7–12)
POTASSIUM SERPL-SCNC: 3.9 MMOL/L (ref 3.5–5)
RBC # BLD AUTO: 3.87 M/UL (ref 3.5–5.5)
SODIUM SERPL-SCNC: 142 MMOL/L (ref 132–146)
SPECIMEN DESCRIPTION: ABNORMAL
SPECIMEN DESCRIPTION: ABNORMAL
SPECIMEN DESCRIPTION: NORMAL
WBC OTHER # BLD: 12.2 K/UL (ref 4.5–11.5)

## 2024-02-23 PROCEDURE — 99291 CRITICAL CARE FIRST HOUR: CPT | Performed by: INTERNAL MEDICINE

## 2024-02-23 PROCEDURE — 2580000003 HC RX 258

## 2024-02-23 PROCEDURE — 82330 ASSAY OF CALCIUM: CPT

## 2024-02-23 PROCEDURE — 6360000002 HC RX W HCPCS: Performed by: STUDENT IN AN ORGANIZED HEALTH CARE EDUCATION/TRAINING PROGRAM

## 2024-02-23 PROCEDURE — 2500000003 HC RX 250 WO HCPCS

## 2024-02-23 PROCEDURE — 6360000002 HC RX W HCPCS

## 2024-02-23 PROCEDURE — 84100 ASSAY OF PHOSPHORUS: CPT

## 2024-02-23 PROCEDURE — 71045 X-RAY EXAM CHEST 1 VIEW: CPT

## 2024-02-23 PROCEDURE — 2580000003 HC RX 258: Performed by: STUDENT IN AN ORGANIZED HEALTH CARE EDUCATION/TRAINING PROGRAM

## 2024-02-23 PROCEDURE — 6370000000 HC RX 637 (ALT 250 FOR IP)

## 2024-02-23 PROCEDURE — 2700000000 HC OXYGEN THERAPY PER DAY

## 2024-02-23 PROCEDURE — 99231 SBSQ HOSP IP/OBS SF/LOW 25: CPT | Performed by: STUDENT IN AN ORGANIZED HEALTH CARE EDUCATION/TRAINING PROGRAM

## 2024-02-23 PROCEDURE — 83735 ASSAY OF MAGNESIUM: CPT

## 2024-02-23 PROCEDURE — 2060000000 HC ICU INTERMEDIATE R&B

## 2024-02-23 PROCEDURE — 99231 SBSQ HOSP IP/OBS SF/LOW 25: CPT | Performed by: NURSE PRACTITIONER

## 2024-02-23 PROCEDURE — 80048 BASIC METABOLIC PNL TOTAL CA: CPT

## 2024-02-23 PROCEDURE — 85025 COMPLETE CBC W/AUTO DIFF WBC: CPT

## 2024-02-23 RX ORDER — HYDROXYZINE PAMOATE 25 MG/1
25 CAPSULE ORAL ONCE
Status: COMPLETED | OUTPATIENT
Start: 2024-02-23 | End: 2024-02-23

## 2024-02-23 RX ORDER — CALCIUM GLUCONATE 10 MG/ML
1000 INJECTION, SOLUTION INTRAVENOUS ONCE
Status: DISCONTINUED | OUTPATIENT
Start: 2024-02-23 | End: 2024-02-23

## 2024-02-23 RX ADMIN — MICONAZOLE NITRATE: 20.6 POWDER TOPICAL at 08:49

## 2024-02-23 RX ADMIN — MEROPENEM 1000 MG: 1 INJECTION, POWDER, FOR SOLUTION INTRAVENOUS at 08:48

## 2024-02-23 RX ADMIN — PANTOPRAZOLE SODIUM 40 MG: 40 TABLET, DELAYED RELEASE ORAL at 06:41

## 2024-02-23 RX ADMIN — POTASSIUM PHOSPHATE, MONOBASIC AND POTASSIUM PHOSPHATE, DIBASIC 15 MMOL: 224; 236 INJECTION, SOLUTION, CONCENTRATE INTRAVENOUS at 07:27

## 2024-02-23 RX ADMIN — SODIUM CHLORIDE, PRESERVATIVE FREE 10 ML: 5 INJECTION INTRAVENOUS at 08:49

## 2024-02-23 RX ADMIN — HYDROXYZINE PAMOATE 25 MG: 25 CAPSULE ORAL at 23:51

## 2024-02-23 RX ADMIN — PETROLATUM: 420 OINTMENT TOPICAL at 22:53

## 2024-02-23 RX ADMIN — PETROLATUM: 420 OINTMENT TOPICAL at 08:49

## 2024-02-23 RX ADMIN — MICONAZOLE NITRATE: 20.6 POWDER TOPICAL at 22:53

## 2024-02-23 RX ADMIN — CALCIUM GLUCONATE 2000 MG: 98 INJECTION, SOLUTION INTRAVENOUS at 16:16

## 2024-02-23 RX ADMIN — CEFTRIAXONE SODIUM 2000 MG: 2 INJECTION, POWDER, FOR SOLUTION INTRAMUSCULAR; INTRAVENOUS at 09:38

## 2024-02-23 RX ADMIN — HEPARIN SODIUM 5000 UNITS: 10000 INJECTION INTRAVENOUS; SUBCUTANEOUS at 21:31

## 2024-02-23 RX ADMIN — SODIUM CHLORIDE, PRESERVATIVE FREE 10 ML: 5 INJECTION INTRAVENOUS at 22:52

## 2024-02-23 RX ADMIN — HEPARIN SODIUM 5000 UNITS: 10000 INJECTION INTRAVENOUS; SUBCUTANEOUS at 05:37

## 2024-02-23 RX ADMIN — HEPARIN SODIUM 5000 UNITS: 10000 INJECTION INTRAVENOUS; SUBCUTANEOUS at 14:00

## 2024-02-23 RX ADMIN — CALCIUM GLUCONATE 2000 MG: 98 INJECTION, SOLUTION INTRAVENOUS at 09:43

## 2024-02-23 NOTE — PROGRESS NOTES
Called nurse to nurse report to [facility]. Answered all questions, request for transport placed. , Artie, called and updated with new room number.

## 2024-02-23 NOTE — PLAN OF CARE
Problem: Discharge Planning  Goal: Discharge to home or other facility with appropriate resources  Outcome: Not Progressing  Flowsheets (Taken 2/23/2024 0800)  Discharge to home or other facility with appropriate resources: Identify barriers to discharge with patient and caregiver     Problem: Safety - Adult  Goal: Free from fall injury  Outcome: Progressing     Problem: Skin/Tissue Integrity  Goal: Absence of new skin breakdown  Description: 1.  Monitor for areas of redness and/or skin breakdown  2.  Assess vascular access sites hourly  3.  Every 4-6 hours minimum:  Change oxygen saturation probe site  4.  Every 4-6 hours:  If on nasal continuous positive airway pressure, respiratory therapy assess nares and determine need for appliance change or resting period.  Outcome: Progressing     Problem: ABCDS Injury Assessment  Goal: Absence of physical injury  Outcome: Progressing  Flowsheets (Taken 2/23/2024 0837)  Absence of Physical Injury: Implement safety measures based on patient assessment     Problem: Respiratory - Adult  Goal: Achieves optimal ventilation and oxygenation  Outcome: Progressing  Flowsheets (Taken 2/23/2024 0800)  Achieves optimal ventilation and oxygenation:   Assess for changes in respiratory status   Assess for changes in mentation and behavior   Oxygen supplementation based on oxygen saturation or arterial blood gases   Position to facilitate oxygenation and minimize respiratory effort   Encourage broncho-pulmonary hygiene including cough, deep breathe, incentive spirometry   Assess the need for suctioning and aspirate as needed   Respiratory therapy support as indicated     Problem: Cardiovascular - Adult  Goal: Maintains optimal cardiac output and hemodynamic stability  Outcome: Progressing  Flowsheets (Taken 2/23/2024 0800)  Maintains optimal cardiac output and hemodynamic stability:   Monitor blood pressure and heart rate   Monitor urine output and notify Licensed Independent Practitioner

## 2024-02-23 NOTE — PROGRESS NOTES
4 Eyes Skin Assessment     NAME:  Alena Busby  YOB: 1955  MEDICAL RECORD NUMBER:  [id number]    The patient is being assessed for  Transfer to New Unit    I agree that at least one RN has performed a thorough Head to Toe Skin Assessment on the patient. ALL assessment sites listed below have been assessed.      Areas assessed by both nurses:    Head, Face, Ears, Shoulders, Back, Chest, Arms, Elbows, Hands, Sacrum. Buttock, Coccyx, Ischium, Legs. Feet and Heels, and Under Medical Devices         Does the Patient have a Wound? Yes wound(s) were present on assessment. LDA wound assessment was Initiated and completed by RN       Ramon Prevention initiated by RN: Yes  Wound Care Orders initiated by RN: No    Pressure Injury (Stage 3,4, Unstageable, DTI, NWPT, and Complex wounds) if present, place Wound referral order by RN under : Yes    New Ostomies, if present place, Ostomy referral order under : No     Nurse 1 eSignature: Electronically signed by Alena Robert RN on 2/23/24 at 6:45 PM EST    **SHARE this note so that the co-signing nurse can place an eSignature**    Nurse 2 eSignature: Electronically signed by Girish Landon RN on 2/23/24 at 6:48 PM EST

## 2024-02-23 NOTE — PROGRESS NOTES
Palliative Care Department  [phone]  Palliative Care Progress Note  Aditi García, APRN - CNP      Alena Busby  [id number]  Hospital Day: 3    Date of Initial Consult: 2/21/24  Referring Provider: Dr. Milanes Marino    Palliative Medicine was consulted for assistance with: goals of care    HPI:   Alena Busby is a 68 y.o. with a past medical history of  chronic pain, arthritis, depression, morbid obesity, unspecified dementia, vitamin D deficiency who presented to ED from  [facility] with a CHIEF COMPLAINT of  respiratory distress.  It was reported pt was having difficulty breathing and upon EMS arrival was found to be hypoxic; then placed on nonrebreather mask.  Her roommate has recently tested for COVID but patient has repeatedly tested negative.  Patient had fever; was hypotensive and somnolent, not talking just nodding her head to questions. On arrival- BP 84/51; SpO2 94% on 15 L nonrebreather mask.  Workup up labs noted; WBCs 19.6 with a left shift, hemoglobin 14.2, platelets 124.  CO2 21;  BUN 45 creatinine 2.9 with GFR 17.  pH 7.37 pO2 90.1;  pCO2 36 on nonrebreather mask.  First troponin 104 and repeat is 73.  Urinalysis with large hematuria, large leukocyte esterase, numerous white blood cells and RBC, bacteriuria and ketones.  Lactic acid normal 0.9.  Blood and urine cultures sent. CXR- patchy opacity in the right upper mid and left lower lung, compatible with possible pneumonia. Patient was given IV fluids bolus 2 L normal saline, vancomycin and Zosyn.  Started on Levophed for septic shock with admission  2/21/2024 into ICU for further care and management.     ASSESSMENT/PLAN:     Pertinent Hospital Diagnoses   Severe septic shock  Acute hypoxic respiratory insufficiency  UTI with hematuria  BALDEMAR  Pneumonia  AMS      Palliative Care Encounter / Counseling Regarding Goals of Care  Alena Busby, Does have capacity for medical decision-making.  Capacity is time limited and  situation/question specific  During encounter spoke with  Artie by phone as surrogate medical decision-maker  Outcome of goals of care meeting:   Continue current care  Continue full code-if needed intubate  Hope for further recovery   Return back to [facility] on discharge  Code status Full Code- continue  Advanced Directives: no HPOA or Living Will noted in chart-has none  Surrogate/Legal NOK:  Spouse Artie Busby @ [phone] (M)  Daughter Katia Gaines @ [phone] (M)  Sister Nadja Acosta @ [phone] (M) (permission given to share medical information )      Spiritual assessment: no spiritual distress identified  Bereavement and grief: to be determined  Referrals to: none today    SUBJECTIVE:     Details of Conversation:     Chart reviewed.  Patient seen at the bedside, alert and oriented x 4.  She is currently on 15 L high flow nasal cannula.  Discussed her current condition and comorbidities.  Discussed goals of care and CODE STATUS options.  The patient states that in the event of cardiopulmonary arrest, she would want to be resuscitated and remain a full CODE STATUS. I reviewed with the patient and family that given multiple medical co-morbidities, advanced disease process, and current severe acute illness, in the event of cardiac arrest, the chances of surviving may likely be low. It was also reviewed that if they survived a cardiac arrest, a return to prior level of function also may be low.  They understand.  Call placed to patient's , Artie.  Introduced self.  Reiterated conversation noted above.  He agrees with continuing current medical management and full CODE STATUS at this time. They understand if patients respiratory status were to deteriorate and worsen, then she may require intubation and they are okay with that.  He has no further questions or concerns at this time. There are no further PM needs at this time.  PM will now sign off.  If new PM needs arise, please

## 2024-02-23 NOTE — PROGRESS NOTES
[facility]   Department of Internal Medicine   Internal Medicine Residency  MICU Progress Note    Patient:  Alena Busby 68 y.o. female   MRN: [id number]       Date of Service: 2/23/2024    Allergy: Patient has no known allergies.    Subjective     Patient was seen and examined this morning at bedside in no acute distress. Was on HFNC, awake, alert and oriented. No significant or concerning overnight events.    Objective     I & O - 24hr:    Intake/Output Summary (Last 24 hours) at 2/23/2024 0904  Last data filed at 2/23/2024 0800  Gross per 24 hour   Intake 1310 ml   Output 650 ml   Net 660 ml       Physical Exam  Vitals: BP (!) 111/44   Pulse 77   Temp 97.5 °F (36.4 °C) (Temporal)   Resp (!) 32   Ht 1.397 m (4' 7\")   Wt 86.9 kg (191 lb 8 oz)   SpO2 94%   BMI 44.51 kg/m²     I & O - 24hr: I/O this shift:  In: 240 [P.O.:240]  Out: -    General Appearance: alert and appears stated age  HEENT:  Head: Normal, normocephalic, atraumatic.  Lung: clear to auscultation bilaterally  Heart: S1, S2 normal  Abdomen: soft, non-tender; bowel sounds normal; no masses,  no organomegaly  Extremities:  extremities normal, atraumatic, no cyanosis or edema  Musculokeletal: No joint swelling, no muscle tenderness. ROM normal in all joints of extremities.   Neurologic: Mental status: Alert, oriented, thought content appropriate      Medications     Continuous Infusions:   sodium chloride      dextrose       Scheduled Meds:   potassium phosphate IVPB (PERIPHERAL LINE)  15 mmol IntraVENous Once    calcium gluconate  2,000 mg IntraVENous Once    pantoprazole  40 mg Oral QAM AC    meropenem  1,000 mg IntraVENous Q12H    white petrolatum   Topical BID    miconazole   Topical BID    sodium chloride flush  5-40 mL IntraVENous 2 times per day    heparin (porcine)  5,000 Units SubCUTAneous 3 times per day     PRN Meds: sodium chloride flush, sodium chloride flush, sodium chloride, acetaminophen **OR** acetaminophen,          XR CHEST PORTABLE   Final Result   1. Cardiomegaly.   2. Patchy opacity in the right upper mid and left lower lung. Findings may   represent multifocal pneumonia. Follow-up is recommended.              Resident's Assessment and Plan     Assessment and Plan:    Neurology:   Alert oriented x 3  Not on sedation    Acute metabolic encephalopathy likely 2/2 UTI  Patient's mental status improving, alert oriented x 3 on admission to ICU.     Chronic bilateral hand numbness  Patient has some chronic numbness of hand and follows up with neurosurgery  On gabapentin 300 mg 3 times daily       Cardiology:   Shock likely septic 2/2 UTI and pneumonia  Chest x-ray showing cardiomegaly, patchy opacity in the right upper mid and left lower lung.  Findings represent multifocal pneumonia.   Plan:  Blood culture, urine culture, MRSA screening, sputum culture  Continue hydrocortisone 50 mg every 6 hours  On Levophed, wean off Levophed as able  On merrem, may consider Ab change  Monitor vitals       Elevated troponin likely 2/2 demand ischemia  Troponin 104 > 73  Plan:  Check repeat troponin     Pulmonary:   Acute hypoxic respiratory insufficiency likely 2/2 pneumonia  HCAP  Patient had shortness of breath since morning of admission, patient was saturating in low 80s on 5 L oxygen and nursing facility could not get the saturations up.  EMS found patient to be hypoxic, placed on nonrebreather  Patient not on any oxygen at baseline  Chest x-ray showing cardiomegaly, patchy opacity in the right upper mid and left lower lung.  Findings represent multifocal pneumonia.  Plan:  Wean oxygen as able  Obtain respiratory culture     Nephrology (Fluids/ Electrolytes & Nutrition):   BALDEMAR stage III, likely prerenal 2/2 hypoperfusion  Creatinine: 0.9, baseline 0.6  I/O =2.16L,  (0.3 ml)    Plan:  Continue hydration with IV normal saline  Urine sodium, urine creatinine ordered, follow     Genitourinary:   UTI  Urinalysis showing red turbid

## 2024-02-23 NOTE — PROGRESS NOTES
02/23/24 0912   Oxygen Therapy/Pulse Ox   O2 Therapy Oxygen humidified   $Oxygen $Daily Charge   O2 Device (S)  High flow nasal cannula   O2 Flow Rate (L/min) (S)  15 L/min   Pulse 78   Respirations 29   SpO2 94 %

## 2024-02-23 NOTE — PROGRESS NOTES
[facility] Hospitalist Progress Note    SYNOPSIS: Patient admitted on 2024 for Septic shock (HCC)   67 YO F with medical history consistent with chronic pain, arthritis, depression, morbid obesity, unspecified dementia, vitamin D deficiency who was brought in to the ER from nursing facility due to respiratory distress.  History from patient is limited due to her mental status.  Information is gathered from EMR and the ER physician.  Per report patient was having difficulty breathing early this morning and upon EMS arrival patient was found to be hypoxic and placed on nonrebreather mask.  Patient had fever overnight for which she was given Tylenol.  Interval to the hospital patient was hypotensive, somnolent, not talking just nodding her head to questions.  Her roommate has recently tested for COVID but patient has repeatedly tested negative  On ER arrival here BP 84/51 with MAP of 62, pulse 94, RR 14, SpO2 94% on 15 L nonrebreather mask.  No temperature documented  Labs significant for white blood cells 19.6 with a left shift, hemoglobin 14.2, platelets 124.  CO2 21.  Glucose 108.  BUN 45 creatinine 2.9 with GFR 17.  pH 7.37 pO2 1 9 eating pCO2 36 on nonrebreather mask.  First troponin 104 and repeat is 73.  Urinalysis with large hematuria, large leukocyte esterase, numerous white blood cells and RBC, bacteriuria and ketones.  Lactic acid normal 0.9.  Blood culture sent urine culture sent.  Negative respiratory molecular panel  6 x-ray with patchy opacity in the right upper mid and left lower lung, compatible with possible pneumonia  Patient was given IV fluids bolus 2 L normal saline, vancomycin and Zosyn.  Started on Levophed for septic shock with admission into ICU    SUBJECTIVE:  Stable overnight. No other overnight issues reported.   Patient seen and examined  Records reviewed.           Temp (24hrs), Av.8 °F (36.6 °C), Min:97.4 °F (36.3 °C), Max:98.5 °F (36.9 °C)    DIET: ADULT DIET; Easy to

## 2024-02-24 ENCOUNTER — APPOINTMENT (OUTPATIENT)
Dept: CT IMAGING | Age: 69
End: 2024-02-24
Payer: MEDICARE

## 2024-02-24 LAB
ACB COMPLEX DNA BLD POS QL NAA+NON-PROBE: NOT DETECTED
AMMONIA PLAS-SCNC: 63 UMOL/L (ref 11–51)
ANION GAP SERPL CALCULATED.3IONS-SCNC: 13 MMOL/L (ref 7–16)
B FRAGILIS DNA BLD POS QL NAA+NON-PROBE: NOT DETECTED
BASOPHILS # BLD: 0.02 K/UL (ref 0–0.2)
BASOPHILS NFR BLD: 0 % (ref 0–2)
BIOFIRE TEST COMMENT: ABNORMAL
BLACTX-M ISLT/SPM QL: NOT DETECTED
BLAIMP ISLT/SPM QL: NOT DETECTED
BLAKPC ISLT/SPM QL: NOT DETECTED
BLAOXA-48-LIKE ISLT/SPM QL: NOT DETECTED
BLAVIM ISLT/SPM QL: NOT DETECTED
BUN SERPL-MCNC: 20 MG/DL (ref 6–23)
C ALBICANS DNA BLD POS QL NAA+NON-PROBE: NOT DETECTED
C AURIS DNA BLD POS QL NAA+NON-PROBE: NOT DETECTED
C GATTII+NEOFOR DNA BLD POS QL NAA+N-PRB: NOT DETECTED
C GLABRATA DNA BLD POS QL NAA+NON-PROBE: NOT DETECTED
C KRUSEI DNA BLD POS QL NAA+NON-PROBE: NOT DETECTED
C PARAP DNA BLD POS QL NAA+NON-PROBE: NOT DETECTED
C TROPICLS DNA BLD POS QL NAA+NON-PROBE: NOT DETECTED
CA-I BLD-SCNC: 1.04 MMOL/L (ref 1.15–1.33)
CALCIUM SERPL-MCNC: 7.9 MG/DL (ref 8.6–10.2)
CHLORIDE SERPL-SCNC: 107 MMOL/L (ref 98–107)
CO2 SERPL-SCNC: 24 MMOL/L (ref 22–29)
CREAT SERPL-MCNC: 0.6 MG/DL (ref 0.5–1)
E CLOAC COMP DNA BLD POS NAA+NON-PROBE: NOT DETECTED
E COLI DNA BLD POS QL NAA+NON-PROBE: NOT DETECTED
E FAECALIS DNA BLD POS QL NAA+NON-PROBE: NOT DETECTED
E FAECIUM DNA BLD POS QL NAA+NON-PROBE: NOT DETECTED
ENTEROBACTERALES DNA BLD POS NAA+N-PRB: DETECTED
EOSINOPHIL # BLD: 0.18 K/UL (ref 0.05–0.5)
EOSINOPHILS RELATIVE PERCENT: 2 % (ref 0–6)
ERYTHROCYTE [DISTWIDTH] IN BLOOD BY AUTOMATED COUNT: 16.1 % (ref 11.5–15)
FOLATE SERPL-MCNC: 6.4 NG/ML (ref 4.8–24.2)
GFR SERPL CREATININE-BSD FRML MDRD: >60 ML/MIN/1.73M2
GLUCOSE SERPL-MCNC: 77 MG/DL (ref 74–99)
GP B STREP DNA BLD POS QL NAA+NON-PROBE: NOT DETECTED
HAEM INFLU DNA BLD POS QL NAA+NON-PROBE: NOT DETECTED
HCT VFR BLD AUTO: 40.3 % (ref 34–48)
HGB BLD-MCNC: 13 G/DL (ref 11.5–15.5)
IMM GRANULOCYTES # BLD AUTO: 0.06 K/UL (ref 0–0.58)
IMM GRANULOCYTES NFR BLD: 1 % (ref 0–5)
K OXYTOCA DNA BLD POS QL NAA+NON-PROBE: NOT DETECTED
KLEBSIELLA SP DNA BLD POS QL NAA+NON-PRB: NOT DETECTED
KLEBSIELLA SP DNA BLD POS QL NAA+NON-PRB: NOT DETECTED
L MONOCYTOG DNA BLD POS QL NAA+NON-PROBE: NOT DETECTED
LYMPHOCYTES NFR BLD: 2.05 K/UL (ref 1.5–4)
LYMPHOCYTES RELATIVE PERCENT: 22 % (ref 20–42)
MAGNESIUM SERPL-MCNC: 1.5 MG/DL (ref 1.6–2.6)
MCH RBC QN AUTO: 30.2 PG (ref 26–35)
MCHC RBC AUTO-ENTMCNC: 32.3 G/DL (ref 32–34.5)
MCV RBC AUTO: 93.7 FL (ref 80–99.9)
MICROORGANISM SPEC CULT: ABNORMAL
MICROORGANISM SPEC CULT: ABNORMAL
MICROORGANISM/AGENT SPEC: ABNORMAL
MICROORGANISM/AGENT SPEC: ABNORMAL
MONOCYTES NFR BLD: 0.77 K/UL (ref 0.1–0.95)
MONOCYTES NFR BLD: 8 % (ref 2–12)
N MEN DNA BLD POS QL NAA+NON-PROBE: NOT DETECTED
NEUTROPHILS NFR BLD: 67 % (ref 43–80)
NEUTS SEG NFR BLD: 6.27 K/UL (ref 1.8–7.3)
P AERUGINOSA DNA BLD POS NAA+NON-PROBE: NOT DETECTED
PHOSPHATE SERPL-MCNC: 3.6 MG/DL (ref 2.5–4.5)
PLATELET # BLD AUTO: 138 K/UL (ref 130–450)
PMV BLD AUTO: 12.2 FL (ref 7–12)
POTASSIUM SERPL-SCNC: 4.5 MMOL/L (ref 3.5–5)
PROCALCITONIN SERPL-MCNC: 8 NG/ML (ref 0–0.08)
PROTEUS SP DNA BLD POS QL NAA+NON-PROBE: DETECTED
RBC # BLD AUTO: 4.3 M/UL (ref 3.5–5.5)
RESISTANT GENE NDM BY PCR: NOT DETECTED
S AUREUS DNA BLD POS QL NAA+NON-PROBE: NOT DETECTED
S AUREUS+CONS DNA BLD POS NAA+NON-PROBE: NOT DETECTED
S EPIDERMIDIS DNA BLD POS QL NAA+NON-PRB: NOT DETECTED
S LUGDUNENSIS DNA BLD POS QL NAA+NON-PRB: NOT DETECTED
S MALTOPHILIA DNA BLD POS QL NAA+NON-PRB: NOT DETECTED
S MARCESCENS DNA BLD POS NAA+NON-PROBE: NOT DETECTED
S PNEUM DNA BLD POS QL NAA+NON-PROBE: NOT DETECTED
S PYO DNA BLD POS QL NAA+NON-PROBE: NOT DETECTED
SALMONELLA DNA BLD POS QL NAA+NON-PROBE: NOT DETECTED
SERVICE CMNT-IMP: ABNORMAL
SERVICE CMNT-IMP: ABNORMAL
SODIUM SERPL-SCNC: 144 MMOL/L (ref 132–146)
SPECIMEN DESCRIPTION: ABNORMAL
SPECIMEN DESCRIPTION: ABNORMAL
STREPTOCOCCUS DNA BLD POS NAA+NON-PROBE: NOT DETECTED
TSH SERPL DL<=0.05 MIU/L-ACNC: 0.98 UIU/ML (ref 0.27–4.2)
VIT B12 SERPL-MCNC: 1354 PG/ML (ref 211–946)
WBC OTHER # BLD: 9.4 K/UL (ref 4.5–11.5)

## 2024-02-24 PROCEDURE — 2700000000 HC OXYGEN THERAPY PER DAY

## 2024-02-24 PROCEDURE — 82607 VITAMIN B-12: CPT

## 2024-02-24 PROCEDURE — 74176 CT ABD & PELVIS W/O CONTRAST: CPT

## 2024-02-24 PROCEDURE — 2580000003 HC RX 258: Performed by: STUDENT IN AN ORGANIZED HEALTH CARE EDUCATION/TRAINING PROGRAM

## 2024-02-24 PROCEDURE — 36415 COLL VENOUS BLD VENIPUNCTURE: CPT

## 2024-02-24 PROCEDURE — 82746 ASSAY OF FOLIC ACID SERUM: CPT

## 2024-02-24 PROCEDURE — 85025 COMPLETE CBC W/AUTO DIFF WBC: CPT

## 2024-02-24 PROCEDURE — 6360000002 HC RX W HCPCS: Performed by: STUDENT IN AN ORGANIZED HEALTH CARE EDUCATION/TRAINING PROGRAM

## 2024-02-24 PROCEDURE — 82330 ASSAY OF CALCIUM: CPT

## 2024-02-24 PROCEDURE — 84100 ASSAY OF PHOSPHORUS: CPT

## 2024-02-24 PROCEDURE — 2060000000 HC ICU INTERMEDIATE R&B

## 2024-02-24 PROCEDURE — 82140 ASSAY OF AMMONIA: CPT

## 2024-02-24 PROCEDURE — 83735 ASSAY OF MAGNESIUM: CPT

## 2024-02-24 PROCEDURE — 6370000000 HC RX 637 (ALT 250 FOR IP): Performed by: STUDENT IN AN ORGANIZED HEALTH CARE EDUCATION/TRAINING PROGRAM

## 2024-02-24 PROCEDURE — 84443 ASSAY THYROID STIM HORMONE: CPT

## 2024-02-24 PROCEDURE — 80048 BASIC METABOLIC PNL TOTAL CA: CPT

## 2024-02-24 PROCEDURE — 99231 SBSQ HOSP IP/OBS SF/LOW 25: CPT | Performed by: STUDENT IN AN ORGANIZED HEALTH CARE EDUCATION/TRAINING PROGRAM

## 2024-02-24 PROCEDURE — 84145 PROCALCITONIN (PCT): CPT

## 2024-02-24 PROCEDURE — 70450 CT HEAD/BRAIN W/O DYE: CPT

## 2024-02-24 RX ORDER — MAGNESIUM SULFATE IN WATER 40 MG/ML
4000 INJECTION, SOLUTION INTRAVENOUS ONCE
Status: COMPLETED | OUTPATIENT
Start: 2024-02-24 | End: 2024-02-24

## 2024-02-24 RX ADMIN — SODIUM CHLORIDE, PRESERVATIVE FREE 10 ML: 5 INJECTION INTRAVENOUS at 12:08

## 2024-02-24 RX ADMIN — WATER 2000 MG: 1 INJECTION INTRAMUSCULAR; INTRAVENOUS; SUBCUTANEOUS at 11:57

## 2024-02-24 RX ADMIN — SODIUM CHLORIDE, PRESERVATIVE FREE 10 ML: 5 INJECTION INTRAVENOUS at 21:15

## 2024-02-24 RX ADMIN — PANTOPRAZOLE SODIUM 40 MG: 40 TABLET, DELAYED RELEASE ORAL at 06:08

## 2024-02-24 RX ADMIN — HEPARIN SODIUM 5000 UNITS: 10000 INJECTION INTRAVENOUS; SUBCUTANEOUS at 15:58

## 2024-02-24 RX ADMIN — MICONAZOLE NITRATE: 20.6 POWDER TOPICAL at 12:09

## 2024-02-24 RX ADMIN — CALCIUM GLUCONATE 4000 MG: 98 INJECTION, SOLUTION INTRAVENOUS at 13:32

## 2024-02-24 RX ADMIN — HEPARIN SODIUM 5000 UNITS: 10000 INJECTION INTRAVENOUS; SUBCUTANEOUS at 21:15

## 2024-02-24 RX ADMIN — HEPARIN SODIUM 5000 UNITS: 10000 INJECTION INTRAVENOUS; SUBCUTANEOUS at 06:08

## 2024-02-24 RX ADMIN — MICONAZOLE NITRATE: 20.6 POWDER TOPICAL at 21:14

## 2024-02-24 RX ADMIN — MAGNESIUM SULFATE IN WATER FOR 4000 MG: 40 INJECTION INTRAVENOUS at 13:42

## 2024-02-24 RX ADMIN — PETROLATUM: 420 OINTMENT TOPICAL at 21:15

## 2024-02-24 RX ADMIN — PETROLATUM: 420 OINTMENT TOPICAL at 12:08

## 2024-02-24 NOTE — PROGRESS NOTES
[facility] Hospitalist Progress Note    SYNOPSIS: Patient admitted on 2/21/2024 for Septic shock (HCC)   69 YO F with medical history consistent with chronic pain, arthritis, depression, morbid obesity, unspecified dementia, vitamin D deficiency who was brought in to the ER from nursing facility due to respiratory distress.  History from patient is limited due to her mental status.  Information is gathered from EMR and the ER physician.  Per report patient was having difficulty breathing early this morning and upon EMS arrival patient was found to be hypoxic and placed on nonrebreather mask.  Patient had fever overnight for which she was given Tylenol.  Interval to the hospital patient   hypotensive, somnolent, not talking just nodding her head equestions.  Her roommate has recently tested for COVID but patient has repeatedly tested negative  On ER arrival here BP 84/51 with MAP of 62, pulse 94, RR 14, SpO2 94% on 15 L nonrebreather mask.  No temperature documented  Labs significant for white blood cells 19.6 with a left shift, hemoglobin 14.2, platelets 124.  CO2 21.  Glucose 108.  BUN 45 creatinine 2.9 with GFR 17.  pH 7.37 pO2 1 9 eating pCO2 36 on nonrebreather mask.  First troponin 104 and repeat is 73.  Urinalysis with large hematuria, large leukocyte esterase, numerous white blood cells and RBC, bacteriuria and ketones.  Lactic acid normal 0.9.  Blood culture sent urine culture sent.  Negative respiratory molecular panel  6 x-ray with patchy opacity in the right upper mid and left lower lung, compatible with possible pneumonia  Patient was given IV fluids bolus 2 L normal saline, vancomycin and Zosyn.  Started on Levophed for septic shock with admission into ICU  2/23 pm-patient was   Transferred out of icu; was agitated overnight; required vistaril drowsy in am; mentation improved later   SUBJECTIVE:  Stable overnight. No other overnight issues reported.   Patient seen and examined  Records  input(s): \"INR\" in the last 72 hours.      No results for input(s): \"CKTOTAL\", \"TROPONINI\" in the last 72 hours.    Chronic labs:    Lab Results   Component Value Date    CHOL 137 02/08/2016    TRIG 118 02/08/2016    HDL 40 02/08/2016    LDLCALC 73 02/08/2016    TSH 2.030 02/08/2016    INR 1.4 02/21/2024       Radiology: REVIEWED DAILY    +++++++++++++++++++++++++++++++++++++++++++++++++  Sejal Meza MD  [facility]  [place], [place]  +++++++++++++++++++++++++++++++++++++++++++++++++  NOTE: This report was transcribed using voice recognition software. Every effort was made to ensure accuracy; however, inadvertent computerized transcription errors may be present.

## 2024-02-24 NOTE — PLAN OF CARE
Problem: Safety - Adult  Goal: Free from fall injury  Outcome: Progressing     Problem: Discharge Planning  Goal: Discharge to home or other facility with appropriate resources  Outcome: Progressing     Problem: Skin/Tissue Integrity  Goal: Absence of new skin breakdown  Description: 1.  Monitor for areas of redness and/or skin breakdown  2.  Assess vascular access sites hourly  3.  Every 4-6 hours minimum:  Change oxygen saturation probe site  4.  Every 4-6 hours:  If on nasal continuous positive airway pressure, respiratory therapy assess nares and determine need for appliance change or resting period.  Outcome: Progressing     Problem: ABCDS Injury Assessment  Goal: Absence of physical injury  Outcome: Progressing     Problem: Respiratory - Adult  Goal: Achieves optimal ventilation and oxygenation  Outcome: Progressing     Problem: Cardiovascular - Adult  Goal: Maintains optimal cardiac output and hemodynamic stability  Outcome: Progressing     Problem: Skin/Tissue Integrity - Adult  Goal: Skin integrity remains intact  Outcome: Progressing  Goal: Oral mucous membranes remain intact  Outcome: Progressing     Problem: Musculoskeletal - Adult  Goal: Return mobility to safest level of function  Outcome: Progressing  Goal: Return ADL status to a safe level of function  Outcome: Progressing     Problem: Gastrointestinal - Adult  Goal: Maintains or returns to baseline bowel function  Outcome: Progressing  Goal: Maintains adequate nutritional intake  Outcome: Progressing     Problem: Genitourinary - Adult  Goal: Absence of urinary retention  Outcome: Progressing     Problem: Infection - Adult  Goal: Absence of infection at discharge  Outcome: Progressing  Goal: Absence of infection during hospitalization  Outcome: Progressing     Problem: Metabolic/Fluid and Electrolytes - Adult  Goal: Electrolytes maintained within normal limits  Outcome: Progressing  Goal: Hemodynamic stability and optimal renal function

## 2024-02-25 LAB
ALBUMIN SERPL-MCNC: 2.6 G/DL (ref 3.5–5.2)
ALP SERPL-CCNC: 64 U/L (ref 35–104)
ALT SERPL-CCNC: 13 U/L (ref 0–32)
ANION GAP SERPL CALCULATED.3IONS-SCNC: 9 MMOL/L (ref 7–16)
AST SERPL-CCNC: 12 U/L (ref 0–31)
BASOPHILS # BLD: 0.03 K/UL (ref 0–0.2)
BASOPHILS NFR BLD: 0 % (ref 0–2)
BILIRUB DIRECT SERPL-MCNC: 0.2 MG/DL (ref 0–0.3)
BILIRUB INDIRECT SERPL-MCNC: 0.4 MG/DL (ref 0–1)
BILIRUB SERPL-MCNC: 0.6 MG/DL (ref 0–1.2)
BUN SERPL-MCNC: 13 MG/DL (ref 6–23)
CA-I BLD-SCNC: 1.1 MMOL/L (ref 1.15–1.33)
CALCIUM SERPL-MCNC: 7.9 MG/DL (ref 8.6–10.2)
CHLORIDE SERPL-SCNC: 106 MMOL/L (ref 98–107)
CO2 SERPL-SCNC: 30 MMOL/L (ref 22–29)
CREAT SERPL-MCNC: 0.5 MG/DL (ref 0.5–1)
EOSINOPHIL # BLD: 0.18 K/UL (ref 0.05–0.5)
EOSINOPHILS RELATIVE PERCENT: 3 % (ref 0–6)
ERYTHROCYTE [DISTWIDTH] IN BLOOD BY AUTOMATED COUNT: 15.6 % (ref 11.5–15)
GFR SERPL CREATININE-BSD FRML MDRD: >60 ML/MIN/1.73M2
GLUCOSE SERPL-MCNC: 82 MG/DL (ref 74–99)
HCT VFR BLD AUTO: 36.8 % (ref 34–48)
HGB BLD-MCNC: 12.1 G/DL (ref 11.5–15.5)
IMM GRANULOCYTES # BLD AUTO: 0.07 K/UL (ref 0–0.58)
IMM GRANULOCYTES NFR BLD: 1 % (ref 0–5)
LYMPHOCYTES NFR BLD: 1.16 K/UL (ref 1.5–4)
LYMPHOCYTES RELATIVE PERCENT: 17 % (ref 20–42)
MAGNESIUM SERPL-MCNC: 1.7 MG/DL (ref 1.6–2.6)
MCH RBC QN AUTO: 31.2 PG (ref 26–35)
MCHC RBC AUTO-ENTMCNC: 32.9 G/DL (ref 32–34.5)
MCV RBC AUTO: 94.8 FL (ref 80–99.9)
MONOCYTES NFR BLD: 0.73 K/UL (ref 0.1–0.95)
MONOCYTES NFR BLD: 11 % (ref 2–12)
NEUTROPHILS NFR BLD: 68 % (ref 43–80)
NEUTS SEG NFR BLD: 4.63 K/UL (ref 1.8–7.3)
PHOSPHATE SERPL-MCNC: 3.7 MG/DL (ref 2.5–4.5)
PLATELET, FLUORESCENCE: 135 K/UL (ref 130–450)
PMV BLD AUTO: 11.1 FL (ref 7–12)
POTASSIUM SERPL-SCNC: 3.5 MMOL/L (ref 3.5–5)
PROT SERPL-MCNC: 6.1 G/DL (ref 6.4–8.3)
RBC # BLD AUTO: 3.88 M/UL (ref 3.5–5.5)
SODIUM SERPL-SCNC: 145 MMOL/L (ref 132–146)
WBC OTHER # BLD: 6.8 K/UL (ref 4.5–11.5)

## 2024-02-25 PROCEDURE — 2580000003 HC RX 258: Performed by: STUDENT IN AN ORGANIZED HEALTH CARE EDUCATION/TRAINING PROGRAM

## 2024-02-25 PROCEDURE — 6360000002 HC RX W HCPCS: Performed by: STUDENT IN AN ORGANIZED HEALTH CARE EDUCATION/TRAINING PROGRAM

## 2024-02-25 PROCEDURE — 80053 COMPREHEN METABOLIC PANEL: CPT

## 2024-02-25 PROCEDURE — 6370000000 HC RX 637 (ALT 250 FOR IP): Performed by: STUDENT IN AN ORGANIZED HEALTH CARE EDUCATION/TRAINING PROGRAM

## 2024-02-25 PROCEDURE — 87040 BLOOD CULTURE FOR BACTERIA: CPT

## 2024-02-25 PROCEDURE — 85025 COMPLETE CBC W/AUTO DIFF WBC: CPT

## 2024-02-25 PROCEDURE — 99231 SBSQ HOSP IP/OBS SF/LOW 25: CPT | Performed by: STUDENT IN AN ORGANIZED HEALTH CARE EDUCATION/TRAINING PROGRAM

## 2024-02-25 PROCEDURE — 82248 BILIRUBIN DIRECT: CPT

## 2024-02-25 PROCEDURE — 84100 ASSAY OF PHOSPHORUS: CPT

## 2024-02-25 PROCEDURE — 36415 COLL VENOUS BLD VENIPUNCTURE: CPT

## 2024-02-25 PROCEDURE — 6370000000 HC RX 637 (ALT 250 FOR IP)

## 2024-02-25 PROCEDURE — 2700000000 HC OXYGEN THERAPY PER DAY

## 2024-02-25 PROCEDURE — 83735 ASSAY OF MAGNESIUM: CPT

## 2024-02-25 PROCEDURE — 82330 ASSAY OF CALCIUM: CPT

## 2024-02-25 PROCEDURE — 2060000000 HC ICU INTERMEDIATE R&B

## 2024-02-25 RX ORDER — BENZONATATE 100 MG/1
100 CAPSULE ORAL 3 TIMES DAILY PRN
Status: DISCONTINUED | OUTPATIENT
Start: 2024-02-25 | End: 2024-03-01 | Stop reason: HOSPADM

## 2024-02-25 RX ORDER — ALBUTEROL SULFATE 2.5 MG/3ML
2.5 SOLUTION RESPIRATORY (INHALATION) EVERY 6 HOURS PRN
Status: DISCONTINUED | OUTPATIENT
Start: 2024-02-25 | End: 2024-03-01 | Stop reason: HOSPADM

## 2024-02-25 RX ORDER — SENNA AND DOCUSATE SODIUM 50; 8.6 MG/1; MG/1
2 TABLET, FILM COATED ORAL 2 TIMES DAILY
Status: DISCONTINUED | OUTPATIENT
Start: 2024-02-25 | End: 2024-03-01 | Stop reason: HOSPADM

## 2024-02-25 RX ORDER — MAGNESIUM SULFATE IN WATER 40 MG/ML
4000 INJECTION, SOLUTION INTRAVENOUS ONCE
Status: COMPLETED | OUTPATIENT
Start: 2024-02-25 | End: 2024-02-25

## 2024-02-25 RX ORDER — DEXTROSE AND SODIUM CHLORIDE 5; .9 G/100ML; G/100ML
INJECTION, SOLUTION INTRAVENOUS CONTINUOUS
Status: DISCONTINUED | OUTPATIENT
Start: 2024-02-25 | End: 2024-02-26

## 2024-02-25 RX ORDER — IPRATROPIUM BROMIDE AND ALBUTEROL SULFATE 2.5; .5 MG/3ML; MG/3ML
1 SOLUTION RESPIRATORY (INHALATION) EVERY 4 HOURS PRN
Status: DISCONTINUED | OUTPATIENT
Start: 2024-02-25 | End: 2024-03-01 | Stop reason: HOSPADM

## 2024-02-25 RX ORDER — HYDROXYZINE PAMOATE 25 MG/1
25 CAPSULE ORAL ONCE
Status: DISCONTINUED | OUTPATIENT
Start: 2024-02-25 | End: 2024-02-25

## 2024-02-25 RX ORDER — GUAIFENESIN 400 MG/1
400 TABLET ORAL 3 TIMES DAILY
Status: DISCONTINUED | OUTPATIENT
Start: 2024-02-25 | End: 2024-03-01 | Stop reason: HOSPADM

## 2024-02-25 RX ORDER — ALBUTEROL SULFATE 2.5 MG/3ML
2.5 SOLUTION RESPIRATORY (INHALATION) 2 TIMES DAILY
Status: DISCONTINUED | OUTPATIENT
Start: 2024-02-25 | End: 2024-02-25

## 2024-02-25 RX ORDER — HYDROXYZINE PAMOATE 25 MG/1
25 CAPSULE ORAL ONCE
Status: COMPLETED | OUTPATIENT
Start: 2024-02-25 | End: 2024-02-25

## 2024-02-25 RX ORDER — LACTULOSE 10 G/15ML
20 SOLUTION ORAL 3 TIMES DAILY
Status: DISCONTINUED | OUTPATIENT
Start: 2024-02-25 | End: 2024-02-29

## 2024-02-25 RX ADMIN — SODIUM CHLORIDE, PRESERVATIVE FREE 10 ML: 5 INJECTION INTRAVENOUS at 13:49

## 2024-02-25 RX ADMIN — HYDROXYZINE PAMOATE 25 MG: 25 CAPSULE ORAL at 22:42

## 2024-02-25 RX ADMIN — SENNOSIDES AND DOCUSATE SODIUM 2 TABLET: 50; 8.6 TABLET ORAL at 13:40

## 2024-02-25 RX ADMIN — GUAIFENESIN 400 MG: 400 TABLET ORAL at 09:47

## 2024-02-25 RX ADMIN — PANTOPRAZOLE SODIUM 40 MG: 40 TABLET, DELAYED RELEASE ORAL at 06:24

## 2024-02-25 RX ADMIN — HEPARIN SODIUM 5000 UNITS: 10000 INJECTION INTRAVENOUS; SUBCUTANEOUS at 22:58

## 2024-02-25 RX ADMIN — POTASSIUM BICARBONATE 40 MEQ: 782 TABLET, EFFERVESCENT ORAL at 09:47

## 2024-02-25 RX ADMIN — GUAIFENESIN 400 MG: 400 TABLET ORAL at 22:42

## 2024-02-25 RX ADMIN — PETROLATUM: 420 OINTMENT TOPICAL at 13:49

## 2024-02-25 RX ADMIN — CALCIUM GLUCONATE 4000 MG: 98 INJECTION, SOLUTION INTRAVENOUS at 10:08

## 2024-02-25 RX ADMIN — LACTULOSE 20 G: 20 SOLUTION ORAL at 13:39

## 2024-02-25 RX ADMIN — LACTULOSE 20 G: 20 SOLUTION ORAL at 22:42

## 2024-02-25 RX ADMIN — SENNOSIDES AND DOCUSATE SODIUM 2 TABLET: 50; 8.6 TABLET ORAL at 22:42

## 2024-02-25 RX ADMIN — WATER 2000 MG: 1 INJECTION INTRAMUSCULAR; INTRAVENOUS; SUBCUTANEOUS at 09:47

## 2024-02-25 RX ADMIN — MICONAZOLE NITRATE: 20.6 POWDER TOPICAL at 22:55

## 2024-02-25 RX ADMIN — HEPARIN SODIUM 5000 UNITS: 10000 INJECTION INTRAVENOUS; SUBCUTANEOUS at 06:24

## 2024-02-25 RX ADMIN — SODIUM CHLORIDE, PRESERVATIVE FREE 10 ML: 5 INJECTION INTRAVENOUS at 22:51

## 2024-02-25 RX ADMIN — HEPARIN SODIUM 5000 UNITS: 10000 INJECTION INTRAVENOUS; SUBCUTANEOUS at 13:39

## 2024-02-25 RX ADMIN — MAGNESIUM SULFATE IN WATER FOR 4000 MG: 40 INJECTION INTRAVENOUS at 10:13

## 2024-02-25 RX ADMIN — BENZONATATE 100 MG: 100 CAPSULE ORAL at 22:51

## 2024-02-25 RX ADMIN — MICONAZOLE NITRATE: 20.6 POWDER TOPICAL at 13:49

## 2024-02-25 RX ADMIN — GUAIFENESIN 400 MG: 400 TABLET ORAL at 17:31

## 2024-02-25 RX ADMIN — ACETAMINOPHEN 650 MG: 325 TABLET ORAL at 22:51

## 2024-02-25 RX ADMIN — PETROLATUM: 420 OINTMENT TOPICAL at 22:55

## 2024-02-25 RX ADMIN — DEXTROSE AND SODIUM CHLORIDE: 5; 900 INJECTION, SOLUTION INTRAVENOUS at 14:27

## 2024-02-25 RX ADMIN — LACTULOSE 20 G: 20 SOLUTION ORAL at 09:47

## 2024-02-25 NOTE — PROGRESS NOTES
[facility]  [facility]  Progress Note    SUBJECTIVE:  Chief Complaint   Patient presents with    Respiratory Distress     Arrived on 15L NRB mask     Patient resting in bed.  Confused.  Patient is tolerating medications. No reported adverse drug reactions.  No nausea, vomiting, diarrhea.    Review of systems:  As stated above in the chief complaint, otherwise negative.    Medications:  Scheduled Meds:   hydrOXYzine pamoate  25 mg Oral Once    guaiFENesin  400 mg Oral TID    lactulose  20 g Oral TID    albuterol  2.5 mg Nebulization BID    sennosides-docusate sodium  2 tablet Oral BID    cefTRIAXone (ROCEPHIN) IV  2,000 mg IntraVENous Q24H    pantoprazole  40 mg Oral QAM AC    white petrolatum   Topical BID    miconazole   Topical BID    sodium chloride flush  5-40 mL IntraVENous 2 times per day    heparin (porcine)  5,000 Units SubCUTAneous 3 times per day     Continuous Infusions:   dextrose 5 % and 0.9 % NaCl 100 mL/hr at 24 1427    sodium chloride      dextrose       PRN Meds:ipratropium 0.5 mg-albuterol 2.5 mg, benzonatate, perflutren lipid microspheres, sodium chloride flush, sodium chloride flush, sodium chloride, acetaminophen **OR** acetaminophen, sodium chloride flush, ondansetron **OR** ondansetron, polyethylene glycol, glucose, dextrose bolus **OR** dextrose bolus, glucagon (rDNA), dextrose    OBJECTIVE:  BP (!) 132/53   Pulse 74   Temp 97.1 °F (36.2 °C) (Temporal)   Resp 21   Ht 1.397 m (4' 7\")   Wt 89.9 kg (198 lb 3.1 oz)   SpO2 98%   BMI 46.06 kg/m²   Temp  Av.8 °F (36.6 °C)  Min: 97.1 °F (36.2 °C)  Max: 98.6 °F (37 °C)  Constitutional: No acute distress  Skin: Warm and dry. No rashes were noted.   Neuro: Confused answers direct questions.  HEENT: Round and reactive pupils.  Moist mucous membranes.  No ulcerations or thrush.  Chest: .Respirations unlabored. Breath sounds clear.   Cardiovascular: Irregularly irregular  Abdomen: Soft bowel sounds

## 2024-02-25 NOTE — PROGRESS NOTES
[facility] Hospitalist Progress Note    SYNOPSIS: Patient admitted on 2/21/2024 for Septic shock (HCC)   69 YO F with medical history consistent with chronic pain, arthritis, depression, morbid obesity, unspecified dementia, vitamin D deficiency who was brought in to the ER from nursing facility due to respiratory distress;fever and altered mentation.  Upon EMS arrival patient was found to be hypoxic and placed on nonrebreather mask. In the ED,  Patient was hypotensive, somnolent, not talking just nodding her head equestions.  Her roommate has recently tested for COVID but patient has repeatedly tested negative  On ER arrival here BP 84/51 with MAP of 62, pulse 94, RR 14, SpO2 94% on 15 L nonrebreather mask.  Labs significant for white blood cells 19.6 with a left shift, hemoglobin 14.2, platelets 124.  CO2 21.  Glucose 108.  BUN 45 creatinine 2.9 with GFR 17.  pH 7.37 pO2 1 9 eating pCO2 36 on nonrebreather mask.  First troponin 104 and repeat is 73.  Urinalysis with large hematuria, large leukocyte esterase, numerous white blood cells and RBC, bacteriuria and ketones.  Lactic acid normal 0.9.  Blood culture sent urine culture sent.  Negative respiratory molecular panel   x-ray with patchy opacity in the right upper mid and left lower lung, compatible with possible pneumonia  Patient was given IV fluids bolus 2 L normal saline, vancomycin and Zosyn.  Started on Levophed for septic shock with admission into ICU on 2/21;       levophed was tapered off ; and patient was transferred to floor from ICU in the 2/24; patient is currently  on rocephin 2 gm daily for proteus bacteremia and urine cx positive for proteus  Checked ammonia tsh and b12- ammonia level is elevated; started lactulose; ordered lft;   ct abdomen s/o staghorn calculi; urology consulted; repeat blood cx is positive for proteus; ID on board      2/25-patient's mentation is still not back to baseline; follows commands but is not answering

## 2024-02-25 NOTE — PROGRESS NOTES
Pulmonary consult placed via perfect serve to on call Pulmonologist d/t patient is not currently established with any one at this time.  Dr Rubio taking new consults

## 2024-02-25 NOTE — RT PROTOCOL NOTE
RT Inhaler-Nebulizer Bronchodilator Protocol Note    There is a bronchodilator order in the chart from a provider indicating to follow the RT Bronchodilator Protocol and there is an “Initiate RT Inhaler-Nebulizer Bronchodilator Protocol” order as well (see protocol at bottom of note).    CXR Findings:  No results found.    The findings from the last RT Protocol Assessment were as follows:   History Pulmonary Disease: None or smoker <15 pack years  Respiratory Pattern: Regular pattern and RR 12-20 bpm  Breath Sounds: Clear breath sounds  Cough: Strong, spontaneous, non-productive  Indication for Bronchodilator Therapy: None  Bronchodilator Assessment Score: 0    Aerosolized bronchodilator medication orders have been revised according to the RT Inhaler-Nebulizer Bronchodilator Protocol below.    Respiratory Therapist to perform RT Therapy Protocol Assessment initially then follow the protocol.  Repeat RT Therapy Protocol Assessment PRN for score 0-3 or on second treatment, BID, and PRN for scores above 3.    No Indications - adjust the frequency to every 6 hours PRN wheezing or bronchospasm, if no treatments needed after 48 hours then discontinue using Per Protocol order mode.     If indication present, adjust the RT bronchodilator orders based on the Bronchodilator Assessment Score as indicated below.  Use Inhaler orders unless patient has one or more of the following: on home nebulizer, not able to hold breath for 10 seconds, is not alert and oriented, cannot activate and use MDI correctly, or respiratory rate 25 breaths per minute or more, then use the equivalent nebulizer order(s) with same Frequency and PRN reasons based on the score.  If a patient is on this medication at home then do not decrease Frequency below that used at home.    0-3 - enter or revise RT bronchodilator order(s) to equivalent RT Bronchodilator order with Frequency of every 4 hours PRN for wheezing or increased work of breathing using Per  Protocol order mode.        4-6 - enter or revise RT Bronchodilator order(s) to two equivalent RT bronchodilator orders with one order with BID Frequency and one order with Frequency of every 4 hours PRN wheezing or increased work of breathing using Per Protocol order mode.        7-10 - enter or revise RT Bronchodilator order(s) to two equivalent RT bronchodilator orders with one order with TID Frequency and one order with Frequency of every 4 hours PRN wheezing or increased work of breathing using Per Protocol order mode.       11-13 - enter or revise RT Bronchodilator order(s) to one equivalent RT bronchodilator order with QID Frequency and an Albuterol order with Frequency of every 4 hours PRN wheezing or increased work of breathing using Per Protocol order mode.      Greater than 13 - enter or revise RT Bronchodilator order(s) to one equivalent RT bronchodilator order with every 4 hours Frequency and an Albuterol order with Frequency of every 2 hours PRN wheezing or increased work of breathing using Per Protocol order mode.     RT to enter RT Home Evaluation for COPD & MDI Assessment order using Per Protocol order mode.    Electronically signed by Ailyn Rhoades RCP on 2/25/2024 at 3:41 PM

## 2024-02-25 NOTE — PROGRESS NOTES
Urology consult placed.  Patient information given to answering service.  Dr Stubbs taking new consults

## 2024-02-26 ENCOUNTER — APPOINTMENT (OUTPATIENT)
Age: 69
End: 2024-02-26
Payer: MEDICARE

## 2024-02-26 LAB
25(OH)D3 SERPL-MCNC: 36 NG/ML (ref 30–100)
ANION GAP SERPL CALCULATED.3IONS-SCNC: 8 MMOL/L (ref 7–16)
BASOPHILS # BLD: 0.04 K/UL (ref 0–0.2)
BASOPHILS NFR BLD: 1 % (ref 0–2)
BUN SERPL-MCNC: 6 MG/DL (ref 6–23)
CA-I BLD-SCNC: 1.04 MMOL/L (ref 1.15–1.33)
CALCIUM SERPL-MCNC: 7.5 MG/DL (ref 8.6–10.2)
CHLORIDE SERPL-SCNC: 107 MMOL/L (ref 98–107)
CO2 SERPL-SCNC: 32 MMOL/L (ref 22–29)
CREAT SERPL-MCNC: 0.5 MG/DL (ref 0.5–1)
ECHO AO ASC DIAM: 3.1 CM
ECHO AO ASCENDING AORTA INDEX: 1.77 CM/M2
ECHO AR MAX VEL PISA: 4.4 M/S
ECHO AV AREA PEAK VELOCITY: 1.6 CM2
ECHO AV AREA VTI: 1.9 CM2
ECHO AV AREA/BSA PEAK VELOCITY: 0.9 CM2/M2
ECHO AV AREA/BSA VTI: 1.1 CM2/M2
ECHO AV CUSP MM: 2.2 CM
ECHO AV MEAN GRADIENT: 7 MMHG
ECHO AV MEAN VELOCITY: 1.2 M/S
ECHO AV PEAK GRADIENT: 17 MMHG
ECHO AV PEAK VELOCITY: 2 M/S
ECHO AV REGURGITANT PHT: 372.5 MILLISECOND
ECHO AV VELOCITY RATIO: 0.6
ECHO AV VTI: 40.3 CM
ECHO BSA: 1.87 M2
ECHO EST RA PRESSURE: 3 MMHG
ECHO LA DIAMETER INDEX: 1.94 CM/M2
ECHO LA DIAMETER: 3.4 CM
ECHO LA VOL A-L A2C: 28 ML (ref 22–52)
ECHO LA VOL A-L A4C: 55 ML (ref 22–52)
ECHO LA VOL MOD A2C: 27 ML (ref 22–52)
ECHO LA VOL MOD A4C: 51 ML (ref 22–52)
ECHO LA VOLUME AREA LENGTH: 44 ML
ECHO LA VOLUME INDEX A-L A2C: 16 ML/M2 (ref 16–34)
ECHO LA VOLUME INDEX A-L A4C: 31 ML/M2 (ref 16–34)
ECHO LA VOLUME INDEX AREA LENGTH: 25 ML/M2 (ref 16–34)
ECHO LA VOLUME INDEX MOD A2C: 15 ML/M2 (ref 16–34)
ECHO LA VOLUME INDEX MOD A4C: 29 ML/M2 (ref 16–34)
ECHO LV EDV A2C: 104 ML
ECHO LV EDV A4C: 97 ML
ECHO LV EDV BP: 103 ML (ref 56–104)
ECHO LV EDV INDEX A4C: 55 ML/M2
ECHO LV EDV INDEX BP: 59 ML/M2
ECHO LV EDV NDEX A2C: 59 ML/M2
ECHO LV EF PHYSICIAN: 60 %
ECHO LV EJECTION FRACTION A2C: 63 %
ECHO LV EJECTION FRACTION A4C: 56 %
ECHO LV EJECTION FRACTION BIPLANE: 59 % (ref 55–100)
ECHO LV ESV A2C: 38 ML
ECHO LV ESV A4C: 43 ML
ECHO LV ESV BP: 42 ML (ref 19–49)
ECHO LV ESV INDEX A2C: 22 ML/M2
ECHO LV ESV INDEX A4C: 25 ML/M2
ECHO LV ESV INDEX BP: 24 ML/M2
ECHO LV FRACTIONAL SHORTENING: 39 % (ref 28–44)
ECHO LV INTERNAL DIMENSION DIASTOLE INDEX: 2.51 CM/M2
ECHO LV INTERNAL DIMENSION DIASTOLIC: 4.4 CM (ref 3.9–5.3)
ECHO LV INTERNAL DIMENSION SYSTOLIC INDEX: 1.54 CM/M2
ECHO LV INTERNAL DIMENSION SYSTOLIC: 2.7 CM
ECHO LV ISOVOLUMETRIC RELAXATION TIME (IVRT): 60 MS
ECHO LV IVSD: 0.8 CM (ref 0.6–0.9)
ECHO LV IVSS: 1.3 CM
ECHO LV MASS 2D: 118.6 G (ref 67–162)
ECHO LV MASS INDEX 2D: 67.8 G/M2 (ref 43–95)
ECHO LV POSTERIOR WALL DIASTOLIC: 0.9 CM (ref 0.6–0.9)
ECHO LV POSTERIOR WALL SYSTOLIC: 1 CM
ECHO LV RELATIVE WALL THICKNESS RATIO: 0.41
ECHO LVOT AREA: 2.8 CM2
ECHO LVOT AV VTI INDEX: 0.7
ECHO LVOT DIAM: 1.9 CM
ECHO LVOT MEAN GRADIENT: 3 MMHG
ECHO LVOT PEAK GRADIENT: 6 MMHG
ECHO LVOT PEAK VELOCITY: 1.2 M/S
ECHO LVOT STROKE VOLUME INDEX: 45.5 ML/M2
ECHO LVOT SV: 79.6 ML
ECHO LVOT VTI: 28.1 CM
ECHO MV "A" WAVE DURATION: 133.8 MSEC
ECHO MV A VELOCITY: 0.93 M/S
ECHO MV AREA PHT: 3.6 CM2
ECHO MV AREA VTI: 2.5 CM2
ECHO MV E DECELERATION TIME (DT): 218 MS
ECHO MV E VELOCITY: 0.99 M/S
ECHO MV E/A RATIO: 1.06
ECHO MV LVOT VTI INDEX: 1.13
ECHO MV MAX VELOCITY: 1.1 M/S
ECHO MV MEAN GRADIENT: 2 MMHG
ECHO MV MEAN VELOCITY: 0.7 M/S
ECHO MV PEAK GRADIENT: 5 MMHG
ECHO MV PRESSURE HALF TIME (PHT): 60.6 MS
ECHO MV VTI: 31.7 CM
ECHO PVEIN A DURATION: 129.2 MS
ECHO PVEIN A VELOCITY: 0.2 M/S
ECHO PVEIN PEAK D VELOCITY: 0.6 M/S
ECHO PVEIN PEAK S VELOCITY: 0.5 M/S
ECHO PVEIN S/D RATIO: 0.8
ECHO RIGHT VENTRICULAR SYSTOLIC PRESSURE (RVSP): 35 MMHG
ECHO RV INTERNAL DIMENSION: 2.6 CM
ECHO RV TAPSE: 2.8 CM (ref 1.7–?)
ECHO TV REGURGITANT MAX VELOCITY: 2.84 M/S
ECHO TV REGURGITANT PEAK GRADIENT: 32 MMHG
EOSINOPHIL # BLD: 0.16 K/UL (ref 0.05–0.5)
EOSINOPHILS RELATIVE PERCENT: 2 % (ref 0–6)
ERYTHROCYTE [DISTWIDTH] IN BLOOD BY AUTOMATED COUNT: 15 % (ref 11.5–15)
GFR SERPL CREATININE-BSD FRML MDRD: >60 ML/MIN/1.73M2
GLUCOSE BLD-MCNC: 106 MG/DL (ref 74–99)
GLUCOSE SERPL-MCNC: 108 MG/DL (ref 74–99)
HCT VFR BLD AUTO: 36.9 % (ref 34–48)
HGB BLD-MCNC: 11.7 G/DL (ref 11.5–15.5)
IMM GRANULOCYTES # BLD AUTO: 0.1 K/UL (ref 0–0.58)
IMM GRANULOCYTES NFR BLD: 2 % (ref 0–5)
LYMPHOCYTES NFR BLD: 1.44 K/UL (ref 1.5–4)
LYMPHOCYTES RELATIVE PERCENT: 21 % (ref 20–42)
MAGNESIUM SERPL-MCNC: 1.4 MG/DL (ref 1.6–2.6)
MCH RBC QN AUTO: 30.2 PG (ref 26–35)
MCHC RBC AUTO-ENTMCNC: 31.7 G/DL (ref 32–34.5)
MCV RBC AUTO: 95.3 FL (ref 80–99.9)
MONOCYTES NFR BLD: 0.84 K/UL (ref 0.1–0.95)
MONOCYTES NFR BLD: 12 % (ref 2–12)
NEUTROPHILS NFR BLD: 62 % (ref 43–80)
NEUTS SEG NFR BLD: 4.21 K/UL (ref 1.8–7.3)
PLATELET # BLD AUTO: 160 K/UL (ref 130–450)
PMV BLD AUTO: 10.5 FL (ref 7–12)
POTASSIUM SERPL-SCNC: 3.1 MMOL/L (ref 3.5–5)
RBC # BLD AUTO: 3.87 M/UL (ref 3.5–5.5)
SODIUM SERPL-SCNC: 147 MMOL/L (ref 132–146)
WBC OTHER # BLD: 6.8 K/UL (ref 4.5–11.5)

## 2024-02-26 PROCEDURE — 2580000003 HC RX 258: Performed by: INTERNAL MEDICINE

## 2024-02-26 PROCEDURE — 2580000003 HC RX 258: Performed by: STUDENT IN AN ORGANIZED HEALTH CARE EDUCATION/TRAINING PROGRAM

## 2024-02-26 PROCEDURE — 92610 EVALUATE SWALLOWING FUNCTION: CPT

## 2024-02-26 PROCEDURE — C8929 TTE W OR WO FOL WCON,DOPPLER: HCPCS

## 2024-02-26 PROCEDURE — 6360000002 HC RX W HCPCS: Performed by: INTERNAL MEDICINE

## 2024-02-26 PROCEDURE — 36415 COLL VENOUS BLD VENIPUNCTURE: CPT

## 2024-02-26 PROCEDURE — 6360000002 HC RX W HCPCS: Performed by: STUDENT IN AN ORGANIZED HEALTH CARE EDUCATION/TRAINING PROGRAM

## 2024-02-26 PROCEDURE — 82306 VITAMIN D 25 HYDROXY: CPT

## 2024-02-26 PROCEDURE — 82330 ASSAY OF CALCIUM: CPT

## 2024-02-26 PROCEDURE — 85025 COMPLETE CBC W/AUTO DIFF WBC: CPT

## 2024-02-26 PROCEDURE — 6360000004 HC RX CONTRAST MEDICATION: Performed by: STUDENT IN AN ORGANIZED HEALTH CARE EDUCATION/TRAINING PROGRAM

## 2024-02-26 PROCEDURE — 2700000000 HC OXYGEN THERAPY PER DAY

## 2024-02-26 PROCEDURE — 92526 ORAL FUNCTION THERAPY: CPT

## 2024-02-26 PROCEDURE — 6370000000 HC RX 637 (ALT 250 FOR IP): Performed by: STUDENT IN AN ORGANIZED HEALTH CARE EDUCATION/TRAINING PROGRAM

## 2024-02-26 PROCEDURE — 99232 SBSQ HOSP IP/OBS MODERATE 35: CPT | Performed by: INTERNAL MEDICINE

## 2024-02-26 PROCEDURE — 2060000000 HC ICU INTERMEDIATE R&B

## 2024-02-26 PROCEDURE — 93306 TTE W/DOPPLER COMPLETE: CPT | Performed by: INTERNAL MEDICINE

## 2024-02-26 PROCEDURE — 83735 ASSAY OF MAGNESIUM: CPT

## 2024-02-26 PROCEDURE — 80048 BASIC METABOLIC PNL TOTAL CA: CPT

## 2024-02-26 PROCEDURE — 82962 GLUCOSE BLOOD TEST: CPT

## 2024-02-26 PROCEDURE — 99233 SBSQ HOSP IP/OBS HIGH 50: CPT | Performed by: INTERNAL MEDICINE

## 2024-02-26 RX ORDER — MAGNESIUM SULFATE IN WATER 40 MG/ML
2000 INJECTION, SOLUTION INTRAVENOUS ONCE
Status: COMPLETED | OUTPATIENT
Start: 2024-02-26 | End: 2024-02-26

## 2024-02-26 RX ORDER — DEXTROSE MONOHYDRATE 50 MG/ML
INJECTION, SOLUTION INTRAVENOUS CONTINUOUS
Status: DISCONTINUED | OUTPATIENT
Start: 2024-02-26 | End: 2024-02-28

## 2024-02-26 RX ADMIN — DEXTROSE MONOHYDRATE: 50 INJECTION, SOLUTION INTRAVENOUS at 11:13

## 2024-02-26 RX ADMIN — PETROLATUM: 420 OINTMENT TOPICAL at 11:22

## 2024-02-26 RX ADMIN — CALCIUM GLUCONATE 2000 MG: 98 INJECTION, SOLUTION INTRAVENOUS at 13:41

## 2024-02-26 RX ADMIN — SODIUM CHLORIDE, PRESERVATIVE FREE 10 ML: 5 INJECTION INTRAVENOUS at 11:15

## 2024-02-26 RX ADMIN — HEPARIN SODIUM 5000 UNITS: 10000 INJECTION INTRAVENOUS; SUBCUTANEOUS at 15:09

## 2024-02-26 RX ADMIN — MICONAZOLE NITRATE: 20.6 POWDER TOPICAL at 11:22

## 2024-02-26 RX ADMIN — HEPARIN SODIUM 5000 UNITS: 10000 INJECTION INTRAVENOUS; SUBCUTANEOUS at 21:27

## 2024-02-26 RX ADMIN — PANTOPRAZOLE SODIUM 40 MG: 40 TABLET, DELAYED RELEASE ORAL at 07:01

## 2024-02-26 RX ADMIN — PERFLUTREN 1.5 ML: 6.52 INJECTION, SUSPENSION INTRAVENOUS at 08:35

## 2024-02-26 RX ADMIN — HEPARIN SODIUM 5000 UNITS: 10000 INJECTION INTRAVENOUS; SUBCUTANEOUS at 07:01

## 2024-02-26 RX ADMIN — PETROLATUM: 420 OINTMENT TOPICAL at 21:29

## 2024-02-26 RX ADMIN — MICONAZOLE NITRATE: 20.6 POWDER TOPICAL at 21:29

## 2024-02-26 RX ADMIN — MAGNESIUM SULFATE HEPTAHYDRATE 2000 MG: 40 INJECTION, SOLUTION INTRAVENOUS at 11:15

## 2024-02-26 RX ADMIN — DEXTROSE AND SODIUM CHLORIDE: 5; 900 INJECTION, SOLUTION INTRAVENOUS at 00:55

## 2024-02-26 RX ADMIN — WATER 2000 MG: 1 INJECTION INTRAMUSCULAR; INTRAVENOUS; SUBCUTANEOUS at 11:21

## 2024-02-26 NOTE — PROGRESS NOTES
[facility] PROGRESS NOTE      Chief complaint: Follow-up of Proteus mirabilis bacteremia/pyelonephritis/staghorn calculus     The patient is a 68 y.o. female with history of arthritis, presented on 02/21 with shortness of breath accompanied by confusion, found to be in acute hypoxic respiratory failure and sepsis with leukocytosis up to 19,000, gram-negative luis (Proteus mirabilis) bacteremia, pyuria too numerous to count with urine culture growing Proteus mirabilis (non-ESBL, resistant to fluoroquinolones, susceptible to co-trimoxazole), CT abdomen and pelvis showed bilateral staghorn calculi with no signs of hydronephrosis or ureteral stones, cholelithiasis, diverticulosis without signs of diverticulitis, fecal impaction, new bibasilar patchy parenchymal densities, new small right pleural effusion.  Chest x-ray showed patchy opacity in the right upper mid and left lower lung.  Respiratory pathogen PCR panel was negative.  MRSA nares culture was negative.  Respiratory Gram stain and culture showed few polymorphonuclear leukocytes, few epithelial cells, few gram-positive rods, normal respiratory jhon.  She received a dose of vancomycin and piperacillin/tazobactam on admission, meropenem since 02/22 then switched to ceftriaxone on 02/23.     Subjective: Patient was seen and examined. No chills, no abdominal pain, no diarrhea, no rash, no itching. Afebrile.      Objective:    Vitals:    02/26/24 1506   BP: 137/63   Pulse: 80   Resp: 16   Temp: 97.1 °F (36.2 °C)   SpO2: 92%     Constitutional: Alert, not in distress  Respiratory: Clear breath sounds, no crackles, no wheezes  Cardiovascular: Regular rate and rhythm, no murmurs  Gastrointestinal: Bowel sounds present, soft, nontender  Skin: Warm and dry, no active dermatoses  Musculoskeletal: No joint swelling, no joint erythema    Labs, imaging, and medical records/notes were personally reviewed.    Assessment:  Sepsis/leukocytosis, resolved, secondary to Proteus

## 2024-02-26 NOTE — PROGRESS NOTES
[facility] Hospitalist Progress Note    Synopsis: Patient is a 68-year-old female with past medical history of arthritis, obesity, dementia, vitamin D deficiency who was brought to the ED from [facility] due to respiratory distress fever and altered mentation.  Patient was found to be hypoxic placed on a nonrebreather mask.  She was also found to be hypotensive.  She was brought to the ICU for septic shock.  She was found to have Proteus in her urine.  She was eventually tapered off of Levophed and was transferred out of the ICU on 2/24.  Infectious diseases following.  She is currently on Rocephin for Proteus bacteremia.  Patient was also found to have elevated ammonia and was started on lactulose.  CT of the abdomen showed a staghorn calculi and urology has been consulted.      Subjective:  Patient is being followed for BALDEMAR (acute kidney injury) (HCC) [N17.9]  Septic shock (HCC) [A41.9, R65.21]  Urinary tract infection with hematuria, site unspecified [N39.0, R31.9]  Altered mental status, unspecified altered mental status type [R41.82]  Pneumonia of right upper lobe due to infectious organism [J18.9]   Pt feels okay.  She is alert and oriented today.  Says that she is in the hospital for pneumonia.  No complaints of pain, no issues with breathing.    ROS: denies fever, chills, cp, sob, n/v, HA unless stated above.      magnesium sulfate  2,000 mg IntraVENous Once    calcium gluconate  2,000 mg IntraVENous Once    guaiFENesin  400 mg Oral TID    lactulose  20 g Oral TID    sennosides-docusate sodium  2 tablet Oral BID    cefTRIAXone (ROCEPHIN) IV  2,000 mg IntraVENous Q24H    pantoprazole  40 mg Oral QAM AC    white petrolatum   Topical BID    miconazole   Topical BID    sodium chloride flush  5-40 mL IntraVENous 2 times per day    heparin (porcine)  5,000 Units SubCUTAneous 3 times per day     ipratropium 0.5 mg-albuterol 2.5 mg, 1 Dose, Q4H PRN  benzonatate, 100 mg, TID PRN  albuterol, 2.5  transcription errors may be present.  Electronically signed by Maria C Marrufo MD on 2/26/2024 at 11:47 AM

## 2024-02-26 NOTE — CONSULTS
Pulmonary Critical Care Medicine           PULMONARY  CRITICAL CARE   SERVICE DAILY PROGRESS  NOTE     2024   Hospital LOS:  LOS: 5 days     Impression / Recommendations:  Acute respiratory insufficiency likely secondary to R , multifocal HCAP   State of shock ,Septic  likely secondary to UTI + HCAP     Proteus mirabilis bacteremia - Pan sensitive, check ECHO for vegetations.   Enterobacterales bacteremia   Proteus mirabilis UTI      - Continue rocephin  2 gms q 24      - Sputum cultures - grm + rods    - Continue sepsis protocol   - Lactate - normalized      BALDEMAR  Likely pre renal - resolved        Interval History/Event Changes:  Improved overall   Continues to be on 6 lpm NC O2       Allergies  No Known Allergies    Review of Systems    A pertinent review of systems was performed and was otherwise non-contributory.    Vitals-     BP (!) 120/58   Pulse 76   Temp 97.7 °F (36.5 °C) (Temporal)   Resp 18   Ht 1.397 m (4' 7\")   Wt 89.8 kg (198 lb)   SpO2 94%   BMI 46.02 kg/m²    Tmax: Temp (24hrs), Av.3 °F (36.8 °C), Min:97.7 °F (36.5 °C), Max:98.9 °F (37.2 °C)      Hemodynamics:  Cuff:   Systolic (24hrs), Av , Min:109 , Max:139   /Diastolic (24hrs), Av, Min:49, Max:76    Cuff MAP:MAP (mmHg)  Av  Min: 49  Max: 95  P:   Pulse  Av.8  Min: 68  Max: 83      Airway:     Observed RR: Resp  Av  Min: 18  Max: 24   Observed O2 sats: SpO2  Av %  Min: 87 %  Max: 100 %      Intake/Output Summary (Last 24 hours) at 2024 1310  Last data filed at 2024 0938  Gross per 24 hour   Intake 240 ml   Output --   Net 240 ml         Physical exam-  General appearance:  ill appearing, alert, mild  converstional dyspnea  Head: Normocephalic, without obvious abnormality, atraumatic   Eyes:Pupils bilateral equal and reactive, EOM intact, conjunctiva - no icterus , no injection   Throat: Clear, no lesions, Mallampti =1, no tonsillar eythema or edema  Neck: Supple, symmetrical, trachea 
    2/26/2024 12:16 PM  Alena Busby  [id number]     Chief Complaint:    Bilateral staghorn calculi      History of Present Illness:      The patient is a 68 y.o. female patient who presented to the hospital from a nursing facility with complaints of respiratory distress, fevers, and altered mental status. She was found to be hypotensive and admitted to the ICU for septic shock initially.   Urology is asked to evaluate for bilateral renal calculi noted on CT imaging that was performed on 2/24.  She currently denies any flank pain.  She denies any previous history of kidney stones.  CT imaging from approximately 2 years ago did not show any renal calculi at that time.  She has never seen a urologist in the past.      Past Medical History:   Diagnosis Date    Arthritis          Past Surgical History:   Procedure Laterality Date    CATARACT REMOVAL      CERVICAL FUSION N/A 6/3/2019    ANTERIOR CERVICAL FUSION C3-C4, C4-C5  AND CORPECTOMY C4  WITH PLATES, SCREWS, BONE GRAFT, SSEP -- GLOBUS performed by Daniele Daniels MD at [facility] [place] OR       Medications Prior to Admission:    Medications Prior to Admission: acetaminophen (TYLENOL) 325 MG tablet, Take 2 tablets by mouth every 4 hours as needed for Pain or Fever  naproxen sodium (ALEVE) 220 MG tablet, Take 1 tablet by mouth daily as needed for Pain  aluminum & magnesium hydroxide-simethicone (MYLANTA) 400-400-40 MG/5ML SUSP, Take 10 mLs by mouth every 4 hours as needed (heartburn)  bisacodyl (DULCOLAX) 10 MG suppository, Place 1 suppository rectally every 72 hours as needed for Constipation  calcium carbonate (TUMS) 500 MG chewable tablet, Take 1 tablet by mouth 3 times daily  mineral oil enema, Place 1 enema rectally every 72 hours as needed for Constipation  gabapentin (NEURONTIN) 300 MG capsule, Take 1 capsule by mouth 3 times daily.  magnesium hydroxide (MILK OF MAGNESIA) 400 MG/5ML suspension, Take 30 mLs by mouth daily as needed for Constipation  polyethylene glycol 
  Palliative Care Department  [phone]  Palliative Care Initial Consult  Cathie Serrano APRN-CNS    Alena Busby  [id number]  Hospital Day: 2    Date of Initial Consult: 2/21/24  Referring Provider: Dr. Milanes Marino    Palliative Medicine was consulted for assistance with: goals of care    HPI:   Alena Busby is a 68 y.o. with a past medical history of  chronic pain, arthritis, depression, morbid obesity, unspecified dementia, vitamin D deficiency who presented to ED from  [facility] with a CHIEF COMPLAINT of  respiratory distress.  It was reported pt was having difficulty breathing and upon EMS arrival was found to be hypoxic; then placed on nonrebreather mask.  Her roommate has recently tested for COVID but patient has repeatedly tested negative.  Patient had fever; was hypotensive and somnolent, not talking just nodding her head to questions. On arrival- BP 84/51; SpO2 94% on 15 L nonrebreather mask.  Workup up labs noted; WBCs 19.6 with a left shift, hemoglobin 14.2, platelets 124.  CO2 21;  BUN 45 creatinine 2.9 with GFR 17.  pH 7.37 pO2 90.1;  pCO2 36 on nonrebreather mask.  First troponin 104 and repeat is 73.  Urinalysis with large hematuria, large leukocyte esterase, numerous white blood cells and RBC, bacteriuria and ketones.  Lactic acid normal 0.9.  Blood and urine cultures sent. CXR- patchy opacity in the right upper mid and left lower lung, compatible with possible pneumonia. Patient was given IV fluids bolus 2 L normal saline, vancomycin and Zosyn.  Started on Levophed for septic shock with admission  2/21/2024 into ICU for further care and management.     ASSESSMENT/PLAN:     Pertinent Hospital Diagnoses   Severe septic shock  Acute hypoxic respiratory insufficiency  UTI with hematuria  BALDEMAR  Pneumonia  AMS      Palliative Care Encounter / Counseling Regarding Goals of Care  Alena Busby, Does Not have capacity for medical decision-making.  Capacity is time limited 
[facility] CONSULT NOTE    Reason for Consult: Proteus bacteremia  Requested by: Sejal Meza MD     Chief complaint: Shortness of breath    History Obtained From: Patient and EMR    HISTORY OFPRESENT ILLNESS              The patient is a 68 y.o. female with history of arthritis, presented on 02/21 with shortness of breath accompanied by confusion, found to be in acute hypoxic respiratory failure and sepsis with leukocytosis up to 19,000, gram-negative luis (Proteus mirabilis by PCR) bacteremia, pyuria too numerous to count with urine culture growing Proteus mirabilis (non-ESBL, resistant to fluoroquinolones, susceptible to co-trimoxazole).  Chest x-ray showed patchy opacity in the right upper mid and left lower lung.  Respiratory pathogen PCR panel was negative.  MRSA nares culture was negative.  Respiratory Gram stain and culture showed few polymorphonuclear leukocytes, few epithelial cells, few gram-positive rods, normal respiratory jhon.  She received a dose of vancomycin and piperacillin/tazobactam on admission, meropenem since 02/22 then switched to ceftriaxone on 02/23.  ID service was subsequently consulted for further recommendations.    Past Medical History  Past Medical History:   Diagnosis Date    Arthritis        Current Facility-Administered Medications   Medication Dose Route Frequency Provider Last Rate Last Admin    calcium gluconate 4,000 mg in sodium chloride 0.9 % 100 mL IVPB  4,000 mg IntraVENous Once Sejal Meza MD        magnesium sulfate 4000 mg in 100 mL IVPB premix  4,000 mg IntraVENous Once Sejal Meza MD        perflutren lipid microspheres (DEFINITY) injection 1.5 mL  1.5 mL IntraVENous ONCE PRN Marcella Arana MD        cefTRIAXone (ROCEPHIN) 2,000 mg in sterile water 20 mL IV syringe  2,000 mg IntraVENous Q24H Marcella Arana MD   2,000 mg at 02/24/24 1157    pantoprazole (PROTONIX) tablet 40 mg  40 mg Oral QAM AC Marcella Arana MD   40 mg at 02/24/24 
baseline  Chest x-ray showing cardiomegaly, patchy opacity in the right upper mid and left lower lung.  Findings represent multifocal pneumonia.  Patient currently on heated high flow saturating 96%  Plan:  Wean oxygen as able  Continue Vanco and Zosyn  Obtain respiratory culture    Nephrology (Fluids/ Electrolytes & Nutrition):   BALDEMAR stage III, likely prerenal 2/2 hypoperfusion  Creatinine on admission 2.9, baseline 0.6  Repeat creatinine 1.6  Plan:  Continue hydration with IV normal saline  Urine sodium, urine creatinine ordered, follow    Genitourinary:   UTI  Urinalysis showing red turbid urine with ketones 15, large hemoglobin, large leukocyte esterase, WBC too numerous to count, RBC , bacteria 3+.  Patient denies any dysuria, difficulty with urination.  Plan:  Urine culture sent, follow  Continue vancomycin and Zosyn    Gastroenterology:  Malnutrition    Hematology/ Oncology:   Leukocytosis likely 2/2 septic shock  WBC on admission 19.6  Monitor CBC daily    Musculoskeletal:   Arthritis  Patient has history of neck and back pain, follows with neurosurgery    Others:  Morbid obesity    Depression    PT/OT evaluation: not indicated at this time   DVT prophylaxis: Heparin  GI prophylaxis: Protonix  Diet:   Diet NPO   Bowel regimen: GlycoLax  Pain management: as needed  Code status: Full Code   Disposition: Admitted to ICU  Family: updated as available    Lottie Phillips MD, PGY-1   Attending physician: Dr. Rubio  Precepted with:

## 2024-02-26 NOTE — CARE COORDINATION
Received case in transfer, reviewed chart. Pt is from [facility], bed hold. Pt has urology and ID on board, +blood cx, IV rocephin q24. Pt is on 6LO2 at 94%. Plan is to return to [facility], envelope started, ambulance form started.Jessica Larson, MSW, LSW

## 2024-02-26 NOTE — PLAN OF CARE
Problem: Safety - Adult  Goal: Free from fall injury  Outcome: Progressing     Problem: Discharge Planning  Goal: Discharge to home or other facility with appropriate resources  Outcome: Progressing     Problem: Skin/Tissue Integrity  Goal: Absence of new skin breakdown  Description: 1.  Monitor for areas of redness and/or skin breakdown  2.  Assess vascular access sites hourly  3.  Every 4-6 hours minimum:  Change oxygen saturation probe site  4.  Every 4-6 hours:  If on nasal continuous positive airway pressure, respiratory therapy assess nares and determine need for appliance change or resting period.  Outcome: Progressing     Problem: ABCDS Injury Assessment  Goal: Absence of physical injury  Outcome: Progressing     Problem: Cardiovascular - Adult  Goal: Maintains optimal cardiac output and hemodynamic stability  Outcome: Progressing     Problem: Skin/Tissue Integrity - Adult  Goal: Skin integrity remains intact  Outcome: Progressing     Problem: Musculoskeletal - Adult  Goal: Return mobility to safest level of function  Outcome: Progressing

## 2024-02-26 NOTE — PLAN OF CARE
Problem: Safety - Adult  Goal: Free from fall injury  2/26/2024 1743 by Demi Lim RN  Outcome: Progressing  2/26/2024 0516 by Eleanor Allen RN  Outcome: Progressing     Problem: Discharge Planning  Goal: Discharge to home or other facility with appropriate resources  2/26/2024 1743 by Demi Lim RN  Outcome: Progressing  2/26/2024 0516 by Eleanor Allen RN  Outcome: Progressing     Problem: Skin/Tissue Integrity  Goal: Absence of new skin breakdown  Description: 1.  Monitor for areas of redness and/or skin breakdown  2.  Assess vascular access sites hourly  3.  Every 4-6 hours minimum:  Change oxygen saturation probe site  4.  Every 4-6 hours:  If on nasal continuous positive airway pressure, respiratory therapy assess nares and determine need for appliance change or resting period.  2/26/2024 1743 by Demi Lim RN  Outcome: Progressing  2/26/2024 0516 by Eleanor Allen RN  Outcome: Progressing     Problem: ABCDS Injury Assessment  Goal: Absence of physical injury  2/26/2024 1743 by Demi Lim RN  Outcome: Progressing  2/26/2024 0516 by Eleanor Allen RN  Outcome: Progressing     Problem: Respiratory - Adult  Goal: Achieves optimal ventilation and oxygenation  Outcome: Progressing     Problem: Cardiovascular - Adult  Goal: Maintains optimal cardiac output and hemodynamic stability  2/26/2024 1743 by Demi Lim RN  Outcome: Progressing  2/26/2024 0516 by Eleanor Allen RN  Outcome: Progressing     Problem: Skin/Tissue Integrity - Adult  Goal: Skin integrity remains intact  2/26/2024 1743 by Demi Lim RN  Outcome: Progressing  2/26/2024 0516 by Eleanor Allen RN  Outcome: Progressing  Goal: Oral mucous membranes remain intact  Outcome: Progressing     Problem: Musculoskeletal - Adult  Goal: Return mobility to safest level of function  2/26/2024 1743 by Demi Lim RN  Outcome: Progressing  2/26/2024 0516 by Eleanor Allen RN  Outcome: Progressing  Goal: Return ADL

## 2024-02-27 ENCOUNTER — APPOINTMENT (OUTPATIENT)
Dept: GENERAL RADIOLOGY | Age: 69
End: 2024-02-27
Payer: MEDICARE

## 2024-02-27 LAB
ANION GAP SERPL CALCULATED.3IONS-SCNC: 10 MMOL/L (ref 7–16)
BASOPHILS # BLD: 0.03 K/UL (ref 0–0.2)
BASOPHILS NFR BLD: 1 % (ref 0–2)
BUN SERPL-MCNC: 4 MG/DL (ref 6–23)
CA-I BLD-SCNC: 0.99 MMOL/L (ref 1.15–1.33)
CALCIUM SERPL-MCNC: 7.2 MG/DL (ref 8.6–10.2)
CHLORIDE SERPL-SCNC: 100 MMOL/L (ref 98–107)
CO2 SERPL-SCNC: 31 MMOL/L (ref 22–29)
CREAT SERPL-MCNC: 0.5 MG/DL (ref 0.5–1)
EOSINOPHIL # BLD: 0.12 K/UL (ref 0.05–0.5)
EOSINOPHILS RELATIVE PERCENT: 2 % (ref 0–6)
ERYTHROCYTE [DISTWIDTH] IN BLOOD BY AUTOMATED COUNT: 14.8 % (ref 11.5–15)
GFR SERPL CREATININE-BSD FRML MDRD: >60 ML/MIN/1.73M2
GLUCOSE BLD-MCNC: 93 MG/DL (ref 74–99)
GLUCOSE SERPL-MCNC: 90 MG/DL (ref 74–99)
HCT VFR BLD AUTO: 36.1 % (ref 34–48)
HGB BLD-MCNC: 11.9 G/DL (ref 11.5–15.5)
IMM GRANULOCYTES # BLD AUTO: 0.15 K/UL (ref 0–0.58)
IMM GRANULOCYTES NFR BLD: 3 % (ref 0–5)
LYMPHOCYTES NFR BLD: 1.82 K/UL (ref 1.5–4)
LYMPHOCYTES RELATIVE PERCENT: 30 % (ref 20–42)
MAGNESIUM SERPL-MCNC: 1.4 MG/DL (ref 1.6–2.6)
MCH RBC QN AUTO: 30.4 PG (ref 26–35)
MCHC RBC AUTO-ENTMCNC: 33 G/DL (ref 32–34.5)
MCV RBC AUTO: 92.1 FL (ref 80–99.9)
MONOCYTES NFR BLD: 0.79 K/UL (ref 0.1–0.95)
MONOCYTES NFR BLD: 13 % (ref 2–12)
NEUTROPHILS NFR BLD: 52 % (ref 43–80)
NEUTS SEG NFR BLD: 3.12 K/UL (ref 1.8–7.3)
PLATELET # BLD AUTO: 215 K/UL (ref 130–450)
PMV BLD AUTO: 10.1 FL (ref 7–12)
POTASSIUM SERPL-SCNC: 2.7 MMOL/L (ref 3.5–5)
RBC # BLD AUTO: 3.92 M/UL (ref 3.5–5.5)
SODIUM SERPL-SCNC: 141 MMOL/L (ref 132–146)
WBC OTHER # BLD: 6 K/UL (ref 4.5–11.5)

## 2024-02-27 PROCEDURE — 99233 SBSQ HOSP IP/OBS HIGH 50: CPT | Performed by: INTERNAL MEDICINE

## 2024-02-27 PROCEDURE — 83735 ASSAY OF MAGNESIUM: CPT

## 2024-02-27 PROCEDURE — 82330 ASSAY OF CALCIUM: CPT

## 2024-02-27 PROCEDURE — 2700000000 HC OXYGEN THERAPY PER DAY

## 2024-02-27 PROCEDURE — 2060000000 HC ICU INTERMEDIATE R&B

## 2024-02-27 PROCEDURE — 74230 X-RAY XM SWLNG FUNCJ C+: CPT

## 2024-02-27 PROCEDURE — 6360000002 HC RX W HCPCS: Performed by: INTERNAL MEDICINE

## 2024-02-27 PROCEDURE — 85025 COMPLETE CBC W/AUTO DIFF WBC: CPT

## 2024-02-27 PROCEDURE — 6360000002 HC RX W HCPCS: Performed by: STUDENT IN AN ORGANIZED HEALTH CARE EDUCATION/TRAINING PROGRAM

## 2024-02-27 PROCEDURE — 2580000003 HC RX 258: Performed by: INTERNAL MEDICINE

## 2024-02-27 PROCEDURE — 2580000003 HC RX 258: Performed by: STUDENT IN AN ORGANIZED HEALTH CARE EDUCATION/TRAINING PROGRAM

## 2024-02-27 PROCEDURE — 6370000000 HC RX 637 (ALT 250 FOR IP): Performed by: STUDENT IN AN ORGANIZED HEALTH CARE EDUCATION/TRAINING PROGRAM

## 2024-02-27 PROCEDURE — 80048 BASIC METABOLIC PNL TOTAL CA: CPT

## 2024-02-27 PROCEDURE — 82962 GLUCOSE BLOOD TEST: CPT

## 2024-02-27 PROCEDURE — 6360000002 HC RX W HCPCS

## 2024-02-27 PROCEDURE — 92611 MOTION FLUOROSCOPY/SWALLOW: CPT

## 2024-02-27 PROCEDURE — 36415 COLL VENOUS BLD VENIPUNCTURE: CPT

## 2024-02-27 PROCEDURE — 6370000000 HC RX 637 (ALT 250 FOR IP)

## 2024-02-27 PROCEDURE — 74018 RADEX ABDOMEN 1 VIEW: CPT

## 2024-02-27 PROCEDURE — 2500000003 HC RX 250 WO HCPCS: Performed by: PHYSICIAN ASSISTANT

## 2024-02-27 PROCEDURE — 92526 ORAL FUNCTION THERAPY: CPT

## 2024-02-27 RX ORDER — SULFAMETHOXAZOLE AND TRIMETHOPRIM 800; 160 MG/1; MG/1
1 TABLET ORAL 2 TIMES DAILY
Qty: 22 TABLET | Refills: 0 | Status: SHIPPED | OUTPATIENT
Start: 2024-02-27 | End: 2024-02-29

## 2024-02-27 RX ORDER — POTASSIUM CHLORIDE 7.45 MG/ML
10 INJECTION INTRAVENOUS
Status: COMPLETED | OUTPATIENT
Start: 2024-02-27 | End: 2024-02-27

## 2024-02-27 RX ORDER — POTASSIUM CHLORIDE 7.45 MG/ML
INJECTION INTRAVENOUS
Status: COMPLETED
Start: 2024-02-27 | End: 2024-02-27

## 2024-02-27 RX ORDER — MAGNESIUM SULFATE 1 G/100ML
1000 INJECTION INTRAVENOUS ONCE
Status: COMPLETED | OUTPATIENT
Start: 2024-02-27 | End: 2024-02-27

## 2024-02-27 RX ORDER — POTASSIUM CHLORIDE 20 MEQ/1
40 TABLET, EXTENDED RELEASE ORAL ONCE
Status: DISCONTINUED | OUTPATIENT
Start: 2024-02-27 | End: 2024-02-27

## 2024-02-27 RX ADMIN — POTASSIUM CHLORIDE 10 MEQ: 7.46 INJECTION, SOLUTION INTRAVENOUS at 11:16

## 2024-02-27 RX ADMIN — SODIUM CHLORIDE, PRESERVATIVE FREE 10 ML: 5 INJECTION INTRAVENOUS at 08:44

## 2024-02-27 RX ADMIN — HEPARIN SODIUM 5000 UNITS: 10000 INJECTION INTRAVENOUS; SUBCUTANEOUS at 21:33

## 2024-02-27 RX ADMIN — BARIUM SULFATE 15 ML: 0.81 POWDER, FOR SUSPENSION ORAL at 15:52

## 2024-02-27 RX ADMIN — MICONAZOLE NITRATE: 20.6 POWDER TOPICAL at 08:45

## 2024-02-27 RX ADMIN — MAGNESIUM SULFATE HEPTAHYDRATE 1000 MG: 1 INJECTION, SOLUTION INTRAVENOUS at 13:03

## 2024-02-27 RX ADMIN — HEPARIN SODIUM 5000 UNITS: 10000 INJECTION INTRAVENOUS; SUBCUTANEOUS at 05:19

## 2024-02-27 RX ADMIN — POTASSIUM CHLORIDE 10 MEQ: 7.46 INJECTION, SOLUTION INTRAVENOUS at 08:49

## 2024-02-27 RX ADMIN — DEXTROSE MONOHYDRATE: 50 INJECTION, SOLUTION INTRAVENOUS at 23:53

## 2024-02-27 RX ADMIN — LACTULOSE 20 G: 20 SOLUTION ORAL at 21:19

## 2024-02-27 RX ADMIN — PETROLATUM: 420 OINTMENT TOPICAL at 21:17

## 2024-02-27 RX ADMIN — WATER 2000 MG: 1 INJECTION INTRAMUSCULAR; INTRAVENOUS; SUBCUTANEOUS at 11:18

## 2024-02-27 RX ADMIN — SENNOSIDES AND DOCUSATE SODIUM 2 TABLET: 50; 8.6 TABLET ORAL at 21:10

## 2024-02-27 RX ADMIN — HEPARIN SODIUM 5000 UNITS: 10000 INJECTION INTRAVENOUS; SUBCUTANEOUS at 16:51

## 2024-02-27 RX ADMIN — DEXTROSE MONOHYDRATE: 50 INJECTION, SOLUTION INTRAVENOUS at 02:15

## 2024-02-27 RX ADMIN — PETROLATUM: 420 OINTMENT TOPICAL at 08:44

## 2024-02-27 RX ADMIN — MICONAZOLE NITRATE: 20.6 POWDER TOPICAL at 21:17

## 2024-02-27 RX ADMIN — SODIUM CHLORIDE, PRESERVATIVE FREE 10 ML: 5 INJECTION INTRAVENOUS at 21:09

## 2024-02-27 RX ADMIN — GUAIFENESIN 400 MG: 400 TABLET ORAL at 21:10

## 2024-02-27 RX ADMIN — BARIUM SULFATE 15 ML: 400 PASTE ORAL at 15:51

## 2024-02-27 RX ADMIN — BARIUM SULFATE 15 ML: 400 SUSPENSION ORAL at 15:51

## 2024-02-27 NOTE — PROGRESS NOTES
[facility]NN PROGRESS NOTE      Chief complaint: Follow-up of Proteus mirabilis bacteremia/pyelonephritis/staghorn calculus     The patient is a 68 y.o. female with history of arthritis, presented on 02/21 with shortness of breath accompanied by confusion, found to be in acute hypoxic respiratory failure and sepsis with leukocytosis up to 19,000, gram-negative luis (Proteus mirabilis) bacteremia, pyuria too numerous to count with urine culture growing Proteus mirabilis (non-ESBL, resistant to fluoroquinolones, susceptible to co-trimoxazole), CT abdomen and pelvis showed bilateral staghorn calculi with no signs of hydronephrosis or ureteral stones, cholelithiasis, diverticulosis without signs of diverticulitis, fecal impaction, new bibasilar patchy parenchymal densities, new small right pleural effusion.  Chest x-ray showed patchy opacity in the right upper mid and left lower lung.  Respiratory pathogen PCR panel was negative.  MRSA nares culture was negative.  Respiratory Gram stain and culture showed few polymorphonuclear leukocytes, few epithelial cells, few gram-positive rods, normal respiratory jhon.  She received a dose of vancomycin and piperacillin/tazobactam on admission, meropenem since 02/22 then switched to ceftriaxone on 02/23.     Subjective: Patient was seen and examined. No chills, no abdominal pain, no diarrhea, no rash, no itching. Afebrile.      Objective:    Vitals:    02/27/24 1123   BP: 135/62   Pulse: 80   Resp: 20   Temp: 97.6 °F (36.4 °C)   SpO2: 94%     Constitutional: Alert, not in distress  Respiratory: Clear breath sounds, no crackles, no wheezes  Cardiovascular: Regular rate and rhythm, no murmurs  Gastrointestinal: Bowel sounds present, soft, nontender  Skin: Warm and dry, no active dermatoses  Musculoskeletal: No joint swelling, no joint erythema    Labs, imaging, and medical records/notes were personally reviewed.    Assessment:  Sepsis/leukocytosis, resolved, secondary to Proteus

## 2024-02-27 NOTE — PROGRESS NOTES
PROGRESS NOTE      Chief Complaint:   Bilateral renal calculi/UTI with bacteremia     HPI:   She is tired but slept well.  She denies any pain or voiding discomfort at this time    Vitals:    02/27/24 0515   BP: 135/72   Pulse: 80   Resp: 16   Temp: 98.1 °F (36.7 °C)   SpO2: 93%       Allergies: Patient has no known allergies.    PAST MEDICAL HISTORY:   Past Medical History:   Diagnosis Date    Arthritis    Obesity  Chronic kidney disease  Depression    PAST SURGICAL HISTORY:   Past Surgical History:   Procedure Laterality Date    CATARACT REMOVAL      CERVICAL FUSION N/A 6/3/2019    ANTERIOR CERVICAL FUSION C3-C4, C4-C5  AND CORPECTOMY C4  WITH PLATES, SCREWS, BONE GRAFT, SSEP -- GLOBUS performed by Daniele Daniels [place] [facility]        PAST FAMILY HISTORY:  History reviewed. No pertinent family history.    PAST SOCIAL HISTORY:    Social History     Socioeconomic History    Marital status:      Spouse name: None    Number of children: None    Years of education: None    Highest education level: None   Tobacco Use    Smoking status: Some Days     Current packs/day: 0.50     Average packs/day: 0.5 packs/day for 20.0 years (10.0 ttl pk-yrs)     Types: Cigarettes     Passive exposure: Current    Smokeless tobacco: Never   Vaping Use    Vaping Use: Never used   Substance and Sexual Activity    Alcohol use: No    Drug use: Never    Sexual activity: Not Currently     Social Determinants of Health     Food Insecurity: Food Insecurity Present (2/21/2024)    Hunger Vital Sign     Worried About Running Out of Food in the Last Year: Sometimes true     Ran Out of Food in the Last Year: Never true   Transportation Needs: No Transportation Needs (2/21/2024)    PRAPARE - Transportation     Lack of Transportation (Medical): No     Lack of Transportation (Non-Medical): No   Housing Stability: Low Risk  (2/21/2024)    Housing Stability Vital Sign     Unable to Pay for Housing in the Last Year: No     Number of Places

## 2024-02-27 NOTE — PROGRESS NOTES
SPEECH/LANGUAGE PATHOLOGY  CLINICAL ASSESSMENT OF SWALLOWING FUNCTION   and PLAN OF CARE    PATIENT NAME:  Alena Busby  (female)     MRN:  [id number]    :  1955  (68 y.o.)  STATUS:  Inpatient: Room 7422/7422-A    TODAY'S DATE:  2024  REFERRING PROVIDER:    SPECIFIC PROVIDER ORDER: SLP swallowing-dysphagia evaluation and treatment Date of order:  24  REASON FOR REFERRAL: DYSPHAGIA   EVALUATING THERAPIST: PEG Kaur                 RESULTS:    DYSPHAGIA DIAGNOSIS:  Inconsistent signs of aspiration noted      DIET RECOMMENDATIONS:  NPO until MBSS can be completed        FEEDING RECOMMENDATIONS:     Assistance level:  Not applicable      Compensatory strategies recommended: Not applicable      Discussed recommendations with nursing and/or faxed report to referring provider: Nursing not available, diet change recommendation placed in Physician sticky note section     SPEECH THERAPY  PLAN OF CARE   The dysphagia POC is established based on physician order, dysphagia diagnosis and results of clinical assessment     Will establish POC once MBSS is completed.    Conditions Requiring Skilled Therapeutic Intervention for dysphagia:    Not applicable    Specific dysphagia interventions to include:     not applicable    Specific instructions for next treatment:  MBSS to be completed  Patient Treatment Goals:    Short Term Goals:  Pt will participate in MBSS to fully assess oropharyngeal swallow function and to assist in determining the least restrictive PO diet to maintain adequate nutrition/hydration     Long Term Goals:   A Video Swallow Study (MBSS) is recommended and requires a physician order      Patient/family Goal:    Did not state.  Will further assess during treatment.    Plan of care discussed with Patient   The Patient understand(s) the diagnosis, prognosis and plan of care     Rehabilitation Potential/Prognosis: good                    ADMITTING DIAGNOSIS: BALDEMAR (acute kidney injury)

## 2024-02-27 NOTE — PROGRESS NOTES
SPEECH/LANGUAGE PATHOLOGY  VIDEOFLUOROSCOPIC STUDY OF SWALLOWING (MBS)   and PLAN OF CARE    PATIENT NAME:  Alena Busby  (female)     MRN:  [id number]    :  1955  (68 y.o.)  STATUS:  Inpatient: Room 7422/7422-A    TODAY'S DATE:  2024  REFERRING PROVIDER:     SPECIFIC PROVIDER ORDER: FL modified barium swallow with video  Date of order:  24   REASON FOR REFERRAL: dysphagia   EVALUATING THERAPIST: PEG Kaur      RESULTS:      DYSPHAGIA DIAGNOSIS:  mild  oropharyngeal phase dysphagia     DIET RECOMMENDATIONS:  Soft and bite size consistency solids (IDDSI level 6) with  nectar consistency (mildly thick - IDDSI level 2) liquids    FEEDING RECOMMENDATIONS:    Assistance level:  Set-up is required for all oral intake     Compensatory strategies recommended: NO STRAW     Discussed recommendations with nursing and/or faxed report to referring provider: Nursing not available, diet change recommendation placed in Physician sticky note section     SPEECH THERAPY  PLAN OF CARE   The dysphagia POC is established based on physician order and dysphagia diagnosis    Skilled SLP intervention for dysphagia management on acute care up to 5 x per week until goals met, pt plateaus in function and/or discharged from hospital      Conditions Requiring Skilled Therapeutic Intervention for dysphagia:    Patient is performing below functional baseline d/t  current acute condition, Multiple diagnoses, multiple medications, and increased dependency upon caregivers.  Reduced laryngeal closure resulting in penetration    SPECIFIC DYSPHAGIA INTERVENTIONS TO INCLUDE:     Therapeutic exercises  Trials of upgraded diet/liquid     Specific instructions for next treatment:  initiate instruction of therapeutic exercises  and initiate instruction of compensatory strategies  Treatment Goals:    Short Term Goals:  Pt will complete PO trials of upgraded diet textures with SLP only to determine the least restrictive PO diet to

## 2024-02-27 NOTE — PLAN OF CARE
Problem: Safety - Adult  Goal: Free from fall injury  Outcome: Progressing     Problem: Discharge Planning  Goal: Discharge to home or other facility with appropriate resources  Outcome: Progressing     Problem: Skin/Tissue Integrity  Goal: Absence of new skin breakdown  Description: 1.  Monitor for areas of redness and/or skin breakdown  2.  Assess vascular access sites hourly  3.  Every 4-6 hours minimum:  Change oxygen saturation probe site  4.  Every 4-6 hours:  If on nasal continuous positive airway pressure, respiratory therapy assess nares and determine need for appliance change or resting period.  Outcome: Progressing     Problem: ABCDS Injury Assessment  Goal: Absence of physical injury  Outcome: Progressing     Problem: Respiratory - Adult  Goal: Achieves optimal ventilation and oxygenation  Outcome: Progressing     Problem: Cardiovascular - Adult  Goal: Maintains optimal cardiac output and hemodynamic stability  Outcome: Progressing     Problem: Skin/Tissue Integrity - Adult  Goal: Skin integrity remains intact  Outcome: Progressing  Goal: Oral mucous membranes remain intact  Outcome: Progressing     Problem: Musculoskeletal - Adult  Goal: Return mobility to safest level of function  Outcome: Progressing  Goal: Return ADL status to a safe level of function  Outcome: Progressing     Problem: Gastrointestinal - Adult  Goal: Maintains or returns to baseline bowel function  Outcome: Progressing  Goal: Maintains adequate nutritional intake  Outcome: Progressing     Problem: Genitourinary - Adult  Goal: Absence of urinary retention  Outcome: Progressing     Problem: Infection - Adult  Goal: Absence of infection at discharge  Outcome: Progressing  Goal: Absence of infection during hospitalization  Outcome: Progressing     Problem: Metabolic/Fluid and Electrolytes - Adult  Goal: Electrolytes maintained within normal limits  Outcome: Progressing  Goal: Hemodynamic stability and optimal renal function  maintained  Outcome: Progressing  Goal: Glucose maintained within prescribed range  Outcome: Progressing

## 2024-02-27 NOTE — PROGRESS NOTES
Pulmonary Critical Care Medicine           PULMONARY  CRITICAL CARE   SERVICE DAILY PROGRESS  NOTE     2024   Hospital LOS:  LOS: 6 days     Impression / Recommendations:    Acute respiratory insufficiency likely secondary to R , multifocal HCAP   State of shock ,Septic  likely secondary to UTI + HCAP     Proteus mirabilis bacteremia - Pan sensitive, ECHO - no  vegetations.   Enterobacterales bacteremia   Proteus mirabilis UTI      - Continue rocephin  2 gms q 24      - Sputum cultures - grm + rods    - Continue sepsis protocol   - Lactate - normalized      BALDEMAR  Likely pre renal - resolved       Interval History/Event Changes:  No new complaints  Overall improving clinically  Overnight uneventful    Allergies  No Known Allergies    Review of Systems    A pertinent review of systems was performed and was otherwise non-contributory.    Vitals-     BP (!) 141/66   Pulse 76   Temp 97.5 °F (36.4 °C) (Temporal)   Resp 22   Ht 1.397 m (4' 7\")   Wt 90 kg (198 lb 6.6 oz)   SpO2 96%   BMI 46.12 kg/m²    Tmax: Temp (24hrs), Av.8 °F (36.6 °C), Min:97.3 °F (36.3 °C), Max:98.1 °F (36.7 °C)      Hemodynamics:  Cuff:   Systolic (24hrs), Av , Min:122 , Max:141   /Diastolic (24hrs), Av, Min:50, Max:72    Cuff MAP:MAP (mmHg)  Av  Min: 49  Max: 95  P:   Pulse  Av.8  Min: 75  Max: 80      Airway:     Observed RR: Resp  Av  Min: 16  Max: 22   Observed O2 sats: SpO2  Av.9 %  Min: 87 %  Max: 100 %      Intake/Output Summary (Last 24 hours) at 2024 1555  Last data filed at 2024 1429  Gross per 24 hour   Intake 0 ml   Output 400 ml   Net -400 ml         Physical exam-  General appearance:  ill appearing, alert, mild  converstional dyspnea  Head: Normocephalic, without obvious abnormality, atraumatic   Eyes:Pupils bilateral equal and reactive, EOM intact, conjunctiva - no icterus , no injection   Throat: Clear, no lesions, Mallampti =1, no tonsillar eythema or edema  Neck: Supple,

## 2024-02-27 NOTE — PLAN OF CARE
Problem: Safety - Adult  Goal: Free from fall injury  2/26/2024 2327 by Eleanor Allen RN  Outcome: Progressing     Problem: Discharge Planning  Goal: Discharge to home or other facility with appropriate resources  2/26/2024 2327 by Eleanor Allen RN  Outcome: Progressing     Problem: Skin/Tissue Integrity  Goal: Absence of new skin breakdown  Description: 1.  Monitor for areas of redness and/or skin breakdown  2.  Assess vascular access sites hourly  3.  Every 4-6 hours minimum:  Change oxygen saturation probe site  4.  Every 4-6 hours:  If on nasal continuous positive airway pressure, respiratory therapy assess nares and determine need for appliance change or resting period.  2/26/2024 2327 by Eleanor Allen RN  Outcome: Progressing     Problem: ABCDS Injury Assessment  Goal: Absence of physical injury  2/26/2024 2327 by Eleanor Allen RN  Outcome: Progressing     Problem: Respiratory - Adult  Goal: Achieves optimal ventilation and oxygenation  2/26/2024 2327 by Eleanor Allen RN  Outcome: Progressing     Problem: Cardiovascular - Adult  Goal: Maintains optimal cardiac output and hemodynamic stability  2/26/2024 2327 by Eleanor Allen RN  Outcome: Progressing     Problem: Skin/Tissue Integrity - Adult  Goal: Skin integrity remains intact  2/26/2024 2327 by Eleanor Allen RN  Outcome: Progressing

## 2024-02-28 LAB
ANION GAP SERPL CALCULATED.3IONS-SCNC: 11 MMOL/L (ref 7–16)
BASOPHILS # BLD: 0.02 K/UL (ref 0–0.2)
BASOPHILS NFR BLD: 0 % (ref 0–2)
BUN SERPL-MCNC: 4 MG/DL (ref 6–23)
CA-I BLD-SCNC: 0.95 MMOL/L (ref 1.15–1.33)
CALCIUM SERPL-MCNC: 7 MG/DL (ref 8.6–10.2)
CHLORIDE SERPL-SCNC: 98 MMOL/L (ref 98–107)
CO2 SERPL-SCNC: 32 MMOL/L (ref 22–29)
CREAT SERPL-MCNC: 0.5 MG/DL (ref 0.5–1)
EOSINOPHIL # BLD: 0.1 K/UL (ref 0.05–0.5)
EOSINOPHILS RELATIVE PERCENT: 2 % (ref 0–6)
ERYTHROCYTE [DISTWIDTH] IN BLOOD BY AUTOMATED COUNT: 15 % (ref 11.5–15)
GFR SERPL CREATININE-BSD FRML MDRD: >60 ML/MIN/1.73M2
GLUCOSE SERPL-MCNC: 95 MG/DL (ref 74–99)
HCT VFR BLD AUTO: 36.2 % (ref 34–48)
HGB BLD-MCNC: 11.6 G/DL (ref 11.5–15.5)
IMM GRANULOCYTES # BLD AUTO: 0.13 K/UL (ref 0–0.58)
IMM GRANULOCYTES NFR BLD: 3 % (ref 0–5)
LYMPHOCYTES NFR BLD: 1.52 K/UL (ref 1.5–4)
LYMPHOCYTES RELATIVE PERCENT: 29 % (ref 20–42)
MAGNESIUM SERPL-MCNC: 1.6 MG/DL (ref 1.6–2.6)
MCH RBC QN AUTO: 30.1 PG (ref 26–35)
MCHC RBC AUTO-ENTMCNC: 32 G/DL (ref 32–34.5)
MCV RBC AUTO: 94 FL (ref 80–99.9)
MONOCYTES NFR BLD: 0.54 K/UL (ref 0.1–0.95)
MONOCYTES NFR BLD: 10 % (ref 2–12)
NEUTROPHILS NFR BLD: 56 % (ref 43–80)
NEUTS SEG NFR BLD: 2.96 K/UL (ref 1.8–7.3)
PLATELET # BLD AUTO: 231 K/UL (ref 130–450)
PMV BLD AUTO: 10 FL (ref 7–12)
POTASSIUM SERPL-SCNC: 2.8 MMOL/L (ref 3.5–5)
POTASSIUM SERPL-SCNC: 3.3 MMOL/L (ref 3.5–5)
RBC # BLD AUTO: 3.85 M/UL (ref 3.5–5.5)
SODIUM SERPL-SCNC: 141 MMOL/L (ref 132–146)
WBC OTHER # BLD: 5.3 K/UL (ref 4.5–11.5)

## 2024-02-28 PROCEDURE — 6370000000 HC RX 637 (ALT 250 FOR IP): Performed by: INTERNAL MEDICINE

## 2024-02-28 PROCEDURE — 6370000000 HC RX 637 (ALT 250 FOR IP): Performed by: STUDENT IN AN ORGANIZED HEALTH CARE EDUCATION/TRAINING PROGRAM

## 2024-02-28 PROCEDURE — 2580000003 HC RX 258: Performed by: INTERNAL MEDICINE

## 2024-02-28 PROCEDURE — 85025 COMPLETE CBC W/AUTO DIFF WBC: CPT

## 2024-02-28 PROCEDURE — 92526 ORAL FUNCTION THERAPY: CPT

## 2024-02-28 PROCEDURE — 6370000000 HC RX 637 (ALT 250 FOR IP)

## 2024-02-28 PROCEDURE — 2580000003 HC RX 258: Performed by: STUDENT IN AN ORGANIZED HEALTH CARE EDUCATION/TRAINING PROGRAM

## 2024-02-28 PROCEDURE — 83735 ASSAY OF MAGNESIUM: CPT

## 2024-02-28 PROCEDURE — 80048 BASIC METABOLIC PNL TOTAL CA: CPT

## 2024-02-28 PROCEDURE — 6360000002 HC RX W HCPCS: Performed by: INTERNAL MEDICINE

## 2024-02-28 PROCEDURE — 84132 ASSAY OF SERUM POTASSIUM: CPT

## 2024-02-28 PROCEDURE — 6360000002 HC RX W HCPCS: Performed by: STUDENT IN AN ORGANIZED HEALTH CARE EDUCATION/TRAINING PROGRAM

## 2024-02-28 PROCEDURE — 2060000000 HC ICU INTERMEDIATE R&B

## 2024-02-28 PROCEDURE — 2700000000 HC OXYGEN THERAPY PER DAY

## 2024-02-28 PROCEDURE — 36415 COLL VENOUS BLD VENIPUNCTURE: CPT

## 2024-02-28 PROCEDURE — 99233 SBSQ HOSP IP/OBS HIGH 50: CPT | Performed by: INTERNAL MEDICINE

## 2024-02-28 PROCEDURE — 82330 ASSAY OF CALCIUM: CPT

## 2024-02-28 RX ORDER — POTASSIUM CHLORIDE 20 MEQ/1
40 TABLET, EXTENDED RELEASE ORAL ONCE
Status: DISCONTINUED | OUTPATIENT
Start: 2024-02-28 | End: 2024-02-28 | Stop reason: CLARIF

## 2024-02-28 RX ADMIN — GUAIFENESIN 400 MG: 400 TABLET ORAL at 13:50

## 2024-02-28 RX ADMIN — MICONAZOLE NITRATE: 20.6 POWDER TOPICAL at 21:14

## 2024-02-28 RX ADMIN — MICONAZOLE NITRATE: 20.6 POWDER TOPICAL at 10:05

## 2024-02-28 RX ADMIN — GUAIFENESIN 400 MG: 400 TABLET ORAL at 10:05

## 2024-02-28 RX ADMIN — GUAIFENESIN 400 MG: 400 TABLET ORAL at 21:14

## 2024-02-28 RX ADMIN — SODIUM CHLORIDE, PRESERVATIVE FREE 10 ML: 5 INJECTION INTRAVENOUS at 21:13

## 2024-02-28 RX ADMIN — HEPARIN SODIUM 5000 UNITS: 10000 INJECTION INTRAVENOUS; SUBCUTANEOUS at 06:26

## 2024-02-28 RX ADMIN — WATER 2000 MG: 1 INJECTION INTRAMUSCULAR; INTRAVENOUS; SUBCUTANEOUS at 10:19

## 2024-02-28 RX ADMIN — SODIUM CHLORIDE, PRESERVATIVE FREE 10 ML: 5 INJECTION INTRAVENOUS at 10:06

## 2024-02-28 RX ADMIN — SENNOSIDES AND DOCUSATE SODIUM 2 TABLET: 50; 8.6 TABLET ORAL at 21:13

## 2024-02-28 RX ADMIN — PETROLATUM: 420 OINTMENT TOPICAL at 21:17

## 2024-02-28 RX ADMIN — LACTULOSE 20 G: 20 SOLUTION ORAL at 21:13

## 2024-02-28 RX ADMIN — SENNOSIDES AND DOCUSATE SODIUM 2 TABLET: 50; 8.6 TABLET ORAL at 10:05

## 2024-02-28 RX ADMIN — PANTOPRAZOLE SODIUM 40 MG: 40 TABLET, DELAYED RELEASE ORAL at 06:26

## 2024-02-28 RX ADMIN — PETROLATUM: 420 OINTMENT TOPICAL at 10:05

## 2024-02-28 RX ADMIN — HEPARIN SODIUM 5000 UNITS: 10000 INJECTION INTRAVENOUS; SUBCUTANEOUS at 22:00

## 2024-02-28 RX ADMIN — HEPARIN SODIUM 5000 UNITS: 10000 INJECTION INTRAVENOUS; SUBCUTANEOUS at 13:50

## 2024-02-28 RX ADMIN — POTASSIUM BICARBONATE 40 MEQ: 782 TABLET, EFFERVESCENT ORAL at 10:15

## 2024-02-28 RX ADMIN — LACTULOSE 20 G: 20 SOLUTION ORAL at 10:09

## 2024-02-28 NOTE — CARE COORDINATION
Reviewed chart, urology has no further acute interventions planned, follow up as outpatient. K+2.8 today. Plan is to return to [facility] at discharge, no precert is needed. Envelope and ambulance form in soft chart.Jessica Larson, MSW, LSW

## 2024-02-28 NOTE — DISCHARGE INSTRUCTIONS
Call upon discharge to schedule a follow-up visit with Alonzo Cheney/Jefferson/Evelyne ([facility]) at [phone]

## 2024-02-28 NOTE — PLAN OF CARE
Problem: Safety - Adult  Goal: Free from fall injury  Outcome: Progressing     Problem: Discharge Planning  Goal: Discharge to home or other facility with appropriate resources  Outcome: Progressing     Problem: Skin/Tissue Integrity  Goal: Absence of new skin breakdown  Description: 1.  Monitor for areas of redness and/or skin breakdown  2.  Assess vascular access sites hourly  3.  Every 4-6 hours minimum:  Change oxygen saturation probe site  4.  Every 4-6 hours:  If on nasal continuous positive airway pressure, respiratory therapy assess nares and determine need for appliance change or resting period.  Outcome: Progressing     Problem: ABCDS Injury Assessment  Goal: Absence of physical injury  Outcome: Progressing     Problem: Respiratory - Adult  Goal: Achieves optimal ventilation and oxygenation  Outcome: Progressing     Problem: Cardiovascular - Adult  Goal: Maintains optimal cardiac output and hemodynamic stability  Outcome: Progressing     Problem: Skin/Tissue Integrity - Adult  Goal: Skin integrity remains intact  Outcome: Progressing     Problem: Skin/Tissue Integrity - Adult  Goal: Oral mucous membranes remain intact  Outcome: Progressing     Problem: Musculoskeletal - Adult  Goal: Return mobility to safest level of function  Outcome: Progressing     Problem: Musculoskeletal - Adult  Goal: Return ADL status to a safe level of function  Outcome: Progressing     Problem: Gastrointestinal - Adult  Goal: Maintains or returns to baseline bowel function  Outcome: Progressing     Problem: Gastrointestinal - Adult  Goal: Maintains adequate nutritional intake  Outcome: Progressing     Problem: Genitourinary - Adult  Goal: Absence of urinary retention  Outcome: Progressing     Problem: Infection - Adult  Goal: Absence of infection at discharge  Outcome: Progressing     Problem: Infection - Adult  Goal: Absence of infection during hospitalization  Outcome: Progressing     Problem: Metabolic/Fluid and Electrolytes  - Adult  Goal: Electrolytes maintained within normal limits  Outcome: Progressing     Problem: Metabolic/Fluid and Electrolytes - Adult  Goal: Hemodynamic stability and optimal renal function maintained  Outcome: Progressing     Problem: Metabolic/Fluid and Electrolytes - Adult  Goal: Glucose maintained within prescribed range  Outcome: Progressing

## 2024-02-28 NOTE — PROGRESS NOTES
LYNN PROGRESS NOTE      Chief complaint: Follow-up of Proteus mirabilis bacteremia/pyelonephritis/staghorn calculus     The patient is a 68 y.o. female with history of arthritis, presented on 02/21 with shortness of breath accompanied by confusion, found to be in acute hypoxic respiratory failure and sepsis with leukocytosis up to 19,000, gram-negative luis (Proteus mirabilis) bacteremia, pyuria too numerous to count with urine culture growing Proteus mirabilis (non-ESBL, resistant to fluoroquinolones, susceptible to co-trimoxazole), CT abdomen and pelvis showed bilateral staghorn calculi with no signs of hydronephrosis or ureteral stones, cholelithiasis, diverticulosis without signs of diverticulitis, fecal impaction, new bibasilar patchy parenchymal densities, new small right pleural effusion.  Chest x-ray showed patchy opacity in the right upper mid and left lower lung.  Respiratory pathogen PCR panel was negative.  MRSA nares culture was negative.  Respiratory Gram stain and culture showed few polymorphonuclear leukocytes, few epithelial cells, few gram-positive rods, normal respiratory jhon.  She received a dose of vancomycin and piperacillin/tazobactam on admission, meropenem since 02/22 then switched to ceftriaxone on 02/23.     Subjective: Patient was seen and examined. No chills, no abdominal pain, no diarrhea, no rash, no itching. Afebrile. She reports feeling well.      Objective:    Vitals:    02/28/24 1102   BP: (!) 159/76   Pulse: 75   Resp: 25   Temp: 97.5 °F (36.4 °C)   SpO2: 95%     Constitutional: Alert, not in distress  Respiratory: Clear breath sounds, no crackles, no wheezes  Cardiovascular: Regular rate and rhythm, no murmurs  Gastrointestinal: Bowel sounds present, soft, nontender  Skin: Warm and dry, no active dermatoses  Musculoskeletal: No joint swelling, no joint erythema    Labs, imaging, and medical records/notes were personally reviewed.    Assessment:  Sepsis/leukocytosis, resolved,  secondary to Proteus mirabilis bacteremia associated with pyelonephritis in the setting of bilateral staghorn calculi. Per Urology, no plan for ureteral stenting at this time as she is in poor health and would not tolerate percutaneous nephrolithotomy or ESWL but would likely require planned staged complex ureteroscopy to try to remove or at least debulk the stones when she is medically stable.    Recommendations:  Continue ceftriaxone 2 g every 24 hours. Plan to switch to oral Bactrim DS 1 tab BID to finish 14 days of total effective antibiotic therapy on discharge.  Follow-up blood cultures.  Continue supportive care.  Follow up discharge planning.     Thank you for involving me in the care of Alena Busby. ID will continue to follow. Please do not hesitate to call for any questions or concerns.    Electronically signed by China Rodriguez MD on 2/28/2024 at 11:46 AM

## 2024-02-28 NOTE — PROGRESS NOTES
2/28/2024 12:14 PM  Alena Busby  [id number]    Subjective:    Awake and alert  No acute distress  No family present  No Huerta  Denies flank pain    Review of Systems  Constitutional: No fever or chills, weak  Respiratory: negative for cough and hemoptysis  Cardiovascular: negative for chest pain and dyspnea  Gastrointestinal: negative for abdominal pain, diarrhea, nausea and vomiting   : See above  Derm: negative for rash and skin lesion(s)  Neurological: negative for seizures and tremors  Musculoskeletal: Negative    Psychiatric: Negative   All other reviews are negative      Scheduled Meds:   guaiFENesin  400 mg Oral TID    lactulose  20 g Oral TID    sennosides-docusate sodium  2 tablet Oral BID    cefTRIAXone (ROCEPHIN) IV  2,000 mg IntraVENous Q24H    pantoprazole  40 mg Oral QAM AC    white petrolatum   Topical BID    miconazole   Topical BID    sodium chloride flush  5-40 mL IntraVENous 2 times per day    heparin (porcine)  5,000 Units SubCUTAneous 3 times per day       Objective:  Vitals:    02/28/24 1102   BP: (!) 159/76   Pulse: 75   Resp: 25   Temp: 97.5 °F (36.4 °C)   SpO2: 95%         Allergies: Patient has no known allergies.    General Appearance: Awake and alert, no acute distress  Skin: no rash or erythema  Head: normocephalic and atraumatic  Pulmonary/Chest: normal air movement, no respiratory distress  Abdomen: soft, non-tender, non-distended  Genitourinary: No Huerta  Extremities: no cyanosis, clubbing or edema         Labs:     Recent Labs     02/28/24  0534      K 2.8*   CL 98   CO2 32*   BUN 4*   CREATININE 0.5   GLUCOSE 95   CALCIUM 7.0*       Lab Results   Component Value Date/Time    HGB 11.6 02/28/2024 05:34 AM    HCT 36.2 02/28/2024 05:34 AM       No results found for: \"PSA\"        Assessment/Plan:  Bilateral renal calculi  UTI with bacteremia    Continue the antibiotics per infectious disease  PVRs low, okay to keep without Huerta catheter as long as PVRs continue to remain  low  CT scan showed bilateral staghorn renal calculi without any evidence of any hydronephrosis.  No plan for ureteral stenting at this time  She can follow-up as an outpatient and may eventually require stage complex ureteroscopy to try to remove or at least debulk some of the stones when she is medically stable as an outpatient  There are no further acute interventions planned at this time  Please call urology with any additional questions or concerns      Gregoria Valerio, APRN - CNP   [facility]  Urology

## 2024-02-28 NOTE — PROGRESS NOTES
PRN  sodium chloride flush, 5-40 mL, PRN  ondansetron, 4 mg, Q8H PRN   Or  ondansetron, 4 mg, Q6H PRN  polyethylene glycol, 17 g, Daily PRN  glucose, 4 tablet, PRN  dextrose bolus, 125 mL, PRN   Or  dextrose bolus, 250 mL, PRN  glucagon (rDNA), 1 mg, PRN  dextrose, , Continuous PRN         Objective:    BP (!) 159/76   Pulse 75   Temp 97.5 °F (36.4 °C) (Temporal)   Resp 25   Ht 1.397 m (4' 7\")   Wt 90.2 kg (198 lb 13.7 oz)   SpO2 95%   BMI 46.22 kg/m²   General Appearance: alert and oriented to person, place  in no acute distress  Skin: warm and dry  Head: normocephalic and atraumatic  Eyes: pupils equal, round, and reactive to light, extraocular eye movements intact, conjunctivae normal  Neck: neck supple and non tender without mass   Pulmonary/Chest: clear to auscultation bilaterally- no wheezes, rales or rhonchi, normal air movement, no respiratory distress  Cardiovascular: normal rate, normal S1 and S2 and no carotid bruits  Abdomen: soft, non-tender, non-distended, normal bowel sounds, no masses or organomegaly  Extremities: no cyanosis, no clubbing and no edema  Neurologic: no cranial nerve deficit and speech normal        Recent Labs     02/26/24  0745 02/27/24  0431 02/28/24  0534   * 141 141   K 3.1* 2.7* 2.8*    100 98   CO2 32* 31* 32*   BUN 6 4* 4*   CREATININE 0.5 0.5 0.5   GLUCOSE 108* 90 95   CALCIUM 7.5* 7.2* 7.0*       Recent Labs     02/26/24  0745 02/27/24  0431 02/28/24  0534   WBC 6.8 6.0 5.3   RBC 3.87 3.92 3.85   HGB 11.7 11.9 11.6   HCT 36.9 36.1 36.2   MCV 95.3 92.1 94.0   MCH 30.2 30.4 30.1   MCHC 31.7* 33.0 32.0   RDW 15.0 14.8 15.0    215 231   MPV 10.5 10.1 10.0       Radiology:   FL MODIFIED BARIUM SWALLOW W VIDEO   Final Result   1.  No barium aspiration or significant swallowing deficits.      2..  Please see separate speech pathology report for full discussion of   findings and recommendations.         XR ABDOMEN (KUB) (SINGLE AP VIEW)   Final Result   1.  Nonobstructive bowel gas pattern.   2. Staghorn calculi         CT ABDOMEN PELVIS WO CONTRAST Additional Contrast? None   Final Result   1. Bilateral staghorn calculi.   2. No signs of hydronephrosis or ureteral stones.   3. Cholelithiasis.   4. Diverticulosis without signs of diverticulitis.   5. Fecal impaction.   6. New bibasilar patchy parenchymal densities concerning for pneumonia and/or   atelectasis.   7. New small right pleural effusion.         CT HEAD WO CONTRAST   Final Result   No acute intracranial abnormality.         XR CHEST PORTABLE   Final Result   1. Nonspecific interstitial prominence, chronic changes versus component of   edema or atypical infection in the acute setting.   2. Hazy bibasilar opacities likely atelectasis/scar or edema/inflammatory   disease. This is greater on the right.         XR CHEST PORTABLE   Final Result   Right internal jugular line just distal to the caval atrial junction.  No   pneumothorax..         XR CHEST PORTABLE   Final Result   1. Cardiomegaly.   2. Patchy opacity in the right upper mid and left lower lung. Findings may   represent multifocal pneumonia. Follow-up is recommended.           Assessment and plan:    Acute metabolic encephalopathy secondary to UTI -unclear what her baseline is however today she is alert to self and place and says that she is in the hospital for possible pneumonia.  Consider restarting her home medications tomorrow if mentation continues to improve  Septic shock secondary to urosepsis, resolved -off pressors, brought out of ICU 2/24.  Proteus bacteremia secondary to staghorn calculi -on Rocephin, infectious disease following recommendations are appreciated.  Urology has also been consulted.  Elevated ammonia level acute hypoxic respiratory failure secondary to HCAP's and small right sided pleural effusion BALDEMAR, resolved  Hyperammonemia-63, started on lactulose, mentation improving continues to  Hypocalcemia - replaced, recheck vitamin

## 2024-02-28 NOTE — PROGRESS NOTES
Pulmonary Critical Care Medicine           PULMONARY  CRITICAL CARE   SERVICE DAILY PROGRESS  NOTE     2024   Hospital LOS:  LOS: 7 days     Impression / Recommendations:    Acute respiratory insufficiency likely secondary to R , multifocal HCAP   State of shock ,Septic  likely secondary to UTI + HCAP     Proteus mirabilis bacteremia - Pan sensitive, ECHO - no  vegetations.   Enterobacterales bacteremia   Proteus mirabilis UTI   Bilateral stag horn renal calculi, urology following      - Continue rocephin  2 gms q 24 , follow ID recs      - Sputum cultures - grm + rods    - Continue sepsis protocol   - Lactate - normalized      BALDEMAR  Likely pre renal - resolved       Interval History/Event Changes:  Continues to be pleasantly confused  Oriented to self, not oriented to time and place, but this waxes and wanes - gradually improving    Allergies  No Known Allergies    Review of Systems    A pertinent review of systems was performed and was otherwise non-contributory.    Vitals-     /61   Pulse 68   Temp 97.6 °F (36.4 °C) (Temporal)   Resp 25   Ht 1.397 m (4' 7\")   Wt 90.2 kg (198 lb 13.7 oz)   SpO2 95%   BMI 46.22 kg/m²    Tmax: Temp (24hrs), Av.7 °F (36.5 °C), Min:97.3 °F (36.3 °C), Max:98.2 °F (36.8 °C)      Hemodynamics:  Cuff:   Systolic (24hrs), Av , Min:136 , Max:159   /Diastolic (24hrs), Av, Min:58, Max:93    Cuff MAP:MAP (mmHg)  Av  Min: 49  Max: 95  P:   Pulse  Av.5  Min: 68  Max: 77      Airway:     Observed RR: Resp  Av  Min: 20  Max: 26   Observed O2 sats: SpO2  Av.9 %  Min: 87 %  Max: 100 %      Intake/Output Summary (Last 24 hours) at 2024 1544  Last data filed at 2024 0952  Gross per 24 hour   Intake 0 ml   Output 1850 ml   Net -1850 ml         Physical exam-  General appearance:  ill appearing, alert, mild  converstional dyspnea  Head: Normocephalic, without obvious abnormality, atraumatic   Eyes:Pupils bilateral equal and reactive, EOM

## 2024-02-28 NOTE — PROGRESS NOTES
Comprehensive Nutrition Assessment    Type and Reason for Visit:  Initial, RD Nutrition Re-Screen/LOS    Nutrition Recommendations/Plan:   Continue current diet per SLP  Start ONS to promote oral intake and aid in wound healing  Will monitor     Malnutrition Assessment:  Malnutrition Status:  At risk for malnutrition (Comment) (02/28/24 1348)    Context:  Acute Illness     Findings of the 6 clinical characteristics of malnutrition:  Energy Intake:  50% or less of estimated energy requirements for 5 or more days  Weight Loss:  Unable to assess (d/t lack of wt hx per EMR)     Body Fat Loss:  Unable to assess     Muscle Mass Loss:  Unable to assess    Fluid Accumulation:  No significant fluid accumulation     Strength:  Not Performed    Nutrition Assessment:    pt adm d/t septic shock/resp distress w/ PNA; R-pleural effusion noted; elevated ammonia - started on lactulose; SLP recommend soft/bite size w/ nectar thick liquids d/t dysphagia; PMhx of dementia; pt w/ wounds noted & pt w/ poor oral intake so far this adm; will start ONS & monitor.    Nutrition Related Findings:    A&Ox4 (oriented/disoriented at times); edentulous; active BS; +1 edema; I/O WNL; hypokalemia; 2/24 elevated ammonia (63) Wound Type: Multiple (wounds x 3 noted)       Current Nutrition Intake & Therapies:    Average Meal Intake: 1-25%  Average Supplements Intake: None Ordered  ADULT DIET; Dysphagia - Soft and Bite Sized; Mildly Thick ([place])  ADULT ORAL NUTRITION SUPPLEMENT; Breakfast; Fortified Gelatin Oral Supplement  ADULT ORAL NUTRITION SUPPLEMENT; Lunch, Dinner; Frozen Oral Supplement    Anthropometric Measures:  Height: 139.7 cm (4' 7\")  Ideal Body Weight (IBW): 75 lbs (34 kg)       Current Body Weight: 86.9 kg (191 lb 9.3 oz) (2/23-BS), 255.4 % IBW. Weight Source: Bed Scale  Current BMI (kg/m2): 44.5  Usual Body Weight:  (UTO d/t lack of wt hx per EMR)     Weight Adjustment For: No Adjustment                 BMI Categories: Obese Class  3 (BMI 40.0 or greater)    Estimated Daily Nutrient Needs:  Energy Requirements Based On: Formula  Weight Used for Energy Requirements: Current  Energy (kcal/day): 5588-8735  Weight Used for Protein Requirements: Ideal  Protein (g/day): 2.4-2.6g/kgxIBW=80-90g (as tolerated; monitor ammonia level)  Method Used for Fluid Requirements: 1 ml/kcal  Fluid (ml/day): 1834-3270    Nutrition Diagnosis:   Inadequate oral intake related to swallowing difficulty as evidenced by intake 0-25%, swallow study results    Nutrition Interventions:   Food and/or Nutrient Delivery: Continue Current Diet, Start Oral Nutrition Supplement (magic cup BID and gelatein once/day)  Nutrition Education/Counseling: Education not indicated  Coordination of Nutrition Care: Continue to monitor while inpatient       Goals:     Goals: PO intake 50% or greater, by next RD assessment       Nutrition Monitoring and Evaluation:   Behavioral-Environmental Outcomes: None Identified  Food/Nutrient Intake Outcomes: Food and Nutrient Intake, Supplement Intake  Physical Signs/Symptoms Outcomes: Biochemical Data, Nutrition Focused Physical Findings, Skin, Weight, Chewing or Swallowing, GI Status, Fluid Status or Edema    Discharge Planning:    Too soon to determine     Ai Beltran RD, LD  Contact: [phone]

## 2024-02-29 LAB
ANION GAP SERPL CALCULATED.3IONS-SCNC: 10 MMOL/L (ref 7–16)
BASOPHILS # BLD: 0.04 K/UL (ref 0–0.2)
BASOPHILS NFR BLD: 1 % (ref 0–2)
BUN SERPL-MCNC: 3 MG/DL (ref 6–23)
CA-I BLD-SCNC: 0.92 MMOL/L (ref 1.15–1.33)
CALCIUM SERPL-MCNC: 6.8 MG/DL (ref 8.6–10.2)
CHLORIDE SERPL-SCNC: 100 MMOL/L (ref 98–107)
CO2 SERPL-SCNC: 34 MMOL/L (ref 22–29)
CREAT SERPL-MCNC: 0.5 MG/DL (ref 0.5–1)
EOSINOPHIL # BLD: 0.01 K/UL (ref 0.05–0.5)
EOSINOPHILS RELATIVE PERCENT: 0 % (ref 0–6)
ERYTHROCYTE [DISTWIDTH] IN BLOOD BY AUTOMATED COUNT: 14.6 % (ref 11.5–15)
GFR SERPL CREATININE-BSD FRML MDRD: >60 ML/MIN/1.73M2
GLUCOSE SERPL-MCNC: 73 MG/DL (ref 74–99)
HCT VFR BLD AUTO: 37.9 % (ref 34–48)
HGB BLD-MCNC: 12.2 G/DL (ref 11.5–15.5)
IMM GRANULOCYTES # BLD AUTO: 0.12 K/UL (ref 0–0.58)
IMM GRANULOCYTES NFR BLD: 2 % (ref 0–5)
LYMPHOCYTES NFR BLD: 1.39 K/UL (ref 1.5–4)
LYMPHOCYTES RELATIVE PERCENT: 23 % (ref 20–42)
MAGNESIUM SERPL-MCNC: 1.4 MG/DL (ref 1.6–2.6)
MCH RBC QN AUTO: 29.9 PG (ref 26–35)
MCHC RBC AUTO-ENTMCNC: 32.2 G/DL (ref 32–34.5)
MCV RBC AUTO: 92.9 FL (ref 80–99.9)
MONOCYTES NFR BLD: 0.54 K/UL (ref 0.1–0.95)
MONOCYTES NFR BLD: 9 % (ref 2–12)
NEUTROPHILS NFR BLD: 65 % (ref 43–80)
NEUTS SEG NFR BLD: 3.93 K/UL (ref 1.8–7.3)
PLATELET # BLD AUTO: 257 K/UL (ref 130–450)
PMV BLD AUTO: 9.8 FL (ref 7–12)
POTASSIUM SERPL-SCNC: 2.9 MMOL/L (ref 3.5–5)
POTASSIUM SERPL-SCNC: 4 MMOL/L (ref 3.5–5)
RBC # BLD AUTO: 4.08 M/UL (ref 3.5–5.5)
SODIUM SERPL-SCNC: 144 MMOL/L (ref 132–146)
WBC OTHER # BLD: 6 K/UL (ref 4.5–11.5)

## 2024-02-29 PROCEDURE — 6360000002 HC RX W HCPCS: Performed by: STUDENT IN AN ORGANIZED HEALTH CARE EDUCATION/TRAINING PROGRAM

## 2024-02-29 PROCEDURE — 2580000003 HC RX 258: Performed by: INTERNAL MEDICINE

## 2024-02-29 PROCEDURE — 6370000000 HC RX 637 (ALT 250 FOR IP): Performed by: STUDENT IN AN ORGANIZED HEALTH CARE EDUCATION/TRAINING PROGRAM

## 2024-02-29 PROCEDURE — 85025 COMPLETE CBC W/AUTO DIFF WBC: CPT

## 2024-02-29 PROCEDURE — 2700000000 HC OXYGEN THERAPY PER DAY

## 2024-02-29 PROCEDURE — 2580000003 HC RX 258: Performed by: STUDENT IN AN ORGANIZED HEALTH CARE EDUCATION/TRAINING PROGRAM

## 2024-02-29 PROCEDURE — 82330 ASSAY OF CALCIUM: CPT

## 2024-02-29 PROCEDURE — 6360000002 HC RX W HCPCS: Performed by: INTERNAL MEDICINE

## 2024-02-29 PROCEDURE — 6370000000 HC RX 637 (ALT 250 FOR IP)

## 2024-02-29 PROCEDURE — 36415 COLL VENOUS BLD VENIPUNCTURE: CPT

## 2024-02-29 PROCEDURE — 92526 ORAL FUNCTION THERAPY: CPT

## 2024-02-29 PROCEDURE — 83735 ASSAY OF MAGNESIUM: CPT

## 2024-02-29 PROCEDURE — 80048 BASIC METABOLIC PNL TOTAL CA: CPT

## 2024-02-29 PROCEDURE — 84132 ASSAY OF SERUM POTASSIUM: CPT

## 2024-02-29 PROCEDURE — 6370000000 HC RX 637 (ALT 250 FOR IP): Performed by: INTERNAL MEDICINE

## 2024-02-29 PROCEDURE — 2060000000 HC ICU INTERMEDIATE R&B

## 2024-02-29 RX ORDER — POTASSIUM CHLORIDE 20 MEQ/1
40 TABLET, EXTENDED RELEASE ORAL
Status: DISCONTINUED | OUTPATIENT
Start: 2024-02-29 | End: 2024-03-01 | Stop reason: HOSPADM

## 2024-02-29 RX ORDER — MAGNESIUM SULFATE 1 G/100ML
1000 INJECTION INTRAVENOUS ONCE
Status: COMPLETED | OUTPATIENT
Start: 2024-02-29 | End: 2024-02-29

## 2024-02-29 RX ORDER — LACTULOSE 10 G/15ML
20 SOLUTION ORAL DAILY
Status: DISCONTINUED | OUTPATIENT
Start: 2024-02-29 | End: 2024-03-01 | Stop reason: HOSPADM

## 2024-02-29 RX ORDER — SULFAMETHOXAZOLE AND TRIMETHOPRIM 800; 160 MG/1; MG/1
1 TABLET ORAL 2 TIMES DAILY
Qty: 14 TABLET | Refills: 0 | DISCHARGE
Start: 2024-03-01 | End: 2024-03-08

## 2024-02-29 RX ADMIN — PETROLATUM: 420 OINTMENT TOPICAL at 09:40

## 2024-02-29 RX ADMIN — MAGNESIUM SULFATE HEPTAHYDRATE 1000 MG: 1 INJECTION, SOLUTION INTRAVENOUS at 09:56

## 2024-02-29 RX ADMIN — PETROLATUM: 420 OINTMENT TOPICAL at 19:58

## 2024-02-29 RX ADMIN — SENNOSIDES AND DOCUSATE SODIUM 2 TABLET: 50; 8.6 TABLET ORAL at 09:42

## 2024-02-29 RX ADMIN — WATER 2000 MG: 1 INJECTION INTRAMUSCULAR; INTRAVENOUS; SUBCUTANEOUS at 09:42

## 2024-02-29 RX ADMIN — GUAIFENESIN 400 MG: 400 TABLET ORAL at 09:43

## 2024-02-29 RX ADMIN — LACTULOSE 20 G: 20 SOLUTION ORAL at 09:42

## 2024-02-29 RX ADMIN — HEPARIN SODIUM 5000 UNITS: 10000 INJECTION INTRAVENOUS; SUBCUTANEOUS at 22:29

## 2024-02-29 RX ADMIN — HEPARIN SODIUM 5000 UNITS: 10000 INJECTION INTRAVENOUS; SUBCUTANEOUS at 06:03

## 2024-02-29 RX ADMIN — MICONAZOLE NITRATE: 20.6 POWDER TOPICAL at 19:59

## 2024-02-29 RX ADMIN — MICONAZOLE NITRATE: 20.6 POWDER TOPICAL at 09:41

## 2024-02-29 RX ADMIN — PANTOPRAZOLE SODIUM 40 MG: 40 TABLET, DELAYED RELEASE ORAL at 06:02

## 2024-02-29 RX ADMIN — GUAIFENESIN 400 MG: 400 TABLET ORAL at 19:56

## 2024-02-29 RX ADMIN — SODIUM CHLORIDE, PRESERVATIVE FREE 10 ML: 5 INJECTION INTRAVENOUS at 09:42

## 2024-02-29 RX ADMIN — POTASSIUM BICARBONATE 40 MEQ: 782 TABLET, EFFERVESCENT ORAL at 09:43

## 2024-02-29 RX ADMIN — GUAIFENESIN 400 MG: 400 TABLET ORAL at 14:42

## 2024-02-29 RX ADMIN — POTASSIUM CHLORIDE 40 MEQ: 20 TABLET, EXTENDED RELEASE ORAL at 10:24

## 2024-02-29 RX ADMIN — SODIUM CHLORIDE, PRESERVATIVE FREE 10 ML: 5 INJECTION INTRAVENOUS at 19:59

## 2024-02-29 RX ADMIN — HEPARIN SODIUM 5000 UNITS: 10000 INJECTION INTRAVENOUS; SUBCUTANEOUS at 14:43

## 2024-02-29 NOTE — PLAN OF CARE
Problem: Safety - Adult  Goal: Free from fall injury  2/29/2024 1633 by Demi Lim RN  Outcome: Progressing  2/29/2024 0259 by Ron Talyor RN  Outcome: Progressing     Problem: Discharge Planning  Goal: Discharge to home or other facility with appropriate resources  2/29/2024 1633 by Demi Lim RN  Outcome: Progressing  2/29/2024 0259 by Ron Taylor RN  Outcome: Progressing     Problem: Skin/Tissue Integrity  Goal: Absence of new skin breakdown  Description: 1.  Monitor for areas of redness and/or skin breakdown  2.  Assess vascular access sites hourly  3.  Every 4-6 hours minimum:  Change oxygen saturation probe site  4.  Every 4-6 hours:  If on nasal continuous positive airway pressure, respiratory therapy assess nares and determine need for appliance change or resting period.  2/29/2024 1633 by Demi Lim RN  Outcome: Progressing  2/29/2024 0259 by Ron Taylor RN  Outcome: Progressing     Problem: ABCDS Injury Assessment  Goal: Absence of physical injury  2/29/2024 1633 by Demi Lim RN  Outcome: Progressing  2/29/2024 0259 by Ron Taylor RN  Outcome: Progressing     Problem: Respiratory - Adult  Goal: Achieves optimal ventilation and oxygenation  2/29/2024 1633 by Demi Lim RN  Outcome: Progressing  2/29/2024 0259 by Ron Taylor RN  Outcome: Progressing     Problem: Cardiovascular - Adult  Goal: Maintains optimal cardiac output and hemodynamic stability  2/29/2024 1633 by Demi Lim RN  Outcome: Progressing  2/29/2024 0259 by Ron Taylor RN  Outcome: Progressing     Problem: Skin/Tissue Integrity - Adult  Goal: Skin integrity remains intact  2/29/2024 1633 by Demi Lim RN  Outcome: Progressing  Flowsheets (Taken 2/23/2024 0837 by Carmelita Strickland)  Skin Integrity Remains Intact:   Monitor for areas of redness and/or skin breakdown   Assess vascular access sites hourly   Every 4-6 hours minimum: Change oxygen saturation probe site

## 2024-02-29 NOTE — PLAN OF CARE
Problem: Safety - Adult  Goal: Free from fall injury  Outcome: Progressing     Problem: Discharge Planning  Goal: Discharge to home or other facility with appropriate resources  Outcome: Progressing     Problem: Skin/Tissue Integrity  Goal: Absence of new skin breakdown  Description: 1.  Monitor for areas of redness and/or skin breakdown  2.  Assess vascular access sites hourly  3.  Every 4-6 hours minimum:  Change oxygen saturation probe site  4.  Every 4-6 hours:  If on nasal continuous positive airway pressure, respiratory therapy assess nares and determine need for appliance change or resting period.  Outcome: Progressing     Problem: ABCDS Injury Assessment  Goal: Absence of physical injury  Outcome: Progressing     Problem: Respiratory - Adult  Goal: Achieves optimal ventilation and oxygenation  Outcome: Progressing     Problem: Cardiovascular - Adult  Goal: Maintains optimal cardiac output and hemodynamic stability  Outcome: Progressing     Problem: Skin/Tissue Integrity - Adult  Goal: Skin integrity remains intact  Outcome: Progressing

## 2024-02-29 NOTE — CARE COORDINATION
Care Coordination : Following for d/c plan.  Chart reviewed and discussed plan with attending   Plan remains to return to [facility] when medially stable.Potasium 2.9.  Lactulose reduced to once daily and attending believes potasium will normalize. All discharge paperwork complete.  Will follow.

## 2024-02-29 NOTE — PROGRESS NOTES
[facility] Hospitalist Progress Note    Synopsis: Patient is a 68-year-old female with past medical history of arthritis, obesity, dementia, vitamin D deficiency who was brought to the ED from [facility] due to respiratory distress fever and altered mentation.  Patient was found to be hypoxic placed on a nonrebreather mask.  She was also found to be hypotensive.  She was brought to the ICU for septic shock.  She was found to have Proteus in her urine.  She was eventually tapered off of Levophed and was transferred out of the ICU on 2/24.  Infectious diseases following.  She is currently on Rocephin for Proteus bacteremia.  Patient was also found to have elevated ammonia and was started on lactulose.  CT of the abdomen showed a staghorn calculi and urology has been consulted.      Subjective:  Patient is being followed for BALDEMAR (acute kidney injury) (HCC) [N17.9]  Septic shock (HCC) [A41.9, R65.21]  Urinary tract infection with hematuria, site unspecified [N39.0, R31.9]  Altered mental status, unspecified altered mental status type [R41.82]  Pneumonia of right upper lobe due to infectious organism [J18.9]     Having copious diarrhea  Somewhat confused    ROS: denies fever, chills, cp, sob, n/v, HA unless stated above.      lactulose  20 g Oral Daily    magnesium sulfate  1,000 mg IntraVENous Once    potassium bicarb-citric acid  40 mEq Oral Daily    guaiFENesin  400 mg Oral TID    sennosides-docusate sodium  2 tablet Oral BID    cefTRIAXone (ROCEPHIN) IV  2,000 mg IntraVENous Q24H    pantoprazole  40 mg Oral QAM AC    white petrolatum   Topical BID    miconazole   Topical BID    sodium chloride flush  5-40 mL IntraVENous 2 times per day    heparin (porcine)  5,000 Units SubCUTAneous 3 times per day     ipratropium 0.5 mg-albuterol 2.5 mg, 1 Dose, Q4H PRN  benzonatate, 100 mg, TID PRN  albuterol, 2.5 mg, Q6H PRN  sodium chloride flush, 5-40 mL, PRN  sodium chloride flush, 5-40 mL, PRN  sodium  report for full discussion of   findings and recommendations.         XR ABDOMEN (KUB) (SINGLE AP VIEW)   Final Result   1. Nonobstructive bowel gas pattern.   2. Staghorn calculi         CT ABDOMEN PELVIS WO CONTRAST Additional Contrast? None   Final Result   1. Bilateral staghorn calculi.   2. No signs of hydronephrosis or ureteral stones.   3. Cholelithiasis.   4. Diverticulosis without signs of diverticulitis.   5. Fecal impaction.   6. New bibasilar patchy parenchymal densities concerning for pneumonia and/or   atelectasis.   7. New small right pleural effusion.         CT HEAD WO CONTRAST   Final Result   No acute intracranial abnormality.         XR CHEST PORTABLE   Final Result   1. Nonspecific interstitial prominence, chronic changes versus component of   edema or atypical infection in the acute setting.   2. Hazy bibasilar opacities likely atelectasis/scar or edema/inflammatory   disease. This is greater on the right.         XR CHEST PORTABLE   Final Result   Right internal jugular line just distal to the caval atrial junction.  No   pneumothorax..         XR CHEST PORTABLE   Final Result   1. Cardiomegaly.   2. Patchy opacity in the right upper mid and left lower lung. Findings may   represent multifocal pneumonia. Follow-up is recommended.           Assessment and plan:    Acute metabolic encephalopathy secondary to UTI -unclear what her baseline is however today she is alert to self and place and says that she is in the hospital for possible pneumonia.  Consider restarting her home medications tomorrow if mentation continues to improve  Septic shock secondary to urosepsis, resolved -off pressors, brought out of ICU 2/24.  Proteus bacteremia secondary to staghorn calculi -on Rocephin, infectious disease following recommendations are appreciated.  Urology has also been consulted.  Elevated ammonia level acute hypoxic respiratory failure secondary to HCAP's and small right sided pleural effusion BALDEMAR,

## 2024-02-29 NOTE — PROGRESS NOTES
[facility]NN PROGRESS NOTE      Chief complaint: Follow-up of Proteus mirabilis bacteremia/pyelonephritis/staghorn calculus     The patient is a 68 y.o. female with history of arthritis, presented on 02/21 with shortness of breath accompanied by confusion, found to be in acute hypoxic respiratory failure and sepsis with leukocytosis up to 19,000, gram-negative luis (Proteus mirabilis) bacteremia, pyuria too numerous to count with urine culture growing Proteus mirabilis (non-ESBL, resistant to fluoroquinolones, susceptible to co-trimoxazole), CT abdomen and pelvis showed bilateral staghorn calculi with no signs of hydronephrosis or ureteral stones, cholelithiasis, diverticulosis without signs of diverticulitis, fecal impaction, new bibasilar patchy parenchymal densities, new small right pleural effusion.  Chest x-ray showed patchy opacity in the right upper mid and left lower lung.  Respiratory pathogen PCR panel was negative.  MRSA nares culture was negative.  Respiratory Gram stain and culture showed few polymorphonuclear leukocytes, few epithelial cells, few gram-positive rods, normal respiratory jhon.  She received a dose of vancomycin and piperacillin/tazobactam on admission, meropenem since 02/22 then switched to ceftriaxone on 02/23.     Subjective: Patient was seen and examined. No chills, no abdominal pain, no diarrhea, no rash, no itching. Afebrile. She reports feeling well.      Objective:    Vitals:    02/29/24 0400   BP: 112/68   Pulse: 70   Resp: 29   Temp: 97 °F (36.1 °C)   SpO2: 95%     Constitutional: Alert, not in distress  Respiratory: Clear breath sounds, no crackles, no wheezes  Cardiovascular: Regular rate and rhythm, no murmurs  Gastrointestinal: Bowel sounds present, soft, nontender  Skin: Warm and dry, no active dermatoses  Musculoskeletal: No joint swelling, no joint erythema    Labs, imaging, and medical records/notes were personally reviewed.    Assessment:  Sepsis/leukocytosis, resolved,

## 2024-03-01 VITALS
SYSTOLIC BLOOD PRESSURE: 129 MMHG | OXYGEN SATURATION: 90 % | WEIGHT: 195.11 LBS | TEMPERATURE: 97.7 F | HEIGHT: 55 IN | DIASTOLIC BLOOD PRESSURE: 60 MMHG | BODY MASS INDEX: 45.15 KG/M2 | RESPIRATION RATE: 22 BRPM | HEART RATE: 76 BPM

## 2024-03-01 LAB
ANION GAP SERPL CALCULATED.3IONS-SCNC: 13 MMOL/L (ref 7–16)
BASOPHILS # BLD: 0.04 K/UL (ref 0–0.2)
BASOPHILS NFR BLD: 1 % (ref 0–2)
BUN SERPL-MCNC: 5 MG/DL (ref 6–23)
CA-I BLD-SCNC: 0.92 MMOL/L (ref 1.15–1.33)
CALCIUM SERPL-MCNC: 6.7 MG/DL (ref 8.6–10.2)
CHLORIDE SERPL-SCNC: 100 MMOL/L (ref 98–107)
CO2 SERPL-SCNC: 31 MMOL/L (ref 22–29)
CREAT SERPL-MCNC: 0.5 MG/DL (ref 0.5–1)
EOSINOPHIL # BLD: 0.02 K/UL (ref 0.05–0.5)
EOSINOPHILS RELATIVE PERCENT: 0 % (ref 0–6)
ERYTHROCYTE [DISTWIDTH] IN BLOOD BY AUTOMATED COUNT: 15 % (ref 11.5–15)
GFR SERPL CREATININE-BSD FRML MDRD: >60 ML/MIN/1.73M2
GLUCOSE SERPL-MCNC: 63 MG/DL (ref 74–99)
HCT VFR BLD AUTO: 38.4 % (ref 34–48)
HGB BLD-MCNC: 12.1 G/DL (ref 11.5–15.5)
IMM GRANULOCYTES # BLD AUTO: 0.08 K/UL (ref 0–0.58)
IMM GRANULOCYTES NFR BLD: 2 % (ref 0–5)
LYMPHOCYTES NFR BLD: 1.56 K/UL (ref 1.5–4)
LYMPHOCYTES RELATIVE PERCENT: 29 % (ref 20–42)
MAGNESIUM SERPL-MCNC: 1.6 MG/DL (ref 1.6–2.6)
MCH RBC QN AUTO: 29.6 PG (ref 26–35)
MCHC RBC AUTO-ENTMCNC: 31.5 G/DL (ref 32–34.5)
MCV RBC AUTO: 93.9 FL (ref 80–99.9)
MICROORGANISM SPEC CULT: NORMAL
MICROORGANISM SPEC CULT: NORMAL
MONOCYTES NFR BLD: 0.48 K/UL (ref 0.1–0.95)
MONOCYTES NFR BLD: 9 % (ref 2–12)
NEUTROPHILS NFR BLD: 60 % (ref 43–80)
NEUTS SEG NFR BLD: 3.23 K/UL (ref 1.8–7.3)
PLATELET # BLD AUTO: 243 K/UL (ref 130–450)
PMV BLD AUTO: 9.3 FL (ref 7–12)
POTASSIUM SERPL-SCNC: 3.7 MMOL/L (ref 3.5–5)
RBC # BLD AUTO: 4.09 M/UL (ref 3.5–5.5)
SERVICE CMNT-IMP: NORMAL
SERVICE CMNT-IMP: NORMAL
SODIUM SERPL-SCNC: 144 MMOL/L (ref 132–146)
SPECIMEN DESCRIPTION: NORMAL
SPECIMEN DESCRIPTION: NORMAL
WBC OTHER # BLD: 5.4 K/UL (ref 4.5–11.5)

## 2024-03-01 PROCEDURE — 6370000000 HC RX 637 (ALT 250 FOR IP): Performed by: INTERNAL MEDICINE

## 2024-03-01 PROCEDURE — 2580000003 HC RX 258: Performed by: INTERNAL MEDICINE

## 2024-03-01 PROCEDURE — 2580000003 HC RX 258: Performed by: STUDENT IN AN ORGANIZED HEALTH CARE EDUCATION/TRAINING PROGRAM

## 2024-03-01 PROCEDURE — 82330 ASSAY OF CALCIUM: CPT

## 2024-03-01 PROCEDURE — 6360000002 HC RX W HCPCS: Performed by: INTERNAL MEDICINE

## 2024-03-01 PROCEDURE — 99233 SBSQ HOSP IP/OBS HIGH 50: CPT | Performed by: INTERNAL MEDICINE

## 2024-03-01 PROCEDURE — 92526 ORAL FUNCTION THERAPY: CPT

## 2024-03-01 PROCEDURE — 6370000000 HC RX 637 (ALT 250 FOR IP)

## 2024-03-01 PROCEDURE — 83735 ASSAY OF MAGNESIUM: CPT

## 2024-03-01 PROCEDURE — 6360000002 HC RX W HCPCS: Performed by: STUDENT IN AN ORGANIZED HEALTH CARE EDUCATION/TRAINING PROGRAM

## 2024-03-01 PROCEDURE — 6370000000 HC RX 637 (ALT 250 FOR IP): Performed by: STUDENT IN AN ORGANIZED HEALTH CARE EDUCATION/TRAINING PROGRAM

## 2024-03-01 PROCEDURE — 36415 COLL VENOUS BLD VENIPUNCTURE: CPT

## 2024-03-01 PROCEDURE — 80048 BASIC METABOLIC PNL TOTAL CA: CPT

## 2024-03-01 PROCEDURE — 85025 COMPLETE CBC W/AUTO DIFF WBC: CPT

## 2024-03-01 PROCEDURE — 2700000000 HC OXYGEN THERAPY PER DAY

## 2024-03-01 RX ORDER — PANTOPRAZOLE SODIUM 40 MG/1
40 TABLET, DELAYED RELEASE ORAL
Qty: 30 TABLET | Refills: 3
Start: 2024-03-02

## 2024-03-01 RX ORDER — POTASSIUM CHLORIDE 20 MEQ/1
40 TABLET, EXTENDED RELEASE ORAL
Qty: 60 TABLET | Refills: 3 | Status: SHIPPED | OUTPATIENT
Start: 2024-03-01

## 2024-03-01 RX ORDER — LACTULOSE 10 G/15ML
20 SOLUTION ORAL DAILY
Qty: 1 EACH | Refills: 0
Start: 2024-03-01

## 2024-03-01 RX ORDER — SULFAMETHOXAZOLE AND TRIMETHOPRIM 800; 160 MG/1; MG/1
1 TABLET ORAL EVERY 12 HOURS SCHEDULED
Status: DISCONTINUED | OUTPATIENT
Start: 2024-03-02 | End: 2024-03-01 | Stop reason: HOSPADM

## 2024-03-01 RX ADMIN — WATER 2000 MG: 1 INJECTION INTRAMUSCULAR; INTRAVENOUS; SUBCUTANEOUS at 11:35

## 2024-03-01 RX ADMIN — HEPARIN SODIUM 5000 UNITS: 10000 INJECTION INTRAVENOUS; SUBCUTANEOUS at 06:26

## 2024-03-01 RX ADMIN — PETROLATUM: 420 OINTMENT TOPICAL at 11:38

## 2024-03-01 RX ADMIN — MICONAZOLE NITRATE: 20.6 POWDER TOPICAL at 11:38

## 2024-03-01 RX ADMIN — SENNOSIDES AND DOCUSATE SODIUM 2 TABLET: 50; 8.6 TABLET ORAL at 11:35

## 2024-03-01 RX ADMIN — SODIUM CHLORIDE, PRESERVATIVE FREE 10 ML: 5 INJECTION INTRAVENOUS at 11:39

## 2024-03-01 RX ADMIN — GUAIFENESIN 400 MG: 400 TABLET ORAL at 11:35

## 2024-03-01 RX ADMIN — POTASSIUM CHLORIDE 40 MEQ: 20 TABLET, EXTENDED RELEASE ORAL at 11:35

## 2024-03-01 RX ADMIN — PANTOPRAZOLE SODIUM 40 MG: 40 TABLET, DELAYED RELEASE ORAL at 05:02

## 2024-03-01 NOTE — PROGRESS NOTES
[facility] on unit for patient transport     Electronically signed by Jamila Arrieta RN on 3/1/2024 at 5:08 PM

## 2024-03-01 NOTE — PROGRESS NOTES
[facility]NN PROGRESS NOTE      Chief complaint: Follow-up of Proteus mirabilis bacteremia/pyelonephritis/staghorn calculus     The patient is a 68 y.o. female with history of arthritis, presented on 02/21 with shortness of breath accompanied by confusion, found to be in acute hypoxic respiratory failure and sepsis with leukocytosis up to 19,000, gram-negative luis (Proteus mirabilis) bacteremia, pyuria too numerous to count with urine culture growing Proteus mirabilis (non-ESBL, resistant to fluoroquinolones, susceptible to co-trimoxazole), CT abdomen and pelvis showed bilateral staghorn calculi with no signs of hydronephrosis or ureteral stones, cholelithiasis, diverticulosis without signs of diverticulitis, fecal impaction, new bibasilar patchy parenchymal densities, new small right pleural effusion.  Chest x-ray showed patchy opacity in the right upper mid and left lower lung.  Respiratory pathogen PCR panel was negative.  MRSA nares culture was negative.  Respiratory Gram stain and culture showed few polymorphonuclear leukocytes, few epithelial cells, few gram-positive rods, normal respiratory jhon.  She received a dose of vancomycin and piperacillin/tazobactam on admission, meropenem since 02/22 then switched to ceftriaxone on 02/23.     Subjective: Patient was seen and examined. No chills, no abdominal pain, no diarrhea, no rash, no itching. Afebrile. She reports feeling well.      Objective:    Vitals:    03/01/24 1231   BP: 129/60   Pulse: 76   Resp: 22   Temp: 97.7 °F (36.5 °C)   SpO2: 90%     Constitutional: Alert, not in distress  Respiratory: Clear breath sounds, no crackles, no wheezes  Cardiovascular: Regular rate and rhythm, no murmurs  Gastrointestinal: Bowel sounds present, soft, nontender  Skin: Warm and dry, no active dermatoses  Musculoskeletal: No joint swelling, no joint erythema    Labs, imaging, and medical records/notes were personally reviewed.    Assessment:  Sepsis/leukocytosis, resolved,

## 2024-03-01 NOTE — PLAN OF CARE
Problem: Safety - Adult  Goal: Free from fall injury  3/1/2024 0238 by Sivakumar Baker RN  Outcome: Progressing  2/29/2024 1633 by Demi Lim RN  Outcome: Progressing     Problem: Discharge Planning  Goal: Discharge to home or other facility with appropriate resources  3/1/2024 0238 by Sivakumar Baker RN  Outcome: Progressing  2/29/2024 1633 by Demi Lim RN  Outcome: Progressing     Problem: Skin/Tissue Integrity  Goal: Absence of new skin breakdown  Description: 1.  Monitor for areas of redness and/or skin breakdown  2.  Assess vascular access sites hourly  3.  Every 4-6 hours minimum:  Change oxygen saturation probe site  4.  Every 4-6 hours:  If on nasal continuous positive airway pressure, respiratory therapy assess nares and determine need for appliance change or resting period.  2/29/2024 1633 by Demi Lim RN  Outcome: Progressing     Problem: ABCDS Injury Assessment  Goal: Absence of physical injury  2/29/2024 1633 by Demi Lim RN  Outcome: Progressing     Problem: Respiratory - Adult  Goal: Achieves optimal ventilation and oxygenation  2/29/2024 1633 by Demi Lim RN  Outcome: Progressing

## 2024-03-01 NOTE — PROGRESS NOTES
Dr Wilkinson notified of patient normal K to clear for discharge    Electronically signed by Jamila Arrieta RN on 3/1/2024 at 8:46 AM

## 2024-03-01 NOTE — PROGRESS NOTES
Call placed to [facility] to check pickup time, new ETA is within the hour     Electronically signed by Jamila Arrieta RN on 3/1/2024 at 3:08 PM

## 2024-03-01 NOTE — PROGRESS NOTES
Pulmonary Critical Care Medicine           PULMONARY  CRITICAL CARE   SERVICE DAILY PROGRESS  NOTE     3/1/2024   Hospital LOS:  LOS: 9 days     Impression / Recommendations:    Acute respiratory insufficiency likely secondary to R , multifocal HCAP   State of shock ,Septic  likely secondary to UTI + HCAP- resolving      Proteus mirabilis bacteremia - Pan sensitive, ECHO - no  vegetations.   Enterobacterales bacteremia   Proteus mirabilis UTI   Bilateral stag horn renal calculi, urology following      - Continue rocephin  2 gms q 24 , follow ID recs   - Sputum cultures - grm + rods    - Continue sepsis protocol   - Lactate - normalized      BALDEMAR  Likely pre renal - resolved       Interval History/Event Changes:  Lying down on bed comfortably , mentation has improved slightly   NAD   Remains on supplemental O2    Allergies  No Known Allergies    Review of Systems    A pertinent review of systems was performed and was otherwise non-contributory.    Vitals-     /60   Pulse 76   Temp 97.7 °F (36.5 °C) (Oral)   Resp 22   Ht 1.397 m (4' 7\")   Wt 88.5 kg (195 lb 1.7 oz)   SpO2 90%   BMI 45.35 kg/m²    Tmax: Temp (24hrs), Av.6 °F (36.4 °C), Min:97.2 °F (36.2 °C), Max:98 °F (36.7 °C)      Hemodynamics:  Cuff:   Systolic (24hrs), Av , Min:129 , Max:151   /Diastolic (24hrs), Av, Min:59, Max:67    Cuff MAP:MAP (mmHg)  Av  Min: 49  Max: 95  P:   Pulse  Av.8  Min: 74  Max: 78      Airway:     Observed RR: Resp  Av.2  Min: 18  Max: 71   Observed O2 sats: SpO2  Av.8 %  Min: 87 %  Max: 100 %      Intake/Output Summary (Last 24 hours) at 3/1/2024 1451  Last data filed at 3/1/2024 1231  Gross per 24 hour   Intake 120 ml   Output 350 ml   Net -230 ml         Physical exam-  General appearance:  ill appearing, alert, mild  converstional dyspnea  Head: Normocephalic, without obvious abnormality, atraumatic   Eyes:Pupils bilateral equal and reactive, EOM intact, conjunctiva - no icterus ,

## 2024-03-01 NOTE — CARE COORDINATION
Care Coordination:  discharge order noted.  [facility] scheduled for 1 pm .  Patient spouse, facility and RN aware.   Return to [facility]

## 2024-03-01 NOTE — PROGRESS NOTES
[facility] Hospitalist Progress Note    Synopsis: Patient is a 68-year-old female with past medical history of arthritis, obesity, dementia, vitamin D deficiency who was brought to the ED from [facility] due to respiratory distress fever and altered mentation.  Patient was found to be hypoxic placed on a nonrebreather mask.  She was also found to be hypotensive.  She was brought to the ICU for septic shock.  She was found to have Proteus in her urine.  She was eventually tapered off of Levophed and was transferred out of the ICU on 2/24.  Infectious diseases following.  She is currently on Rocephin for Proteus bacteremia.  Patient was also found to have elevated ammonia and was started on lactulose.  CT of the abdomen showed a staghorn calculi and urology has been consulted.      Subjective:  Patient is being followed for BALDEMAR (acute kidney injury) (HCC) [N17.9]  Septic shock (HCC) [A41.9, R65.21]  Urinary tract infection with hematuria, site unspecified [N39.0, R31.9]  Altered mental status, unspecified altered mental status type [R41.82]  Pneumonia of right upper lobe due to infectious organism [J18.9]     Complaining of modified diet  No new issues otherwise    ROS: denies fever, chills, cp, sob, n/v, HA unless stated above.      lactulose  20 g Oral Daily    potassium chloride  40 mEq Oral Daily with breakfast    guaiFENesin  400 mg Oral TID    sennosides-docusate sodium  2 tablet Oral BID    cefTRIAXone (ROCEPHIN) IV  2,000 mg IntraVENous Q24H    pantoprazole  40 mg Oral QAM AC    white petrolatum   Topical BID    miconazole   Topical BID    sodium chloride flush  5-40 mL IntraVENous 2 times per day    heparin (porcine)  5,000 Units SubCUTAneous 3 times per day     ipratropium 0.5 mg-albuterol 2.5 mg, 1 Dose, Q4H PRN  benzonatate, 100 mg, TID PRN  albuterol, 2.5 mg, Q6H PRN  sodium chloride flush, 5-40 mL, PRN  sodium chloride, , PRN  acetaminophen, 650 mg, Q6H PRN   Or  acetaminophen, 650  mg, Q6H PRN  ondansetron, 4 mg, Q8H PRN   Or  ondansetron, 4 mg, Q6H PRN  polyethylene glycol, 17 g, Daily PRN  glucose, 4 tablet, PRN  dextrose bolus, 125 mL, PRN   Or  dextrose bolus, 250 mL, PRN  glucagon (rDNA), 1 mg, PRN  dextrose, , Continuous PRN         Objective:    BP (!) 140/60   Pulse 77   Temp 97.3 °F (36.3 °C) (Temporal)   Resp 18   Ht 1.397 m (4' 7\")   Wt 88.5 kg (195 lb 1.7 oz)   SpO2 94%   BMI 45.35 kg/m²   General Appearance: alert and oriented to person, place  in no acute distress  Skin: warm and dry  Head: normocephalic and atraumatic  Eyes: pupils equal, round, and reactive to light, extraocular eye movements intact, conjunctivae normal  Neck: neck supple and non tender without mass   Pulmonary/Chest: clear to auscultation bilaterally- no wheezes, rales or rhonchi, normal air movement, no respiratory distress  Cardiovascular: normal rate, normal S1 and S2 and no carotid bruits  Abdomen: soft, non-tender, non-distended, normal bowel sounds, no masses or organomegaly  Extremities: no cyanosis, no clubbing and no edema  Neurologic: no cranial nerve deficit and speech normal        Recent Labs     02/28/24  0534 02/28/24  1544 02/29/24  0628 02/29/24  1507 03/01/24  0727     --  144  --  144   K 2.8*   < > 2.9* 4.0 3.7   CL 98  --  100  --  100   CO2 32*  --  34*  --  31*   BUN 4*  --  3*  --  5*   CREATININE 0.5  --  0.5  --  0.5   GLUCOSE 95  --  73*  --  63*   CALCIUM 7.0*  --  6.8*  --  6.7*    < > = values in this interval not displayed.       Recent Labs     02/28/24  0534 02/29/24  0628 03/01/24  0727   WBC 5.3 6.0 5.4   RBC 3.85 4.08 4.09   HGB 11.6 12.2 12.1   HCT 36.2 37.9 38.4   MCV 94.0 92.9 93.9   MCH 30.1 29.9 29.6   MCHC 32.0 32.2 31.5*   RDW 15.0 14.6 15.0    257 243   MPV 10.0 9.8 9.3       Radiology:   FL MODIFIED BARIUM SWALLOW W VIDEO   Final Result   1.  No barium aspiration or significant swallowing deficits.      2..  Please see separate speech pathology

## 2024-04-09 ENCOUNTER — HOSPITAL ENCOUNTER (OUTPATIENT)
Dept: PHYSICAL THERAPY | Age: 69
Setting detail: THERAPIES SERIES
Discharge: HOME | End: 2024-04-09
Payer: MEDICARE

## 2024-04-09 PROCEDURE — 97161 PT EVAL LOW COMPLEX 20 MIN: CPT | Performed by: PHYSICAL THERAPIST

## 2024-04-09 ASSESSMENT — PAIN DESCRIPTION - PAIN TYPE: TYPE: CHRONIC PAIN

## 2024-04-09 ASSESSMENT — PAIN SCALES - GENERAL: PAINLEVEL_OUTOF10: 5

## 2024-04-09 ASSESSMENT — PAIN DESCRIPTION - ORIENTATION: ORIENTATION: RIGHT;LEFT

## 2024-04-09 ASSESSMENT — PAIN DESCRIPTION - DESCRIPTORS: DESCRIPTORS: ACHING;BURNING;NUMBNESS;TINGLING

## 2024-04-09 ASSESSMENT — PAIN DESCRIPTION - LOCATION: LOCATION: BACK;LEG

## 2024-04-09 NOTE — PROGRESS NOTES
weeks      Eval Complexity:    Decision Making: Low Complexity        Therapist Signature: Neo Gr, PT    Date: 4/9/2024     I certify that the above Therapy Services are being furnished while the patient is under my care. I agree with the treatment plan and certify that this therapy is necessary.      Physician's Signature:  ___________________________   Date:_______                                                                   Marysol Crabtree APRN -*        Physician Comments: _______________________________________________    Please sign and return to [facility].  Please fax to the location listed below. THANK YOU for this referral!    [facility]  [address]  [place] [address]  Dept: [phone]       POC NOTE

## 2024-05-18 ENCOUNTER — APPOINTMENT (OUTPATIENT)
Dept: CT IMAGING | Age: 69
DRG: 871 | End: 2024-05-18
Payer: MEDICARE

## 2024-05-18 ENCOUNTER — APPOINTMENT (OUTPATIENT)
Dept: GENERAL RADIOLOGY | Age: 69
DRG: 871 | End: 2024-05-18
Payer: MEDICARE

## 2024-05-18 ENCOUNTER — HOSPITAL ENCOUNTER (INPATIENT)
Age: 69
LOS: 4 days | Discharge: HOME | DRG: 871 | End: 2024-05-23
Attending: EMERGENCY MEDICINE | Admitting: FAMILY MEDICINE
Payer: MEDICARE

## 2024-05-18 DIAGNOSIS — E83.51 HYPOCALCEMIA: ICD-10-CM

## 2024-05-18 DIAGNOSIS — J96.01 ACUTE RESPIRATORY FAILURE WITH HYPOXIA (HCC): ICD-10-CM

## 2024-05-18 DIAGNOSIS — J18.9 PNEUMONIA OF BOTH LUNGS DUE TO INFECTIOUS ORGANISM, UNSPECIFIED PART OF LUNG: Primary | ICD-10-CM

## 2024-05-18 DIAGNOSIS — N30.01 ACUTE CYSTITIS WITH HEMATURIA: ICD-10-CM

## 2024-05-18 LAB
ALBUMIN SERPL-MCNC: 3.1 G/DL (ref 3.5–5.2)
ALP SERPL-CCNC: 77 U/L (ref 35–104)
ALT SERPL-CCNC: 23 U/L (ref 0–32)
ANION GAP SERPL CALCULATED.3IONS-SCNC: 10 MMOL/L (ref 7–16)
AST SERPL-CCNC: 19 U/L (ref 0–31)
B.E.: -1.4 MMOL/L (ref -3–3)
BACTERIA URNS QL MICRO: ABNORMAL
BASOPHILS # BLD: 0.01 K/UL (ref 0–0.2)
BASOPHILS NFR BLD: 0 % (ref 0–2)
BILIRUB SERPL-MCNC: 0.7 MG/DL (ref 0–1.2)
BILIRUB UR QL STRIP: ABNORMAL
BNP SERPL-MCNC: 621 PG/ML (ref 0–125)
BUN SERPL-MCNC: 22 MG/DL (ref 6–23)
CALCIUM SERPL-MCNC: 6.5 MG/DL (ref 8.6–10.2)
CHLORIDE SERPL-SCNC: 105 MMOL/L (ref 98–107)
CLARITY UR: ABNORMAL
CO2 SERPL-SCNC: 23 MMOL/L (ref 22–29)
COHB: 1.1 % (ref 0–1.5)
COLOR UR: ABNORMAL
CREAT SERPL-MCNC: 0.8 MG/DL (ref 0.5–1)
CRITICAL: ABNORMAL
DATE ANALYZED: ABNORMAL
DATE OF COLLECTION: ABNORMAL
EOSINOPHIL # BLD: 0 K/UL (ref 0.05–0.5)
EOSINOPHILS RELATIVE PERCENT: 0 % (ref 0–6)
ERYTHROCYTE [DISTWIDTH] IN BLOOD BY AUTOMATED COUNT: 14.6 % (ref 11.5–15)
GFR, ESTIMATED: 75 ML/MIN/1.73M2
GLUCOSE SERPL-MCNC: 123 MG/DL (ref 74–99)
GLUCOSE UR STRIP-MCNC: NEGATIVE MG/DL
HCO3: 24.1 MMOL/L (ref 22–26)
HCT VFR BLD AUTO: 37 % (ref 34–48)
HGB BLD-MCNC: 12 G/DL (ref 11.5–15.5)
HGB UR QL STRIP.AUTO: ABNORMAL
HHB: 2.2 % (ref 0–5)
IMM GRANULOCYTES # BLD AUTO: <0.03 K/UL (ref 0–0.58)
IMM GRANULOCYTES NFR BLD: 0 % (ref 0–5)
KETONES UR STRIP-MCNC: ABNORMAL MG/DL
LAB: ABNORMAL
LACTATE BLDV-SCNC: 0.8 MMOL/L (ref 0.5–1.9)
LEUKOCYTE ESTERASE UR QL STRIP: ABNORMAL
LYMPHOCYTES NFR BLD: 1.4 K/UL (ref 1.5–4)
LYMPHOCYTES RELATIVE PERCENT: 20 % (ref 20–42)
Lab: 2103
MCH RBC QN AUTO: 30.3 PG (ref 26–35)
MCHC RBC AUTO-ENTMCNC: 32.4 G/DL (ref 32–34.5)
MCV RBC AUTO: 93.4 FL (ref 80–99.9)
METHB: 0.3 % (ref 0–1.5)
MODE: ABNORMAL
MONOCYTES NFR BLD: 1.19 K/UL (ref 0.1–0.95)
MONOCYTES NFR BLD: 17 % (ref 2–12)
NEUTROPHILS NFR BLD: 63 % (ref 43–80)
NEUTS SEG NFR BLD: 4.37 K/UL (ref 1.8–7.3)
NITRITE UR QL STRIP: POSITIVE
O2 SATURATION: 97.8 % (ref 92–98.5)
O2HB: 96.4 % (ref 94–97)
OPERATOR ID: 2577
PATIENT TEMP: 37 C
PCO2: 43.2 MMHG (ref 35–45)
PH BLOOD GAS: 7.36 (ref 7.35–7.45)
PH UR STRIP: 6.5 [PH] (ref 5–9)
PLATELET, FLUORESCENCE: 117 K/UL (ref 130–450)
PMV BLD AUTO: 10.7 FL (ref 7–12)
PO2: 107.6 MMHG (ref 75–100)
POTASSIUM SERPL-SCNC: 3.6 MMOL/L (ref 3.5–5)
PROT SERPL-MCNC: 6.2 G/DL (ref 6.4–8.3)
PROT UR STRIP-MCNC: >=300 MG/DL
RBC # BLD AUTO: 3.96 M/UL (ref 3.5–5.5)
RBC #/AREA URNS HPF: ABNORMAL /HPF
SODIUM SERPL-SCNC: 138 MMOL/L (ref 132–146)
SOURCE, BLOOD GAS: ABNORMAL
SP GR UR STRIP: 1.02 (ref 1–1.03)
THB: 14.2 G/DL (ref 11.5–16.5)
TIME ANALYZED: 2112
TROPONIN I SERPL HS-MCNC: 34 NG/L (ref 0–9)
UROBILINOGEN UR STRIP-ACNC: 2 EU/DL (ref 0–1)
WBC #/AREA URNS HPF: ABNORMAL /HPF
WBC OTHER # BLD: 7 K/UL (ref 4.5–11.5)

## 2024-05-18 PROCEDURE — 96365 THER/PROPH/DIAG IV INF INIT: CPT

## 2024-05-18 PROCEDURE — 82805 BLOOD GASES W/O2 SATURATION: CPT

## 2024-05-18 PROCEDURE — 84484 ASSAY OF TROPONIN QUANT: CPT

## 2024-05-18 PROCEDURE — 80053 COMPREHEN METABOLIC PANEL: CPT

## 2024-05-18 PROCEDURE — 87899 AGENT NOS ASSAY W/OPTIC: CPT

## 2024-05-18 PROCEDURE — 6370000000 HC RX 637 (ALT 250 FOR IP)

## 2024-05-18 PROCEDURE — 83880 ASSAY OF NATRIURETIC PEPTIDE: CPT

## 2024-05-18 PROCEDURE — 96361 HYDRATE IV INFUSION ADD-ON: CPT

## 2024-05-18 PROCEDURE — 71045 X-RAY EXAM CHEST 1 VIEW: CPT

## 2024-05-18 PROCEDURE — 99285 EMERGENCY DEPT VISIT HI MDM: CPT

## 2024-05-18 PROCEDURE — 74177 CT ABD & PELVIS W/CONTRAST: CPT

## 2024-05-18 PROCEDURE — 87040 BLOOD CULTURE FOR BACTERIA: CPT

## 2024-05-18 PROCEDURE — 87449 NOS EACH ORGANISM AG IA: CPT

## 2024-05-18 PROCEDURE — 87154 CUL TYP ID BLD PTHGN 6+ TRGT: CPT

## 2024-05-18 PROCEDURE — 83605 ASSAY OF LACTIC ACID: CPT

## 2024-05-18 PROCEDURE — 96375 TX/PRO/DX INJ NEW DRUG ADDON: CPT

## 2024-05-18 PROCEDURE — 93005 ELECTROCARDIOGRAM TRACING: CPT

## 2024-05-18 PROCEDURE — 85025 COMPLETE CBC W/AUTO DIFF WBC: CPT

## 2024-05-18 PROCEDURE — 6360000002 HC RX W HCPCS

## 2024-05-18 PROCEDURE — 81001 URINALYSIS AUTO W/SCOPE: CPT

## 2024-05-18 PROCEDURE — 71275 CT ANGIOGRAPHY CHEST: CPT

## 2024-05-18 PROCEDURE — 2580000003 HC RX 258

## 2024-05-18 PROCEDURE — 2580000003 HC RX 258: Performed by: EMERGENCY MEDICINE

## 2024-05-18 RX ORDER — ACETAMINOPHEN 325 MG/1
650 TABLET ORAL ONCE
Status: COMPLETED | OUTPATIENT
Start: 2024-05-18 | End: 2024-05-18

## 2024-05-18 RX ORDER — 0.9 % SODIUM CHLORIDE 0.9 %
1000 INTRAVENOUS SOLUTION INTRAVENOUS ONCE
Status: COMPLETED | OUTPATIENT
Start: 2024-05-18 | End: 2024-05-18

## 2024-05-18 RX ORDER — SODIUM CHLORIDE 9 MG/ML
INJECTION, SOLUTION INTRAVENOUS CONTINUOUS
Status: DISCONTINUED | OUTPATIENT
Start: 2024-05-18 | End: 2024-05-19

## 2024-05-18 RX ORDER — 0.9 % SODIUM CHLORIDE 0.9 %
1000 INTRAVENOUS SOLUTION INTRAVENOUS ONCE
Status: COMPLETED | OUTPATIENT
Start: 2024-05-18 | End: 2024-05-19

## 2024-05-18 RX ADMIN — VANCOMYCIN HYDROCHLORIDE 1750 MG: 10 INJECTION, POWDER, LYOPHILIZED, FOR SOLUTION INTRAVENOUS at 23:36

## 2024-05-18 RX ADMIN — SODIUM CHLORIDE 1000 ML: 9 INJECTION, SOLUTION INTRAVENOUS at 23:36

## 2024-05-18 RX ADMIN — PIPERACILLIN AND TAZOBACTAM 4500 MG: 4; .5 INJECTION, POWDER, FOR SOLUTION INTRAVENOUS at 22:08

## 2024-05-18 RX ADMIN — ACETAMINOPHEN 650 MG: 325 TABLET ORAL at 20:49

## 2024-05-18 RX ADMIN — SODIUM CHLORIDE: 9 INJECTION, SOLUTION INTRAVENOUS at 21:34

## 2024-05-18 RX ADMIN — SODIUM CHLORIDE 1000 ML: 9 INJECTION, SOLUTION INTRAVENOUS at 21:35

## 2024-05-18 ASSESSMENT — PAIN - FUNCTIONAL ASSESSMENT: PAIN_FUNCTIONAL_ASSESSMENT: NONE - DENIES PAIN

## 2024-05-19 PROBLEM — J18.9 PNEUMONIA DUE TO ORGANISM: Status: ACTIVE | Noted: 2024-05-19

## 2024-05-19 LAB
ALBUMIN SERPL-MCNC: 2.9 G/DL (ref 3.5–5.2)
ALP SERPL-CCNC: 67 U/L (ref 35–104)
ALT SERPL-CCNC: 20 U/L (ref 0–32)
AST SERPL-CCNC: 17 U/L (ref 0–31)
B PARAP IS1001 DNA NPH QL NAA+NON-PROBE: NOT DETECTED
B PERT DNA SPEC QL NAA+PROBE: NOT DETECTED
BILIRUB DIRECT SERPL-MCNC: 0.4 MG/DL (ref 0–0.3)
BILIRUB INDIRECT SERPL-MCNC: 0.3 MG/DL (ref 0–1)
BILIRUB SERPL-MCNC: 0.7 MG/DL (ref 0–1.2)
C PNEUM DNA NPH QL NAA+NON-PROBE: NOT DETECTED
CA-I BLD-SCNC: 0.95 MMOL/L (ref 1.15–1.33)
FLUAV RNA NPH QL NAA+NON-PROBE: NOT DETECTED
FLUBV RNA NPH QL NAA+NON-PROBE: NOT DETECTED
HADV DNA NPH QL NAA+NON-PROBE: NOT DETECTED
HCOV 229E RNA NPH QL NAA+NON-PROBE: NOT DETECTED
HCOV HKU1 RNA NPH QL NAA+NON-PROBE: NOT DETECTED
HCOV NL63 RNA NPH QL NAA+NON-PROBE: NOT DETECTED
HCOV OC43 RNA NPH QL NAA+NON-PROBE: NOT DETECTED
HMPV RNA NPH QL NAA+NON-PROBE: NOT DETECTED
HPIV1 RNA NPH QL NAA+NON-PROBE: NOT DETECTED
HPIV2 RNA NPH QL NAA+NON-PROBE: NOT DETECTED
HPIV3 RNA NPH QL NAA+NON-PROBE: NOT DETECTED
HPIV4 RNA NPH QL NAA+NON-PROBE: NOT DETECTED
L PNEUMO1 AG UR QL IA.RAPID: NEGATIVE
LACTATE BLDV-SCNC: 0.9 MMOL/L (ref 0.5–1.9)
M PNEUMO DNA NPH QL NAA+NON-PROBE: NOT DETECTED
PROCALCITONIN SERPL-MCNC: 0.17 NG/ML (ref 0–0.08)
PROT SERPL-MCNC: 6.1 G/DL (ref 6.4–8.3)
RSV RNA NPH QL NAA+NON-PROBE: NOT DETECTED
RV+EV RNA NPH QL NAA+NON-PROBE: NOT DETECTED
S PNEUM AG SPEC QL: NEGATIVE
SARS-COV-2 RNA NPH QL NAA+NON-PROBE: NOT DETECTED
SPECIMEN DESCRIPTION: NORMAL
SPECIMEN SOURCE: NORMAL

## 2024-05-19 PROCEDURE — 2140000000 HC CCU INTERMEDIATE R&B

## 2024-05-19 PROCEDURE — 2580000003 HC RX 258: Performed by: FAMILY MEDICINE

## 2024-05-19 PROCEDURE — 6360000002 HC RX W HCPCS: Performed by: INTERNAL MEDICINE

## 2024-05-19 PROCEDURE — 80076 HEPATIC FUNCTION PANEL: CPT

## 2024-05-19 PROCEDURE — 0202U NFCT DS 22 TRGT SARS-COV-2: CPT

## 2024-05-19 PROCEDURE — 86738 MYCOPLASMA ANTIBODY: CPT

## 2024-05-19 PROCEDURE — 87086 URINE CULTURE/COLONY COUNT: CPT

## 2024-05-19 PROCEDURE — 99222 1ST HOSP IP/OBS MODERATE 55: CPT | Performed by: FAMILY MEDICINE

## 2024-05-19 PROCEDURE — 2580000003 HC RX 258: Performed by: INTERNAL MEDICINE

## 2024-05-19 PROCEDURE — 84145 PROCALCITONIN (PCT): CPT

## 2024-05-19 PROCEDURE — 83605 ASSAY OF LACTIC ACID: CPT

## 2024-05-19 PROCEDURE — 6360000002 HC RX W HCPCS: Performed by: FAMILY MEDICINE

## 2024-05-19 PROCEDURE — 82330 ASSAY OF CALCIUM: CPT

## 2024-05-19 PROCEDURE — 6360000004 HC RX CONTRAST MEDICATION: Performed by: RADIOLOGY

## 2024-05-19 PROCEDURE — 87081 CULTURE SCREEN ONLY: CPT

## 2024-05-19 RX ORDER — SODIUM CHLORIDE 9 MG/ML
INJECTION, SOLUTION INTRAVENOUS PRN
Status: DISCONTINUED | OUTPATIENT
Start: 2024-05-19 | End: 2024-05-23 | Stop reason: HOSPADM

## 2024-05-19 RX ORDER — LACTULOSE 10 G/15ML
20 SOLUTION ORAL DAILY
Status: DISCONTINUED | OUTPATIENT
Start: 2024-05-19 | End: 2024-05-23 | Stop reason: HOSPADM

## 2024-05-19 RX ORDER — SODIUM CHLORIDE 0.9 % (FLUSH) 0.9 %
5-40 SYRINGE (ML) INJECTION PRN
Status: DISCONTINUED | OUTPATIENT
Start: 2024-05-19 | End: 2024-05-23 | Stop reason: HOSPADM

## 2024-05-19 RX ORDER — POTASSIUM CHLORIDE 20 MEQ/1
40 TABLET, EXTENDED RELEASE ORAL
Status: DISCONTINUED | OUTPATIENT
Start: 2024-05-19 | End: 2024-05-23 | Stop reason: HOSPADM

## 2024-05-19 RX ORDER — POTASSIUM CHLORIDE 20 MEQ/1
40 TABLET, EXTENDED RELEASE ORAL PRN
Status: DISCONTINUED | OUTPATIENT
Start: 2024-05-19 | End: 2024-05-23 | Stop reason: HOSPADM

## 2024-05-19 RX ORDER — CIPROFLOXACIN 250 MG/1
250 TABLET, FILM COATED ORAL 2 TIMES DAILY
Status: ON HOLD | COMMUNITY
Start: 2024-05-17 | End: 2024-05-22 | Stop reason: HOSPADM

## 2024-05-19 RX ORDER — SODIUM CHLORIDE, SODIUM LACTATE, POTASSIUM CHLORIDE, AND CALCIUM CHLORIDE .6; .31; .03; .02 G/100ML; G/100ML; G/100ML; G/100ML
1000 INJECTION, SOLUTION INTRAVENOUS ONCE
Status: COMPLETED | OUTPATIENT
Start: 2024-05-19 | End: 2024-05-19

## 2024-05-19 RX ORDER — BISACODYL 10 MG
10 SUPPOSITORY, RECTAL RECTAL DAILY
Status: DISCONTINUED | OUTPATIENT
Start: 2024-05-19 | End: 2024-05-23 | Stop reason: HOSPADM

## 2024-05-19 RX ORDER — PHENAZOPYRIDINE HYDROCHLORIDE 100 MG/1
100 TABLET, FILM COATED ORAL 3 TIMES DAILY PRN
COMMUNITY

## 2024-05-19 RX ORDER — POLYETHYLENE GLYCOL 3350 17 G/17G
17 POWDER, FOR SOLUTION ORAL DAILY PRN
Status: DISCONTINUED | OUTPATIENT
Start: 2024-05-19 | End: 2024-05-23 | Stop reason: HOSPADM

## 2024-05-19 RX ORDER — ENOXAPARIN SODIUM 100 MG/ML
40 INJECTION SUBCUTANEOUS DAILY
Status: DISCONTINUED | OUTPATIENT
Start: 2024-05-19 | End: 2024-05-23 | Stop reason: HOSPADM

## 2024-05-19 RX ORDER — MAGNESIUM SULFATE IN WATER 40 MG/ML
2000 INJECTION, SOLUTION INTRAVENOUS PRN
Status: DISCONTINUED | OUTPATIENT
Start: 2024-05-19 | End: 2024-05-23 | Stop reason: HOSPADM

## 2024-05-19 RX ORDER — ASCORBIC ACID 500 MG
500 TABLET ORAL DAILY
Status: DISCONTINUED | OUTPATIENT
Start: 2024-05-19 | End: 2024-05-23 | Stop reason: HOSPADM

## 2024-05-19 RX ORDER — ACETAMINOPHEN 650 MG/1
650 SUPPOSITORY RECTAL EVERY 6 HOURS PRN
Status: DISCONTINUED | OUTPATIENT
Start: 2024-05-19 | End: 2024-05-23 | Stop reason: HOSPADM

## 2024-05-19 RX ORDER — ONDANSETRON 2 MG/ML
4 INJECTION INTRAMUSCULAR; INTRAVENOUS EVERY 6 HOURS PRN
Status: DISCONTINUED | OUTPATIENT
Start: 2024-05-19 | End: 2024-05-23 | Stop reason: HOSPADM

## 2024-05-19 RX ORDER — SODIUM CHLORIDE 0.9 % (FLUSH) 0.9 %
5-40 SYRINGE (ML) INJECTION EVERY 12 HOURS SCHEDULED
Status: DISCONTINUED | OUTPATIENT
Start: 2024-05-19 | End: 2024-05-23 | Stop reason: HOSPADM

## 2024-05-19 RX ORDER — SODIUM CHLORIDE 9 MG/ML
INJECTION, SOLUTION INTRAVENOUS CONTINUOUS
Status: DISCONTINUED | OUTPATIENT
Start: 2024-05-19 | End: 2024-05-21

## 2024-05-19 RX ORDER — CHOLECALCIFEROL (VITAMIN D3) 50 MCG
4000 TABLET ORAL 2 TIMES DAILY
Status: DISCONTINUED | OUTPATIENT
Start: 2024-05-19 | End: 2024-05-23 | Stop reason: HOSPADM

## 2024-05-19 RX ORDER — POTASSIUM CHLORIDE 7.45 MG/ML
10 INJECTION INTRAVENOUS PRN
Status: DISCONTINUED | OUTPATIENT
Start: 2024-05-19 | End: 2024-05-23 | Stop reason: HOSPADM

## 2024-05-19 RX ORDER — PANTOPRAZOLE SODIUM 40 MG/1
40 TABLET, DELAYED RELEASE ORAL
Status: DISCONTINUED | OUTPATIENT
Start: 2024-05-19 | End: 2024-05-23 | Stop reason: HOSPADM

## 2024-05-19 RX ORDER — GABAPENTIN 300 MG/1
300 CAPSULE ORAL 4 TIMES DAILY
Status: DISCONTINUED | OUTPATIENT
Start: 2024-05-19 | End: 2024-05-23 | Stop reason: HOSPADM

## 2024-05-19 RX ORDER — IBUPROFEN 800 MG/1
800 TABLET ORAL EVERY 8 HOURS PRN
Status: ON HOLD | COMMUNITY
End: 2024-05-22 | Stop reason: HOSPADM

## 2024-05-19 RX ORDER — ONDANSETRON 4 MG/1
4 TABLET, ORALLY DISINTEGRATING ORAL EVERY 8 HOURS PRN
Status: DISCONTINUED | OUTPATIENT
Start: 2024-05-19 | End: 2024-05-23 | Stop reason: HOSPADM

## 2024-05-19 RX ORDER — ACETAMINOPHEN 325 MG/1
650 TABLET ORAL EVERY 6 HOURS PRN
Status: DISCONTINUED | OUTPATIENT
Start: 2024-05-19 | End: 2024-05-23 | Stop reason: HOSPADM

## 2024-05-19 RX ORDER — CALCIUM CARBONATE 500 MG/1
1 TABLET, CHEWABLE ORAL 3 TIMES DAILY
Status: DISCONTINUED | OUTPATIENT
Start: 2024-05-19 | End: 2024-05-23 | Stop reason: HOSPADM

## 2024-05-19 RX ADMIN — PIPERACILLIN AND TAZOBACTAM 3375 MG: 3; .375 INJECTION, POWDER, LYOPHILIZED, FOR SOLUTION INTRAVENOUS at 15:58

## 2024-05-19 RX ADMIN — SODIUM CHLORIDE: 9 INJECTION, SOLUTION INTRAVENOUS at 21:45

## 2024-05-19 RX ADMIN — PIPERACILLIN AND TAZOBACTAM 4500 MG: 4; .5 INJECTION, POWDER, FOR SOLUTION INTRAVENOUS at 06:12

## 2024-05-19 RX ADMIN — IOPAMIDOL 75 ML: 755 INJECTION, SOLUTION INTRAVENOUS at 00:03

## 2024-05-19 RX ADMIN — SODIUM CHLORIDE, POTASSIUM CHLORIDE, SODIUM LACTATE AND CALCIUM CHLORIDE 1000 ML: 600; 310; 30; 20 INJECTION, SOLUTION INTRAVENOUS at 18:48

## 2024-05-19 RX ADMIN — ENOXAPARIN SODIUM 40 MG: 100 INJECTION SUBCUTANEOUS at 11:40

## 2024-05-19 NOTE — PROGRESS NOTES
HOSPITALIST PROGRESS NOTES     ADMITTING DATE AND TIME: 5/18/2024  8:24 PM  had concerns including Shortness of Breath (EMS called for possible sepsis , pt sat 86& room air. Arrived on 6L. Recent surg kidney stone removal).    ADMIT DX: Pneumonia due to organism [J18.9]    SYNOPSIS: 69-year-old lady presented from nursing facility due to concern for fever.  Temperature as high as 104.  Patient was found to hypoxic and present to the ER in the 80s.  Placed on 5 L oxygen.  Recently had right-sided ureteral stents placed 2 days ago.  In the ER she was febrile to 102.4 blood pressure as well as 98/65.  Urinalysis concerning for UTI with positive nitrite more leukocyte esterase 20-50 white blood cells 2+ bacteria.  CTA chest negative for PE but it showed concern for bronchoprovocation in the lower lobes concerning for pneumonia.  CT abdomen with nonobstructive renal calculi bilaterally right-sided ureteral stents in place and distended CBD measuring 10 mm.  LFTs within normal limits with ALT of 23, alk phos of 77, AST of 19 total bili of 0.7.  Lactic acid 0.8.  She was given vancomycin and Zosyn and IV fluid resuscitation.  On review of her records from the nursing facility she does have documented dysphagia though the patient denies any reports swallowing.     SUBJECTIVE:  Patient is being followed for Pneumonia due to organism [J18.9]     Patient was seen examined and evaluated  Recent lab results, charts and pertinent diagnostic imaging reviewed   Ancillary service notes reviewed   No apparent distress   No Abd pain     ROS: denies fever, chills, cp, sob, n/v, HA unless stated above.     ascorbic acid  500 mg Oral Daily    bisacodyl  10 mg Rectal Daily    calcium carbonate  1 tablet Oral TID    vitamin D  4,000 Units Oral BID    gabapentin  300 mg Oral 4x Daily    pantoprazole

## 2024-05-19 NOTE — PROGRESS NOTES
Pharmacy Consultation Note  (Antibiotic Dosing and Monitoring)    Initial consult date: 5/19/24  Consulting physician/provider: Dr. Cavanaugh  Drug: Vancomycin  Indication: HAP    Age/  Gender Height Weight IBW  Allergy Information   69 y.o./female 139.7 cm (4' 7\") 86.2 kg (190 lb)     Ideal body weight: 42.4 kg (93 lb 7 oz)  Adjusted ideal body weight: 59.9 kg (132 lb 1 oz)   Patient has no known allergies.      Renal Function:  Recent Labs     05/18/24 2045   BUN 22   CREATININE 0.8     No intake or output data in the 24 hours ending 05/19/24 0323    Vancomycin Monitoring:  Trough:  No results for input(s): \"VANCOTROUGH\" in the last 72 hours.  Random:  No results for input(s): \"VANCORANDOM\" in the last 72 hours.    Vancomycin Administration Times:  Recent vancomycin administrations                     vancomycin (VANCOCIN) 1,750 mg in sodium chloride 0.9 % 500 mL IVPB (mg) 1,750 mg New Bag 05/18/24 9747                    Assessment:  Patient is a 69 y.o. female who has been initiated on vancomycin  Estimated Creatinine Clearance: 62 mL/min (based on SCr of 0.8 mg/dL).  To dose vancomycin, pharmacy will be utilizing  trough based dosing    Plan:  Received 1750 mg load.  Start vancomycin 1250 mg IV every 24 hours  Will check vancomycin levels when appropriate  Will continue to monitor renal function   Pharmacy to follow    Thank you for your consult    Diana Rea PharmD BCPS 5/19/2024 3:23 AM

## 2024-05-19 NOTE — ED NOTES
Handoff report received from KAMILA Pennington. Pt care assumed at this time. Pt updated on plan of care

## 2024-05-19 NOTE — ED PROVIDER NOTES
[facility] EMERGENCY DEPARTMENT  EMERGENCY DEPARTMENT ENCOUNTER        Pt Name: Alena Busby  MRN: [id number]  Birthdate 1955  Date of evaluation: 5/18/2024  Provider: Dennis Payton DO  PCP: Vipin Aguayo MD  Note Started: 8:45 PM EDT 5/18/24    CHIEF COMPLAINT       Chief Complaint   Patient presents with    Shortness of Breath     EMS called for possible sepsis , pt sat 86& room air. Arrived on 6L. Recent surg kidney stone removal       HISTORY OF PRESENT ILLNESS: 1 or more Elements   History From: PATIENT, EMS    Limitations to history : None    Alena Busby is a 69 y.o. female arriving from nursing facility after they reported \"possible sepsis\".  Patient is febrile for EMS with a temperature as high as 104.  They started a liter of fluids due to a blood pressure systolic reading in the 90s.  Patient had a urological procedure done 2 days ago with Dr. Palacios.  Patient admits to shortness of breath.      Nursing Notes were all reviewed and agreed with or any disagreements were addressed in the HPI.    REVIEW OF SYSTEMS :      Review of Systems    POSITIVE (+): Shortness of breath, fever  NEGATIVE (-): Nausea, vomiting, abdominal pain, diarrhea, constipation, chest pain    SURGICAL HISTORY     Past Surgical History:   Procedure Laterality Date    CATARACT REMOVAL      CERVICAL FUSION N/A 6/3/2019    ANTERIOR CERVICAL FUSION C3-C4, C4-C5  AND CORPECTOMY C4  WITH PLATES, SCREWS, BONE GRAFT, SSEP -- GLOBUS performed by Daniele Daniels MD at [facility] OR       CURRENTMEDICATIONS       Previous Medications    ACETAMINOPHEN (TYLENOL) 325 MG TABLET    Take 2 tablets by mouth every 4 hours as needed for Pain or Fever    ASCORBIC ACID (VITAMIN C) 500 MG TABLET    Take 1 tablet by mouth daily    BISACODYL (DULCOLAX) 10 MG SUPPOSITORY    Place 1 suppository rectally daily    CALCIUM CARBONATE (TUMS) 500 MG CHEWABLE TABLET    Take 1 tablet by mouth 3 times daily

## 2024-05-19 NOTE — CONSULTS
[facility] CONSULT NOTE    Reason for Consult: Sepsis   Requested by: Familia Cavanaugh MD      Chief complaint: Fever    History Obtained From:  Patient and EMR    HISTORY OFPRESENT ILLNESS              The patient is a 69 y.o. female with history of arthritis, previously admitted from 02/21-03/01 for sepsis secondary to Proteus mirabilis bacteremia associated with pyelonephritis in the setting of bilateral staghorn calculi for which she received a 14-day course of antibiotic treatment (ceftriaxone then discharged on oral cotrimoxazole) until 03/08 and was recommended planned staged complex ureteroscopy to try to remove or at least debulk the stones when she is medically stable per Urology instead of ureteral stenting at that time as she is in poor health and would not tolerate percutaneous nephrolithotomy or ESWL, presented on 05/18 for fever. On admission, she was febrile up to 102.4 F with no leukocytosis.  CT chest, abdomen and pelvis showed bronchial opacification in lower lobes, right-sided ureteral stent in place with trace gas in the right lower calyces, nonobstructing renal calculi bilaterally, tiny stone in gallbladder neck, distended common bile duct up to 10 mm, intestinal malrotation with no bowel obstruction. Urinalysis showed pyuria of 21-50 WBCs. Urine Streptococcus pneumoniae and Legionella antigens were negative.  Piperacillin-tazobactam and vancomycin were started on admission.  ID service was subsequently consulted for further recommendations.    Past Medical History  Past Medical History:   Diagnosis Date    Arthritis     COVID-19     2/2022    Depression     Dysphagia, oropharyngeal phase     Headache     multiple within a month, 7/10 on the pain scale.    Hematuria, unspecified     Metabolic encephalopathy     Morbid obesity (HCC)     Muscle wasting and atrophy, not elsewhere classified, multiple sites     Other abnormalities of gait and mobility     Pneumonia     Sepsis (HCC)     Severe

## 2024-05-20 LAB
ALBUMIN SERPL-MCNC: 3 G/DL (ref 3.5–5.2)
ALP SERPL-CCNC: 67 U/L (ref 35–104)
ALT SERPL-CCNC: 16 U/L (ref 0–32)
AMMONIA PLAS-SCNC: 43 UMOL/L (ref 11–51)
ANION GAP SERPL CALCULATED.3IONS-SCNC: 13 MMOL/L (ref 7–16)
AST SERPL-CCNC: 13 U/L (ref 0–31)
BASOPHILS # BLD: 0.01 K/UL (ref 0–0.2)
BASOPHILS NFR BLD: 0 % (ref 0–2)
BILIRUB DIRECT SERPL-MCNC: 0.2 MG/DL (ref 0–0.3)
BILIRUB INDIRECT SERPL-MCNC: 0.3 MG/DL (ref 0–1)
BILIRUB SERPL-MCNC: 0.5 MG/DL (ref 0–1.2)
BUN SERPL-MCNC: 17 MG/DL (ref 6–23)
CALCIUM SERPL-MCNC: 6.6 MG/DL (ref 8.6–10.2)
CHLORIDE SERPL-SCNC: 107 MMOL/L (ref 98–107)
CO2 SERPL-SCNC: 20 MMOL/L (ref 22–29)
CREAT SERPL-MCNC: 0.6 MG/DL (ref 0.5–1)
EKG ATRIAL RATE: 76 BPM
EKG P AXIS: 67 DEGREES
EKG P-R INTERVAL: 166 MS
EKG Q-T INTERVAL: 384 MS
EKG QRS DURATION: 82 MS
EKG QTC CALCULATION (BAZETT): 432 MS
EKG R AXIS: -20 DEGREES
EKG T AXIS: 27 DEGREES
EKG VENTRICULAR RATE: 76 BPM
EOSINOPHIL # BLD: 0.25 K/UL (ref 0.05–0.5)
EOSINOPHILS RELATIVE PERCENT: 5 % (ref 0–6)
ERYTHROCYTE [DISTWIDTH] IN BLOOD BY AUTOMATED COUNT: 14.7 % (ref 11.5–15)
GFR, ESTIMATED: >90 ML/MIN/1.73M2
GLUCOSE SERPL-MCNC: 61 MG/DL (ref 74–99)
HCT VFR BLD AUTO: 38 % (ref 34–48)
HGB BLD-MCNC: 11.9 G/DL (ref 11.5–15.5)
IMM GRANULOCYTES # BLD AUTO: <0.03 K/UL (ref 0–0.58)
IMM GRANULOCYTES NFR BLD: 0 % (ref 0–5)
INR PPP: 1.1
LYMPHOCYTES NFR BLD: 1.35 K/UL (ref 1.5–4)
LYMPHOCYTES RELATIVE PERCENT: 26 % (ref 20–42)
MCH RBC QN AUTO: 30.1 PG (ref 26–35)
MCHC RBC AUTO-ENTMCNC: 31.3 G/DL (ref 32–34.5)
MCV RBC AUTO: 96.2 FL (ref 80–99.9)
MICROORGANISM SPEC CULT: NORMAL
MONOCYTES NFR BLD: 0.68 K/UL (ref 0.1–0.95)
MONOCYTES NFR BLD: 13 % (ref 2–12)
NEUTROPHILS NFR BLD: 56 % (ref 43–80)
NEUTS SEG NFR BLD: 2.91 K/UL (ref 1.8–7.3)
PLATELET # BLD AUTO: 126 K/UL (ref 130–450)
PMV BLD AUTO: 10.9 FL (ref 7–12)
POTASSIUM SERPL-SCNC: 3.8 MMOL/L (ref 3.5–5)
PROT SERPL-MCNC: 6.1 G/DL (ref 6.4–8.3)
PROTHROMBIN TIME: 11.6 SEC (ref 9.3–12.4)
RBC # BLD AUTO: 3.95 M/UL (ref 3.5–5.5)
SODIUM SERPL-SCNC: 140 MMOL/L (ref 132–146)
SPECIMEN DESCRIPTION: NORMAL
WBC OTHER # BLD: 5.2 K/UL (ref 4.5–11.5)

## 2024-05-20 PROCEDURE — 6370000000 HC RX 637 (ALT 250 FOR IP): Performed by: FAMILY MEDICINE

## 2024-05-20 PROCEDURE — 92610 EVALUATE SWALLOWING FUNCTION: CPT

## 2024-05-20 PROCEDURE — 97165 OT EVAL LOW COMPLEX 30 MIN: CPT

## 2024-05-20 PROCEDURE — 85025 COMPLETE CBC W/AUTO DIFF WBC: CPT

## 2024-05-20 PROCEDURE — 99232 SBSQ HOSP IP/OBS MODERATE 35: CPT | Performed by: STUDENT IN AN ORGANIZED HEALTH CARE EDUCATION/TRAINING PROGRAM

## 2024-05-20 PROCEDURE — 6360000002 HC RX W HCPCS: Performed by: INTERNAL MEDICINE

## 2024-05-20 PROCEDURE — 36415 COLL VENOUS BLD VENIPUNCTURE: CPT

## 2024-05-20 PROCEDURE — 97530 THERAPEUTIC ACTIVITIES: CPT

## 2024-05-20 PROCEDURE — 80053 COMPREHEN METABOLIC PANEL: CPT

## 2024-05-20 PROCEDURE — 2700000000 HC OXYGEN THERAPY PER DAY

## 2024-05-20 PROCEDURE — 2580000003 HC RX 258: Performed by: INTERNAL MEDICINE

## 2024-05-20 PROCEDURE — 85610 PROTHROMBIN TIME: CPT

## 2024-05-20 PROCEDURE — 2140000000 HC CCU INTERMEDIATE R&B

## 2024-05-20 PROCEDURE — 82140 ASSAY OF AMMONIA: CPT

## 2024-05-20 PROCEDURE — 97161 PT EVAL LOW COMPLEX 20 MIN: CPT

## 2024-05-20 PROCEDURE — 93010 ELECTROCARDIOGRAM REPORT: CPT | Performed by: INTERNAL MEDICINE

## 2024-05-20 PROCEDURE — 2580000003 HC RX 258: Performed by: FAMILY MEDICINE

## 2024-05-20 PROCEDURE — 82248 BILIRUBIN DIRECT: CPT

## 2024-05-20 PROCEDURE — 6360000002 HC RX W HCPCS: Performed by: FAMILY MEDICINE

## 2024-05-20 RX ADMIN — CALCIUM CARBONATE 500 MG: 500 TABLET, CHEWABLE ORAL at 21:47

## 2024-05-20 RX ADMIN — PIPERACILLIN AND TAZOBACTAM 3375 MG: 3; .375 INJECTION, POWDER, LYOPHILIZED, FOR SOLUTION INTRAVENOUS at 09:31

## 2024-05-20 RX ADMIN — SODIUM CHLORIDE, PRESERVATIVE FREE 10 ML: 5 INJECTION INTRAVENOUS at 21:48

## 2024-05-20 RX ADMIN — GABAPENTIN 300 MG: 300 CAPSULE ORAL at 21:47

## 2024-05-20 RX ADMIN — GABAPENTIN 300 MG: 300 CAPSULE ORAL at 16:25

## 2024-05-20 RX ADMIN — GABAPENTIN 300 MG: 300 CAPSULE ORAL at 12:31

## 2024-05-20 RX ADMIN — Medication 4000 UNITS: at 21:47

## 2024-05-20 RX ADMIN — PIPERACILLIN AND TAZOBACTAM 3375 MG: 3; .375 INJECTION, POWDER, LYOPHILIZED, FOR SOLUTION INTRAVENOUS at 00:43

## 2024-05-20 RX ADMIN — SODIUM CHLORIDE, PRESERVATIVE FREE 10 ML: 5 INJECTION INTRAVENOUS at 09:27

## 2024-05-20 RX ADMIN — PIPERACILLIN AND TAZOBACTAM 3375 MG: 3; .375 INJECTION, POWDER, LYOPHILIZED, FOR SOLUTION INTRAVENOUS at 16:28

## 2024-05-20 RX ADMIN — ENOXAPARIN SODIUM 40 MG: 100 INJECTION SUBCUTANEOUS at 09:27

## 2024-05-20 RX ADMIN — SODIUM CHLORIDE: 9 INJECTION, SOLUTION INTRAVENOUS at 21:48

## 2024-05-20 NOTE — PROGRESS NOTES
Physical Therapy  Initial Assessment     Name: Alena Busby  : 1955  MRN: [id number]      Date of Service: 2024    Evaluating PT: Edison Rico, PT, DPT [id number]      Room #:  6415/6415-A  Diagnosis:  Hypocalcemia [E83.51]  Pneumonia due to organism [J18.9]  Acute cystitis with hematuria [N30.01]  Acute respiratory failure with hypoxia (HCC) [J96.01]  Pneumonia of both lungs due to infectious organism, unspecified part of lung [J18.9]  PMHx/PSHx:   has a past medical history of Arthritis, COVID-19, Depression, Dysphagia, oropharyngeal phase, Headache, Hematuria, unspecified, Metabolic encephalopathy, Morbid obesity (HCC), Muscle wasting and atrophy, not elsewhere classified, multiple sites, Other abnormalities of gait and mobility, Pneumonia, Sepsis (HCC), Severe protein-calorie malnutrition (HCC), Unspecified dementia, unspecified severity, with other behavioral disturbance (HCC), Urinary tract infection, and Vitamin D deficiency.  Precautions:  Fall risk, O2, Continuous pulse ox, TAPS, PureWick, Alarm    SUBJECTIVE:    Pt was admitted from a nursing facility. Pt used dilan lift for transfers to/from .    OBJECTIVE:   Initial Evaluation  Date: 24 Treatment Date: Short Term/ Long Term   Goals   AM-PAC 6 Clicks      Was pt agreeable to Eval/treatment? Yes     Does pt have pain? Hands and feet pain     Bed Mobility  Rolling: NT  Supine to sit: MaxA x2  Sit to supine: MaxA x2  Scooting: MaxA to EOB  Rolling: Pedrito  Supine to sit: Pedrito  Sit to supine: Pedrito  Scooting: Pedrito   Transfers Sit to stand: NT  Stand to sit: NT  Stand pivot: NT  Sit to stand: ModA  Stand to sit: ModA  Stand pivot: MaxA with AAD   Ambulation   NT  >5 feet with WW with MaxA   Stair negotiation: ascended and descended NT  NA   ROM BUE: Refer to OT note  BLE: WFL     Strength BUE: Refer to OT note  BLE: NT     Balance Sitting EOB: Pedrito  Dynamic Standing: NT  Sitting EOB: Supervision  Dynamic Standing: MaxA with WW     Pt is  unreviewed at transfer on Sun May 19, 2024  7:14 AM    Familia Cavanaugh MD      Diagnosis:  Hypocalcemia [E83.51]  Pneumonia due to organism [J18.9]  Acute cystitis with hematuria [N30.01]  Acute respiratory failure with hypoxia (HCC) [J96.01]  Pneumonia of both lungs due to infectious organism, unspecified part of lung [J18.9]  Specific instructions for next treatment:  Progress activity    Current Treatment Recommendations:     [x] Strengthening to improve independence with functional mobility   [] ROM to improve independence with functional mobility   [x] Balance Training to improve static/dynamic balance and to reduce fall risk  [x] Endurance Training to improve activity tolerance during functional mobility   [x] Transfer Training to improve safety and independence with all functional transfers   [x] Gait Training to improve gait mechanics, endurance and assess need for appropriate assistive device  [] Stair Training in preparation for safe discharge home and/or into the community   [x] Positioning to prevent skin breakdown and contractures  [x] Safety and Education Training   [x] Patient/Caregiver Education   [] HEP  [] Other     PT long term treatment goals are located in above grid    Frequency of treatments: 2-5x/week x 1-2 weeks.    Time in  0740  Time out  0805    Total Treatment Time  10 minutes     Evaluation Time includes thorough review of current medical information, gathering information on past medical history/social history and prior level of function, completion of standardized testing/informal observation of tasks, assessment of data and education on plan of care and goals.    CPT codes:  [x] Low Complexity PT evaluation 73378  [] Moderate Complexity PT evaluation 01590  [] High Complexity PT evaluation 43386  [] PT Re-evaluation 94003  [] Gait training 06092 0 minutes  [] Manual therapy 58615 0 minutes  [x] Therapeutic activities 70856 10 minutes  [] Therapeutic exercises 33193 0 minutes  []

## 2024-05-20 NOTE — PROGRESS NOTES
LYNN PROGRESS NOTE      Chief complaint: Follow-up of sepsis    The patient is a 69 y.o. female with history of arthritis, previously admitted from 02/21-03/01 for sepsis secondary to Proteus mirabilis bacteremia associated with pyelonephritis in the setting of bilateral staghorn calculi for which she received a 14-day course of antibiotic treatment (ceftriaxone then discharged on oral cotrimoxazole) until 03/08 and was recommended planned staged complex ureteroscopy to try to remove or at least debulk the stones when she is medically stable per Urology instead of ureteral stenting at that time as she is in poor health and would not tolerate percutaneous nephrolithotomy or ESWL, presented on 05/18 for fever. On admission, she was febrile up to 102.4 F with no leukocytosis.  CT chest, abdomen and pelvis showed bronchial opacification in lower lobes, right-sided ureteral stent in place with trace gas in the right lower calyces, nonobstructing renal calculi bilaterally, tiny stone in gallbladder neck, distended common bile duct up to 10 mm, intestinal malrotation with no bowel obstruction. Urinalysis showed pyuria of 21-50 WBCs. Urine Streptococcus pneumoniae and Legionella antigens, respiratory pathogen PCR panel, MRSA nares culture were negative.  Piperacillin-tazobactam and vancomycin were started on admission.     Subjective: Patient was seen and examined. No chills, no abdominal pain, no diarrhea, no rash, no itching, has dysuria. Afebrile.      Objective:    Vitals:    05/20/24 1530   BP: (!) 122/53   Pulse: 69   Resp: 22   Temp: 97.9 °F (36.6 °C)   SpO2: 91%     Constitutional: Alert, not in distress  Respiratory: Clear breath sounds, no crackles, no wheezes  Cardiovascular: Regular rate and rhythm, no murmurs  Gastrointestinal: Bowel sounds present, soft, nontender  Skin: Warm and dry, no active dermatoses  Musculoskeletal: No joint swelling, no joint erythema    Labs, imaging, and medical records/notes were

## 2024-05-20 NOTE — PROGRESS NOTES
Patient's sister, Nadja, requesting an update from the attending. Notified Dr. Meza.    ARTEMIO CANSECO RN

## 2024-05-20 NOTE — PROGRESS NOTES
SPEECH/LANGUAGE PATHOLOGY  CLINICAL ASSESSMENT OF SWALLOWING FUNCTION   and PLAN OF CARE    PATIENT NAME:  Alena Busby  (female)     MRN:  [id number]    :  1955  (69 y.o.)  STATUS:  Inpatient: Room 6415/6415-A    TODAY'S DATE:  2024  REFERRING PROVIDER:     SPECIFIC PROVIDER ORDER: SLP swallowing-dysphagia evaluation and treatment Date of order:  24  REASON FOR REFERRAL: dysphagia   EVALUATING THERAPIST: PEG Kaur                 RESULTS:    DYSPHAGIA DIAGNOSIS:   Clinical indicators of mild  oropharyngeal phase dysphagia       DIET RECOMMENDATIONS:  Soft and bite size consistency solids (IDDSI level 6) with  thin liquids (IDDSI level 0)     FEEDING RECOMMENDATIONS:     Assistance level:  Set-up is required for all oral intake      Compensatory strategies recommended: NO STRAW      Discussed recommendations with:  patient nurse in person    SPEECH THERAPY  PLAN OF CARE   The dysphagia POC is established based on physician order, dysphagia diagnosis and results of clinical assessment     Dysphagia therapy is not recommended     Conditions Requiring Skilled Therapeutic Intervention for dysphagia:    Not applicable    Specific dysphagia interventions to include:     Not applicable no therapy warranted     Specific instructions for next treatment:  not applicable   Patient Treatment Goals:    Short Term Goals:  Not applicable no therapy warranted     Long Term Goals:   Not applicable no therapy warranted      Patient/family Goal:    not applicable                    ADMITTING DIAGNOSIS: Hypocalcemia [E83.51]  Pneumonia due to organism [J18.9]  Acute cystitis with hematuria [N30.01]  Acute respiratory failure with hypoxia (HCC) [J96.01]  Pneumonia of both lungs due to infectious organism, unspecified part of lung [J18.9]    VISIT DIAGNOSIS:   Visit Diagnoses         Codes    Acute cystitis with hematuria     N30.01    Hypocalcemia     E83.51    Acute respiratory failure with hypoxia (HCC)  education on plan of care and goals.    [x]The admitting diagnosis and active problem list, have been reviewed prior to initiation of this evaluation.        ACTIVE PROBLEM LIST:   Patient Active Problem List   Diagnosis    Right leg pain    Chronic pain of both shoulders    Right arm pain    Fusion of spine of cervical region    Electrolyte abnormality    Delirium    Septic shock (HCC)    Palliative care encounter    Goals of care, counseling/discussion    Pneumonia due to organism         CPT code:  15411  bedside swallow eval

## 2024-05-20 NOTE — PROGRESS NOTES
4 Eyes Skin Assessment     NAME:  Alena Busby  YOB: 1955  MEDICAL RECORD NUMBER:  [id number]    The patient is being assessed for  Admission    I agree that at least one RN has performed a thorough Head to Toe Skin Assessment on the patient. ALL assessment sites listed below have been assessed.      Areas assessed by both nurses:    Head, Face, Ears, Shoulders, Back, Chest, Arms, Elbows, Hands, Sacrum. Buttock, Coccyx, Ischium, Legs. Feet and Heels, and Under Medical Devices         Does the Patient have a Wound? No noted wound(s)       Ramon Prevention initiated by RN: Yes  Wound Care Orders initiated by RN: Yes    Pressure Injury (Stage 3,4, Unstageable, DTI, NWPT, and Complex wounds) if present, place Wound referral order by RN under : No    New Ostomies, if present place, Ostomy referral order under : No     Nurse 1 eSignature: Electronically signed by She Thorpe RN on 5/20/24 at 12:07 AM EDT    **SHARE this note so that the co-signing nurse can place an eSignature**    Nurse 2 eSignature: Electronically signed by Homa Stubbs RN on 5/20/24 at 3:03 AM EDT

## 2024-05-20 NOTE — PROGRESS NOTES
Spoke to lab about urine culture that was ordered. They stated they do see the order and they will add it on today.

## 2024-05-20 NOTE — CONSULTS
Advanced Endoscopy/Gastroenterology Consult Note  Neto Jean D.O.    Requesting physician: Dr. Lepe  Reason for Consult: Dilated CBD  Date:6:31 AM 5/20/2024    Alena Busby  69 y.o.  female    HPI:  Alena is a 69-year-old lady presented from [facility] with a temperature as high as 104 to the ER.  She was found to have acute respiratory failure with hypoxic and O2 sats were noted to be in the 80s. Per the chart she recently received right-sided ureteral stents placed ~4 days prior with Dr. Villasenor.  In the ER she was noted to again be afebile at 102.4; She underwent a CT a/p along with CTA of her chest for PE which was negative. CT abdomen with nonobstructive renal calculi bilaterally right-sided ureteral stents in place along with a dilated CBD measuring ~10 mm in size, for which GI was consulted to evaluate. There was no noted liver enzyme elevation hepatocellular nor cholestatic.  Lactic acid 0.8. Per the nursing facility she does have documented dysphagia though the patient denies any reports swallowing and is frustrated that they want to give her thickened liquids. Patient this morning denies any abdominal complaints, nausea, vomiting, abdominal pain, diarrhea, nor constipation.        PAST MEDICAL Hx:  Past Medical History:   Diagnosis Date    Arthritis     COVID-19     2/2022    Depression     Dysphagia, oropharyngeal phase     Headache     multiple within a month, 7/10 on the pain scale.    Hematuria, unspecified     Metabolic encephalopathy     Morbid obesity (HCC)     Muscle wasting and atrophy, not elsewhere classified, multiple sites     Other abnormalities of gait and mobility     Pneumonia     Sepsis (HCC)     Severe protein-calorie malnutrition (HCC)     Unspecified dementia, unspecified severity, with other behavioral disturbance (HCC)     Urinary tract infection     Vitamin D deficiency        PAST SURGICAL Hx:   Past Surgical History:   Procedure Laterality Date    CATARACT REMOVAL

## 2024-05-20 NOTE — PROGRESS NOTES
[facility] Hospitalist Progress Note    SYNOPSIS: Patient admitted on 2024 for Pneumonia due to organism  69-year-old lady presented from nursing facility due to concern for fever.  Temperature as high as 104.  Patient was found to hypoxic and present to the ER in the 80s.  Placed on 5 L oxygen.  Recently had right-sided ureteral stents placed 2 days ago.  In the ER she was febrile to 102.4 blood pressure as well as 98/65.  Urinalysis concerning for UTI with positive nitrite more leukocyte esterase 20-50 white blood cells 2+ bacteria.  CTA chest negative for PE but it showed concern for bronchoprovocation in the lower lobes concerning for pneumonia.  CT abdomen with nonobstructive renal calculi bilaterally right-sided ureteral stents in place and distended CBD measuring 10 mm.  LFTs within normal limits with ALT of 23, alk phos of 77, AST of 19 total bili of 0.7.  Lactic acid 0.8.  She was given vancomycin and Zosyn and IV fluid resuscitation.  On review of her records from the nursing facility she does have documented dysphagia though the patient denies any reports swallowing. For the concern of dilated CBD-GI was consulted; planning for MRI of abdomen today; patient is currently on zosyn per ID for the concern of pyelonephritis and pneumonia  Discussed with patient's sister ; she wanted evaluation for b/l LE weakness X2 years; neurology consulted   SUBJECTIVE:  Stable overnight. No other overnight issues reported.   Patient seen and examined  Records reviewed.           Temp (24hrs), Av.2 °F (36.8 °C), Min:97.8 °F (36.6 °C), Max:98.6 °F (37 °C)    DIET: ADULT DIET; Dysphagia - Soft and Bite Sized; No Drinking Straws  CODE: Full Code    Intake/Output Summary (Last 24 hours) at 2024 0813  Last data filed at 2024 0555  Gross per 24 hour   Intake 0 ml   Output 200 ml   Net -200 ml       Review of Systems  All bolded are positive; please see HPI  General:  Fever, chills, diaphoresis,

## 2024-05-20 NOTE — PROGRESS NOTES
Vancomycin has been discontinued   Clinical Pharmacy to sign-off  Physician to re-consult pharmacy if future dosing is needed    Thank you for the consult,  Jaimie Schilling, PharmD, BCPS 5/20/2024 1:50 PM

## 2024-05-21 ENCOUNTER — APPOINTMENT (OUTPATIENT)
Dept: MRI IMAGING | Age: 69
DRG: 871 | End: 2024-05-21
Payer: MEDICARE

## 2024-05-21 LAB
ALBUMIN SERPL-MCNC: 2.8 G/DL (ref 3.5–5.2)
ALP SERPL-CCNC: 67 U/L (ref 35–104)
ALT SERPL-CCNC: 13 U/L (ref 0–32)
ANION GAP SERPL CALCULATED.3IONS-SCNC: 14 MMOL/L (ref 7–16)
ANION GAP SERPL CALCULATED.3IONS-SCNC: 14 MMOL/L (ref 7–16)
AST SERPL-CCNC: 11 U/L (ref 0–31)
BASOPHILS # BLD: 0.02 K/UL (ref 0–0.2)
BASOPHILS NFR BLD: 0 % (ref 0–2)
BILIRUB DIRECT SERPL-MCNC: 0.2 MG/DL (ref 0–0.3)
BILIRUB INDIRECT SERPL-MCNC: 0.3 MG/DL (ref 0–1)
BILIRUB SERPL-MCNC: 0.5 MG/DL (ref 0–1.2)
BUN SERPL-MCNC: 10 MG/DL (ref 6–23)
BUN SERPL-MCNC: 10 MG/DL (ref 6–23)
CA-I BLD-SCNC: 1 MMOL/L (ref 1.15–1.33)
CALCIUM SERPL-MCNC: 6.7 MG/DL (ref 8.6–10.2)
CALCIUM SERPL-MCNC: 6.9 MG/DL (ref 8.6–10.2)
CHLORIDE SERPL-SCNC: 110 MMOL/L (ref 98–107)
CHLORIDE SERPL-SCNC: 111 MMOL/L (ref 98–107)
CO2 SERPL-SCNC: 20 MMOL/L (ref 22–29)
CO2 SERPL-SCNC: 20 MMOL/L (ref 22–29)
CREAT SERPL-MCNC: 0.6 MG/DL (ref 0.5–1)
CREAT SERPL-MCNC: 0.7 MG/DL (ref 0.5–1)
EOSINOPHIL # BLD: 0.21 K/UL (ref 0.05–0.5)
EOSINOPHILS RELATIVE PERCENT: 4 % (ref 0–6)
ERYTHROCYTE [DISTWIDTH] IN BLOOD BY AUTOMATED COUNT: 14.5 % (ref 11.5–15)
GFR, ESTIMATED: >90 ML/MIN/1.73M2
GFR, ESTIMATED: >90 ML/MIN/1.73M2
GLUCOSE SERPL-MCNC: 81 MG/DL (ref 74–99)
GLUCOSE SERPL-MCNC: 82 MG/DL (ref 74–99)
HCT VFR BLD AUTO: 38 % (ref 34–48)
HGB BLD-MCNC: 12.1 G/DL (ref 11.5–15.5)
IMM GRANULOCYTES # BLD AUTO: 0.03 K/UL (ref 0–0.58)
IMM GRANULOCYTES NFR BLD: 1 % (ref 0–5)
LYMPHOCYTES NFR BLD: 1.49 K/UL (ref 1.5–4)
LYMPHOCYTES RELATIVE PERCENT: 31 % (ref 20–42)
MCH RBC QN AUTO: 30.4 PG (ref 26–35)
MCHC RBC AUTO-ENTMCNC: 31.8 G/DL (ref 32–34.5)
MCV RBC AUTO: 95.5 FL (ref 80–99.9)
MICROORGANISM SPEC CULT: NO GROWTH
MONOCYTES NFR BLD: 0.44 K/UL (ref 0.1–0.95)
MONOCYTES NFR BLD: 9 % (ref 2–12)
NEUTROPHILS NFR BLD: 55 % (ref 43–80)
NEUTS SEG NFR BLD: 2.68 K/UL (ref 1.8–7.3)
PLATELET # BLD AUTO: 150 K/UL (ref 130–450)
PMV BLD AUTO: 10.5 FL (ref 7–12)
POTASSIUM SERPL-SCNC: 3.7 MMOL/L (ref 3.5–5)
POTASSIUM SERPL-SCNC: 3.9 MMOL/L (ref 3.5–5)
PROT SERPL-MCNC: 5.9 G/DL (ref 6.4–8.3)
RBC # BLD AUTO: 3.98 M/UL (ref 3.5–5.5)
SODIUM SERPL-SCNC: 144 MMOL/L (ref 132–146)
SODIUM SERPL-SCNC: 145 MMOL/L (ref 132–146)
SPECIMEN DESCRIPTION: NORMAL
WBC OTHER # BLD: 4.9 K/UL (ref 4.5–11.5)

## 2024-05-21 PROCEDURE — A9577 INJ MULTIHANCE: HCPCS | Performed by: RADIOLOGY

## 2024-05-21 PROCEDURE — 87086 URINE CULTURE/COLONY COUNT: CPT

## 2024-05-21 PROCEDURE — 6360000002 HC RX W HCPCS: Performed by: FAMILY MEDICINE

## 2024-05-21 PROCEDURE — 82330 ASSAY OF CALCIUM: CPT

## 2024-05-21 PROCEDURE — 2580000003 HC RX 258: Performed by: INTERNAL MEDICINE

## 2024-05-21 PROCEDURE — 82248 BILIRUBIN DIRECT: CPT

## 2024-05-21 PROCEDURE — 99232 SBSQ HOSP IP/OBS MODERATE 35: CPT | Performed by: STUDENT IN AN ORGANIZED HEALTH CARE EDUCATION/TRAINING PROGRAM

## 2024-05-21 PROCEDURE — 2580000003 HC RX 258: Performed by: FAMILY MEDICINE

## 2024-05-21 PROCEDURE — 74183 MRI ABD W/O CNTR FLWD CNTR: CPT

## 2024-05-21 PROCEDURE — 80048 BASIC METABOLIC PNL TOTAL CA: CPT

## 2024-05-21 PROCEDURE — 36415 COLL VENOUS BLD VENIPUNCTURE: CPT

## 2024-05-21 PROCEDURE — 6360000002 HC RX W HCPCS: Performed by: INTERNAL MEDICINE

## 2024-05-21 PROCEDURE — 85025 COMPLETE CBC W/AUTO DIFF WBC: CPT

## 2024-05-21 PROCEDURE — 2700000000 HC OXYGEN THERAPY PER DAY

## 2024-05-21 PROCEDURE — 6360000002 HC RX W HCPCS: Performed by: STUDENT IN AN ORGANIZED HEALTH CARE EDUCATION/TRAINING PROGRAM

## 2024-05-21 PROCEDURE — 6370000000 HC RX 637 (ALT 250 FOR IP): Performed by: FAMILY MEDICINE

## 2024-05-21 PROCEDURE — 2580000003 HC RX 258: Performed by: STUDENT IN AN ORGANIZED HEALTH CARE EDUCATION/TRAINING PROGRAM

## 2024-05-21 PROCEDURE — 80053 COMPREHEN METABOLIC PANEL: CPT

## 2024-05-21 PROCEDURE — 6360000004 HC RX CONTRAST MEDICATION: Performed by: RADIOLOGY

## 2024-05-21 PROCEDURE — 2140000000 HC CCU INTERMEDIATE R&B

## 2024-05-21 RX ADMIN — CALCIUM GLUCONATE 4000 MG: 98 INJECTION, SOLUTION INTRAVENOUS at 13:54

## 2024-05-21 RX ADMIN — PANTOPRAZOLE SODIUM 40 MG: 40 TABLET, DELAYED RELEASE ORAL at 05:46

## 2024-05-21 RX ADMIN — PIPERACILLIN AND TAZOBACTAM 3375 MG: 3; .375 INJECTION, POWDER, LYOPHILIZED, FOR SOLUTION INTRAVENOUS at 17:13

## 2024-05-21 RX ADMIN — Medication 4000 UNITS: at 21:24

## 2024-05-21 RX ADMIN — ENOXAPARIN SODIUM 40 MG: 100 INJECTION SUBCUTANEOUS at 09:36

## 2024-05-21 RX ADMIN — GADOBENATE DIMEGLUMINE 20 ML: 529 INJECTION, SOLUTION INTRAVENOUS at 19:59

## 2024-05-21 RX ADMIN — PIPERACILLIN AND TAZOBACTAM 3375 MG: 3; .375 INJECTION, POWDER, LYOPHILIZED, FOR SOLUTION INTRAVENOUS at 00:07

## 2024-05-21 RX ADMIN — BISACODYL 10 MG: 10 SUPPOSITORY RECTAL at 09:37

## 2024-05-21 RX ADMIN — SODIUM CHLORIDE, PRESERVATIVE FREE 10 ML: 5 INJECTION INTRAVENOUS at 21:26

## 2024-05-21 RX ADMIN — GABAPENTIN 300 MG: 300 CAPSULE ORAL at 21:24

## 2024-05-21 RX ADMIN — PIPERACILLIN AND TAZOBACTAM 3375 MG: 3; .375 INJECTION, POWDER, LYOPHILIZED, FOR SOLUTION INTRAVENOUS at 11:26

## 2024-05-21 RX ADMIN — SODIUM CHLORIDE, PRESERVATIVE FREE 10 ML: 5 INJECTION INTRAVENOUS at 09:37

## 2024-05-21 NOTE — CONSULTS
5/21/2024 5:03 PM  Alena Busby  [id number]     Chief Complaint:     recent h/o ureteral stent placement; concern for UTI     History of Present Illness:      The patient is a 69 y.o. female patient who presented to the hospital with complaints of shortness of breath and fever from a nursing facility.  She was found to have pneumonia and admitted for further evaluation.  Urology is asked to evaluate for recent ureteral stent placement and concern for UTI.      She is known to our practice and has a history of kidney stones.  She recently underwent a right ureteroscopy with laser lithotripsy and right stent insertion at [facility] approximately 1 week ago.  She is scheduled to have cystoscopy with right stent removal and left ureteroscopy with laser lithotripsy in about 2 to 3 weeks as an outpatient as well.  CT imaging shows a right ureteral stent to currently be in good position.  She is voiding with the use of external pure wick.    Past Medical History:   Diagnosis Date    Arthritis     COVID-19     2/2022    Depression     Dysphagia, oropharyngeal phase     Headache     multiple within a month, 7/10 on the pain scale.    Hematuria, unspecified     Metabolic encephalopathy     Morbid obesity (HCC)     Muscle wasting and atrophy, not elsewhere classified, multiple sites     Other abnormalities of gait and mobility     Pneumonia     Sepsis (HCC)     Severe protein-calorie malnutrition (HCC)     Unspecified dementia, unspecified severity, with other behavioral disturbance (HCC)     Urinary tract infection     Vitamin D deficiency          Past Surgical History:   Procedure Laterality Date    CATARACT REMOVAL      CERVICAL FUSION N/A 6/3/2019    ANTERIOR CERVICAL FUSION C3-C4, C4-C5  AND CORPECTOMY C4  WITH PLATES, SCREWS, BONE GRAFT, SSEP -- GLOBUS [place] [facility] [place]       Medications Prior to Admission:    Medications Prior to Admission: OXYGEN, Inhale 6 L/min into the lungs    ADDITIONAL FINDINGS: None.     IMPRESSION:  CHEST:     No acute pulmonary embolism identified.     Suggestion of bronchial opacification in the lower lobes may reflect  bronchitis, mucus/debris, aspiration.     A few tiny nodular densities measuring up to 5 mm.  Advise follow-up chest CT  in 12 months if there are risk factors for malignancy.     ABDOMEN/PELVIS:     Right-sided ureteral stent in place. Trace gas in the right lower calices.  Bladder appears thickened.  Correlate with urinalysis to evaluate for  infection.     Nonobstructive renal calculi bilaterally.     Suspect a tiny stone in the gallbladder neck.  Correlate with ultrasound if  there is any concern for cholecystitis.     Distended CBD measuring up to 10 mm.  Correlate with LFTs.  Can further  evaluate with MRCP if clinically indicated.     Findings of intestinal malrotation.  No bowel obstruction.          Assessment/plan:  Right renal calculi status post cystoscopy, retrograde pyelogram, right ureteroscopy, laser lithotripsy, right stent insertion approximately 1 week ago at [facility]  Nonobstructing left renal calculi    Creatinine stable  Urine culture from 5/19/2024 no growth  Repeat urine cultures pending  Blood cultures no growth  Antibiotics per infectious disease  CT abdomen pelvis reviewed shows right ureteral stent to be in appropriate position with nonobstructing left renal calculi.  Continue the right ureteral stent  She will undergo cystoscopy with right stent removal as well as left ureteroscopy with laser lithotripsy in the future as an outpatient in approximately 2 to 3 weeks.  No further acute interventions planned at this time  Will need definitive stone management outpatient    Electronically signed by EASTON Duncan CNP on 5/21/2024 at 5:03 PM  [facility]   Agree with above assessment and plan

## 2024-05-21 NOTE — PROGRESS NOTES
LYNN PROGRESS NOTE      Chief complaint: Follow-up of sepsis    The patient is a 69 y.o. female with history of arthritis, previously admitted from 02/21-03/01 for sepsis secondary to Proteus mirabilis bacteremia associated with pyelonephritis in the setting of bilateral staghorn calculi for which she received a 14-day course of antibiotic treatment (ceftriaxone then discharged on oral cotrimoxazole) until 03/08 and was recommended planned staged complex ureteroscopy to try to remove or at least debulk the stones when she is medically stable per Urology instead of ureteral stenting at that time as she is in poor health and would not tolerate percutaneous nephrolithotomy or ESWL, presented on 05/18 for fever. On admission, she was febrile up to 102.4 F with no leukocytosis.  CT chest, abdomen and pelvis showed bronchial opacification in lower lobes, right-sided ureteral stent in place with trace gas in the right lower calyces, nonobstructing renal calculi bilaterally, tiny stone in gallbladder neck, distended common bile duct up to 10 mm, intestinal malrotation with no bowel obstruction. Urinalysis showed pyuria of 21-50 WBCs. Urine Streptococcus pneumoniae and Legionella antigens, respiratory pathogen PCR panel, MRSA nares culture were negative.  Piperacillin-tazobactam and vancomycin were started on admission.     Subjective: Patient was seen and examined. No chills, no abdominal pain, no diarrhea, no rash, no itching, has dysuria. Afebrile.      Objective:    Vitals:    05/21/24 0845   BP: (!) 111/52   Pulse: 71   Resp: 18   Temp: 97.6 °F (36.4 °C)   SpO2: 95%     Constitutional: Alert, not in distress  Respiratory: Clear breath sounds, no crackles, no wheezes  Cardiovascular: Regular rate and rhythm, no murmurs  Gastrointestinal: Bowel sounds present, soft, nontender  Skin: Warm and dry, no active dermatoses  Musculoskeletal: No joint swelling, no joint erythema    Labs, imaging, and medical records/notes were

## 2024-05-21 NOTE — PROGRESS NOTES
Advanced Endoscopy/Gastroenterology Progress Note    By ASHLEY Teixeira  69 y.o.  female    SUBJECTIVE:  No complaints this morning  Patient comfortably resting    OBJECTIVE:  BP (!) 109/55   Pulse 68   Temp 98.3 °F (36.8 °C) (Axillary)   Resp 22   Ht 1.397 m (4' 7\")   Wt 86.2 kg (190 lb)   SpO2 94%   BMI 44.16 kg/m²   GEN: A&Ox3, Obese, NAD, fatigued with mild confusion  HEENT:PEERL, no icterus  Heart: RRR  Lungs: + rhonci/course BS with wheezing.   Abd.: soft, NT, ND, BS +, no G/R, no HSM  Extr.: no C/C/E, no brusing      Stool (measured) : 0 mL  Lab Results   Component Value Date/Time    WBC 5.2 05/20/2024 07:24 AM    WBC 7.0 05/18/2024 08:45 PM    WBC 5.4 03/01/2024 07:27 AM    HGB 11.9 05/20/2024 07:24 AM    HGB 12.0 05/18/2024 08:45 PM    HGB 12.1 03/01/2024 07:27 AM    HCT 38.0 05/20/2024 07:24 AM    MCV 96.2 05/20/2024 07:24 AM    RDW 14.7 05/20/2024 07:24 AM     05/20/2024 07:24 AM     03/01/2024 07:27 AM     02/29/2024 06:28 AM     Lab Results   Component Value Date/Time     05/20/2024 07:24 AM    K 3.8 05/20/2024 07:24 AM    K 4.0 02/03/2022 08:21 PM     05/20/2024 07:24 AM    CO2 20 05/20/2024 07:24 AM    BUN 17 05/20/2024 07:24 AM    CREATININE 0.6 05/20/2024 07:24 AM    CALCIUM 6.6 05/20/2024 07:24 AM    BILITOT 0.5 05/20/2024 07:24 AM    BILITOT 0.7 05/19/2024 11:49 AM    BILITOT 0.7 05/18/2024 08:45 PM    ALKPHOS 67 05/20/2024 07:24 AM    ALKPHOS 67 05/19/2024 11:49 AM    ALKPHOS 77 05/18/2024 08:45 PM    AST 13 05/20/2024 07:24 AM    AST 17 05/19/2024 11:49 AM    AST 19 05/18/2024 08:45 PM    ALT 16 05/20/2024 07:24 AM    ALT 20 05/19/2024 11:49 AM    ALT 23 05/18/2024 08:45 PM     Lab Results   Component Value Date/Time    LIPASE 5 02/21/2024 11:59 AM     No results found for: \"AMYLASE\"      ASSESSMENT/PLAN:  - Dilated CBD, ~10mm - appears chronic given prior imaging from 2022  - Thrombocytopenia  - Hx of Dysphagia, Oropharyngeal -

## 2024-05-21 NOTE — ACP (ADVANCE CARE PLANNING)
Advance Care Planning   The patient has the following advanced directives on file:  Advance Directives       Power of  Living Will ACP-Advance Directive ACP-Power of     Not on File Not on File Not on File Not on File            The patient has appointed the following active healthcare agents:    Primary Decision Maker: Artie Busby - Spouse - [phone]    The Patient has the following current code status:    Code Status: Full Code    Dee Haines, LSW  5/21/2024

## 2024-05-21 NOTE — CONSULTS
NEUROLOGY CONSULT NOTE      Requesting Physician: Sejal Meza MD    Reason for Consult:  Evaluate for weakness in lower extremities x 2 years    History of Present Illness:  Alena Busby is a 69 y.o. female  with h/o *** who was admitted to [facility] on 5/18/2024 with presentation of ***    Onset: ***   Frequency: ***  Description/Severity: ***  Timing/Duration: ***  Modifiers: ***   Therapy: ***   ED/Hospital Course: ***       Past Medical History:        Diagnosis Date    Arthritis     COVID-19     2/2022    Depression     Dysphagia, oropharyngeal phase     Headache     multiple within a month, 7/10 on the pain scale.    Hematuria, unspecified     Metabolic encephalopathy     Morbid obesity (HCC)     Muscle wasting and atrophy, not elsewhere classified, multiple sites     Other abnormalities of gait and mobility     Pneumonia     Sepsis (HCC)     Severe protein-calorie malnutrition (HCC)     Unspecified dementia, unspecified severity, with other behavioral disturbance (HCC)     Urinary tract infection     Vitamin D deficiency            Procedure Laterality Date    CATARACT REMOVAL      CERVICAL FUSION N/A 6/3/2019    ANTERIOR CERVICAL FUSION C3-C4, C4-C5  AND CORPECTOMY C4  WITH PLATES, SCREWS, BONE GRAFT, SSEP -- GLOBUS performed by Daniele Daniels MD at [facility] OR       Social History:  Social History     Tobacco Use   Smoking Status Every Day    Current packs/day: 0.50    Average packs/day: 0.5 packs/day for 20.0 years (10.0 ttl pk-yrs)    Types: Cigarettes    Passive exposure: Current   Smokeless Tobacco Never   Tobacco Comments    Smokes 1-2 cigarettes per day.     Social History     Substance and Sexual Activity   Alcohol Use No     Social History     Substance and Sexual Activity   Drug Use Never         Family History:   History reviewed. No pertinent family history.    Review of Systems:  All systems reviewed are negative except what is mentioned in history of present illness.  atherosclerotic change. PELVIS: The bladder is not well-distended but may be thickened. BONES: Degenerative changes of the spine. ADDITIONAL FINDINGS: None.     CHEST: No acute pulmonary embolism identified. Suggestion of bronchial opacification in the lower lobes may reflect bronchitis, mucus/debris, aspiration. A few tiny nodular densities measuring up to 5 mm.  Advise follow-up chest CT in 12 months if there are risk factors for malignancy. ABDOMEN/PELVIS: Right-sided ureteral stent in place. Trace gas in the right lower calices. Bladder appears thickened.  Correlate with urinalysis to evaluate for infection. Nonobstructive renal calculi bilaterally. Suspect a tiny stone in the gallbladder neck.  Correlate with ultrasound if there is any concern for cholecystitis. Distended CBD measuring up to 10 mm.  Correlate with LFTs.  Can further evaluate with MRCP if clinically indicated. Findings of intestinal malrotation.  No bowel obstruction.     XR CHEST PORTABLE    Result Date: 5/18/2024  EXAMINATION: ONE XRAY VIEW OF THE CHEST 5/18/2024 9:08 pm COMPARISON: Chest x-ray February 23, 2024 HISTORY: ORDERING SYSTEM PROVIDED HISTORY: short of breath and septic appearing TECHNOLOGIST PROVIDED HISTORY: Reason for exam:->short of breath and septic appearing FINDINGS: Patchy bilateral lower lung opacities with coarse lung markings and prominent vasculature.  Cardiomediastinal silhouette is enlarged.  No pneumothorax or pleural effusion.  Atherosclerotic vascular disease.  Degenerative change without acute osseous findings.  Stable cervical spine hardware.     Prominent vasculature, patchy opacities, and enlarged cardiomediastinal silhouette suggestive of pulmonary edema in the setting of CHF, atypical infection can have a similar appearance.       The patient's records from referring provider and available information in the EHR was reviewed.    I have personally reviewed the following studies: {reviews:19574}

## 2024-05-21 NOTE — PROGRESS NOTES
Patient was unable to hold her breath for the MRI exam. Utilized non-breath hold protocol. Best images obtained

## 2024-05-21 NOTE — PROGRESS NOTES
Patient turned and repositioned in bed. Patient requested to remove EVA boots for a short bit and pillow placed under heels to offload from bed. Patient denies any pain at present time and discussed with patient the NPO order that the doctor had placed for MRI testing. Patient verbalized understanding. Will continue to monitor patient.

## 2024-05-21 NOTE — PLAN OF CARE
Problem: Discharge Planning  Goal: Discharge to home or other facility with appropriate resources  Outcome: Progressing  Flowsheets  Taken 5/21/2024 0100 by Deedee Samuel RN  Discharge to home or other facility with appropriate resources:   Identify barriers to discharge with patient and caregiver   Arrange for needed discharge resources and transportation as appropriate   Identify discharge learning needs (meds, wound care, etc)   Refer to discharge planning if patient needs post-hospital services based on physician order or complex needs related to functional status, cognitive ability or social support system   Arrange for interpreters to assist at discharge as needed  Taken 5/20/2024 2000 by Loida Foss RN  Discharge to home or other facility with appropriate resources: Identify barriers to discharge with patient and caregiver     Problem: Skin/Tissue Integrity  Goal: Absence of new skin breakdown  Description: 1.  Monitor for areas of redness and/or skin breakdown  2.  Assess vascular access sites hourly  3.  Every 4-6 hours minimum:  Change oxygen saturation probe site  4.  Every 4-6 hours:  If on nasal continuous positive airway pressure, respiratory therapy assess nares and determine need for appliance change or resting period.  Outcome: Progressing     Problem: Safety - Adult  Goal: Free from fall injury  Outcome: Progressing  Flowsheets (Taken 5/21/2024 0248)  Free From Fall Injury: Instruct family/caregiver on patient safety

## 2024-05-21 NOTE — PLAN OF CARE
Problem: Discharge Planning  Goal: Discharge to home or other facility with appropriate resources  5/21/2024 0903 by Paulette Woodson RN  Outcome: Progressing  Flowsheets (Taken 5/21/2024 0858)  Discharge to home or other facility with appropriate resources: Identify barriers to discharge with patient and caregiver  5/21/2024 0249 by Deedee Samuel RN  Outcome: Progressing  Flowsheets  Taken 5/21/2024 0100 by Deedee Samuel RN  Discharge to home or other facility with appropriate resources:   Identify barriers to discharge with patient and caregiver   Arrange for needed discharge resources and transportation as appropriate   Identify discharge learning needs (meds, wound care, etc)   Refer to discharge planning if patient needs post-hospital services based on physician order or complex needs related to functional status, cognitive ability or social support system   Arrange for interpreters to assist at discharge as needed  Taken 5/20/2024 2000 by Loida Foss RN  Discharge to home or other facility with appropriate resources: Identify barriers to discharge with patient and caregiver     Problem: Skin/Tissue Integrity  Goal: Absence of new skin breakdown  Description: 1.  Monitor for areas of redness and/or skin breakdown  2.  Assess vascular access sites hourly  3.  Every 4-6 hours minimum:  Change oxygen saturation probe site  4.  Every 4-6 hours:  If on nasal continuous positive airway pressure, respiratory therapy assess nares and determine need for appliance change or resting period.  5/21/2024 0903 by Paulette Woodson RN  Outcome: Progressing  5/21/2024 0249 by Deedee Samuel RN  Outcome: Progressing     Problem: Safety - Adult  Goal: Free from fall injury  5/21/2024 0903 by Paulette Woodson RN  Outcome: Progressing  5/21/2024 0249 by Deedee Samuel RN  Outcome: Progressing  Flowsheets (Taken 5/21/2024 0248)  Free From Fall Injury: Instruct family/caregiver on patient safety

## 2024-05-21 NOTE — PROGRESS NOTES
weight loss  Psychological:  Anxiety, disorientation, hallucinations.  ENT:  Epistaxis, headaches, vertigo, visual changes.  Cardiovascular:  Chest pain, irregular heartbeats, palpitations, paroxysmal nocturnal dyspnea.  Respiratory:  Shortness of breath, coughing, sputum production, hemoptysis, wheezing, orthopnea.  Gastrointestinal:  Nausea, vomiting, diarrhea, heartburn, constipation, abdominal pain, hematemesis, hematochezia, melena, acholic stools  Genito-Urinary:  Dysuria, urgency, frequency, hematuria  Musculoskeletal:  Joint pain, joint stiffness, joint swelling, muscle pain  Neurology:  Headache, focal neurological deficits, weakness, numbness, paresthesia  Derm:  Rashes, ulcers, excoriations, bruising  Extremities:  Decreased ROM, peripheral edema, mottling      OBJECTIVE:    BP (!) 142/58   Pulse 68   Temp 97.8 °F (36.6 °C) (Oral)   Resp 18   Ht 1.397 m (4' 7\")   Wt 86.2 kg (190 lb)   SpO2 95%   BMI 44.16 kg/m²     General appearance:  awake, alert, and oriented to person, place, time, and purpose; appears stated age and cooperative; no apparent distress no labored breathing  HEENT:  Conjunctivae/corneas clear.   Neck: Supple. No jugular venous distention.   Respiratory: symmetrical; clear to auscultation bilaterally; no wheezes; no rhonchi; no rales  Cardiovascular: rhythm regular; rate controlled; no murmurs  Abdomen: Soft, nontender, nondistended  Extremities:  peripheral pulses present; no peripheral edema; no ulcers  Musculoskeletal: No clubbing, cyanosis, no bilateral lower extremity edema. Brisk capillary refill.   Skin:  No rashes  on visible skin  Neurologic: awake, alert and following commands     ASSESSMENT and PLAN:    Sepsis-resolved   pneumonia versus pyelonephritis  Febrile, leukocytosis-resolved ; pt has been borderline hypotensive which is her baseline  Continue IV fluid resuscitation  negative respiratory Gram stain and culture   urine and blood culture-NGTD  Recent right urinary  stent placement ; urology consulted  ID on board; on iv zosyn  Monitor vitals     Acute hypoxic respiratory failure  Secondary to pneumonia  On 1 Lo2 by NC  Continue antibiotic  Monitor respiratory status     Common bile duct 10 mm  No right upper quadrant pain, liver function test stable  GI on board  MRI of abdomen ordered     History of dysphagia  Able to tolerate diet  Monitor for aspiration           DVT Prophylaxis [] Lovenox, []  Heparin, [] SCDs, [] Ambulation   GI Prophylaxis [] PPI,  [] H2 Blocker,  [] Carafate,  [] Diet/Tube Feeds   Disposition Patient requires continued admission due to pending MRI of abdomen   MDM [] Low, [] Moderate,[]  High       Medications:  REVIEWED DAILY    Infusion Medications    sodium chloride       Scheduled Medications    calcium gluconate  4,000 mg IntraVENous Once    ascorbic acid  500 mg Oral Daily    bisacodyl  10 mg Rectal Daily    calcium carbonate  1 tablet Oral TID    vitamin D  4,000 Units Oral BID    gabapentin  300 mg Oral 4x Daily    pantoprazole  40 mg Oral QAM AC    lactulose  20 g Oral Daily    potassium chloride  40 mEq Oral Daily with breakfast    sodium chloride flush  5-40 mL IntraVENous 2 times per day    enoxaparin  40 mg SubCUTAneous Daily    piperacillin-tazobactam  3,375 mg IntraVENous Q8H     PRN Meds: magnesium hydroxide, sodium chloride flush, sodium chloride, potassium chloride **OR** potassium alternative oral replacement **OR** potassium chloride, magnesium sulfate, ondansetron **OR** ondansetron, polyethylene glycol, acetaminophen **OR** acetaminophen    Labs:     Recent Labs     05/18/24  2045 05/20/24  0724 05/21/24  0833   WBC 7.0 5.2 4.9   HGB 12.0 11.9 12.1   HCT 37.0 38.0 38.0   PLT  --  126* 150       Recent Labs     05/20/24  0724 05/21/24  0807 05/21/24  0833    145 144   K 3.8 3.9 3.7    111* 110*   CO2 20* 20* 20*   BUN 17 10 10   CREATININE 0.6 0.7 0.6   CALCIUM 6.6* 6.9* 6.7*       Recent Labs     05/19/24  1149

## 2024-05-21 NOTE — DISCHARGE INSTR - COC
weight bearing restrictions  Other Medical Equipment (for information only, NOT a DME order):  none  Other Treatments: none      Patient's personal belongings (please select all that are sent with patient):  None    RN SIGNATURE:  Electronically signed by Sneha Cordova RN on 5/22/24 at 7:02 PM EDT    CASE MANAGEMENT/SOCIAL WORK SECTION    Inpatient Status Date: 05/19/24    Readmission Risk Assessment Score:  Readmission Risk              Risk of Unplanned Readmission:  11           Discharging to Facility/ Agency   Name: [facility]   Address: [address] [place] [address]  Phone: [phone]  Fax: [phone]    Dialysis Facility (if applicable)   Name:  Address:  Dialysis Schedule:  Phone:  Fax:    / signature: Electronically signed by LESLY Talavera on 5/21/24 at 5:20 PM EDT    PHYSICIAN SECTION    Prognosis: {Prognosis:5547810239}    Condition at Discharge: { Patient Condition:485571652}    Rehab Potential (if transferring to Rehab): {Prognosis:6445821422}    Recommended Labs or Other Treatments After Discharge: ***    Physician Certification: I certify the above information and transfer of Alena Busby  is necessary for the continuing treatment of the diagnosis listed and that she requires Intermediate Nursing Care for greater 30 days.     Update Admission H&P: {CHP DME Changes in HandP:335577256}    PHYSICIAN SIGNATURE:  {Esignature:[id number]}

## 2024-05-22 LAB
ANION GAP SERPL CALCULATED.3IONS-SCNC: 15 MMOL/L (ref 7–16)
BASOPHILS # BLD: 0.05 K/UL (ref 0–0.2)
BASOPHILS NFR BLD: 1 % (ref 0–2)
BUN SERPL-MCNC: 7 MG/DL (ref 6–23)
CA-I BLD-SCNC: 1.03 MMOL/L (ref 1.15–1.33)
CALCIUM SERPL-MCNC: 7.6 MG/DL (ref 8.6–10.2)
CHLORIDE SERPL-SCNC: 108 MMOL/L (ref 98–107)
CO2 SERPL-SCNC: 22 MMOL/L (ref 22–29)
CREAT SERPL-MCNC: 0.6 MG/DL (ref 0.5–1)
EOSINOPHIL # BLD: 0.25 K/UL (ref 0.05–0.5)
EOSINOPHILS RELATIVE PERCENT: 4 % (ref 0–6)
ERYTHROCYTE [DISTWIDTH] IN BLOOD BY AUTOMATED COUNT: 14.5 % (ref 11.5–15)
GFR, ESTIMATED: >90 ML/MIN/1.73M2
GLUCOSE SERPL-MCNC: 79 MG/DL (ref 74–99)
HCT VFR BLD AUTO: 39.8 % (ref 34–48)
HGB BLD-MCNC: 12.9 G/DL (ref 11.5–15.5)
LYMPHOCYTES NFR BLD: 1.19 K/UL (ref 1.5–4)
LYMPHOCYTES RELATIVE PERCENT: 21 % (ref 20–42)
MCH RBC QN AUTO: 30.8 PG (ref 26–35)
MCHC RBC AUTO-ENTMCNC: 32.4 G/DL (ref 32–34.5)
MCV RBC AUTO: 95 FL (ref 80–99.9)
MICROORGANISM SPEC CULT: NO GROWTH
MONOCYTES NFR BLD: 0.55 K/UL (ref 0.1–0.95)
MONOCYTES NFR BLD: 10 % (ref 2–12)
NEUTROPHILS NFR BLD: 64 % (ref 43–80)
NEUTS SEG NFR BLD: 3.67 K/UL (ref 1.8–7.3)
PLATELET # BLD AUTO: 159 K/UL (ref 130–450)
PMV BLD AUTO: 10.3 FL (ref 7–12)
POTASSIUM SERPL-SCNC: 3.6 MMOL/L (ref 3.5–5)
RBC # BLD AUTO: 4.19 M/UL (ref 3.5–5.5)
RBC # BLD: ABNORMAL 10*6/UL
SODIUM SERPL-SCNC: 145 MMOL/L (ref 132–146)
SPECIMEN DESCRIPTION: NORMAL
WBC OTHER # BLD: 5.7 K/UL (ref 4.5–11.5)

## 2024-05-22 PROCEDURE — 2580000003 HC RX 258: Performed by: FAMILY MEDICINE

## 2024-05-22 PROCEDURE — 85025 COMPLETE CBC W/AUTO DIFF WBC: CPT

## 2024-05-22 PROCEDURE — 99239 HOSP IP/OBS DSCHRG MGMT >30: CPT | Performed by: STUDENT IN AN ORGANIZED HEALTH CARE EDUCATION/TRAINING PROGRAM

## 2024-05-22 PROCEDURE — 2140000000 HC CCU INTERMEDIATE R&B

## 2024-05-22 PROCEDURE — 94640 AIRWAY INHALATION TREATMENT: CPT

## 2024-05-22 PROCEDURE — 6370000000 HC RX 637 (ALT 250 FOR IP): Performed by: STUDENT IN AN ORGANIZED HEALTH CARE EDUCATION/TRAINING PROGRAM

## 2024-05-22 PROCEDURE — 2700000000 HC OXYGEN THERAPY PER DAY

## 2024-05-22 PROCEDURE — 6360000002 HC RX W HCPCS: Performed by: INTERNAL MEDICINE

## 2024-05-22 PROCEDURE — 36415 COLL VENOUS BLD VENIPUNCTURE: CPT

## 2024-05-22 PROCEDURE — 6370000000 HC RX 637 (ALT 250 FOR IP): Performed by: FAMILY MEDICINE

## 2024-05-22 PROCEDURE — 82330 ASSAY OF CALCIUM: CPT

## 2024-05-22 PROCEDURE — 2580000003 HC RX 258: Performed by: INTERNAL MEDICINE

## 2024-05-22 PROCEDURE — 6360000002 HC RX W HCPCS: Performed by: FAMILY MEDICINE

## 2024-05-22 PROCEDURE — 94664 DEMO&/EVAL PT USE INHALER: CPT

## 2024-05-22 PROCEDURE — 80048 BASIC METABOLIC PNL TOTAL CA: CPT

## 2024-05-22 RX ORDER — IPRATROPIUM BROMIDE AND ALBUTEROL SULFATE 2.5; .5 MG/3ML; MG/3ML
1 SOLUTION RESPIRATORY (INHALATION)
Status: DISCONTINUED | OUTPATIENT
Start: 2024-05-22 | End: 2024-05-23 | Stop reason: HOSPADM

## 2024-05-22 RX ORDER — PREDNISONE 20 MG/1
40 TABLET ORAL DAILY
Status: DISCONTINUED | OUTPATIENT
Start: 2024-05-22 | End: 2024-05-23 | Stop reason: HOSPADM

## 2024-05-22 RX ORDER — GUAIFENESIN 400 MG/1
400 TABLET ORAL 3 TIMES DAILY
Status: DISCONTINUED | OUTPATIENT
Start: 2024-05-22 | End: 2024-05-23 | Stop reason: HOSPADM

## 2024-05-22 RX ORDER — GUAIFENESIN 400 MG/1
400 TABLET ORAL 3 TIMES DAILY
Qty: 56 TABLET | Refills: 0 | Status: SHIPPED | OUTPATIENT
Start: 2024-05-22

## 2024-05-22 RX ORDER — PREDNISONE 20 MG/1
40 TABLET ORAL DAILY
Qty: 8 TABLET | Refills: 0 | Status: SHIPPED | OUTPATIENT
Start: 2024-05-23 | End: 2024-05-27

## 2024-05-22 RX ADMIN — ENOXAPARIN SODIUM 40 MG: 100 INJECTION SUBCUTANEOUS at 08:50

## 2024-05-22 RX ADMIN — GUAIFENESIN 400 MG: 400 TABLET ORAL at 13:50

## 2024-05-22 RX ADMIN — BISACODYL 10 MG: 10 SUPPOSITORY RECTAL at 08:52

## 2024-05-22 RX ADMIN — PIPERACILLIN AND TAZOBACTAM 3375 MG: 3; .375 INJECTION, POWDER, LYOPHILIZED, FOR SOLUTION INTRAVENOUS at 00:38

## 2024-05-22 RX ADMIN — Medication 4000 UNITS: at 21:10

## 2024-05-22 RX ADMIN — GUAIFENESIN 400 MG: 400 TABLET ORAL at 10:30

## 2024-05-22 RX ADMIN — PIPERACILLIN AND TAZOBACTAM 3375 MG: 3; .375 INJECTION, POWDER, LYOPHILIZED, FOR SOLUTION INTRAVENOUS at 09:54

## 2024-05-22 RX ADMIN — PANTOPRAZOLE SODIUM 40 MG: 40 TABLET, DELAYED RELEASE ORAL at 05:18

## 2024-05-22 RX ADMIN — GUAIFENESIN 400 MG: 400 TABLET ORAL at 21:10

## 2024-05-22 RX ADMIN — IPRATROPIUM BROMIDE AND ALBUTEROL SULFATE 1 DOSE: 2.5; .5 SOLUTION RESPIRATORY (INHALATION) at 12:06

## 2024-05-22 RX ADMIN — GABAPENTIN 300 MG: 300 CAPSULE ORAL at 21:10

## 2024-05-22 RX ADMIN — PIPERACILLIN AND TAZOBACTAM 3375 MG: 3; .375 INJECTION, POWDER, LYOPHILIZED, FOR SOLUTION INTRAVENOUS at 17:42

## 2024-05-22 RX ADMIN — LACTULOSE 20 G: 20 SOLUTION ORAL at 08:52

## 2024-05-22 RX ADMIN — GABAPENTIN 300 MG: 300 CAPSULE ORAL at 13:49

## 2024-05-22 RX ADMIN — GABAPENTIN 300 MG: 300 CAPSULE ORAL at 08:51

## 2024-05-22 RX ADMIN — SODIUM CHLORIDE, PRESERVATIVE FREE 10 ML: 5 INJECTION INTRAVENOUS at 08:50

## 2024-05-22 RX ADMIN — SODIUM CHLORIDE, PRESERVATIVE FREE 10 ML: 5 INJECTION INTRAVENOUS at 21:11

## 2024-05-22 RX ADMIN — IPRATROPIUM BROMIDE AND ALBUTEROL SULFATE 1 DOSE: 2.5; .5 SOLUTION RESPIRATORY (INHALATION) at 19:52

## 2024-05-22 RX ADMIN — POTASSIUM CHLORIDE 40 MEQ: 1500 TABLET, EXTENDED RELEASE ORAL at 08:51

## 2024-05-22 RX ADMIN — GABAPENTIN 300 MG: 300 CAPSULE ORAL at 17:28

## 2024-05-22 RX ADMIN — PREDNISONE 40 MG: 20 TABLET ORAL at 10:29

## 2024-05-22 RX ADMIN — IPRATROPIUM BROMIDE AND ALBUTEROL SULFATE 1 DOSE: 2.5; .5 SOLUTION RESPIRATORY (INHALATION) at 16:05

## 2024-05-22 RX ADMIN — Medication 4000 UNITS: at 08:50

## 2024-05-22 NOTE — PROGRESS NOTES
5/22/2024 9:35 AM  Service: Urology  Group: [facility] ([facility])    Alena Busby  [id number]    Subjective:    Temperature is 98  She is somnolent but arousable  Bronchospasm  Very frail looking  Review of Systems  Respiratory: negative  Cardiovascular: negative  Gastrointestinal: negative  Hematologic/lymphatic: negative  Musculoskeletal:negative  Neurological: negative  Endocrine: negative    Scheduled Meds:   ascorbic acid  500 mg Oral Daily    bisacodyl  10 mg Rectal Daily    calcium carbonate  1 tablet Oral TID    vitamin D  4,000 Units Oral BID    gabapentin  300 mg Oral 4x Daily    pantoprazole  40 mg Oral QAM AC    lactulose  20 g Oral Daily    potassium chloride  40 mEq Oral Daily with breakfast    sodium chloride flush  5-40 mL IntraVENous 2 times per day    enoxaparin  40 mg SubCUTAneous Daily    piperacillin-tazobactam  3,375 mg IntraVENous Q8H       Objective:  Vitals:    05/22/24 0830   BP: (!) 106/58   Pulse: 70   Resp: 24   Temp: 98 °F (36.7 °C)   SpO2: 95%         Allergies: Patient has no known allergies.    General Appearance: alert and oriented to person, place and time and in no acute distress  Skin: no rash or erythema  Head: normocephalic and atraumatic  Pulmonary/Chest: clear to auscultation bilaterally- no wheezes, rales or rhonchi, normal air movement, no respiratory distress and no chest wall tenderness  Abdomen: soft, non-tender, non-distended, normal bowel sounds, no masses or organomegaly and no inguinal adenopathy  Genitourinary: genitals normal without hernia or inguinal adenopathy  Extremities: no cyanosis, clubbing or edema and Demetrius's sign negative bilaterally  Musculoskeletal: no swollen joints and no trigger point or muscular tenderness      Labs:     Recent Labs     05/22/24  0502      K 3.6   *   CO2 22   BUN 7   CREATININE 0.6   GLUCOSE 79   CALCIUM 7.6*       Lab Results   Component Value Date/Time    HGB 12.9 05/22/2024 05:02 AM    HCT 39.8 05/22/2024  05:02 AM         Assessment:  Alena Westoli 69 y.o. female     Principal Problem:    Pneumonia due to organism  Resolved Problems:    * No resolved hospital problems. *      She seems stable from a urological standpoint    Plan:    We will sign off for her now at some point she would need the right stent removed with that can be done in the office or outpatient will be determined at a later time we can sign off for her at this time    Leroy Cheney MD   [facility]

## 2024-05-22 NOTE — PROGRESS NOTES
Nurse to nurse report called to [facility]. Patient's discharge paperwork, carlo and mar report in soft chart to be sent with patient. [facility]waiting pickup [facility] scheduled for 9pm.

## 2024-05-22 NOTE — PROGRESS NOTES
Advanced Endoscopy/Gastroenterology Progress Note    By ASHLEY Teixeira Brettoli  69 y.o.  female    SUBJECTIVE:  No complaints this morning other than I am bothering her  Patient comfortably resting eating breakfast    OBJECTIVE:  /66   Pulse 65   Temp 98.6 °F (37 °C) (Axillary)   Resp 22   Ht 1.397 m (4' 7\")   Wt 86.2 kg (190 lb)   SpO2 96%   BMI 44.16 kg/m²   GEN: A&Ox3, Obese, NAD, fatigued with mild confusion  HEENT:PEERL, no icterus  Heart: RRR  Lungs: + rhonci/course BS with wheezing.   Abd.: soft, NT, ND, BS +, no G/R, no HSM  Extr.: no C/C/E, no brusing      Stool (measured) : 0 mL  Lab Results   Component Value Date/Time    WBC 5.7 05/22/2024 05:02 AM    WBC 4.9 05/21/2024 08:33 AM    WBC 5.2 05/20/2024 07:24 AM    HGB 12.9 05/22/2024 05:02 AM    HGB 12.1 05/21/2024 08:33 AM    HGB 11.9 05/20/2024 07:24 AM    HCT 39.8 05/22/2024 05:02 AM    MCV 95.0 05/22/2024 05:02 AM    RDW 14.5 05/22/2024 05:02 AM     05/22/2024 05:02 AM     05/21/2024 08:33 AM     05/20/2024 07:24 AM     Lab Results   Component Value Date/Time     05/22/2024 05:02 AM    K 3.6 05/22/2024 05:02 AM    K 4.0 02/03/2022 08:21 PM     05/22/2024 05:02 AM    CO2 22 05/22/2024 05:02 AM    BUN 7 05/22/2024 05:02 AM    CREATININE 0.6 05/22/2024 05:02 AM    CALCIUM 7.6 05/22/2024 05:02 AM    BILITOT 0.5 05/21/2024 08:07 AM    BILITOT 0.5 05/20/2024 07:24 AM    BILITOT 0.7 05/19/2024 11:49 AM    ALKPHOS 67 05/21/2024 08:07 AM    ALKPHOS 67 05/20/2024 07:24 AM    ALKPHOS 67 05/19/2024 11:49 AM    AST 11 05/21/2024 08:07 AM    AST 13 05/20/2024 07:24 AM    AST 17 05/19/2024 11:49 AM    ALT 13 05/21/2024 08:07 AM    ALT 16 05/20/2024 07:24 AM    ALT 20 05/19/2024 11:49 AM     Lab Results   Component Value Date/Time    LIPASE 5 02/21/2024 11:59 AM     No results found for: \"AMYLASE\"      ASSESSMENT/PLAN:  - Dilated CBD, ~10mm - appears chronic given prior imaging from 2022  -

## 2024-05-22 NOTE — PLAN OF CARE
Problem: Discharge Planning  Goal: Discharge to home or other facility with appropriate resources  5/22/2024 1013 by Sneha Cordova RN  Outcome: Progressing  5/22/2024 0222 by Deedee Sameul RN  Outcome: Progressing  5/22/2024 0217 by Deedee Samuel RN  Outcome: Progressing  5/22/2024 0127 by Gayle Monaco RN  Outcome: Progressing     Problem: Skin/Tissue Integrity  Goal: Absence of new skin breakdown  Description: 1.  Monitor for areas of redness and/or skin breakdown  2.  Assess vascular access sites hourly  3.  Every 4-6 hours minimum:  Change oxygen saturation probe site  4.  Every 4-6 hours:  If on nasal continuous positive airway pressure, respiratory therapy assess nares and determine need for appliance change or resting period.  5/22/2024 1013 by Sneha Cordova RN  Outcome: Progressing  5/22/2024 0222 by Deedee Samuel RN  Outcome: Progressing  5/22/2024 0217 by Deedee Samuel RN  Outcome: Progressing  5/22/2024 0127 by Gayle Monaco RN  Outcome: Progressing     Problem: Safety - Adult  Goal: Free from fall injury  5/22/2024 1013 by Sneha Cordova RN  Outcome: Progressing  5/22/2024 0222 by Deedee Samuel RN  Outcome: Progressing  5/22/2024 0217 by Deedee Samuel RN  Outcome: Progressing  5/22/2024 0127 by Gayle Monaco RN  Outcome: Progressing     Problem: ABCDS Injury Assessment  Goal: Absence of physical injury  5/22/2024 1013 by Sneha Cordova RN  Outcome: Progressing  5/22/2024 0222 by Deedee Samuel RN  Outcome: Progressing  5/22/2024 0217 by Deedee Samuel RN  Outcome: Progressing  5/22/2024 0127 by Gayle Monaco RN  Outcome: Progressing

## 2024-05-22 NOTE — PLAN OF CARE
Problem: Discharge Planning  Goal: Discharge to home or other facility with appropriate resources  5/22/2024 0217 by Deedee Samuel RN  Outcome: Progressing  5/22/2024 0127 by Gayle Monaco RN  Outcome: Progressing     Problem: Skin/Tissue Integrity  Goal: Absence of new skin breakdown  Description: 1.  Monitor for areas of redness and/or skin breakdown  2.  Assess vascular access sites hourly  3.  Every 4-6 hours minimum:  Change oxygen saturation probe site  4.  Every 4-6 hours:  If on nasal continuous positive airway pressure, respiratory therapy assess nares and determine need for appliance change or resting period.  5/22/2024 0217 by Deedee Samuel RN  Outcome: Progressing  5/22/2024 0127 by Gayle Monaco RN  Outcome: Progressing     Problem: Safety - Adult  Goal: Free from fall injury  5/22/2024 0217 by Deedee Samuel RN  Outcome: Progressing  5/22/2024 0127 by Gayle Monaco RN  Outcome: Progressing     Problem: ABCDS Injury Assessment  Goal: Absence of physical injury  5/22/2024 0217 by Deedee Samuel RN  Outcome: Progressing  5/22/2024 0127 by Gayle Monaco RN  Outcome: Progressing

## 2024-05-22 NOTE — PLAN OF CARE
Problem: Discharge Planning  Goal: Discharge to home or other facility with appropriate resources  5/22/2024 0222 by Deedee Samuel RN  Outcome: Progressing  5/22/2024 0217 by Deedee Samuel RN  Outcome: Progressing  5/22/2024 0127 by Gayle Monaco RN  Outcome: Progressing     Problem: Skin/Tissue Integrity  Goal: Absence of new skin breakdown  Description: 1.  Monitor for areas of redness and/or skin breakdown  2.  Assess vascular access sites hourly  3.  Every 4-6 hours minimum:  Change oxygen saturation probe site  4.  Every 4-6 hours:  If on nasal continuous positive airway pressure, respiratory therapy assess nares and determine need for appliance change or resting period.  5/22/2024 0222 by Deedee Samuel RN  Outcome: Progressing  5/22/2024 0217 by Deedee Samuel RN  Outcome: Progressing  5/22/2024 0127 by Gayle Monaco RN  Outcome: Progressing     Problem: Safety - Adult  Goal: Free from fall injury  5/22/2024 0222 by Deedee Samuel RN  Outcome: Progressing  5/22/2024 0217 by Deedee Samuel RN  Outcome: Progressing  5/22/2024 0127 by Gayle Monaco RN  Outcome: Progressing     Problem: ABCDS Injury Assessment  Goal: Absence of physical injury  5/22/2024 0222 by Deedee Samuel RN  Outcome: Progressing  5/22/2024 0217 by Deedee Samuel RN  Outcome: Progressing  5/22/2024 0127 by Gayle Monaco RN  Outcome: Progressing

## 2024-05-22 NOTE — DISCHARGE SUMMARY
Hospitalist Discharge Summary    Patient ID: Alena Busby   Patient : 1955  Patient's PCP: Vipin Aguayo MD    Admit Date: 2024   Admitting Physician: Familia Cavanaugh MD    Discharge Date:  2024   Discharge Physician: Sejal Meza MD   Discharge Condition: Stable  Discharge Disposition: Home      Hospital course in brief:  (Please refer to daily progress notes for a comprehensive review of the hospitalization by requesting medical records)    69-year-old lady presented from nursing facility due to concern for fever.  Temperature as high as 104.  Patient was found to hypoxic and present to the ER in the 80s.  Placed on 5 L oxygen.  Recently had right-sided ureteral stents placed 2 days ago.  In the ER she was febrile to 102.4 blood pressure as well as 98/65.  Urinalysis concerning for UTI with positive nitrite more leukocyte esterase 20-50 white blood cells 2+ bacteria.  CTA chest negative for PE but it showed concern for bronchoprovocation in the lower lobes concerning for pneumonia.  CT abdomen with nonobstructive renal calculi bilaterally right-sided ureteral stents in place and distended CBD measuring 10 mm.  LFTs within normal limits with ALT of 23, alk phos of 77, AST of 19 total bili of 0.7.  Lactic acid 0.8.  She was given vancomycin and Zosyn and IV fluid resuscitation.  On review of her records from the nursing facility she does have documented dysphagia though the patient denies any reports swallowing. For the concern of dilated CBD-GI was consulted; planning for MRI of abdomen today; patient is currently on zosyn per ID for the concern of pyelonephritis and pneumonia; urine cx and blood cx-ngtd  Discussed with patient's sister ; she wanted evaluation for b/l LE weakness X2 years; neurology consulted -since neurology has not seen the patient yet and this is not acute issue; recommended to follow as outpatient; patient is cleared by ID to discharge after 5 day course  Suspect a tiny stone in the gallbladder neck.  Correlate with ultrasound if there is any concern for cholecystitis. Distended CBD measuring up to 10 mm.  Correlate with LFTs.  Can further evaluate with MRCP if clinically indicated. Findings of intestinal malrotation.  No bowel obstruction.     XR CHEST PORTABLE    Result Date: 5/18/2024  EXAMINATION: ONE XRAY VIEW OF THE CHEST 5/18/2024 9:08 pm COMPARISON: Chest x-ray February 23, 2024 HISTORY: ORDERING SYSTEM PROVIDED HISTORY: short of breath and septic appearing TECHNOLOGIST PROVIDED HISTORY: Reason for exam:->short of breath and septic appearing FINDINGS: Patchy bilateral lower lung opacities with coarse lung markings and prominent vasculature.  Cardiomediastinal silhouette is enlarged.  No pneumothorax or pleural effusion.  Atherosclerotic vascular disease.  Degenerative change without acute osseous findings.  Stable cervical spine hardware.     Prominent vasculature, patchy opacities, and enlarged cardiomediastinal silhouette suggestive of pulmonary edema in the setting of CHF, atypical infection can have a similar appearance.       Discharge Medications:      Medication List        START taking these medications      guaiFENesin 400 MG tablet  Take 1 tablet by mouth in the morning, at noon, and at bedtime     predniSONE 20 MG tablet  Commonly known as: DELTASONE  Take 2 tablets by mouth daily for 4 doses  Start taking on: May 23, 2024            CONTINUE taking these medications      acetaminophen 325 MG tablet  Commonly known as: TYLENOL     ascorbic acid 500 MG tablet  Commonly known as: VITAMIN C  Take 1 tablet by mouth daily     bisacodyl 10 MG suppository  Commonly known as: DULCOLAX     calcium carbonate 500 MG chewable tablet  Commonly known as: TUMS     Cholecalciferol 50 MCG (2000 UT) Tabs     gabapentin 300 MG capsule  Commonly known as: NEURONTIN  Take 1 capsule by mouth 4 times daily for 120 days.     lactulose 10 GM/15ML solution  Commonly known

## 2024-05-22 NOTE — CARE COORDINATION
Social Work/ Case Management Transition of Care Planning (Dee Haines, LESLY [phone]):     Discharge order noted. Pt will discharge back to [facility], transport scheduled [facility]-earliest trip was 9pm. Ambulance form and envelope in soft chart.   LESLY Talavera  5/22/2024      
Social Work/ Case Management Transition of Care Planning (LESLY Talavera [phone]):     Pt presented to the hospital with SOB. SW attempted to speak with Pt at bedside, Pt appeared confused and was unable to answer. SW attempted to call Pt's family, messages were left. Per records Pt is from [facility], Per [facility] Pt is a long term bed hold and can return with no precert needed. Pt is on 1/L NC. Pt is on IV Zosyn q8. Urology consult noted. ID, GI and Neurology are following. Pt is pending urine culture. MRCP ordered. Discharge plan is to return to [facility]. Ambulance form and envelope are in soft chart. SW/CM to follow.  LESLY Talavera  5/21/2024    Case Management Assessment  Initial Evaluation    Date/Time of Evaluation: 5/21/2024 5:18 PM  Assessment Completed by: LESLY Talavera    If patient is discharged prior to next notation, then this note serves as note for discharge by case management.    Patient Name: Alena Busby                   YOB: 1955  Diagnosis: Hypocalcemia [E83.51]  Pneumonia due to organism [J18.9]  Acute cystitis with hematuria [N30.01]  Acute respiratory failure with hypoxia (HCC) [J96.01]  Pneumonia of both lungs due to infectious organism, unspecified part of lung [J18.9]                   Date / Time: 5/18/2024  8:24 PM    Patient Admission Status: Inpatient   Readmission Risk (Low < 19, Mod (19-27), High > 27): Readmission Risk Score: 19    Current PCP: Vipin Aguayo MD  PCP verified by ? Yes (Facility phsyician)    Chart Reviewed: Yes      History Provided by: Medical Record, Other (see comment) (SNF staff)  Patient Orientation: Other (see comment) (Pt struggled to answer due to confusion.)    Patient Cognition: Other (see comment) (Noted confusion)    Hospitalization in the last 30 days (Readmission):  No    If yes, Readmission Assessment in  Navigator will be completed.    Advance Directives:      Code Status: Full Code   Patient's Primary 
Yes

## 2024-05-23 VITALS
HEIGHT: 55 IN | DIASTOLIC BLOOD PRESSURE: 59 MMHG | RESPIRATION RATE: 20 BRPM | SYSTOLIC BLOOD PRESSURE: 124 MMHG | BODY MASS INDEX: 43.97 KG/M2 | OXYGEN SATURATION: 99 % | HEART RATE: 63 BPM | TEMPERATURE: 98 F | WEIGHT: 190 LBS

## 2024-05-23 LAB
MICROORGANISM SPEC CULT: NORMAL
MYCOPLASMA AB,IGM: PRESENT
SERVICE CMNT-IMP: NORMAL
SPECIMEN DESCRIPTION: NORMAL

## 2024-05-23 PROCEDURE — 6370000000 HC RX 637 (ALT 250 FOR IP): Performed by: STUDENT IN AN ORGANIZED HEALTH CARE EDUCATION/TRAINING PROGRAM

## 2024-05-23 PROCEDURE — 94640 AIRWAY INHALATION TREATMENT: CPT

## 2024-05-23 PROCEDURE — 2700000000 HC OXYGEN THERAPY PER DAY

## 2024-05-23 RX ADMIN — IPRATROPIUM BROMIDE AND ALBUTEROL SULFATE 1 DOSE: 2.5; .5 SOLUTION RESPIRATORY (INHALATION) at 01:01

## 2024-05-26 LAB
ACB COMPLEX DNA BLD POS QL NAA+NON-PROBE: NOT DETECTED
B FRAGILIS DNA BLD POS QL NAA+NON-PROBE: NOT DETECTED
BIOFIRE TEST COMMENT: ABNORMAL
BLACTX-M ISLT/SPM QL: DETECTED
BLAIMP ISLT/SPM QL: NOT DETECTED
BLAKPC ISLT/SPM QL: NOT DETECTED
BLAOXA-48-LIKE ISLT/SPM QL: NOT DETECTED
BLAVIM ISLT/SPM QL: NOT DETECTED
C ALBICANS DNA BLD POS QL NAA+NON-PROBE: NOT DETECTED
C AURIS DNA BLD POS QL NAA+NON-PROBE: NOT DETECTED
C GATTII+NEOFOR DNA BLD POS QL NAA+N-PRB: NOT DETECTED
C GLABRATA DNA BLD POS QL NAA+NON-PROBE: NOT DETECTED
C KRUSEI DNA BLD POS QL NAA+NON-PROBE: NOT DETECTED
C PARAP DNA BLD POS QL NAA+NON-PROBE: NOT DETECTED
C TROPICLS DNA BLD POS QL NAA+NON-PROBE: NOT DETECTED
COLISTIN RES MCR-1 ISLT/SPM QL: NOT DETECTED
E CLOAC COMP DNA BLD POS NAA+NON-PROBE: NOT DETECTED
E COLI DNA BLD POS QL NAA+NON-PROBE: NOT DETECTED
E FAECALIS DNA BLD POS QL NAA+NON-PROBE: NOT DETECTED
E FAECIUM DNA BLD POS QL NAA+NON-PROBE: NOT DETECTED
ENTEROBACTERALES DNA BLD POS NAA+N-PRB: DETECTED
GP B STREP DNA BLD POS QL NAA+NON-PROBE: NOT DETECTED
HAEM INFLU DNA BLD POS QL NAA+NON-PROBE: NOT DETECTED
K OXYTOCA DNA BLD POS QL NAA+NON-PROBE: NOT DETECTED
KLEBSIELLA SP DNA BLD POS QL NAA+NON-PRB: DETECTED
KLEBSIELLA SP DNA BLD POS QL NAA+NON-PRB: NOT DETECTED
L MONOCYTOG DNA BLD POS QL NAA+NON-PROBE: NOT DETECTED
MICROORGANISM SPEC CULT: ABNORMAL
MICROORGANISM/AGENT SPEC: ABNORMAL
N MEN DNA BLD POS QL NAA+NON-PROBE: NOT DETECTED
P AERUGINOSA DNA BLD POS NAA+NON-PROBE: NOT DETECTED
PROTEUS SP DNA BLD POS QL NAA+NON-PROBE: NOT DETECTED
RESISTANT GENE NDM BY PCR: NOT DETECTED
S AUREUS DNA BLD POS QL NAA+NON-PROBE: NOT DETECTED
S AUREUS+CONS DNA BLD POS NAA+NON-PROBE: NOT DETECTED
S EPIDERMIDIS DNA BLD POS QL NAA+NON-PRB: NOT DETECTED
S LUGDUNENSIS DNA BLD POS QL NAA+NON-PRB: NOT DETECTED
S MALTOPHILIA DNA BLD POS QL NAA+NON-PRB: NOT DETECTED
S MARCESCENS DNA BLD POS NAA+NON-PROBE: NOT DETECTED
S PNEUM DNA BLD POS QL NAA+NON-PROBE: NOT DETECTED
S PYO DNA BLD POS QL NAA+NON-PROBE: NOT DETECTED
SALMONELLA DNA BLD POS QL NAA+NON-PROBE: NOT DETECTED
SERVICE CMNT-IMP: ABNORMAL
SPECIMEN DESCRIPTION: ABNORMAL
STREPTOCOCCUS DNA BLD POS NAA+NON-PROBE: NOT DETECTED

## 2024-07-10 ENCOUNTER — HOSPITAL ENCOUNTER (OUTPATIENT)
Age: 69
Discharge: HOME | End: 2024-07-12

## 2024-07-10 LAB
ANION GAP SERPL CALCULATED.3IONS-SCNC: 13 MMOL/L (ref 7–16)
BUN SERPL-MCNC: 19 MG/DL (ref 6–23)
CALCIUM SERPL-MCNC: 7.5 MG/DL (ref 8.6–10.2)
CHLORIDE SERPL-SCNC: 101 MMOL/L (ref 98–107)
CO2 SERPL-SCNC: 27 MMOL/L (ref 22–29)
CREAT SERPL-MCNC: 1 MG/DL (ref 0.5–1)
ERYTHROCYTE [DISTWIDTH] IN BLOOD BY AUTOMATED COUNT: 15.9 % (ref 11.5–15)
GFR, ESTIMATED: 58 ML/MIN/1.73M2
GLUCOSE SERPL-MCNC: 110 MG/DL (ref 74–99)
HCT VFR BLD AUTO: 39.7 % (ref 34–48)
HGB BLD-MCNC: 12.6 G/DL (ref 11.5–15.5)
MCH RBC QN AUTO: 28.9 PG (ref 26–35)
MCHC RBC AUTO-ENTMCNC: 31.7 G/DL (ref 32–34.5)
MCV RBC AUTO: 91.1 FL (ref 80–99.9)
PLATELET # BLD AUTO: 207 K/UL (ref 130–450)
PMV BLD AUTO: 10.4 FL (ref 7–12)
POTASSIUM SERPL-SCNC: 3.8 MMOL/L (ref 3.5–5)
RBC # BLD AUTO: 4.36 M/UL (ref 3.5–5.5)
SODIUM SERPL-SCNC: 141 MMOL/L (ref 132–146)
WBC OTHER # BLD: 9.6 K/UL (ref 4.5–11.5)

## 2024-07-10 PROCEDURE — 85027 COMPLETE CBC AUTOMATED: CPT

## 2024-07-10 PROCEDURE — 80048 BASIC METABOLIC PNL TOTAL CA: CPT

## 2024-12-12 ENCOUNTER — OFFICE VISIT (OUTPATIENT)
Age: 69
End: 2024-12-12

## 2024-12-12 VITALS
BODY MASS INDEX: 45.89 KG/M2 | HEIGHT: 55 IN | DIASTOLIC BLOOD PRESSURE: 56 MMHG | SYSTOLIC BLOOD PRESSURE: 126 MMHG | HEART RATE: 74 BPM | WEIGHT: 198.3 LBS

## 2024-12-12 DIAGNOSIS — R27.8 SENSORY ATAXIA: ICD-10-CM

## 2024-12-12 DIAGNOSIS — R26.0 ATAXIC GAIT: ICD-10-CM

## 2024-12-12 DIAGNOSIS — G62.81: Primary | ICD-10-CM

## 2024-12-12 RX ORDER — ALBUTEROL SULFATE 0.83 MG/ML
2.5 SOLUTION RESPIRATORY (INHALATION) EVERY 4 HOURS PRN
COMMUNITY

## 2024-12-12 RX ORDER — PREGABALIN 75 MG/1
75 CAPSULE ORAL 3 TIMES DAILY
Qty: 90 CAPSULE | Refills: 2 | Status: SHIPPED | OUTPATIENT
Start: 2024-12-12 | End: 2025-03-12

## 2024-12-12 NOTE — PROGRESS NOTES
testing.  Rapid alternating movements were decreased in ue/le  There was decreased vibration till wrists  Absent vibration till knees.   Proprioception decreased in toes    There was not extinction on the bilaterally with bilateral simultaneous stimulus.   Gait testing cannot be done     Skin:  Skin is warm. she is not diaphoretic.  Psychiatric: Memory, affect and judgement normal.       ASSESSMENT AND PLAN   1. Progressive inflammatory neuropathy (HCC)  Patient had a sudden onset of motor and sensory decline in 2021 post COVID and never recovered  MRI of the cervical and lumbar spine spine has been non explanatory, not been considered a surgical candidate  Failed physical therapy    Presently she has distal weakness in her arms and legs with severe sensory ataxia limiting her gait  Differential diagnosis includes sensory CIDP versus CANDA vs anti-MAG neuropathy vs lyme   vs   sensory neuronopathy related to autoimmune causes, celiac disease.  B6 deficiency, paraneoplastic causes,  hiv/htlv/ebv/vzv   Vs   Dorsal column dysfunction from nutritional def, syphilis/htlv .       Will recommend an EMG to be repeated to evaluate for peripheral neuropathy versus sensory neuronopathy    Ideally needs an LP will consider that after the EMG    Recommend blood work for nutritional factors, autoimmune disorders, MGUS, celiac disease, Lyme disease, paraneoplastic antibodies, thyroid disease, antiganglioside antibodies    Cannot have a definitive treatment for the neuropathy until the cause is identified.     Suggest symptomatic treatment with Lyrica 75 mg 3 times daily for pain control  - Folate; Future  - Vitamin B1; Future  - Vitamin B12; Future  - Vitamin B6; Future  - ANISH; Future  - RUDOLPH Profile; Future  - Angiotensin Converting Enzyme; Future  - C4 Complement; Future  - C3 Complement; Future  - Sedimentation Rate; Future  - C-Reactive Protein; Future  - Cryoglobulin Qualitative; Future  - Rheumatoid Factor; Future  - LYME

## 2024-12-30 ENCOUNTER — TELEPHONE (OUTPATIENT)
Age: 69
End: 2024-12-30

## 2025-01-01 NOTE — PROGRESS NOTES
Occupational Therapy  OCCUPATIONAL THERAPY INITIAL EVALUATION    [facility]  [address], [place]      Date:2024                                                Patient Name: Alena Busby  MRN: [id number]  : 1955  Room: [facility]    Evaluating OT: Sea Henao OTR/L #[id number]     Referring Provider: Familia Cavanaugh MD   Specific Provider Orders/Date: OT eval and treat 24    Diagnosis: Hypocalcemia [E83.51]  Pneumonia due to organism [J18.9]  Acute cystitis with hematuria [N30.01]  Acute respiratory failure with hypoxia (HCC) [J96.01]  Pneumonia of both lungs due to infectious organism, unspecified part of lung [J18.9]   Pt admitted to hospital with fever and hypoxia from nursing facility; Pt with recently received right-sided ureteral stents placed ~4 days prior with Dr. Villasenor     Pertinent Medical History:  has a past medical history of Arthritis, COVID-19, Depression, Dysphagia, oropharyngeal phase, Headache, Hematuria, unspecified, Metabolic encephalopathy, Morbid obesity (HCC), Muscle wasting and atrophy, not elsewhere classified, multiple sites, Other abnormalities of gait and mobility, Pneumonia, Sepsis (HCC), Severe protein-calorie malnutrition (HCC), Unspecified dementia, unspecified severity, with other behavioral disturbance (HCC), Urinary tract infection, and Vitamin D deficiency.       Precautions:  Fall Risk, bed alarm, TAPS, O2, continuous pulse ox    Assessment of current deficits    [x] Functional mobility  [x]ADLs  [x] Strength               [x]Cognition    [x] Functional transfers   [x] IADLs         [x] Safety Awareness   [x]Endurance    [x] Fine Coordination              [x] Balance      [] Vision/perception   [x]Sensation     [x]Gross Motor Coordination  [x] ROM  [] Delirium                   [] Motor Control     OT PLAN OF CARE   OT POC based on physician orders, patient diagnosis and results of  Take prednisone as directed  Continue antibiotic   Follow up with primary as schedule  Recheck xray 6 weeks to ensure resolution

## 2025-01-09 ENCOUNTER — HOSPITAL ENCOUNTER (OUTPATIENT)
Dept: MRI IMAGING | Age: 70
Discharge: HOME | End: 2025-01-11
Attending: PSYCHIATRY & NEUROLOGY
Payer: MEDICARE

## 2025-01-09 DIAGNOSIS — R26.0 ATAXIC GAIT: ICD-10-CM

## 2025-01-09 PROCEDURE — 70551 MRI BRAIN STEM W/O DYE: CPT

## 2025-01-09 NOTE — PROGRESS NOTES
Patient had no recent labs. Made several attempts to get from facility, with no success. Patient came to appointment without labs- we perfect served ordering doctor Dr. Gerardo and got a verbal to change to without contrast. Exam was completed.

## 2025-01-21 NOTE — H&P
Hospital Medicine History & Physical      PCP: Vipin Aguayo MD    Date of Admission: 5/18/2024    Date of Service: Pt seen/examined on 5/19/24 and Admitted to Inpatient with expected LOS greater than two midnights due to medical therapy.     Chief Complaint:  sepsis      History Of Present Illness:    69-year-old lady presented from nursing facility due to concern for fever.  Temperature as high as 104.  Patient was found to hypoxic and present to the ER in the 80s.  Placed on 5 L oxygen.  Recently had right-sided ureteral stents placed 2 days ago.  In the ER she was febrile to 102.4 blood pressure as well as 98/65.  Urinalysis concerning for UTI with positive nitrite more leukocyte esterase 20-50 white blood cells 2+ bacteria.  CT abdomen pelvis into the thorax angiogram was ordered.  CTA negative for PE but it showed concern for bronchoprovocation in the lower lobes concerning for pneumonia.  CT abdomen with nonobstructive renal calculi bilaterally right-sided ureteral stents in place and distended CBD measuring 10 mm.  LFTs within normal limits with ALT of 23, alk phos of 77, AST of 19 total bili of 0.7.  Lactic acid 0.8.  She was given vancomycin and Zosyn and IV fluid resuscitation.  On review of her records from the nursing facility she does have documented dysphagia though the patient denies any reports swallowing.    Past Medical History:          Diagnosis Date    Arthritis     COVID-19     2/2022    Depression     Dysphagia, oropharyngeal phase     Headache     multiple within a month, 7/10 on the pain scale.    Hematuria, unspecified     Metabolic encephalopathy     Morbid obesity (HCC)     Muscle wasting and atrophy, not elsewhere classified, multiple sites     Other abnormalities of gait and mobility     Pneumonia     Sepsis (HCC)     Severe protein-calorie malnutrition (HCC)     Unspecified dementia, unspecified severity, with other behavioral disturbance (HCC)     Urinary tract infection      Physical Therapy Visit    Visit Type: Daily Treatment Note  Visit: 4  Referring Provider: Drake Del Valle MD  Medical Diagnosis (from order): N40.0 - Enlarged prostate     SUBJECTIVE                                                                                                               Patient states that things have been about the same. He has good days and bad days and can't find any pattern. When he feels the urge, there is nothing he can do. He has leakage mostly with urgency. He has no issues while he is sleeping. He has slept a couple of nights without a pad. He has a trip coming up in two weeks.     Fluid intake: water: 50oz/day  Urinary frequency: 5-6x/day  Leakage: 2x/day, various amounts, usually just a little    Patient Goals: increased strength, decrease leakage, decrease toileting frequency, decrease symptoms, improved sleep, able to delay toileting and decrease pad usage. education and instruction for best outcome and recovery, get off of his medications.     From Eval: Reports urgency since surgery is worse. He was told that this might happen. He had his catheter removed on Monday after 4 days of having it. (Today is Thursday)   Pt reports his urgency began after his first back surgery in his mid 20's. He had another in his 30's and this is when he really began to notice urgency and frequency.      Diagnosis Precautions: pt is one week post TURP procedure.      He had difficulty urinating for a bit, due to post surgical constipation, but since having a BM, his flow is back to normal      OBJECTIVE                                                                                                                                   Treatment    Un-attended Electrical Stimulation: Interferential Current  - Purpose: reduce muscle spasm and neuromuscular re-education/facilitation  - Position: prone  - Intensity: patient tolerance  - Electrode Placement: Sacrum  - Duration: 15 minutes    Results: no change  in symptoms immediately following  Reaction: no adverse reaction to treatment      Activities of Daily Living/Self Care  Bladder retraining: Patient will put timer on phone for every 1.5 hours and will slowly increase the amount of time between urination    Utilize urge control techniques along with this  Use TENS each day for 20 minutes as handout instructs    Skilled input: verbal instruction/cues, tactile instruction/cues, demonstration and as detailed above    Writer verbally educated and received verbal consent for hand placement, positioning of patient, and techniques to be performed today from patient for clothing adjustments for techniques, hand placement and palpation for techniques and therapist position for techniques as described above and how they are pertinent to the patient's plan of care.  Verbal consent received today for external visualization pelvic floor muscle assessment and treatment.   Patient provided continued consent during evaluation and treatment.  Patient was given alternative options.  Benefits and drawbacks were explained.  Third person was offered to be in room during session, patient declined.  Home Exercise Program  See education provided above  30-day [facility]      ASSESSMENT                                                                                                            Today's session focused on strategies to decrease urinary urgency and retrain patient's bladder. The majority of his incontinence is currently urge urinary incontinence and he will benefit from timed voids, urge control techniques, and the use of TENS. He will require continued pelvic floor PT in order to address remaining urinary urgency and leakage so he can return to his prior level of function.   Education:   - Results of above outlined education: Verbalizes understanding, Needs reinforcement and Demonstrates understanding    PLAN                                                                                                                            Suggestions for next session as indicated: Progress per plan of care: Cont treatment as indicated. Consider assessment of pelvic floor function with biofeeback       Therapy procedure time and total treatment time can be found documented on the Time Entry flowsheet

## 2025-03-26 ENCOUNTER — HOSPITAL ENCOUNTER (INPATIENT)
Age: 70
LOS: 2 days | Discharge: NURSING, REHAB OR LONG-TERM CARE | DRG: 189 | End: 2025-03-28
Attending: STUDENT IN AN ORGANIZED HEALTH CARE EDUCATION/TRAINING PROGRAM | Admitting: FAMILY MEDICINE
Payer: MEDICARE

## 2025-03-26 ENCOUNTER — APPOINTMENT (OUTPATIENT)
Dept: CT IMAGING | Age: 70
DRG: 189 | End: 2025-03-26
Payer: MEDICARE

## 2025-03-26 ENCOUNTER — APPOINTMENT (OUTPATIENT)
Dept: GENERAL RADIOLOGY | Age: 70
DRG: 189 | End: 2025-03-26
Payer: MEDICARE

## 2025-03-26 DIAGNOSIS — J18.9 PNEUMONIA DUE TO INFECTIOUS ORGANISM, UNSPECIFIED LATERALITY, UNSPECIFIED PART OF LUNG: ICD-10-CM

## 2025-03-26 DIAGNOSIS — J96.01 ACUTE RESPIRATORY FAILURE WITH HYPOXIA: Primary | ICD-10-CM

## 2025-03-26 DIAGNOSIS — J44.1 COPD EXACERBATION (HCC): ICD-10-CM

## 2025-03-26 LAB
AADO2: 460.9 MMHG
ALBUMIN SERPL-MCNC: 3.4 G/DL (ref 3.5–5.2)
ALP SERPL-CCNC: 101 U/L (ref 35–104)
ALT SERPL-CCNC: 20 U/L (ref 0–32)
ANION GAP SERPL CALCULATED.3IONS-SCNC: 14 MMOL/L (ref 7–16)
AST SERPL-CCNC: 20 U/L (ref 0–31)
B PARAP IS1001 DNA NPH QL NAA+NON-PROBE: NOT DETECTED
B PERT DNA SPEC QL NAA+PROBE: NOT DETECTED
B.E.: -2.7 MMOL/L (ref -3–3)
B.E.: -3.5 MMOL/L (ref -3–3)
B.E.: 1.6 MMOL/L (ref -3–3)
BACTERIA URNS QL MICRO: ABNORMAL
BASOPHILS # BLD: 0.03 K/UL (ref 0–0.2)
BASOPHILS NFR BLD: 0 % (ref 0–2)
BILIRUB SERPL-MCNC: 1.1 MG/DL (ref 0–1.2)
BILIRUB UR QL STRIP: NEGATIVE
BNP SERPL-MCNC: 540 PG/ML (ref 0–125)
BUN SERPL-MCNC: 14 MG/DL (ref 6–23)
C PNEUM DNA NPH QL NAA+NON-PROBE: NOT DETECTED
CALCIUM SERPL-MCNC: 7.1 MG/DL (ref 8.6–10.2)
CHLORIDE SERPL-SCNC: 104 MMOL/L (ref 98–107)
CLARITY UR: ABNORMAL
CO2 SERPL-SCNC: 24 MMOL/L (ref 22–29)
COHB: 0.5 % (ref 0–1.5)
COHB: 0.9 % (ref 0–1.5)
COHB: 1.7 % (ref 0–1.5)
COLOR UR: YELLOW
CREAT SERPL-MCNC: 0.6 MG/DL (ref 0.5–1)
CRITICAL: ABNORMAL
D-DIMER QUANTITATIVE: 263 NG/ML DDU (ref 0–230)
DATE ANALYZED: ABNORMAL
DATE OF COLLECTION: ABNORMAL
EOSINOPHIL # BLD: 0.05 K/UL (ref 0.05–0.5)
EOSINOPHILS RELATIVE PERCENT: 0 % (ref 0–6)
EPI CELLS #/AREA URNS HPF: ABNORMAL /HPF
ERYTHROCYTE [DISTWIDTH] IN BLOOD BY AUTOMATED COUNT: 16 % (ref 11.5–15)
FIO2: 100 %
FLUAV RNA NPH QL NAA+NON-PROBE: NOT DETECTED
FLUBV RNA NPH QL NAA+NON-PROBE: NOT DETECTED
GFR, ESTIMATED: >90 ML/MIN/1.73M2
GLUCOSE BLD-MCNC: 170 MG/DL (ref 74–99)
GLUCOSE SERPL-MCNC: 97 MG/DL (ref 74–99)
GLUCOSE UR STRIP-MCNC: NEGATIVE MG/DL
HADV DNA NPH QL NAA+NON-PROBE: NOT DETECTED
HCO3: 22.1 MMOL/L (ref 22–26)
HCO3: 25.4 MMOL/L (ref 22–26)
HCO3: 29.3 MMOL/L (ref 22–26)
HCOV 229E RNA NPH QL NAA+NON-PROBE: NOT DETECTED
HCOV HKU1 RNA NPH QL NAA+NON-PROBE: NOT DETECTED
HCOV NL63 RNA NPH QL NAA+NON-PROBE: NOT DETECTED
HCOV OC43 RNA NPH QL NAA+NON-PROBE: NOT DETECTED
HCT VFR BLD AUTO: 52 % (ref 34–48)
HGB BLD-MCNC: 16.4 G/DL (ref 11.5–15.5)
HGB UR QL STRIP.AUTO: NEGATIVE
HHB: 1 % (ref 0–5)
HHB: 3.4 % (ref 0–5)
HHB: 5.1 % (ref 0–5)
HMPV RNA NPH QL NAA+NON-PROBE: NOT DETECTED
HPIV1 RNA NPH QL NAA+NON-PROBE: NOT DETECTED
HPIV2 RNA NPH QL NAA+NON-PROBE: NOT DETECTED
HPIV3 RNA NPH QL NAA+NON-PROBE: NOT DETECTED
HPIV4 RNA NPH QL NAA+NON-PROBE: NOT DETECTED
IMM GRANULOCYTES # BLD AUTO: 0.06 K/UL (ref 0–0.58)
IMM GRANULOCYTES NFR BLD: 1 % (ref 0–5)
KETONES UR STRIP-MCNC: NEGATIVE MG/DL
LAB: ABNORMAL
LACTATE BLDV-SCNC: 1.5 MMOL/L (ref 0.5–2.2)
LEUKOCYTE ESTERASE UR QL STRIP: ABNORMAL
LYMPHOCYTES NFR BLD: 2.07 K/UL (ref 1.5–4)
LYMPHOCYTES RELATIVE PERCENT: 17 % (ref 20–42)
Lab: 1235
Lab: 508
Lab: 843
M PNEUMO DNA NPH QL NAA+NON-PROBE: NOT DETECTED
MAGNESIUM SERPL-MCNC: 1.8 MG/DL (ref 1.6–2.6)
MCH RBC QN AUTO: 29.2 PG (ref 26–35)
MCHC RBC AUTO-ENTMCNC: 31.5 G/DL (ref 32–34.5)
MCV RBC AUTO: 92.7 FL (ref 80–99.9)
METHB: 0.3 % (ref 0–1.5)
MODE: ABNORMAL
MONOCYTES NFR BLD: 1.09 K/UL (ref 0.1–0.95)
MONOCYTES NFR BLD: 9 % (ref 2–12)
NEUTROPHILS NFR BLD: 73 % (ref 43–80)
NEUTS SEG NFR BLD: 8.79 K/UL (ref 1.8–7.3)
NITRITE UR QL STRIP: POSITIVE
O2 SATURATION: 94.8 % (ref 92–98.5)
O2 SATURATION: 96.6 % (ref 92–98.5)
O2 SATURATION: 99 % (ref 92–98.5)
O2HB: 92.9 % (ref 94–97)
O2HB: 95.8 % (ref 94–97)
O2HB: 97.8 % (ref 94–97)
OPERATOR ID: 1893
OPERATOR ID: 2067
OPERATOR ID: 2067
PATIENT TEMP: 37 C
PCO2: 41.6 MMHG (ref 35–45)
PCO2: 56.5 MMHG (ref 35–45)
PCO2: 58.2 MMHG (ref 35–45)
PEEP/CPAP: 8 CMH2O
PFO2: 1.71 MMHG/%
PH BLOOD GAS: 7.27 (ref 7.35–7.45)
PH BLOOD GAS: 7.32 (ref 7.35–7.45)
PH BLOOD GAS: 7.34 (ref 7.35–7.45)
PH UR STRIP: >9 [PH] (ref 5–8)
PLATELET # BLD AUTO: 176 K/UL (ref 130–450)
PMV BLD AUTO: 11.1 FL (ref 7–12)
PO2: 170.6 MMHG (ref 75–100)
PO2: 81.3 MMHG (ref 75–100)
PO2: 93.3 MMHG (ref 75–100)
POTASSIUM SERPL-SCNC: 4.1 MMOL/L (ref 3.5–5)
PROT SERPL-MCNC: 6.8 G/DL (ref 6.4–8.3)
PROT UR STRIP-MCNC: 100 MG/DL
PS: 15 CMH20
RBC # BLD AUTO: 5.61 M/UL (ref 3.5–5.5)
RBC #/AREA URNS HPF: ABNORMAL /HPF
RI(T): 2.7
RSV RNA NPH QL NAA+NON-PROBE: NOT DETECTED
RV+EV RNA NPH QL NAA+NON-PROBE: NOT DETECTED
SARS-COV-2 RNA NPH QL NAA+NON-PROBE: NOT DETECTED
SODIUM SERPL-SCNC: 142 MMOL/L (ref 132–146)
SOURCE, BLOOD GAS: ABNORMAL
SP GR UR STRIP: <1.005 (ref 1–1.03)
SPECIMEN DESCRIPTION: NORMAL
THB: 16 G/DL (ref 11.5–16.5)
THB: 16.5 G/DL (ref 11.5–16.5)
THB: 16.6 G/DL (ref 11.5–16.5)
TIME ANALYZED: 1237
TIME ANALYZED: 515
TIME ANALYZED: 851
TROPONIN I SERPL HS-MCNC: 40 NG/L (ref 0–9)
TROPONIN I SERPL HS-MCNC: 41 NG/L (ref 0–9)
UROBILINOGEN UR STRIP-ACNC: 1 EU/DL (ref 0–1)
WBC #/AREA URNS HPF: ABNORMAL /HPF
WBC OTHER # BLD: 12.1 K/UL (ref 4.5–11.5)

## 2025-03-26 PROCEDURE — 6370000000 HC RX 637 (ALT 250 FOR IP): Performed by: STUDENT IN AN ORGANIZED HEALTH CARE EDUCATION/TRAINING PROGRAM

## 2025-03-26 PROCEDURE — 83735 ASSAY OF MAGNESIUM: CPT

## 2025-03-26 PROCEDURE — 71045 X-RAY EXAM CHEST 1 VIEW: CPT

## 2025-03-26 PROCEDURE — 94640 AIRWAY INHALATION TREATMENT: CPT

## 2025-03-26 PROCEDURE — 2700000000 HC OXYGEN THERAPY PER DAY

## 2025-03-26 PROCEDURE — 2500000003 HC RX 250 WO HCPCS

## 2025-03-26 PROCEDURE — 6360000004 HC RX CONTRAST MEDICATION: Performed by: RADIOLOGY

## 2025-03-26 PROCEDURE — 6360000002 HC RX W HCPCS

## 2025-03-26 PROCEDURE — 93005 ELECTROCARDIOGRAM TRACING: CPT

## 2025-03-26 PROCEDURE — 87449 NOS EACH ORGANISM AG IA: CPT

## 2025-03-26 PROCEDURE — 5A0935A ASSISTANCE WITH RESPIRATORY VENTILATION, LESS THAN 24 CONSECUTIVE HOURS, HIGH NASAL FLOW/VELOCITY: ICD-10-PCS | Performed by: FAMILY MEDICINE

## 2025-03-26 PROCEDURE — 86738 MYCOPLASMA ANTIBODY: CPT

## 2025-03-26 PROCEDURE — 99222 1ST HOSP IP/OBS MODERATE 55: CPT | Performed by: INTERNAL MEDICINE

## 2025-03-26 PROCEDURE — 2060000000 HC ICU INTERMEDIATE R&B

## 2025-03-26 PROCEDURE — 94660 CPAP INITIATION&MGMT: CPT

## 2025-03-26 PROCEDURE — 82962 GLUCOSE BLOOD TEST: CPT

## 2025-03-26 PROCEDURE — 80053 COMPREHEN METABOLIC PANEL: CPT

## 2025-03-26 PROCEDURE — 87899 AGENT NOS ASSAY W/OPTIC: CPT

## 2025-03-26 PROCEDURE — 6370000000 HC RX 637 (ALT 250 FOR IP)

## 2025-03-26 PROCEDURE — 5A09357 ASSISTANCE WITH RESPIRATORY VENTILATION, LESS THAN 24 CONSECUTIVE HOURS, CONTINUOUS POSITIVE AIRWAY PRESSURE: ICD-10-PCS | Performed by: FAMILY MEDICINE

## 2025-03-26 PROCEDURE — 83880 ASSAY OF NATRIURETIC PEPTIDE: CPT

## 2025-03-26 PROCEDURE — 85025 COMPLETE CBC W/AUTO DIFF WBC: CPT

## 2025-03-26 PROCEDURE — 2580000003 HC RX 258

## 2025-03-26 PROCEDURE — 87040 BLOOD CULTURE FOR BACTERIA: CPT

## 2025-03-26 PROCEDURE — 6360000002 HC RX W HCPCS: Performed by: STUDENT IN AN ORGANIZED HEALTH CARE EDUCATION/TRAINING PROGRAM

## 2025-03-26 PROCEDURE — 84484 ASSAY OF TROPONIN QUANT: CPT

## 2025-03-26 PROCEDURE — 99285 EMERGENCY DEPT VISIT HI MDM: CPT

## 2025-03-26 PROCEDURE — 99222 1ST HOSP IP/OBS MODERATE 55: CPT | Performed by: FAMILY MEDICINE

## 2025-03-26 PROCEDURE — 2580000003 HC RX 258: Performed by: FAMILY MEDICINE

## 2025-03-26 PROCEDURE — 81001 URINALYSIS AUTO W/SCOPE: CPT

## 2025-03-26 PROCEDURE — 82805 BLOOD GASES W/O2 SATURATION: CPT

## 2025-03-26 PROCEDURE — 0202U NFCT DS 22 TRGT SARS-COV-2: CPT

## 2025-03-26 PROCEDURE — 6360000002 HC RX W HCPCS: Performed by: FAMILY MEDICINE

## 2025-03-26 PROCEDURE — 85379 FIBRIN DEGRADATION QUANT: CPT

## 2025-03-26 PROCEDURE — 83605 ASSAY OF LACTIC ACID: CPT

## 2025-03-26 PROCEDURE — 2580000003 HC RX 258: Performed by: STUDENT IN AN ORGANIZED HEALTH CARE EDUCATION/TRAINING PROGRAM

## 2025-03-26 PROCEDURE — 2500000003 HC RX 250 WO HCPCS: Performed by: STUDENT IN AN ORGANIZED HEALTH CARE EDUCATION/TRAINING PROGRAM

## 2025-03-26 PROCEDURE — 71275 CT ANGIOGRAPHY CHEST: CPT

## 2025-03-26 RX ORDER — ONDANSETRON 4 MG/1
4 TABLET, ORALLY DISINTEGRATING ORAL EVERY 8 HOURS PRN
Status: DISCONTINUED | OUTPATIENT
Start: 2025-03-26 | End: 2025-03-29 | Stop reason: HOSPADM

## 2025-03-26 RX ORDER — ONDANSETRON 2 MG/ML
4 INJECTION INTRAMUSCULAR; INTRAVENOUS EVERY 6 HOURS PRN
Status: DISCONTINUED | OUTPATIENT
Start: 2025-03-26 | End: 2025-03-29 | Stop reason: HOSPADM

## 2025-03-26 RX ORDER — LANOLIN ALCOHOL/MO/W.PET/CERES
400 CREAM (GRAM) TOPICAL DAILY
COMMUNITY

## 2025-03-26 RX ORDER — SODIUM CHLORIDE 9 MG/ML
INJECTION, SOLUTION INTRAVENOUS PRN
Status: DISCONTINUED | OUTPATIENT
Start: 2025-03-26 | End: 2025-03-29 | Stop reason: HOSPADM

## 2025-03-26 RX ORDER — NIFEDIPINE 60 MG/1
60 TABLET, EXTENDED RELEASE ORAL DAILY PRN
Status: DISCONTINUED | OUTPATIENT
Start: 2025-03-26 | End: 2025-03-29 | Stop reason: HOSPADM

## 2025-03-26 RX ORDER — ACETAMINOPHEN 650 MG/1
650 SUPPOSITORY RECTAL EVERY 6 HOURS PRN
Status: DISCONTINUED | OUTPATIENT
Start: 2025-03-26 | End: 2025-03-29 | Stop reason: HOSPADM

## 2025-03-26 RX ORDER — PANTOPRAZOLE SODIUM 40 MG/1
40 TABLET, DELAYED RELEASE ORAL
Status: DISCONTINUED | OUTPATIENT
Start: 2025-03-26 | End: 2025-03-29 | Stop reason: HOSPADM

## 2025-03-26 RX ORDER — POTASSIUM CHLORIDE 1500 MG/1
40 TABLET, EXTENDED RELEASE ORAL PRN
Status: DISCONTINUED | OUTPATIENT
Start: 2025-03-26 | End: 2025-03-29 | Stop reason: HOSPADM

## 2025-03-26 RX ORDER — PREGABALIN 75 MG/1
75 CAPSULE ORAL 3 TIMES DAILY
COMMUNITY

## 2025-03-26 RX ORDER — ACETAMINOPHEN 325 MG/1
650 TABLET ORAL EVERY 6 HOURS PRN
Status: DISCONTINUED | OUTPATIENT
Start: 2025-03-26 | End: 2025-03-29 | Stop reason: HOSPADM

## 2025-03-26 RX ORDER — POLYETHYLENE GLYCOL 3350 17 G/17G
17 POWDER, FOR SOLUTION ORAL DAILY PRN
Status: DISCONTINUED | OUTPATIENT
Start: 2025-03-26 | End: 2025-03-29 | Stop reason: HOSPADM

## 2025-03-26 RX ORDER — IOPAMIDOL 755 MG/ML
75 INJECTION, SOLUTION INTRAVASCULAR
Status: COMPLETED | OUTPATIENT
Start: 2025-03-26 | End: 2025-03-26

## 2025-03-26 RX ORDER — CALCIUM CARBONATE 500 MG/1
1 TABLET, CHEWABLE ORAL 3 TIMES DAILY
Status: DISCONTINUED | OUTPATIENT
Start: 2025-03-26 | End: 2025-03-29 | Stop reason: HOSPADM

## 2025-03-26 RX ORDER — IPRATROPIUM BROMIDE AND ALBUTEROL SULFATE 2.5; .5 MG/3ML; MG/3ML
1 SOLUTION RESPIRATORY (INHALATION)
Status: DISCONTINUED | OUTPATIENT
Start: 2025-03-26 | End: 2025-03-28

## 2025-03-26 RX ORDER — IPRATROPIUM BROMIDE AND ALBUTEROL SULFATE 2.5; .5 MG/3ML; MG/3ML
3 SOLUTION RESPIRATORY (INHALATION) ONCE
Status: COMPLETED | OUTPATIENT
Start: 2025-03-26 | End: 2025-03-26

## 2025-03-26 RX ORDER — SODIUM CHLORIDE 0.9 % (FLUSH) 0.9 %
5-40 SYRINGE (ML) INJECTION EVERY 12 HOURS SCHEDULED
Status: DISCONTINUED | OUTPATIENT
Start: 2025-03-26 | End: 2025-03-29 | Stop reason: HOSPADM

## 2025-03-26 RX ORDER — AMMONIUM LACTATE 12 G/100G
LOTION TOPICAL PRN
COMMUNITY

## 2025-03-26 RX ORDER — POTASSIUM CHLORIDE 7.45 MG/ML
10 INJECTION INTRAVENOUS PRN
Status: DISCONTINUED | OUTPATIENT
Start: 2025-03-26 | End: 2025-03-29 | Stop reason: HOSPADM

## 2025-03-26 RX ORDER — MAGNESIUM SULFATE IN WATER 40 MG/ML
2000 INJECTION, SOLUTION INTRAVENOUS PRN
Status: DISCONTINUED | OUTPATIENT
Start: 2025-03-26 | End: 2025-03-29 | Stop reason: HOSPADM

## 2025-03-26 RX ORDER — ARFORMOTEROL TARTRATE 15 UG/2ML
15 SOLUTION RESPIRATORY (INHALATION) ONCE
Status: COMPLETED | OUTPATIENT
Start: 2025-03-26 | End: 2025-03-26

## 2025-03-26 RX ORDER — SODIUM CHLORIDE 9 MG/ML
INJECTION, SOLUTION INTRAVENOUS CONTINUOUS
Status: DISCONTINUED | OUTPATIENT
Start: 2025-03-26 | End: 2025-03-27

## 2025-03-26 RX ORDER — LACTULOSE 10 G/15ML
20 SOLUTION ORAL 2 TIMES DAILY
COMMUNITY

## 2025-03-26 RX ORDER — ENOXAPARIN SODIUM 100 MG/ML
40 INJECTION SUBCUTANEOUS DAILY
Status: DISCONTINUED | OUTPATIENT
Start: 2025-03-26 | End: 2025-03-29 | Stop reason: HOSPADM

## 2025-03-26 RX ORDER — SODIUM CHLORIDE 0.9 % (FLUSH) 0.9 %
5-40 SYRINGE (ML) INJECTION PRN
Status: DISCONTINUED | OUTPATIENT
Start: 2025-03-26 | End: 2025-03-29 | Stop reason: HOSPADM

## 2025-03-26 RX ORDER — 0.9 % SODIUM CHLORIDE 0.9 %
1000 INTRAVENOUS SOLUTION INTRAVENOUS ONCE
Status: COMPLETED | OUTPATIENT
Start: 2025-03-26 | End: 2025-03-26

## 2025-03-26 RX ORDER — ACETAMINOPHEN 325 MG/1
650 TABLET ORAL ONCE
Status: COMPLETED | OUTPATIENT
Start: 2025-03-26 | End: 2025-03-26

## 2025-03-26 RX ORDER — ALBUTEROL SULFATE 0.83 MG/ML
2.5 SOLUTION RESPIRATORY (INHALATION) EVERY 4 HOURS PRN
Status: DISCONTINUED | OUTPATIENT
Start: 2025-03-26 | End: 2025-03-29 | Stop reason: HOSPADM

## 2025-03-26 RX ADMIN — IOPAMIDOL 75 ML: 755 INJECTION, SOLUTION INTRAVENOUS at 02:49

## 2025-03-26 RX ADMIN — IPRATROPIUM BROMIDE AND ALBUTEROL SULFATE 1 DOSE: 2.5; .5 SOLUTION RESPIRATORY (INHALATION) at 20:24

## 2025-03-26 RX ADMIN — SODIUM CHLORIDE 1000 ML: 0.9 INJECTION, SOLUTION INTRAVENOUS at 05:25

## 2025-03-26 RX ADMIN — WATER 1000 MG: 1 INJECTION INTRAMUSCULAR; INTRAVENOUS; SUBCUTANEOUS at 04:18

## 2025-03-26 RX ADMIN — SODIUM CHLORIDE 3000 MG: 9 INJECTION, SOLUTION INTRAVENOUS at 06:32

## 2025-03-26 RX ADMIN — IPRATROPIUM BROMIDE AND ALBUTEROL SULFATE 3 DOSE: 2.5; .5 SOLUTION RESPIRATORY (INHALATION) at 09:17

## 2025-03-26 RX ADMIN — IPRATROPIUM BROMIDE AND ALBUTEROL SULFATE 1 DOSE: 2.5; .5 SOLUTION RESPIRATORY (INHALATION) at 12:11

## 2025-03-26 RX ADMIN — ENOXAPARIN SODIUM 40 MG: 100 INJECTION SUBCUTANEOUS at 08:44

## 2025-03-26 RX ADMIN — DOXYCYCLINE 100 MG: 100 INJECTION, POWDER, LYOPHILIZED, FOR SOLUTION INTRAVENOUS at 04:27

## 2025-03-26 RX ADMIN — MEROPENEM 1000 MG: 1 INJECTION INTRAVENOUS at 10:58

## 2025-03-26 RX ADMIN — ACETAMINOPHEN 650 MG: 325 TABLET ORAL at 04:20

## 2025-03-26 RX ADMIN — IPRATROPIUM BROMIDE AND ALBUTEROL SULFATE 3 DOSE: 2.5; .5 SOLUTION RESPIRATORY (INHALATION) at 01:25

## 2025-03-26 RX ADMIN — WATER 125 MG: 1 INJECTION INTRAMUSCULAR; INTRAVENOUS; SUBCUTANEOUS at 04:16

## 2025-03-26 RX ADMIN — IPRATROPIUM BROMIDE AND ALBUTEROL SULFATE 1 DOSE: 2.5; .5 SOLUTION RESPIRATORY (INHALATION) at 15:15

## 2025-03-26 RX ADMIN — ARFORMOTEROL TARTRATE 15 MCG: 15 SOLUTION RESPIRATORY (INHALATION) at 09:15

## 2025-03-26 RX ADMIN — MEROPENEM 1000 MG: 1 INJECTION INTRAVENOUS at 18:59

## 2025-03-26 RX ADMIN — SODIUM CHLORIDE: 0.9 INJECTION, SOLUTION INTRAVENOUS at 18:59

## 2025-03-26 RX ADMIN — SODIUM CHLORIDE: 0.9 INJECTION, SOLUTION INTRAVENOUS at 06:30

## 2025-03-26 ASSESSMENT — LIFESTYLE VARIABLES
HOW OFTEN DO YOU HAVE A DRINK CONTAINING ALCOHOL: NEVER
HOW MANY STANDARD DRINKS CONTAINING ALCOHOL DO YOU HAVE ON A TYPICAL DAY: PATIENT DOES NOT DRINK

## 2025-03-26 NOTE — PROGRESS NOTES
Speech Language Pathology  NAME:  Alena Busby  :  1955  DATE: 3/26/2025  ROOM:      Pt unavailable at 835 for Clinical Swallow Evaluation Discussed with patient nurse in person and charge nurse in person     REASON:  Other: Pt currently NPO secondary to ongoing BiPAP    Will re-attempt as appropriate.       Thank You    Chichi Douglas M.S., CCC-SLP  Speech-Language Pathologist  [id number]  3/26/2025

## 2025-03-26 NOTE — CONSULTS
[facility]  Department of Internal Medicine  Division of Pulmonary, Critical Care and Sleep Medicine  Consult Note    EASTON Ma MD, MD, MD, MD, MD    Patient: Alena Busby  MRN: [id number]  : 1955    Encounter Time: 7:55 PM     Date of Admission: 3/26/2025 12:47 AM    Primary Care Physician: Eufemia Ahmadi DO    Primary Pulmonologist: [facility]    Reason for Consultation: Hypoxemia, pneumonia     HISTORY OF PRESENT ILLNESS : Alena Busby 69 y.o. female was seen in consultation regarding the above chief compliant.  Patient seen and examined this morning while in the emergency room.  For me she denies any history of shortness of breath she states that the nursing home sent her because her oxygen levels were low but she denies any symptoms.  She is quite upset that she was on the CPAP and states that she never wants to wear that mask again that she felt like it was suffocating her.  She denies any symptoms of increased coughing, wheezing.  She denies any increased lower extremity edema.  She provides a very limited history.      PAST MEDICAL HISTORY:  has a past medical history of Arthritis, COVID-19, Depression, Dysphagia, oropharyngeal phase, Headache, Hearing loss, Hematuria, unspecified, Metabolic encephalopathy, Morbid obesity, Muscle wasting and atrophy, not elsewhere classified, multiple sites, Neuropathy, Other abnormalities of gait and mobility, Pneumonia, Sepsis (HCC), Severe protein-calorie malnutrition, Unspecified dementia, unspecified severity, with other behavioral disturbance (HCC), Urinary tract infection, and Vitamin D deficiency.    SURGICAL HISTORY:  has a past surgical history that includes Cataract removal and cervical fusion (N/A, 6/3/2019).     SOCIAL

## 2025-03-26 NOTE — ED NOTES
Pricila Acosta (Sister) will like to be reached out for updates regarding the patient.     Pricila Acosta (Sister): [phone]

## 2025-03-26 NOTE — ED NOTES
Notified physician of oxygen levels and pt being less responsive; respiratory notified of need for bipap

## 2025-03-26 NOTE — PROGRESS NOTES
Pt took NIV off and is refusing to allow this RT nor RN to replace on pt face.  RN perfect serve doctor to advise.

## 2025-03-26 NOTE — H&P
Hospital Medicine History & Physical      PCP: Eufemia Ahmadi DO    Date of Admission: 3/26/2025    Date of Service: Pt seen/examined on 3/26/25 and Admitted to Inpatient with expected LOS greater than two midnights due to medical therapy.     Chief Complaint: Shortness of breath      History Of Present Illness:      69 y.o. female who presented to [facility] with concern for shortness of breath from the nursing home.  She was found to be hypoxic 70% on room air and was placed on CPAP by EMS on arrival.  In the ER she did drop her O2 down to 83% requiring up to 5 L.  Chest x-ray showed concern for hazy airspace opacity in the right lower lobe concerning for pneumonia.  CTA chest with no evidence of PE.  proBNP 540 troponin of 40 and 41.  White count 21,100.  Patient has no history of underlying lung disease such as asthma or COPD.  She was given antibiotics with Rocephin and doxycycline.  Also received IV fluid given concern for sepsis blood pressure as low as 83/42. Blood gases on presentation pH of 7.3 pCO2 of 58.2 pO2 of 81.3.  She was placed on BiPAP initially on arrival.  Patient was not confused answering questions appropriately but denies recent swallowing issues and denied choking on any food. Patient is currently admitted for sepsis secondary to probable aspiration pneumonia and acute hypoxic respiratory failure.    Past Medical History:          Diagnosis Date    Arthritis     COVID-19     2/2022    Depression     Dysphagia, oropharyngeal phase     Headache     multiple within a month, 7/10 on the pain scale.    Hearing loss     Hematuria, unspecified     Metabolic encephalopathy     Morbid obesity     Muscle wasting and atrophy, not elsewhere classified, multiple sites     Neuropathy     Other abnormalities of gait and mobility     Pneumonia     Sepsis (HCC)     Severe protein-calorie malnutrition     Unspecified dementia, unspecified severity, with other behavioral disturbance (HCC)      Urinary tract infection     Vitamin D deficiency        Past Surgical History:          Procedure Laterality Date    CATARACT REMOVAL      CERVICAL FUSION N/A 6/3/2019    ANTERIOR CERVICAL FUSION C3-C4, C4-C5  AND CORPECTOMY C4  WITH PLATES, SCREWS, BONE GRAFT, SSEP -- GLOBUS performed by Daniele Daniels MD at [facility] OR       Medications Prior to Admission:      Prior to Admission medications    Medication Sig Start Date End Date Taking? Authorizing Provider   albuterol (PROVENTIL) (2.5 MG/3ML) 0.083% nebulizer solution Take 3 mLs by nebulization every 4 hours as needed for Wheezing    Jocelyne Palomino MD   pregabalin (LYRICA) 75 MG capsule Take 1 capsule by mouth 3 times daily for 90 days. Max Daily Amount: 225 mg 12/12/24 3/12/25  Kalli Gerardo MD   guaiFENesin 400 MG tablet Take 1 tablet by mouth in the morning, at noon, and at bedtime 5/22/24   Sejal Meza MD   OXYGEN Inhale 6 L/min into the lungs continuous  Patient not taking: Reported on 12/12/2024    Jocelyne Palomino MD   phenazopyridine (PYRIDIUM) 100 MG tablet Take 1 tablet by mouth 3 times daily as needed for Pain    Jocelyne Palomino MD   bisacodyl (DULCOLAX) 10 MG suppository Place 1 suppository rectally every 72 hours as needed for Constipation    Jocelyne Palomino MD   Cholecalciferol 50 MCG (2000 UT) TABS Take 2 tablets by mouth 2 times daily    Jocelyne Palomino MD   mineral oil enema Place 1 enema rectally every 72 hours as needed for Constipation    Jocelyne Palomino MD   omeprazole (PRILOSEC) 20 MG delayed release capsule Take 1 capsule by mouth daily    Jocelyne Palomino MD   polyethylene glycol (GLYCOLAX) 17 g packet Take 1 packet by mouth daily as needed for Constipation    Jocelyne Palomino MD   NIFEdipine (PROCARDIA XL) 60 MG extended release tablet Take 1 tablet by mouth daily as needed (HTN if BP > 170/100)    Jocelyne Palomino MD   magnesium hydroxide (MILK OF MAGNESIA) 400 MG/5ML  Perfect Serve.

## 2025-03-26 NOTE — ED NOTES
patient ripped off BiPap and is refusing to put BiPap back on. Patient was educated on benefits of BiPap, patient refused. Patient currently on 6L NC at this time     This RN walked into the room. Patient ripped off BiPap mask saying that \"I'm not wearing this damn thing anymore,\" Respiratory therapist and this RN educated patient on benefits of BiPap machine. Patient refused and attempted to throw mask off onto the bed. Patient is A+O x4. Patient placed on 6L NC. Physician notified,

## 2025-03-26 NOTE — ED PROVIDER NOTES
[facility] EMERGENCY DEPARTMENT  EMERGENCY DEPARTMENT ENCOUNTER        Pt Name: Alena Busby  MRN: [id number]  Birthdate 1955  Date of evaluation: 3/26/2025  Provider: Madan Mckeon DO  PCP: Eufemia Ahmadi DO  Note Started: 12:52 AM EDT 3/26/25    CHIEF COMPLAINT       Chief Complaint   Patient presents with    Shortness of Breath     Pt at nursing facility with +SOB and was in the 70's O2 % per staff. Pt arrived on C-PAP. Pt denies SOB at this time       HISTORY OF PRESENT ILLNESS: 1 or more Elements   History From: Patient    Alena Busby is a 69 y.o. female with a PMHx of obesity, pneumonia, UTI, sepsis, tobacco use, who presents shortness of breath.  Patient is arriving from [facility] via EMS with shortness of breath.  According to EMS nursing facility reported shortness of breath and patient was in the 70s O2 saturation per staff.  Patient arrived on CPAP.  Patient denies any shortness of breath or other symptoms at this time.  Patient put on 2 L saturating well.  She denies any fevers, chills, chest pain, shortness of breath, abdominal pain, nausea, vomiting, diarrhea, constipation, urinary symptoms.  Patient is not on blood thinners.  Patient admits to smoking 6 cigarettes/day, denies alcohol use or any other illicit drug use.      Nursing Notes were all reviewed and agreed with or any disagreements were addressed in the HPI.      REVIEW OF EXTERNAL NOTES :       PDMP Monitoring:    Last PDMP Madan as Reviewed:  Review User Review Instant Review Result   DOUGLAS HANKS 2/11/2022  9:34 AM Reviewed PDMP [1]       Urine Drug Screenings (1 yr)       Urine Drug Screen  Collected: 2/3/2022  7:36 PM (Final result)              Serum Drug Screen  Collected: 2/3/2022  9:27 PM (Final result)                  Medication Contract and Consent for Opioid Use Documents Filed        No documents found                      REVIEW OF SYSTEMS :      Positives and  PREGABALIN (LYRICA) 75 MG CAPSULE    Take 1 capsule by mouth 3 times daily for 90 days. Max Daily Amount: 225 mg       ALLERGIES       Patient has no known allergies.    FAMILYHISTORY       History reviewed. No pertinent family history.     SOCIAL HISTORY       Social History     Tobacco Use    Smoking status: Every Day     Current packs/day: 0.50     Average packs/day: 0.5 packs/day for 20.0 years (10.0 ttl pk-yrs)     Types: Cigarettes     Passive exposure: Current    Smokeless tobacco: Never    Tobacco comments:     Smokes 1-2 cigarettes per day.   Vaping Use    Vaping status: Never Used   Substance Use Topics    Alcohol use: No    Drug use: Never       SCREENINGS          [place] Coma Scale  Eye Opening: Spontaneous  Best Verbal Response: Oriented  Best Motor Response: Obeys commands  [place] Coma Scale Score: 15                CIWA Assessment  BP: (!) 102/47  Pulse: 65           PHYSICAL EXAM  1 or more Elements     ED Triage Vitals   BP Systolic BP Percentile Diastolic BP Percentile Temp Temp Source Pulse Respirations SpO2   03/26/25 0048 -- -- 03/26/25 0048 03/26/25 0048 03/26/25 0048 03/26/25 0050 03/26/25 0048   (!) 129/52   99 °F (37.2 °C) Oral 94 20 96 %      Height Weight         -- --                         Physical Exam  Constitutional:       General: She is not in acute distress.     Appearance: She is obese. She is not ill-appearing or toxic-appearing.   HENT:      Head: Normocephalic and atraumatic.   Eyes:      Extraocular Movements: Extraocular movements intact.      Pupils: Pupils are equal, round, and reactive to light.   Cardiovascular:      Rate and Rhythm: Normal rate and regular rhythm.      Heart sounds: No murmur heard.     No friction rub. No gallop.   Pulmonary:      Effort: No accessory muscle usage or respiratory distress.      Breath sounds: Wheezing present. No rhonchi or rales.   Abdominal:      Palpations: Abdomen is soft.      Tenderness: There is no abdominal tenderness. There  is no guarding or rebound.   Musculoskeletal:         General: Normal range of motion.   Skin:     General: Skin is warm and dry.      Capillary Refill: Capillary refill takes less than 2 seconds.   Neurological:      General: No focal deficit present.      Mental Status: She is alert and oriented to person, place, and time.          DIAGNOSTIC RESULTS   LABS:    Labs Reviewed   CBC WITH AUTO DIFFERENTIAL - Abnormal; Notable for the following components:       Result Value    WBC 12.1 (*)     RBC 5.61 (*)     Hemoglobin 16.4 (*)     Hematocrit 52.0 (*)     MCHC 31.5 (*)     RDW 16.0 (*)     Lymphocytes % 17 (*)     Neutrophils Absolute 8.79 (*)     Monocytes Absolute 1.09 (*)     All other components within normal limits   COMPREHENSIVE METABOLIC PANEL - Abnormal; Notable for the following components:    Calcium 7.1 (*)     Albumin 3.4 (*)     All other components within normal limits   TROPONIN - Abnormal; Notable for the following components:    Troponin, High Sensitivity 40 (*)     All other components within normal limits   D-DIMER, QUANTITATIVE - Abnormal; Notable for the following components:    D-Dimer, Quant 263 (*)     All other components within normal limits   BRAIN NATRIURETIC PEPTIDE - Abnormal; Notable for the following components:    NT Pro- (*)     All other components within normal limits   TROPONIN - Abnormal; Notable for the following components:    Troponin, High Sensitivity 41 (*)     All other components within normal limits   BLOOD GAS, ARTERIAL - Abnormal; Notable for the following components:    pH, Blood Gas 7.320 (*)     PCO2 58.2 (*)     HCO3 29.3 (*)     O2Hb 92.9 (*)     COHb 1.7 (*)     HHb 5.1 (*)     All other components within normal limits   RESPIRATORY PANEL, MOLECULAR, WITH COVID-19   LEGIONELLA ANTIGEN, URINE   STREP PNEUMONIAE ANTIGEN   CULTURE, BLOOD 1   CULTURE, BLOOD 2   MAGNESIUM   BLOOD GAS, ARTERIAL   BLOOD GAS, ARTERIAL   URINALYSIS WITH MICROSCOPIC   MYCOPLASMA

## 2025-03-27 PROBLEM — E83.51 HYPOCALCEMIA: Status: ACTIVE | Noted: 2025-03-27

## 2025-03-27 LAB
25(OH)D3 SERPL-MCNC: 58.4 NG/ML (ref 30–100)
ANION GAP SERPL CALCULATED.3IONS-SCNC: 13 MMOL/L (ref 7–16)
BASOPHILS # BLD: 0.01 K/UL (ref 0–0.2)
BASOPHILS NFR BLD: 0 % (ref 0–2)
BUN SERPL-MCNC: 15 MG/DL (ref 6–23)
CALCIUM SERPL-MCNC: 6.4 MG/DL (ref 8.6–10.2)
CHLORIDE SERPL-SCNC: 107 MMOL/L (ref 98–107)
CO2 SERPL-SCNC: 22 MMOL/L (ref 22–29)
CREAT SERPL-MCNC: 0.5 MG/DL (ref 0.5–1)
EOSINOPHIL # BLD: 0 K/UL (ref 0.05–0.5)
EOSINOPHILS RELATIVE PERCENT: 0 % (ref 0–6)
ERYTHROCYTE [DISTWIDTH] IN BLOOD BY AUTOMATED COUNT: 15.4 % (ref 11.5–15)
GFR, ESTIMATED: >90 ML/MIN/1.73M2
GLUCOSE SERPL-MCNC: 134 MG/DL (ref 74–99)
HCT VFR BLD AUTO: 45.7 % (ref 34–48)
HGB BLD-MCNC: 14.8 G/DL (ref 11.5–15.5)
IMM GRANULOCYTES # BLD AUTO: 0.07 K/UL (ref 0–0.58)
IMM GRANULOCYTES NFR BLD: 1 % (ref 0–5)
L PNEUMO1 AG UR QL IA.RAPID: NEGATIVE
LYMPHOCYTES NFR BLD: 1.26 K/UL (ref 1.5–4)
LYMPHOCYTES RELATIVE PERCENT: 13 % (ref 20–42)
MCH RBC QN AUTO: 29.4 PG (ref 26–35)
MCHC RBC AUTO-ENTMCNC: 32.4 G/DL (ref 32–34.5)
MCV RBC AUTO: 90.9 FL (ref 80–99.9)
MONOCYTES NFR BLD: 0.8 K/UL (ref 0.1–0.95)
MONOCYTES NFR BLD: 8 % (ref 2–12)
NEUTROPHILS NFR BLD: 79 % (ref 43–80)
NEUTS SEG NFR BLD: 7.86 K/UL (ref 1.8–7.3)
PLATELET # BLD AUTO: 163 K/UL (ref 130–450)
PMV BLD AUTO: 11 FL (ref 7–12)
POTASSIUM SERPL-SCNC: 4.1 MMOL/L (ref 3.5–5)
PTH-INTACT SERPL-MCNC: 11.9 PG/ML (ref 15–65)
RBC # BLD AUTO: 5.03 M/UL (ref 3.5–5.5)
S PNEUM AG SPEC QL: NEGATIVE
SODIUM SERPL-SCNC: 142 MMOL/L (ref 132–146)
SPECIMEN SOURCE: NORMAL
TSH SERPL DL<=0.05 MIU/L-ACNC: 0.2 UIU/ML (ref 0.27–4.2)
WBC OTHER # BLD: 10 K/UL (ref 4.5–11.5)

## 2025-03-27 PROCEDURE — 99232 SBSQ HOSP IP/OBS MODERATE 35: CPT

## 2025-03-27 PROCEDURE — 85025 COMPLETE CBC W/AUTO DIFF WBC: CPT

## 2025-03-27 PROCEDURE — 80048 BASIC METABOLIC PNL TOTAL CA: CPT

## 2025-03-27 PROCEDURE — 83970 ASSAY OF PARATHORMONE: CPT

## 2025-03-27 PROCEDURE — 97530 THERAPEUTIC ACTIVITIES: CPT

## 2025-03-27 PROCEDURE — 6370000000 HC RX 637 (ALT 250 FOR IP)

## 2025-03-27 PROCEDURE — 92610 EVALUATE SWALLOWING FUNCTION: CPT

## 2025-03-27 PROCEDURE — 2580000003 HC RX 258

## 2025-03-27 PROCEDURE — 36415 COLL VENOUS BLD VENIPUNCTURE: CPT

## 2025-03-27 PROCEDURE — 2500000003 HC RX 250 WO HCPCS: Performed by: FAMILY MEDICINE

## 2025-03-27 PROCEDURE — 84443 ASSAY THYROID STIM HORMONE: CPT

## 2025-03-27 PROCEDURE — 2700000000 HC OXYGEN THERAPY PER DAY

## 2025-03-27 PROCEDURE — 82306 VITAMIN D 25 HYDROXY: CPT

## 2025-03-27 PROCEDURE — 6360000002 HC RX W HCPCS: Performed by: FAMILY MEDICINE

## 2025-03-27 PROCEDURE — 6360000002 HC RX W HCPCS

## 2025-03-27 PROCEDURE — 2060000000 HC ICU INTERMEDIATE R&B

## 2025-03-27 PROCEDURE — 94640 AIRWAY INHALATION TREATMENT: CPT

## 2025-03-27 PROCEDURE — 97161 PT EVAL LOW COMPLEX 20 MIN: CPT

## 2025-03-27 RX ORDER — FUROSEMIDE 10 MG/ML
20 INJECTION INTRAMUSCULAR; INTRAVENOUS ONCE
Status: COMPLETED | OUTPATIENT
Start: 2025-03-27 | End: 2025-03-27

## 2025-03-27 RX ORDER — ASCORBIC ACID 500 MG
500 TABLET ORAL DAILY
Status: DISCONTINUED | OUTPATIENT
Start: 2025-03-27 | End: 2025-03-29 | Stop reason: HOSPADM

## 2025-03-27 RX ORDER — PREGABALIN 75 MG/1
75 CAPSULE ORAL 3 TIMES DAILY
Status: DISCONTINUED | OUTPATIENT
Start: 2025-03-27 | End: 2025-03-29 | Stop reason: HOSPADM

## 2025-03-27 RX ORDER — CHOLECALCIFEROL (VITAMIN D3) 50 MCG
4000 TABLET ORAL 2 TIMES DAILY
Status: DISCONTINUED | OUTPATIENT
Start: 2025-03-27 | End: 2025-03-29 | Stop reason: HOSPADM

## 2025-03-27 RX ORDER — FUROSEMIDE 20 MG/1
20 TABLET ORAL DAILY
Status: DISCONTINUED | OUTPATIENT
Start: 2025-03-28 | End: 2025-03-29 | Stop reason: HOSPADM

## 2025-03-27 RX ORDER — CALCIUM GLUCONATE 20 MG/ML
2000 INJECTION, SOLUTION INTRAVENOUS ONCE
Status: COMPLETED | OUTPATIENT
Start: 2025-03-27 | End: 2025-03-27

## 2025-03-27 RX ADMIN — FUROSEMIDE 20 MG: 10 INJECTION, SOLUTION INTRAMUSCULAR; INTRAVENOUS at 16:59

## 2025-03-27 RX ADMIN — Medication 500 MG: at 16:59

## 2025-03-27 RX ADMIN — IPRATROPIUM BROMIDE AND ALBUTEROL SULFATE 1 DOSE: 2.5; .5 SOLUTION RESPIRATORY (INHALATION) at 08:44

## 2025-03-27 RX ADMIN — PANTOPRAZOLE SODIUM 40 MG: 40 TABLET, DELAYED RELEASE ORAL at 05:14

## 2025-03-27 RX ADMIN — IPRATROPIUM BROMIDE AND ALBUTEROL SULFATE 1 DOSE: 2.5; .5 SOLUTION RESPIRATORY (INHALATION) at 15:46

## 2025-03-27 RX ADMIN — CALCIUM CARBONATE 500 MG: 500 TABLET, CHEWABLE ORAL at 16:59

## 2025-03-27 RX ADMIN — CALCIUM CARBONATE 500 MG: 500 TABLET, CHEWABLE ORAL at 20:10

## 2025-03-27 RX ADMIN — CALCIUM GLUCONATE 2000 MG: 20 INJECTION, SOLUTION INTRAVENOUS at 09:22

## 2025-03-27 RX ADMIN — PREGABALIN 75 MG: 75 CAPSULE ORAL at 16:59

## 2025-03-27 RX ADMIN — CALCIUM CARBONATE 500 MG: 500 TABLET, CHEWABLE ORAL at 09:18

## 2025-03-27 RX ADMIN — SODIUM CHLORIDE, PRESERVATIVE FREE 10 ML: 5 INJECTION INTRAVENOUS at 09:19

## 2025-03-27 RX ADMIN — PREGABALIN 75 MG: 75 CAPSULE ORAL at 20:10

## 2025-03-27 RX ADMIN — SODIUM CHLORIDE, PRESERVATIVE FREE 10 ML: 5 INJECTION INTRAVENOUS at 20:10

## 2025-03-27 RX ADMIN — ENOXAPARIN SODIUM 40 MG: 100 INJECTION SUBCUTANEOUS at 09:18

## 2025-03-27 RX ADMIN — MEROPENEM 1000 MG: 1 INJECTION INTRAVENOUS at 02:54

## 2025-03-27 RX ADMIN — MEROPENEM 1000 MG: 1 INJECTION INTRAVENOUS at 11:44

## 2025-03-27 RX ADMIN — Medication 4000 UNITS: at 20:10

## 2025-03-27 NOTE — PROGRESS NOTES
[facility] Hospitalist Progress Note    Admitting Date and Time: 3/26/2025 12:47 AM  Admit Dx: COPD exacerbation (HCC) [J44.1]  Acute respiratory failure with hypoxia [J96.01]  Pneumonia due to infectious organism, unspecified laterality, unspecified part of lung [J18.9]  Acute hypoxic respiratory failure [J96.01]    Subjective:  Patient is being followed for COPD exacerbation (HCC) [J44.1]  Acute respiratory failure with hypoxia [J96.01]  Pneumonia due to infectious organism, unspecified laterality, unspecified part of lung [J18.9]  Acute hypoxic respiratory failure [J96.01]     Patient was seen at bedside.  He is alert oriented answering all my question.  Denies any complaint.  Currently required 3 L oxygen  Discussed with pulmonology    ROS: denies fever, chills, cp, sob, n/v, HA unless stated above.        sodium chloride flush  5-40 mL IntraVENous 2 times per day    enoxaparin  40 mg SubCUTAneous Daily    calcium carbonate  1 tablet Oral TID    pantoprazole  40 mg Oral QAM AC    ipratropium 0.5 mg-albuterol 2.5 mg  1 Dose Inhalation Q4H WA RT    meropenem  1,000 mg IntraVENous Q8H     sodium chloride flush, 5-40 mL, PRN  sodium chloride, , PRN  potassium chloride, 40 mEq, PRN   Or  potassium alternative oral replacement, 40 mEq, PRN   Or  potassium chloride, 10 mEq, PRN  magnesium sulfate, 2,000 mg, PRN  ondansetron, 4 mg, Q8H PRN   Or  ondansetron, 4 mg, Q6H PRN  polyethylene glycol, 17 g, Daily PRN  acetaminophen, 650 mg, Q6H PRN   Or  acetaminophen, 650 mg, Q6H PRN  albuterol, 2.5 mg, Q4H PRN  NIFEdipine, 60 mg, Daily PRN         Objective:    /79   Pulse 75   Temp 97.2 °F (36.2 °C)   Resp 14   SpO2 93%     General Appearance: alert and oriented to person, place and time and in no acute distress  Skin: warm and dry  Head: normocephalic and atraumatic  Eyes: pupils equal, round, and reactive to light, extraocular eye movements intact, conjunctivae normal  Neck: neck supple and non tender  without mass   Pulmonary/Chest: clear to auscultation bilaterally- no wheezes, rales or rhonchi, normal air movement, no respiratory distress  Cardiovascular: normal rate, normal S1 and S2 and no carotid bruits  Abdomen: soft, non-tender, non-distended, normal bowel sounds, no masses or organomegaly  Extremities: no cyanosis, no clubbing and no edema  Neurologic: no cranial nerve deficit and speech normal      Recent Labs     03/26/25  0109 03/27/25  0706    142   K 4.1 4.1    107   CO2 24 22   BUN 14 15   CREATININE 0.6 0.5   GLUCOSE 97 134*   CALCIUM 7.1* 6.4*       Recent Labs     03/26/25  0109 03/27/25  0706   WBC 12.1* 10.0   RBC 5.61* 5.03   HGB 16.4* 14.8   HCT 52.0* 45.7   MCV 92.7 90.9   MCH 29.2 29.4   MCHC 31.5* 32.4   RDW 16.0* 15.4*    163   MPV 11.1 11.0       Radiology: Reviewed    Assessment and plan:    Acute hypoxic respiratory failure  Pneumonia versus volume overload  Leukocytosis, resolved  Asymptomatic bacteriuria  Dementia  Dysphagia  Hypocalcemia    -Chest x-ray showed sign of pneumonia vs vascular congestion but  CT chest no infiltration  -Pulmonology consulted  -Patient has history of ESBL bacteremia, patient was treated with meropenem.  Will stop meropenem as patient is clinically improving.   -Blood culture no growth till now  -Will give 1 dose of IV Lasix  -Speech consulted and recommended soft and bite-size solid with liquid diets  -Her calcium was 6.9 this morning.  Repleted with calcium gluconate.  Vitamin D and PTH ordered    Disposition: Plan to discharge in next 24 hours    NOTE: This report was transcribed using voice recognition software. Every effort was made to ensure accuracy; however, inadvertent computerized transcription errors may be present.  Electronically signed by BIBI VALENCIA MD on 3/27/2025 at 2:15 PM

## 2025-03-27 NOTE — PROGRESS NOTES
activities:  Bed mobility: Cues for safety and hand placement during bed mobility transfers.  Sitting EOB 15 minutes to promote upright tolerance and improve seated posture and balance.   Vitals and symptoms were closely monitored throughout session.  Skilled positioning in bed to protect skin/joint integrity and for pain management.  Education on safety with all functional mobility.      Pt's/family goals:  1. Return to PLOF    Prognosis is good for reaching above PT goals.    Patient and or family understand(s) diagnosis, prognosis, and plan of care.  Yes    PHYSICAL THERAPY PLAN OF CARE:    PT POC is established based on physician order and patient diagnosis     Referring provider/PT Order:    Start   Ordering Provider    03/26/25 0545  PT evaluation and treat  Start:  03/26/25 0545,   End:  03/26/25 0545,   ONE TIME,   Standing Count:  1 Occurrences,   R         Familia Cavanaugh MD      Diagnosis:  COPD exacerbation (HCC) [J44.1]  Acute respiratory failure with hypoxia [J96.01]  Pneumonia due to infectious organism, unspecified laterality, unspecified part of lung [J18.9]  Acute hypoxic respiratory failure [J96.01]  Specific instructions for next treatment:  Progress activity as tolerated.     Current Treatment Recommendations:     [x] Strengthening to improve independence with functional mobility   [] ROM to improve independence with functional mobility   [x] Balance Training to improve static/dynamic balance and to reduce fall risk  [x] Endurance Training to improve activity tolerance during functional mobility   [x] Transfer Training to improve safety and independence with all functional transfers   [x] Gait Training to improve gait mechanics, endurance and assess need for appropriate assistive device  [] Stair Training in preparation for safe discharge home and/or into the community   [x] Positioning to prevent skin breakdown and contractures  [x] Safety and Education Training   [x] Patient/Caregiver  Education   [] HEP  [] Other     PT long term treatment goals are located in above grid    Frequency of treatments: 2-5x/week x 1-2 weeks.    Time in  1020  Time out  1055    Total Treatment Time  23 minutes     Evaluation Time includes thorough review of current medical information, gathering information on past medical history/social history and prior level of function, completion of standardized testing/informal observation of tasks, assessment of data and education on plan of care and goals.    CPT codes:  [x] Low Complexity PT evaluation 05166  [] Moderate Complexity PT evaluation 86570  [] High Complexity PT evaluation 07531  [] PT Re-evaluation 53491  [] Gait training 54382 0 minutes  [] Manual therapy 29112 0 minutes  [x] Therapeutic activities 71021 23 minutes  [] Therapeutic exercises 92384 0 minutes  [] Neuromuscular reeducation 06503 0 minutes     Edison Rico, PT, DPT  [id number]    Amirah Tomas, SPT

## 2025-03-27 NOTE — PROGRESS NOTES
[facility]  Department of Internal Medicine  Division of Pulmonary, Critical Care and Sleep Medicine  Progress Note    EASTON Ma MD, MD, MD, MD, MD    [facility]    Subjective:  Patient seen and examined.  Patient was seen on 3 L nasal cannula, in no acute distress.  She denies any respiratory complaints during time of evaluation.     OBJECTIVE:     PHYSICAL EXAM:   VITALS:   Vitals:    03/27/25 0300 03/27/25 0515 03/27/25 0758 03/27/25 1116   BP:   (!) 112/58 113/79   Pulse:   75 75   Resp:   20 14   Temp:   97.2 °F (36.2 °C) 97.2 °F (36.2 °C)   TempSrc:   Temporal    SpO2: 97% 96% 93%         Intake/Output Summary (Last 24 hours) at 3/27/2025 1302  Last data filed at 3/27/2025 0800  Gross per 24 hour   Intake 1033 ml   Output --   Net 1033 ml        CONSTITUTIONAL: Alert, oriented to person, not situation    SKIN:     No rash, No suspicious lesions, No skin discoloration  HEENT:     EOMI, MMM, No thrush  NECK:    No bruits, No JVP appreciated  CV:      Sinus,  No murmur, No rubs, No gallops  PULMONARY:   Breath sounds clear but diminished bilaterally  ABDOMEN:     Soft, non-tender. BS normal. No R/R/G  EXT:    No deformities .  No clubbing.       1+ lower extremity edema, No venous stasis  PULSE:   Appears equal and palpable.  PSYCHIATRIC:  Seems appropriate, No acute psychosis  MS:    No fractures, No gross weakness  NEUROLOGIC:   The clinical assessment is non-focal     DATA: IMAGING & TESTING:     LABORATORY TESTS:    CBC with Differential:    Lab Results   Component Value Date/Time    WBC 10.0 03/27/2025 07:06 AM    RBC 5.03 03/27/2025 07:06 AM    HGB 14.8 03/27/2025 07:06 AM    HCT 45.7 03/27/2025 07:06 AM     03/27/2025 07:06 AM    MCV 90.9 03/27/2025 07:06  will continue to follow    Above assessment and plan were made in collaboration with Dr. Taylor.  Further recommendations to follow.       EASTON Morse NP

## 2025-03-27 NOTE — PLAN OF CARE
Problem: Discharge Planning  Goal: Discharge to home or other facility with appropriate resources  3/27/2025 1227 by Shanda Tamayo, RN  Outcome: Progressing  3/26/2025 2331 by Des Bishop, RN  Outcome: Progressing     Problem: Skin/Tissue Integrity  Goal: Skin integrity remains intact  Description: 1.  Monitor for areas of redness and/or skin breakdown  2.  Assess vascular access sites hourly  3.  Every 4-6 hours minimum:  Change oxygen saturation probe site  4.  Every 4-6 hours:  If on nasal continuous positive airway pressure, respiratory therapy assess nares and determine need for appliance change or resting period  Outcome: Progressing     Problem: Safety - Adult  Goal: Free from fall injury  Outcome: Progressing     Problem: Respiratory - Adult  Goal: Achieves optimal ventilation and oxygenation  Outcome: Progressing

## 2025-03-27 NOTE — PROGRESS NOTES
4 Eyes Skin Assessment     NAME:  Alena Busby  YOB: 1955  MEDICAL RECORD NUMBER:  [id number]    The patient is being assessed for  Admission    I agree that at least one RN has performed a thorough Head to Toe Skin Assessment on the patient. ALL assessment sites listed below have been assessed.      Areas assessed by both nurses:    Head, Face, Ears, Shoulders, Back, Chest, Arms, Elbows, Hands, Sacrum. Buttock, Coccyx, Ischium, Legs. Feet and Heels, and Under Medical Devices     Blanchable Redness-Buttocks/Heels  Ecchymosis-BUE        Does the Patient have a Wound? No noted wound(s)       Ramon Prevention initiated by RN: Yes  Wound Care Orders initiated by RN: No    Pressure Injury (Stage 3,4, Unstageable, DTI, NWPT, and Complex wounds) if present, place Wound referral order by RN under : No    New Ostomies, if present place, Ostomy referral order under : No     Nurse 1 eSignature: Electronically signed by Des Bishop RN on 3/27/25 at 12:17 AM EDT    **SHARE this note so that the co-signing nurse can place an eSignature**    Nurse 2 eSignature: Electronically signed by Fabiano Haney RN on 3/27/25 at 1:52 AM EDT

## 2025-03-27 NOTE — PROGRESS NOTES
SPEECH/LANGUAGE PATHOLOGY  CLINICAL ASSESSMENT OF SWALLOWING FUNCTION   and PLAN OF CARE      PATIENT NAME:  Alena Busby  (female)     MRN:  [id number]    :  1955  (69 y.o.)  STATUS:  Inpatient: Room 7410/7410-A    TODAY'S DATE:  3/27/2025  ORDER DATE, DESCRIPTION AND REFERRING PROVIDER:    SLP swallowing-dysphagia evaluation and treatment  Start:  25,   End:  25,   ONE TIME,   Standing Count:  1 Occurrences,   R       Familia Cavanaugh MD  REASON FOR REFERRAL: Dysphagia   EVALUATING THERAPIST: PEG Wang                 RESULTS:    DYSPHAGIA DIAGNOSIS:   Clinical indicators of mild  oropharyngeal phase dysphagia     An underlying moist cough was observed throughout the session that decreases the ability to determine presence or absence of clinical indicators of dysphagia. Patient denies any difficulty with eating/drinking at bedside. No immediate overt s/s of aspiration were demonstrated throughout the evaluation. However, if concerns for silent aspiration arise, patient will benefit from a modified barium swallow study to fully assess her oral and pharyngeal swallow function.       DIET RECOMMENDATIONS:  Soft and bite size consistency solids (IDDSI level 6) with  thin liquids (IDDSI level 0)     FEEDING RECOMMENDATIONS:     Assistance level:  Set-up is required for all oral intake  Supervision is needed during all oral intake  Verbal cueing for implementation of safe swallow strategies       Compensatory strategies recommended: Fully alert for all PO, Thorough oral care to prevent colonization of oral bacteria, Upright in bed/ chair as tolerated, Effortful swallow, Encourage oral clearing of bolus before next bite/sip is taken, Slow rate of intake, SINGLE cup sips, SINGLE straw sips, SMALL bites, Liquid wash to help clear oral cavity of thicker consistency items      Discussed recommendations with:  patient nurse in person    SPEECH THERAPY  PLAN OF CARE   The dysphagia  fatigue/decreased endurance during meal compromises swallow function    During trials of solids patient will masticate solids fully and recollect bolus leaving only minimal oral residue post swallow on  90% of opportunities or greater  Pt will improve bolus prep/control and mastication with  minimal verbal prompts to advance diet grade by 1 IDDSI level .    Long Term Goals:   Pt will maintain adequate nutrition/hydration via PO intake of the least restrictive oral diet with implementation of safe swallow/ compensatory strategies and decrease signs/symptoms of aspiration to less than 1 x/day.   Pt will improve oropharyngeal swallow function to ensure airway protection during PO intake to maintain adequate nutrition/hydration and decrease signs/symptoms of aspiration to less than 1 x/day.   A Video Swallow Study (MBSS) may be recommended and requires a physician order      Patient/family Goal:    To get stronger    Plan of care discussed with Patient   The Patient understand(s) the diagnosis, prognosis and plan of care     Rehabilitation Potential/Prognosis: Good                    ADMITTING DIAGNOSIS: COPD exacerbation (HCC) [J44.1]  Acute respiratory failure with hypoxia [J96.01]  Pneumonia due to infectious organism, unspecified laterality, unspecified part of lung [J18.9]  Acute hypoxic respiratory failure [J96.01]    VISIT DIAGNOSIS:   Visit Diagnoses         Codes      COPD exacerbation (HCC)     J44.1             PATIENT REPORT/COMPLAINT: denies difficulty swallowing  RN cleared patient for participation in assessment     yes     PRIOR LEVEL OF SWALLOW FUNCTION:    PAST HISTORY OF OROPHARYNGEAL DYSPHAGIA?: yes    Home diet: Regular consistency solids (IDDSI level 7) with  thin liquids (IDDSI level 0)  Current Diet Order:  ADULT DIET; Regular    Patient has a previous medical hx of dysphagia. She was seen in June 2024 for a clinical bedside swallow evaluation and was recommended a soft and bite size diet with

## 2025-03-27 NOTE — CARE COORDINATION
3/27/25 CM Note.  Pt admitted 3/26/25 acute hypoxic resp failure. IV Lasix. Pt from [facility],plan to return upon DC. Awaiting return call from Liaison to verify needs to return to facility.    Monica DE LA TORRE RN-BC  [phone]    Case Management Assessment  Initial Evaluation    Date/Time of Evaluation: 3/27/2025 2:42 PM  Assessment Completed by: Monica Black RN    If patient is discharged prior to next notation, then this note serves as note for discharge by case management.    Patient Name: Alena Busby                   YOB: 1955  Diagnosis: COPD exacerbation (HCC) [J44.1]  Acute respiratory failure with hypoxia [J96.01]  Pneumonia due to infectious organism, unspecified laterality, unspecified part of lung [J18.9]  Acute hypoxic respiratory failure [J96.01]                   Date / Time: 3/26/2025 12:47 AM    Patient Admission Status: Inpatient   Readmission Risk (Low < 19, Mod (19-27), High > 27): Readmission Risk Score: 13.5    Current PCP: Eufemia Ahmadi, DO  PCP verified by ? Yes    Chart Reviewed: Yes      History Provided by: Medical Record  Patient Orientation: Alert and Oriented    Patient Cognition: Alert    Hospitalization in the last 30 days (Readmission):  No    If yes, Readmission Assessment in CM Navigator will be completed.    Advance Directives:      Code Status: Full Code   Patient's Primary Decision Maker is: Legal Next of Kin    Primary Decision Maker: Artie Busby - [facility] - [phone]    Discharge Planning:    Patient lives with: Alone Type of Home: Skilled Nursing Facility  Primary Care Giver: Self  Patient Support Systems include: Family Members   Current services prior to admission: Skilled Nursing Facility  Type of Home Care services:  None    ADLS  Prior functional level: Assistance with the following:, Bathing, Housework, Cooking  Current functional level: Assistance with the following:, Shopping, Mobility, Bathing, Dressing, Cooking, Housework,  Toileting    PT AM-PAC: 8 /24  OT AM-PAC:   /24    Family can provide assistance at DC: No  Would you like Case Management to discuss the discharge plan with any other family members/significant others, and if so, who? No  Plans to Return to Present Housing: Yes  Potential Assistance needed at discharge: Skilled Nursing Facility  Patient expects to discharge to: Skilled nursing facility  Plan for transportation at discharge: ambulance     Financial    Payor: MEDICARE / Plan: MEDICARE PART A AND B / Product Type: *No Product type* /     Does insurance require precert for SNF: No    Potential assistance Purchasing Medications: No  Meds-to-Beds request:        [facility] [address] [place] [address] - [address] [place] [address] -  [phone] - F [phone]  [address] [place] [address]  Phone: [phone] Fax: [phone]    [facility] [address] -  [phone] -  [phone]  [address] [place], [place] [id number]  Phone: [phone] Fax: [phone]        The Plan for Transition of Care is related to the following treatment goals of COPD exacerbation (HCC) [J44.1]  Acute respiratory failure with hypoxia [J96.01]  Pneumonia due to infectious organism, unspecified laterality, unspecified part of lung [J18.9]  Acute hypoxic respiratory failure [J96.01]      Monica Black RN  Case Management Department

## 2025-03-28 VITALS
OXYGEN SATURATION: 93 % | DIASTOLIC BLOOD PRESSURE: 55 MMHG | SYSTOLIC BLOOD PRESSURE: 106 MMHG | HEART RATE: 65 BPM | RESPIRATION RATE: 19 BRPM | TEMPERATURE: 97 F

## 2025-03-28 PROBLEM — J81.0 ACUTE PULMONARY EDEMA (HCC): Status: ACTIVE | Noted: 2025-03-28

## 2025-03-28 LAB
ANION GAP SERPL CALCULATED.3IONS-SCNC: 16 MMOL/L (ref 7–16)
BUN SERPL-MCNC: 18 MG/DL (ref 6–23)
CALCIUM SERPL-MCNC: 7.1 MG/DL (ref 8.6–10.2)
CHLORIDE SERPL-SCNC: 106 MMOL/L (ref 98–107)
CO2 SERPL-SCNC: 24 MMOL/L (ref 22–29)
CREAT SERPL-MCNC: 0.6 MG/DL (ref 0.5–1)
GFR, ESTIMATED: >90 ML/MIN/1.73M2
GLUCOSE SERPL-MCNC: 133 MG/DL (ref 74–99)
MAGNESIUM SERPL-MCNC: 1.4 MG/DL (ref 1.6–2.6)
MYCOPLASMA AB,IGM: PRESENT
PHOSPHATE SERPL-MCNC: 3.5 MG/DL (ref 2.5–4.5)
POTASSIUM SERPL-SCNC: 4 MMOL/L (ref 3.5–5)
SODIUM SERPL-SCNC: 146 MMOL/L (ref 132–146)
T4 FREE SERPL-MCNC: 1.3 NG/DL (ref 0.9–1.7)

## 2025-03-28 PROCEDURE — 84439 ASSAY OF FREE THYROXINE: CPT

## 2025-03-28 PROCEDURE — 80048 BASIC METABOLIC PNL TOTAL CA: CPT

## 2025-03-28 PROCEDURE — 97530 THERAPEUTIC ACTIVITIES: CPT

## 2025-03-28 PROCEDURE — 6370000000 HC RX 637 (ALT 250 FOR IP): Performed by: NURSE PRACTITIONER

## 2025-03-28 PROCEDURE — 94660 CPAP INITIATION&MGMT: CPT

## 2025-03-28 PROCEDURE — 94640 AIRWAY INHALATION TREATMENT: CPT

## 2025-03-28 PROCEDURE — 6370000000 HC RX 637 (ALT 250 FOR IP)

## 2025-03-28 PROCEDURE — 99239 HOSP IP/OBS DSCHRG MGMT >30: CPT

## 2025-03-28 PROCEDURE — 84100 ASSAY OF PHOSPHORUS: CPT

## 2025-03-28 PROCEDURE — 83735 ASSAY OF MAGNESIUM: CPT

## 2025-03-28 PROCEDURE — 6360000002 HC RX W HCPCS

## 2025-03-28 PROCEDURE — 2500000003 HC RX 250 WO HCPCS: Performed by: FAMILY MEDICINE

## 2025-03-28 PROCEDURE — 36415 COLL VENOUS BLD VENIPUNCTURE: CPT

## 2025-03-28 PROCEDURE — 99233 SBSQ HOSP IP/OBS HIGH 50: CPT | Performed by: INTERNAL MEDICINE

## 2025-03-28 PROCEDURE — 6360000002 HC RX W HCPCS: Performed by: FAMILY MEDICINE

## 2025-03-28 PROCEDURE — 2700000000 HC OXYGEN THERAPY PER DAY

## 2025-03-28 RX ORDER — AZITHROMYCIN 250 MG/1
250 TABLET, FILM COATED ORAL DAILY
DISCHARGE
Start: 2025-03-28 | End: 2025-04-02

## 2025-03-28 RX ORDER — AZITHROMYCIN 250 MG/1
250 TABLET, FILM COATED ORAL DAILY
Status: DISCONTINUED | OUTPATIENT
Start: 2025-03-28 | End: 2025-03-29 | Stop reason: HOSPADM

## 2025-03-28 RX ORDER — FUROSEMIDE 20 MG/1
20 TABLET ORAL DAILY
DISCHARGE
Start: 2025-03-29

## 2025-03-28 RX ORDER — CALCIUM GLUCONATE 20 MG/ML
1000 INJECTION, SOLUTION INTRAVENOUS ONCE
Status: COMPLETED | OUTPATIENT
Start: 2025-03-28 | End: 2025-03-28

## 2025-03-28 RX ORDER — IPRATROPIUM BROMIDE AND ALBUTEROL SULFATE 2.5; .5 MG/3ML; MG/3ML
1 SOLUTION RESPIRATORY (INHALATION) EVERY 4 HOURS PRN
Status: DISCONTINUED | OUTPATIENT
Start: 2025-03-28 | End: 2025-03-29 | Stop reason: HOSPADM

## 2025-03-28 RX ORDER — MAGNESIUM SULFATE 1 G/100ML
1000 INJECTION INTRAVENOUS ONCE
Status: COMPLETED | OUTPATIENT
Start: 2025-03-28 | End: 2025-03-28

## 2025-03-28 RX ADMIN — SODIUM CHLORIDE, PRESERVATIVE FREE 10 ML: 5 INJECTION INTRAVENOUS at 08:25

## 2025-03-28 RX ADMIN — MAGNESIUM SULFATE HEPTAHYDRATE 1000 MG: 1 INJECTION, SOLUTION INTRAVENOUS at 15:00

## 2025-03-28 RX ADMIN — PANTOPRAZOLE SODIUM 40 MG: 40 TABLET, DELAYED RELEASE ORAL at 05:45

## 2025-03-28 RX ADMIN — CALCIUM CARBONATE 500 MG: 500 TABLET, CHEWABLE ORAL at 08:21

## 2025-03-28 RX ADMIN — AZITHROMYCIN DIHYDRATE 250 MG: 250 TABLET ORAL at 20:06

## 2025-03-28 RX ADMIN — PREGABALIN 75 MG: 75 CAPSULE ORAL at 20:07

## 2025-03-28 RX ADMIN — Medication 500 MG: at 08:21

## 2025-03-28 RX ADMIN — IPRATROPIUM BROMIDE AND ALBUTEROL SULFATE 1 DOSE: 2.5; .5 SOLUTION RESPIRATORY (INHALATION) at 08:07

## 2025-03-28 RX ADMIN — ENOXAPARIN SODIUM 40 MG: 100 INJECTION SUBCUTANEOUS at 08:21

## 2025-03-28 RX ADMIN — PREGABALIN 75 MG: 75 CAPSULE ORAL at 14:08

## 2025-03-28 RX ADMIN — CALCIUM CARBONATE 500 MG: 500 TABLET, CHEWABLE ORAL at 14:08

## 2025-03-28 RX ADMIN — CALCIUM CARBONATE 500 MG: 500 TABLET, CHEWABLE ORAL at 20:07

## 2025-03-28 RX ADMIN — PREGABALIN 75 MG: 75 CAPSULE ORAL at 08:21

## 2025-03-28 RX ADMIN — FUROSEMIDE 20 MG: 20 TABLET ORAL at 08:21

## 2025-03-28 RX ADMIN — CALCIUM GLUCONATE 1000 MG: 20 INJECTION, SOLUTION INTRAVENOUS at 15:02

## 2025-03-28 NOTE — CARE COORDINATION
3/28/25 Update CM Note. Pt admitted 3/26/25 acute hypoxic resp failure. Pt from [facility],plan to return upon DC. Pt is long term bed hold and able to return at any time. Destination/BRIAN updated.  Ambulance form/envelope on chart  Mnoica SILVESTREN RN-BC  [phone] 1555 Addendum: Discharge order noted. Pt scheduled for 830 pickup.  Bedside and charge RNs, daughter Katia and facility liaison notified.

## 2025-03-28 NOTE — PROGRESS NOTES
Attempted to call report to Venus Wallace x2 with no answer from RN. BRIAN completed. Discharge paperwork on soft chart

## 2025-03-28 NOTE — PLAN OF CARE
Problem: Discharge Planning  Goal: Discharge to home or other facility with appropriate resources  3/27/2025 2314 by Amarilis Zapien RN  Outcome: Progressing     Problem: Skin/Tissue Integrity  Goal: Skin integrity remains intact  Description: 1.  Monitor for areas of redness and/or skin breakdown  2.  Assess vascular access sites hourly  3.  Every 4-6 hours minimum:  Change oxygen saturation probe site  4.  Every 4-6 hours:  If on nasal continuous positive airway pressure, respiratory therapy assess nares and determine need for appliance change or resting period  3/27/2025 2314 by Amarilis Zapien RN  Outcome: Progressing     Problem: Safety - Adult  Goal: Free from fall injury  3/27/2025 2314 by Amarilis Zapien RN  Outcome: Progressing     Problem: Respiratory - Adult  Goal: Achieves optimal ventilation and oxygenation  3/27/2025 2314 by Amarilis Zapien RN  Outcome: Progressing

## 2025-03-28 NOTE — PROGRESS NOTES
[facility]  Department of Internal Medicine  Division of Pulmonary, Critical Care and Sleep Medicine  Progress Note    EASTON Quintero MD, MD, MD, MD    Primary Pulmonologist: [facility]    Subjective:  Patient seen and examined.  Patient was seen on 3 L nasal cannula, in no acute distress.  She denies any respiratory complaints during time of evaluation. Mycoplasma antibody +, switch to azithromycin.     OBJECTIVE:     PHYSICAL EXAM:   VITALS:   Vitals:    03/28/25 0300 03/28/25 0757 03/28/25 0807 03/28/25 1220   BP: 135/70 (!) 94/51  (!) 95/53   Pulse: 63 72  70   Resp: 20 21  20   Temp: 97.1 °F (36.2 °C) 97.3 °F (36.3 °C)  97.2 °F (36.2 °C)   TempSrc:  Temporal  Temporal   SpO2: 98% 95% 94% 99%        Intake/Output Summary (Last 24 hours) at 3/28/2025 1459  Last data filed at 3/28/2025 1228  Gross per 24 hour   Intake 600 ml   Output 1050 ml   Net -450 ml        CONSTITUTIONAL: Alert, oriented to person, not situation    SKIN:     No rash, No suspicious lesions, No skin discoloration  HEENT:     EOMI, MMM, No thrush  NECK:    No bruits, No JVP appreciated  CV:      Sinus,  No murmur, No rubs, No gallops  PULMONARY:   Breath sounds clear but diminished bilaterally  ABDOMEN:     Soft, non-tender. BS normal. No R/R/G  EXT:    No deformities .  No clubbing.       1+ lower extremity edema, No venous stasis  PULSE:   Appears equal and palpable.  PSYCHIATRIC:  Seems appropriate, No acute psychosis  MS:    No fractures, No gross weakness  NEUROLOGIC:   The clinical assessment is non-focal     DATA: IMAGING & TESTING:     LABORATORY TESTS:    CBC with Differential:    Lab Results   Component Value Date/Time    WBC 10.0 03/27/2025 07:06 AM    RBC 5.03 03/27/2025 07:06 AM    HGB 14.8 03/27/2025 07:06 AM    HCT 45.7  clear infiltrate noted, however may be consistent with increased vascular congestion.   Wean FIO2 as tolerated  IS/Flutter valve  Pulmonary will continue to follow    Above assessment and plan were made in collaboration with Dr. Taylor.  Further recommendations to follow.       EASTON Mendoza - CNP       I saw and evaluated the patient and performed the MDM as documented in my note. Radiographs, labs and medication list were reviewed by me independently. I spoke with bedside nursing, therapists and consultants. The case was discussed in detail and plans for care were reviewed with Sylvie MCCORMACK. I provided the substantive portion of this visit by personally performing the history/MDM component in its entirety.Review of CNP documentation was conducted and revisions were made as appropriate.      Patient seen and examined. Remains pleasantly confused. Breath sounds clear to auscultation. Abd soft, nontender.     Acute hypoxemic resp insufficiency, remains on 2L supplemental O2  Leukocytosis, improving   Concern for pna vs volume overload- Mycoplasma +  Morbid obesity  Dementia  Dysphagia        Plan:      Discontinue Merrem and start Azithromycin for + mycoplasma  Wean FIO2 as tolerated  IS/Flutter valve  Pulmonary will continue to follow    Soha Taylor,   [unfilled]  8:55 PM

## 2025-03-28 NOTE — DISCHARGE SUMMARY
[facility] Hospitalist Physician Discharge Summary       [facility]  [address] [facility]  [phone]          Activity level: As tolerated     Dispo: SNF    Condition on discharge: Stable     Patient ID:  Alena Busby  [id number]  69 y.o.  1955    Admit date: 3/26/2025    Discharge date and time:  3/28/2025  2:51 PM    Admission Diagnoses: Principal Problem:    Acute hypoxic respiratory failure (HCC)  Active Problems:    Acute respiratory failure with hypoxia (HCC)    Hypocalcemia  Resolved Problems:    * No resolved hospital problems. *      Discharge Diagnoses: Principal Problem:    Acute hypoxic respiratory failure (HCC)  Active Problems:    Acute respiratory failure with hypoxia (HCC)    Hypocalcemia  Resolved Problems:    * No resolved hospital problems. *      Consults:  IP CONSULT TO HOSPITALIST  IP CONSULT TO PULMONOLOGY    Procedures: None    Hospital Course:   Patient Alena Busby is a 69 y.o. presented with COPD exacerbation (HCC) [J44.1]  Acute respiratory failure with hypoxia [J96.01]  Pneumonia due to infectious organism, unspecified laterality, unspecified part of lung [J18.9]  Acute hypoxic respiratory failure [J96.01]    69-year-old female with a past medical history of dementia, hypocalcemia, arthritis came to the ED due to hypoxia and shortness of breath from the nursing home.  She was found to have hypoxic of around 70% and was placed on CPAP by EMS on arrival.  Chest x-ray show concern for Hg airspace opacity in right lower lobe concerning for pneumonia vs volume overload but CT chest showed no evidence of pulmonary embolism or pneumonia.  WC count was high during the admission as well.  Patient has history of ESBL bacteremia.  Patient received dose of doxycycline and ceftriaxone with transition to Unasyn.  Later on Unasyn stopped and patient received few dose of meropenem due to history of ESBL bacteremia.  Pulmonology was consulted.  They        Imaging:  CTA PULMONARY W CONTRAST  Result Date: 3/26/2025  EXAMINATION: CTA OF THE CHEST 3/26/2025 2:37 am TECHNIQUE: CTA of the chest was performed after the administration of intravenous contrast.  Multiplanar reformatted images are provided for review.  MIP images are provided for review. Automated exposure control, iterative reconstruction, and/or weight based adjustment of the mA/kV was utilized to reduce the radiation dose to as low as reasonably achievable. COMPARISON: None. HISTORY: ORDERING SYSTEM PROVIDED HISTORY: Elevated D-dimer TECHNOLOGIST PROVIDED HISTORY: Reason for exam:->Elevated D-dimer Additional Contrast?->1 What reading provider will be dictating this exam?->CRC FINDINGS: Pulmonary Arteries: Pulmonary arteries are adequately opacified for evaluation.  No evidence of intraluminal filling defect to suggest pulmonary embolism.  Main pulmonary artery is normal in caliber. Mediastinum: No evidence of mediastinal lymphadenopathy.  The heart and pericardium demonstrate no acute abnormality.  There is no acute abnormality of the thoracic aorta. Lungs/pleura: The lungs are without acute process.  No focal consolidation or pulmonary edema.  No evidence of pleural effusion or pneumothorax. Upper Abdomen:  Limited images of the upper abdomen demonstrate no acute abnormality. Soft Tissues/Bones: No acute bone or soft tissue abnormality.     No evidence of pulmonary embolism or acute pulmonary abnormality.     XR CHEST PORTABLE  Result Date: 3/26/2025  EXAMINATION: ONE XRAY VIEW OF THE CHEST 3/26/2025 1:07 am COMPARISON: None. HISTORY: ORDERING SYSTEM PROVIDED HISTORY: Shortness of Breath TECHNOLOGIST PROVIDED HISTORY: Reason for exam:->Shortness of Breath FINDINGS: The cardiomediastinal silhouette is within normal limits.  There are hazy airspace opacities within the right lower lung.  There is mild interstitial thickening.  No pneumothorax or pleural effusion.     1. Hazy airspace opacities within

## 2025-03-28 NOTE — PROGRESS NOTES
03/28/25 0809   NIV Type   $NIV $Daily Charge   NIV Started/Stopped Off     Date: 3/28/2025    Time: 8:09 AM    Patient Placed On BIPAP/CPAP/ Non-Invasive Ventilation?  No    If no must comment.    Comments:    Pt off bipap at this time     Alma Rosa Ron RCP

## 2025-03-28 NOTE — PLAN OF CARE
Problem: Discharge Planning  Goal: Discharge to home or other facility with appropriate resources  3/28/2025 1013 by Annette Zelaya RN  Outcome: Progressing  3/27/2025 2314 by Amarilis Zapien RN  Outcome: Progressing     Problem: Skin/Tissue Integrity  Goal: Skin integrity remains intact  Description: 1.  Monitor for areas of redness and/or skin breakdown  2.  Assess vascular access sites hourly  3.  Every 4-6 hours minimum:  Change oxygen saturation probe site  4.  Every 4-6 hours:  If on nasal continuous positive airway pressure, respiratory therapy assess nares and determine need for appliance change or resting period  3/28/2025 1013 by Annette Zelaya RN  Outcome: Progressing  3/27/2025 2314 by Amarilis Zapien RN  Outcome: Progressing     Problem: Safety - Adult  Goal: Free from fall injury  3/28/2025 1013 by Annette Zelaya RN  Outcome: Progressing  3/27/2025 2314 by Amarilis Zapien RN  Outcome: Progressing     Problem: Respiratory - Adult  Goal: Achieves optimal ventilation and oxygenation  3/28/2025 1013 by Annette Zelaya RN  Outcome: Progressing  3/27/2025 2314 by Amarilis Zapien RN  Outcome: Progressing

## 2025-03-28 NOTE — PROGRESS NOTES
RT Nebulizer Bronchodilator Protocol Note    There is a bronchodilator order in the chart from a provider indicating to follow the RT Bronchodilator Protocol and there is an “Initiate RT Bronchodilator Protocol” order as well (see protocol at bottom of note).    CXR Findings:  No results found.    The findings from the last RT Protocol Assessment were as follows:  Smoking: (P) Smoker 15 pack years or more  Respiratory Pattern: (P) Regular pattern and RR 12-20 bpm  Breath Sounds: (P) Slightly diminished and/or crackles  Cough: (P) Strong, spontaneous, non-productive  Indication for Bronchodilator Therapy: (P) Decreased or absent breath sounds  Bronchodilator Assessment Score: (P) 3    Aerosolized bronchodilator medication orders have been revised according to the RT Nebulizer Bronchodilator Protocol below.    Respiratory Therapist to perform RT Therapy Protocol Assessment initially then follow the protocol.  Repeat RT Therapy Protocol Assessment PRN for score 0-3 or on second treatment, BID, and PRN for scores above 3.    No Indications - adjust the frequency to every 6 hours PRN wheezing or bronchospasm, if no treatments needed after 48 hours then discontinue using Per Protocol order mode.     If indication present, adjust the RT bronchodilator orders based on the Bronchodilator Assessment Score as indicated below.  If a patient is on this medication at home then do not decrease Frequency below that used at home.    0-3 - enter or revise RT bronchodilator order(s) to equivalent RT Bronchodilator order with Frequency of every 4 hours PRN for wheezing or increased work of breathing using Per Protocol order mode.       4-6 - enter or revise RT Bronchodilator order(s) to two equivalent RT bronchodilator orders with one order with BID Frequency and one order with Frequency of every 4 hours PRN wheezing or increased work of breathing using Per Protocol order mode.         7-10 - enter or revise RT Bronchodilator order(s)  to two equivalent RT bronchodilator orders with one order with TID Frequency and one order with Frequency of every 4 hours PRN wheezing or increased work of breathing using Per Protocol order mode.       11-13 - enter or revise RT Bronchodilator order(s) to one equivalent RT bronchodilator order with QID Frequency and an Albuterol order with Frequency of every 4 hours PRN wheezing or increased work of breathing using Per Protocol order mode.      Greater than 13 - enter or revise RT Bronchodilator order(s) to one equivalent RT bronchodilator order with every 4 hours Frequency and an Albuterol order with Frequency of every 2 hours PRN wheezing or increased work of breathing using Per Protocol order mode.     RT to enter RT Home Evaluation for COPD & MDI Assessment order using Per Protocol order mode.    Electronically signed by Alma Rosa Ron RCP on 3/28/2025 at 8:10 AM

## 2025-03-28 NOTE — DISCHARGE INSTR - COC
Continuity of Care Form    Patient Name: Alena Busby   :  1955  MRN:  [id number]    Admit date:  3/26/2025  Discharge date:  ***    Code Status Order: Full Code   Advance Directives:     Admitting Physician:  Familia Cavanaugh MD  PCP: Eufemia Ahmadi DO    Discharging Nurse: ***  Discharging Hospital Unit/Room#: 7410/7410-A  Discharging Unit Phone Number: ***    Emergency Contact:   Extended Emergency Contact Information  Primary Emergency Contact: Artie Busby  Address: [address] [place] [address] [address]           [place] [id number] [facility]  Home Phone: [phone]  Mobile Phone: [phone]  Relation: Spouse  Secondary Emergency Contact: Nadja Acosta  Home Phone: [phone]  Mobile Phone: [phone]  Relation: Brother/Sister  Preferred language: English   needed? No    Past Surgical History:  Past Surgical History:   Procedure Laterality Date    CATARACT REMOVAL      CERVICAL FUSION N/A 6/3/2019    ANTERIOR CERVICAL FUSION C3-C4, C4-C5  AND CORPECTOMY C4  WITH PLATES, SCREWS, BONE GRAFT, SSEP -- GLOBUS performed by Daniele Daniels MD at [facility] OR       Immunization History:     There is no immunization history on file for this patient.    Active Problems:  Patient Active Problem List   Diagnosis Code    Right leg pain M79.604    Chronic pain of both shoulders M25.511, G89.29, M25.512    Right arm pain M79.601    Fusion of spine of cervical region M43.22    Electrolyte abnormality E87.8    Delirium R41.0    Septic shock (HCC) A41.9, R65.21    Palliative care encounter Z51.5    Goals of care, counseling/discussion Z71.89    Pneumonia due to organism J18.9    Acute hypoxic respiratory failure (HCC) J96.01    Acute respiratory failure with hypoxia (HCC) J96.01    Hypocalcemia E83.51       Isolation/Infection:   Isolation            Contact          Patient Infection Status        Infection Onset Added Last Indicated Last Indicated By Review Planned Expiration    ESBL  Swallowing Test): not done    Treatments at the Time of Hospital Discharge:   Respiratory Treatments: Duonebs  Oxygen Therapy:  is on oxygen at 2 L/min per nasal cannula.  Ventilator:    - No ventilator support    Rehab Therapies: Physical Therapy and Occupational Therapy  Weight Bearing Status/Restrictions: No weight bearing restrictions  Other Medical Equipment (for information only, NOT a DME order):  N/A  Other Treatments: N/A    Patient's personal belongings (please select all that are sent with patient):  None    RN SIGNATURE:  Electronically signed by Annette Zelaya RN on 3/28/25 at 6:24 PM EDT    CASE MANAGEMENT/SOCIAL WORK SECTION    Inpatient Status Date:3/26/25    Readmission Risk Assessment Score:  [facility] RISK OF UNPLANNED READMISSION 2.0             13.5 Total Score        Discharging to Facility/ Agency   Name: [facility]   Address:[address] [place], [place] [address]  Phone:[phone]  Fax:[phone]      / signature: Electronically signed by Monica Black RN on 3/28/25 at 11:37 AM EDT    PHYSICIAN SECTION    Prognosis: Good    Condition at Discharge: Stable    Rehab Potential (if transferring to Rehab): Good    Recommended Labs or Other Treatments After Discharge: Advised to recheck BMP and magnesium in 1 week    Physician Certification: I certify the above information and transfer of Alena Busby  is necessary for the continuing treatment of the diagnosis listed and that she requires Skilled Nursing Facility for greater 30 days.     Update Admission H&P: No change in H&P    PHYSICIAN SIGNATURE:  Electronically signed by BIBI VALENCIA MD on 3/28/25 at 2:50 PM EDT

## 2025-03-28 NOTE — PROGRESS NOTES
Physical Therapy  Treatment Note    Name: Alena Busby  : 1955  MRN: [id number]      Date of Service: 3/28/2025    Evaluating PT: Edison Rico, PT, DPT [id number]      Room #:  7410/7410-A  Diagnosis:  COPD exacerbation (HCC) [J44.1]  Acute respiratory failure with hypoxia [J96.01]  Pneumonia due to infectious organism, unspecified laterality, unspecified part of lung [J18.9]  Acute hypoxic respiratory failure [J96.01]  PMHx/PSHx:   has a past medical history of Arthritis, COVID-19, Depression, Dysphagia, oropharyngeal phase, Headache, Hearing loss, Hematuria, unspecified, Metabolic encephalopathy, Morbid obesity, Muscle wasting and atrophy, not elsewhere classified, multiple sites, Neuropathy, Other abnormalities of gait and mobility, Pneumonia, Sepsis (HCC), Severe protein-calorie malnutrition, Unspecified dementia, unspecified severity, with other behavioral disturbance (HCC), Urinary tract infection, and Vitamin D deficiency.  Procedure/Surgery:  None this admission  Precautions:  Fall risk, contact isolation, TAPS, Purewick, alarms, O2, continuous pulse ox  Equipment Needs:  TBD    SUBJECTIVE:    Pt admitted from [facility]. Pt used dilan lift for transfers to/from . 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 Pt states she has not tried standing in months as therapy is not working on this with her anymore.     OBJECTIVE:   Initial Evaluation  Date: 3/27/25 Treatment   Date: 3/28/25 Short Term/ Long Term   Goals   AM-PAC 6 Clicks     Was pt agreeable to Eval/treatment? Yes yes    Does pt have pain? Denies pain No pain    Bed Mobility  Rolling: NT  Supine to sit: MaxA x2  Sit to supine: MaxA x2  Scooting: MaxA to EOB Rolling: max A  Supine to sit: max A  Sit to supine: max Ax2  Scooting: max A Rolling: Pedrito  Supine to sit: Pedrito  Sit to supine: Pedrito  Scooting: Pedrito   Transfers Sit to stand: NT  Stand to sit: NT  Stand pivot: NT Sit

## 2025-03-28 NOTE — PROGRESS NOTES
Occupational Therapy  ,OCCUPATIONAL THERAPY INITIAL EVALUATION    [facility]   [address] [facility]        Date:3/28/2025                                                  Patient Name: Alena Busby    MRN: [id number]    : 1955    Room: [address] [place] [address]      Evaluating OT: Maricarmen Keen OTR/L [id number]       Referring Provider:Familia Cavanaugh MD      Specific Provider Orders/Date: OT eval and treat 3/26/25      Diagnosis:  COPD Exacerbation    Surgery: none      Pertinent Medical History:       Past Medical History:   Diagnosis Date    Arthritis     COVID-19     2022    Depression     Dysphagia, oropharyngeal phase     Headache     multiple within a month, 7/10 on the pain scale.    Hearing loss     Hematuria, unspecified     Metabolic encephalopathy     Morbid obesity     Muscle wasting and atrophy, not elsewhere classified, multiple sites     Neuropathy     Other abnormalities of gait and mobility     Pneumonia     Sepsis (HCC)     Severe protein-calorie malnutrition     Unspecified dementia, unspecified severity, with other behavioral disturbance (HCC)     Urinary tract infection     Vitamin D deficiency          Past Surgical History:   Procedure Laterality Date    CATARACT REMOVAL      CERVICAL FUSION N/A 6/3/2019    ANTERIOR CERVICAL FUSION C3-C4, C4-C5  AND CORPECTOMY C4  WITH PLATES, SCREWS, BONE GRAFT, SSEP -- GLOBUS performed by Daniele Daniels MD at [facility] OR      Precautions:  Fall Risk,O2,contact isolation    Assessment of current deficits    [x] Functional mobility  [x]ADLs  [x] Strength               []Cognition    [x] Functional transfers   [x] IADLs         [x] Safety Awareness   [x]Endurance    [] Fine Coordination              [x] Balance      [] Vision/perception   []Sensation     []Gross Motor Coordination  [] ROM  [] Delirium                   [] Motor Control     OT PLAN OF CARE   OT POC based on physician  orders, patient diagnosis and results of clinical assessment    Frequency/Duration 1-3 days/wk for 2 weeks PRN   Specific OT Treatment Interventions to include:   * Instruction/training on adapted ADL techniques and AE recommendations to increase functional independence within precautions       * Training on energy conservation strategies, correct breathing pattern and techniques to improve independence/tolerance for self-care routine  * Functional transfer/mobility training/DME recommendations for increased independence, safety, and fall prevention  * Patient/Family education to increase follow through with safety techniques and functional independence  * Recommendation of environmental modifications for increased safety with functional transfers/mobility and ADLs  * Therapeutic exercise to improve motor endurance, ROM, and functional strength for ADLs/functional transfers  * Therapeutic activities to facilitate/challenge dynamic balance, stand tolerance for increased safety and independence with ADLs  * Positioning to improve skin integrity, interaction with environment and functional independence      Recommended Adaptive Equipment:  TBD     Home Living: Pt lives in a SNF         Prior Level of Function: pt needed assist  with ADLs , assist  with IADLs. Frances lift was used for transfers and pt has a power wheelchair       Pain Level: none reported   Cognition: A&O: 4/4; Follows multi  step directions   Memory:  good    Sequencing:  good    Problem solving:  fair +   Judgement/safety:  fair +     Functional Assessment:  AM-PAC Daily Activity Raw Score: 12/24   Initial Eval Status  Date: 3/28/25 Treatment Status  Date: STGs = LTGs  Time frame: 10-14 days   Feeding Setup - assist to open small packages on tray      Grooming Minimal Assist   Supervision    UB Dressing Minimal Assist   Supervision    LB Dressing Maximal Assist -assist to don slipper socks supine in bed   Moderate Assist    Bathing Maximal Assist   Goal: \" go home to my own house\"      Patient and/or family were instructed on functional diagnosis, prognosis/goals and OT plan of care. Demonstrated good  understanding.     Eval Complexity: Low    Time In: 1045  Time Out:1115  Total Treatment Time: 8    Min Units   OT Eval Low 97165  x  1   OT Eval Medium 96787      OT Eval High 23679      OT Re-Eval 32406       Therapeutic Ex 38605       Therapeutic Activities 92062  8  1   ADL/Self Care 98733       Orthotic Management 87695       Manual [id number]     Neuro Re-Ed 57362       Non-Billable Time          Evaluation Time additionally includes thorough review of current medical information, gathering information on past medical history/social history and prior level of function, interpretation of standardized testing/informal observation of tasks, assessment of data and development of plan of care and goals.            Maricarmen Keen OTR/L [id number]

## 2025-03-30 LAB
EKG ATRIAL RATE: 83 BPM
EKG P AXIS: 63 DEGREES
EKG P-R INTERVAL: 186 MS
EKG Q-T INTERVAL: 380 MS
EKG QRS DURATION: 78 MS
EKG QTC CALCULATION (BAZETT): 446 MS
EKG R AXIS: -38 DEGREES
EKG T AXIS: 46 DEGREES
EKG VENTRICULAR RATE: 83 BPM
MICROORGANISM SPEC CULT: NORMAL
MICROORGANISM SPEC CULT: NORMAL
SERVICE CMNT-IMP: NORMAL
SERVICE CMNT-IMP: NORMAL
SPECIMEN DESCRIPTION: NORMAL
SPECIMEN DESCRIPTION: NORMAL

## 2025-03-31 LAB
MICROORGANISM SPEC CULT: NORMAL
MICROORGANISM SPEC CULT: NORMAL
SERVICE CMNT-IMP: NORMAL
SERVICE CMNT-IMP: NORMAL
SPECIMEN DESCRIPTION: NORMAL
SPECIMEN DESCRIPTION: NORMAL

## 2025-04-03 NOTE — PROGRESS NOTES
Physician Progress Note      PATIENT:               KARY SEGUNDO  [facility] #:                  [id number]  :                       1955  ADMIT DATE:       3/26/2025 12:47 AM  DISCH DATE:        3/28/2025 10:24 PM  RESPONDING  PROVIDER #:        Familia Cavanaugh MD          QUERY TEXT:    Acute hypoxic Respiratory Failure is documented in the medical record 3/26 by   Dr. Cavanaugh and discharge summary by Dr. Nino... Please specify the type and   acuity:    The clinical indicators include:  3/28 Pulmonary - acute hypoxemic resp insufficiency  H & P . Acute hypoxic respiratory failure. DC summary - Acute respiratory   failure with hypoxia by Dr. Nino. Igm mycoplasma was positive and treated   for 5 days course of azithromycin.  EMS Ojjxpcm78% on RA placed on Cpap.    ED note Wheezing.  Effort: No accessory muscle usage or respiratory distress.    ph 7.320, pc02 58.3, po2 81.3. Repeat was ph 7.270, pc02 56.5,  po2 170.6. E  progres note 3/26 - Sat 83% requring 5 liters oxygen.ABG on 3/26 showed:  Options provided:  -- Acute respiratory failure with hypoxia  -- Acute respiratory failure with hypercapnia  -- Other - I will add my own diagnosis  -- Disagree - Not applicable / Not valid  -- Disagree - Clinically unable to determine / Unknown  -- Refer to Clinical Documentation Reviewer    PROVIDER RESPONSE TEXT:    This patient is in acute respiratory failure with hypoxia.    Query created by: Halle Foss on 2025 1:05 PM      Electronically signed by:  Familia Cavanaugh MD 4/3/2025 7:06 PM

## 2025-04-07 ENCOUNTER — OFFICE VISIT (OUTPATIENT)
Age: 70
End: 2025-04-07
Payer: MEDICARE

## 2025-04-07 VITALS
DIASTOLIC BLOOD PRESSURE: 68 MMHG | HEIGHT: 55 IN | HEART RATE: 81 BPM | SYSTOLIC BLOOD PRESSURE: 141 MMHG | BODY MASS INDEX: 46.09 KG/M2

## 2025-04-07 DIAGNOSIS — R26.0 ATAXIC GAIT: ICD-10-CM

## 2025-04-07 DIAGNOSIS — R29.898 WEAKNESS OF DISTAL ARMS AND LEGS: Primary | ICD-10-CM

## 2025-04-07 DIAGNOSIS — R20.0 BILATERAL LEG NUMBNESS: ICD-10-CM

## 2025-04-07 PROCEDURE — 99214 OFFICE O/P EST MOD 30 MIN: CPT | Performed by: PSYCHIATRY & NEUROLOGY

## 2025-04-07 PROCEDURE — 1159F MED LIST DOCD IN RCRD: CPT | Performed by: PSYCHIATRY & NEUROLOGY

## 2025-04-07 PROCEDURE — 4004F PT TOBACCO SCREEN RCVD TLK: CPT | Performed by: PSYCHIATRY & NEUROLOGY

## 2025-04-07 PROCEDURE — 1123F ACP DISCUSS/DSCN MKR DOCD: CPT | Performed by: PSYCHIATRY & NEUROLOGY

## 2025-04-07 PROCEDURE — G8417 CALC BMI ABV UP PARAM F/U: HCPCS | Performed by: PSYCHIATRY & NEUROLOGY

## 2025-04-07 PROCEDURE — 3017F COLORECTAL CA SCREEN DOC REV: CPT | Performed by: PSYCHIATRY & NEUROLOGY

## 2025-04-07 PROCEDURE — 1111F DSCHRG MED/CURRENT MED MERGE: CPT | Performed by: PSYCHIATRY & NEUROLOGY

## 2025-04-07 PROCEDURE — 1090F PRES/ABSN URINE INCON ASSESS: CPT | Performed by: PSYCHIATRY & NEUROLOGY

## 2025-04-07 PROCEDURE — G8400 PT W/DXA NO RESULTS DOC: HCPCS | Performed by: PSYCHIATRY & NEUROLOGY

## 2025-04-07 PROCEDURE — G8427 DOCREV CUR MEDS BY ELIG CLIN: HCPCS | Performed by: PSYCHIATRY & NEUROLOGY

## 2025-04-07 RX ORDER — PREGABALIN 150 MG/1
150 CAPSULE ORAL 3 TIMES DAILY
Qty: 90 CAPSULE | Refills: 2 | Status: SHIPPED | OUTPATIENT
Start: 2025-04-07 | End: 2025-07-06

## 2025-04-07 NOTE — PROGRESS NOTES
MD Lenin Schaeferdelilah Busby is a 69 y.o. female presenting as a follow patient for a   Chief Complaint   Patient presents with    Follow-up    Peripheral Neuropathy      Was being seen by NP with neurology  Sister wanted her to be switched to a neurologist        Onset: 2021  patient had covid and fell  Could not get up  Was hospitalized and transferred to NH from then     Sudden onset     Progression: has been non ambulatory in 3 years.   Wheel chair bound.   Frances lift so far.      Not had PT early           Now:               Pain: has tingling, burning.   Constant  8/10  Same all the time.   Better at night when not moving at night.   Has not woken her up at night      Affects ayla hands  Radiates to ayla elbows.      Affects ayla feet.,  Rarely radiates till knees.      Arms >> legs        Weakness: has been able to grasp and lift with arms  Can stand, but loses balance and tips over.   No longer standing      Rls: none            Spastically: tightness in arms and legs.      Numbness: as above in pain   Blurred Vision: Yes: is been blurry   Double Vision: No  Slurred Speech/Speech Difficulty: No, has no teeth   Trouble Swallowing: cannot swallow laying down  Needs to sit up to swallow     Balance: once standing up, loses balance.   On being helped up, she goes forward.      Falls: no falls, wheel chair bound, hoyet lift  Not walked since 2021  Motorized wheel chair     Urinary Trouble: good control. Can feel the bladder filling and control it.   Wears diapers for protection in NH  Bowl Trouble: has constipation   Band like sensations around chest wall: No           W/u :   Mri cervical spine:2023:  IMPRESSION:  1. Status post C4 corpectomy with bone graft reconstruction and anterior  interbody fusion from C3-C5.  2. Moderate central canal stenosis at C2-3.  3. The neural foramina are not well visualized due to motion artifact and  susceptibility artifact from the surgical hardware.  Review of

## 2025-04-28 ENCOUNTER — TELEPHONE (OUTPATIENT)
Age: 70
End: 2025-04-28

## 2025-04-28 NOTE — TELEPHONE ENCOUNTER
Venus Wallace calling stating pt is complaining of increased fatigue after increasing Lyrica at last visit

## 2025-05-16 ENCOUNTER — HOSPITAL ENCOUNTER (OUTPATIENT)
Dept: NEUROLOGY | Age: 70
Discharge: HOME | End: 2025-05-16
Payer: MEDICARE

## 2025-05-16 VITALS — BODY MASS INDEX: 46.09 KG/M2 | HEIGHT: 55 IN

## 2025-05-16 PROCEDURE — 95913 NRV CNDJ TEST 13/> STUDIES: CPT

## 2025-05-16 PROCEDURE — 95886 MUSC TEST DONE W/N TEST COMP: CPT

## 2025-05-16 NOTE — PROCEDURES
PROCEDURE NOTE  Date: 5/16/2025   Name: Alena Busby  YOB: 1955    Procedures:      [facility]  Electrodiagnostic Laboratory   [place]         Full Name: Alena Busby Gender: Female  MRN: [id number] YOB: 1955  Location:: se[facility]- 2      Visit Date: 5/16/2025 11:43  Age: 70 Years 0 Months Old  Examining Physician: Dr. ARMINDA Gerardo   Referring Physician: Dr. Gerardo   Technician: Paulette Llanos   Height: 4 feet 7 inch  Notes: bmi-  dm-No  bloodthinner-No  pacemaker/defibrilator-No  weakness of distal arms and legs      Sensory NCS      Nerve / Sites Onset Lat Peak Lat PP Amp Distance Velocity Temp.    ms ms µV cm m/s °C   R Median - Digit II (Antidromic)      Mid Palm NR NR NR 7 NR 32      Wrist NR NR NR 14 NR 32   R Ulnar - Digit V (Antidromic)      Wrist NR NR NR 14 NR 32   R Radial - Anatomical snuff box (Forearm)      Forearm NR NR NR 10 NR 23   R Dorsal ulnar cutaneous - Hand dorsum (Forearm)      Forearm NR NR NR 8 NR 32   R Superficial peroneal - Ankle      Lat leg NR NR NR 10 NR 31   R Sural - Ankle (Calf)      Calf NR NR NR 14 NR 23.3       Motor NCS      Nerve / Sites Lat. Amplitude Distance Velocity Temp.    ms mV cm m/s °C   R Median - APB      Wrist 3.39 11.4 8  32      Elbow 6.67 9.7 18 55 32   R Ulnar - ADM      Wrist 3.02 7.9 8  32      B.Elbow 5.68 6.1 14 53 32      A.Elbow 8.96 4.2 10 30 32   R Ulnar - FDI      Wrist 3.23 6.4   32      B.Elbow 6.20 6.0 14 47 32      A.Elbow 8.13 5.2 10 52 32   R Peroneal - EDB      Ankle 4.17 0.5 8  31      Fib head 9.48 0.7 25 47 31      Pop fossa 11.88 0.5 9 38 31   R Tibial - AH      Ankle 3.85 5.6 8  31      Pop fossa NR NR 26 NR 31       F  Wave      Nerve F Lat M Lat F-M Lat    ms ms ms   R Peroneal - EDB 42.8 4.9 37.9   R Tibial - AH NR NR NR   R Median - APB 25.1 3.4 21.7   R Ulnar - ADM 24.8 2.9 21.9       H Reflex      Nerve Lat Hmax    ms   R Tibial - Soleus NR   L Tibial - Soleus NR

## 2025-05-21 ENCOUNTER — APPOINTMENT (OUTPATIENT)
Dept: GENERAL RADIOLOGY | Age: 70
DRG: 450 | End: 2025-05-21
Payer: MEDICARE

## 2025-05-21 ENCOUNTER — HOSPITAL ENCOUNTER (OUTPATIENT)
Dept: GENERAL RADIOLOGY | Age: 70
Discharge: HOME | End: 2025-05-23
Payer: MEDICARE

## 2025-05-21 ENCOUNTER — HOSPITAL ENCOUNTER (OUTPATIENT)
Age: 70
Discharge: HOME | End: 2025-05-21
Payer: MEDICARE

## 2025-05-21 ENCOUNTER — APPOINTMENT (OUTPATIENT)
Dept: MRI IMAGING | Age: 70
DRG: 450 | End: 2025-05-21
Payer: MEDICARE

## 2025-05-21 ENCOUNTER — APPOINTMENT (OUTPATIENT)
Dept: CT IMAGING | Age: 70
DRG: 450 | End: 2025-05-21
Payer: MEDICARE

## 2025-05-21 ENCOUNTER — HOSPITAL ENCOUNTER (INPATIENT)
Age: 70
LOS: 8 days | Discharge: HOSPICE | DRG: 450 | End: 2025-05-29
Attending: EMERGENCY MEDICINE | Admitting: SURGERY
Payer: MEDICARE

## 2025-05-21 VITALS
HEART RATE: 75 BPM | OXYGEN SATURATION: 94 % | SYSTOLIC BLOOD PRESSURE: 131 MMHG | RESPIRATION RATE: 15 BRPM | TEMPERATURE: 98 F | DIASTOLIC BLOOD PRESSURE: 71 MMHG

## 2025-05-21 DIAGNOSIS — J96.02 ACUTE RESPIRATORY FAILURE WITH HYPOXIA AND HYPERCAPNIA (HCC): ICD-10-CM

## 2025-05-21 DIAGNOSIS — R29.898 WEAKNESS OF DISTAL ARMS AND LEGS: ICD-10-CM

## 2025-05-21 DIAGNOSIS — S14.109A INJURY OF CERVICAL SPINAL CORD, INITIAL ENCOUNTER (HCC): ICD-10-CM

## 2025-05-21 DIAGNOSIS — R26.0 ATAXIC GAIT: ICD-10-CM

## 2025-05-21 DIAGNOSIS — J96.01 ACUTE RESPIRATORY FAILURE WITH HYPOXIA AND HYPERCAPNIA (HCC): ICD-10-CM

## 2025-05-21 DIAGNOSIS — R41.0 DELIRIUM: ICD-10-CM

## 2025-05-21 DIAGNOSIS — R20.0 BILATERAL LEG NUMBNESS: ICD-10-CM

## 2025-05-21 DIAGNOSIS — R57.9 SHOCK (HCC): ICD-10-CM

## 2025-05-21 DIAGNOSIS — T79.4XXA NEUROGENIC SHOCK DUE TO TRAUMATIC INJURY (HCC): Primary | ICD-10-CM

## 2025-05-21 DIAGNOSIS — G82.50 QUADRIPARESIS (HCC): ICD-10-CM

## 2025-05-21 PROBLEM — T14.90XA TRAUMA: Status: ACTIVE | Noted: 2025-05-21
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
## 2025-05-21 LAB
AADO2: 317 MMHG
ABO + RH BLD: NORMAL
ALBUMIN SERPL-MCNC: 3.1 G/DL (ref 3.5–5.2)
ALP SERPL-CCNC: 94 U/L (ref 35–104)
ALT SERPL-CCNC: 18 U/L (ref 0–35)
AMPHET UR QL SCN: NEGATIVE
ANION GAP SERPL CALCULATED.3IONS-SCNC: 10 MMOL/L (ref 7–16)
APAP SERPL-MCNC: <5 UG/ML (ref 10–30)
APPEARANCE CSF: CLEAR
ARM BAND NUMBER: NORMAL
AST SERPL-CCNC: 26 U/L (ref 0–35)
B.E.: -0.2 MMOL/L (ref -3–3)
B.E.: 1.4 MMOL/L (ref -3–3)
BACTERIA URNS QL MICRO: ABNORMAL
BARBITURATES UR QL SCN: NEGATIVE
BASOPHILS # BLD: 0.04 K/UL (ref 0–0.2)
BASOPHILS NFR BLD: 1 % (ref 0–2)
BENZODIAZ UR QL: NEGATIVE
BILIRUB SERPL-MCNC: 0.6 MG/DL (ref 0–1.2)
BILIRUB UR QL STRIP: NEGATIVE
BLOOD BANK SAMPLE EXPIRATION: NORMAL
BLOOD GROUP ANTIBODIES SERPL: NEGATIVE
BUN SERPL-MCNC: 16 MG/DL (ref 8–23)
BUPRENORPHINE UR QL: NEGATIVE
CALCIUM SERPL-MCNC: 7.2 MG/DL (ref 8.8–10.2)
CANNABINOIDS UR QL SCN: NEGATIVE
CHLORIDE SERPL-SCNC: 108 MMOL/L (ref 98–107)
CLARITY UR: ABNORMAL
CLOT ANGLE.KAOLIN INDUCED BLD RES TEG: 51.9 DEG (ref 53–70)
CLOT CHECK: NORMAL
CO2 SERPL-SCNC: 28 MMOL/L (ref 22–29)
COCAINE UR QL SCN: NEGATIVE
COHB: 1.4 % (ref 0–1.5)
COHB: 2.5 % (ref 0–1.5)
COLOR CSF: COLORLESS
COLOR UR: YELLOW
CREAT SERPL-MCNC: 0.6 MG/DL (ref 0.5–1)
CRITICAL: ABNORMAL
CRITICAL: ABNORMAL
DATE ANALYZED: ABNORMAL
DATE ANALYZED: ABNORMAL
DATE OF COLLECTION: ABNORMAL
DATE OF COLLECTION: ABNORMAL
EOSINOPHIL # BLD: 0.22 K/UL (ref 0.05–0.5)
EOSINOPHILS RELATIVE PERCENT: 3 % (ref 0–6)
EPL-TEG: 0 % (ref 0–15)
ERYTHROCYTE [DISTWIDTH] IN BLOOD BY AUTOMATED COUNT: 16.7 % (ref 11.5–15)
ERYTHROCYTE [DISTWIDTH] IN BLOOD BY AUTOMATED COUNT: 17 % (ref 11.5–15)
ETHANOLAMINE SERPL-MCNC: <10 MG/DL (ref 0–0.08)
ETHANOLAMINE SERPL-MCNC: <10 MG/DL (ref 0–0.08)
FENTANYL UR QL: NEGATIVE
FIO2: 100 %
G-TEG: 13.3 KDYN/CM2 (ref 4.5–11)
GFR, ESTIMATED: >90 ML/MIN/1.73M2
GLUCOSE CSF-MCNC: 58 MG/DL (ref 40–70)
GLUCOSE SERPL-MCNC: 116 MG/DL (ref 74–99)
GLUCOSE UR STRIP-MCNC: NEGATIVE MG/DL
HCO3: 24 MMOL/L (ref 22–26)
HCO3: 28.1 MMOL/L (ref 22–26)
HCT VFR BLD AUTO: 45.2 % (ref 34–48)
HCT VFR BLD AUTO: 51.4 % (ref 34–48)
HGB BLD-MCNC: 14.5 G/DL (ref 11.5–15.5)
HGB BLD-MCNC: 16.4 G/DL (ref 11.5–15.5)
HGB UR QL STRIP.AUTO: ABNORMAL
HHB: 0.2 % (ref 0–5)
HHB: 2.2 % (ref 0–5)
IMM GRANULOCYTES # BLD AUTO: <0.03 K/UL (ref 0–0.58)
IMM GRANULOCYTES NFR BLD: 0 % (ref 0–5)
INR PPP: 1.1
INR PPP: 1.1
KETONES UR STRIP-MCNC: NEGATIVE MG/DL
KINETICS TEG: 1.3 MIN (ref 1–3)
LAB: ABNORMAL
LAB: ABNORMAL
LACTATE BLDV-SCNC: 1.8 MMOL/L (ref 0.5–2.2)
LEUKOCYTE ESTERASE UR QL STRIP: ABNORMAL
LY30 (LYSIS) TEG: 0 % (ref 0–8)
LYMPHOCYTES NFR BLD: 2.26 K/UL (ref 1.5–4)
LYMPHOCYTES RELATIVE PERCENT: 35 % (ref 20–42)
Lab: 1430
Lab: 1622
MA (MAX CLOT) TEG: 72.6 MM (ref 50–70)
MCH RBC QN AUTO: 29.8 PG (ref 26–35)
MCH RBC QN AUTO: 30 PG (ref 26–35)
MCHC RBC AUTO-ENTMCNC: 31.9 G/DL (ref 32–34.5)
MCHC RBC AUTO-ENTMCNC: 32.1 G/DL (ref 32–34.5)
MCV RBC AUTO: 93.3 FL (ref 80–99.9)
MCV RBC AUTO: 93.6 FL (ref 80–99.9)
METHADONE UR QL: NEGATIVE
METHB: 0.2 % (ref 0–1.5)
METHB: 0.4 % (ref 0–1.5)
MODE: ABNORMAL
MODE: AC
MONOCYTES NFR BLD: 0.62 K/UL (ref 0.1–0.95)
MONOCYTES NFR BLD: 10 % (ref 2–12)
NEUTROPHILS NFR BLD: 52 % (ref 43–80)
NEUTS SEG NFR BLD: 3.38 K/UL (ref 1.8–7.3)
NITRITE UR QL STRIP: NEGATIVE
NUC CELL # FLD MANUAL: <3 CELLS/UL (ref 0–5)
O2 SATURATION: 97.7 % (ref 92–98.5)
O2 SATURATION: 99.8 % (ref 92–98.5)
O2HB: 94.9 % (ref 94–97)
O2HB: 98.2 % (ref 94–97)
OPERATOR ID: 421
OPERATOR ID: 421
OPIATES UR QL SCN: NEGATIVE
OXYCODONE UR QL SCN: NEGATIVE
PARTIAL THROMBOPLASTIN TIME: 33.2 SEC (ref 24.5–35.1)
PARTIAL THROMBOPLASTIN TIME: 33.6 SEC (ref 24.5–35.1)
PATIENT TEMP: 37 C
PATIENT TEMP: 37 C
PCO2: 38.1 MMHG (ref 35–45)
PCO2: 52.1 MMHG (ref 35–45)
PCP UR QL SCN: NEGATIVE
PEEP/CPAP: 8 CMH2O
PFO2: 3.58 MMHG/%
PH BLOOD GAS: 7.35 (ref 7.35–7.45)
PH BLOOD GAS: 7.42 (ref 7.35–7.45)
PH UR STRIP: 8.5 [PH] (ref 5–8)
PLATELET # BLD AUTO: 177 K/UL (ref 130–450)
PLATELET # BLD AUTO: 179 K/UL (ref 130–450)
PMV BLD AUTO: 10.2 FL (ref 7–12)
PMV BLD AUTO: 10.5 FL (ref 7–12)
PO2: 112.8 MMHG (ref 75–100)
PO2: 357.9 MMHG (ref 75–100)
POTASSIUM SERPL-SCNC: 3.31 MMOL/L (ref 3.5–5)
POTASSIUM SERPL-SCNC: 3.63 MMOL/L (ref 3.5–5)
POTASSIUM SERPL-SCNC: 3.7 MMOL/L (ref 3.5–5.1)
PROT CSF-MCNC: 30.9 MG/DL (ref 15–40)
PROT SERPL-MCNC: 6.1 G/DL (ref 6.4–8.3)
PROT UR STRIP-MCNC: 30 MG/DL
PROTHROMBIN TIME: 11 SEC (ref 9.3–12.4)
PROTHROMBIN TIME: 12.1 SEC (ref 9.3–12.4)
RBC # BLD AUTO: 4.83 M/UL (ref 3.5–5.5)
RBC # BLD AUTO: 5.51 M/UL (ref 3.5–5.5)
RBC # FLD MANUAL: <2000 CELLS/UL
RBC #/AREA URNS HPF: ABNORMAL /HPF
REACTION TIME TEG: 5.3 MIN (ref 5–10)
RI(T): 0.89
RR MECHANICAL: 20 B/MIN
SALICYLATES SERPL-MCNC: <0.5 MG/DL (ref 0–30)
SEND OUT REPORT: NORMAL
SODIUM SERPL-SCNC: 145 MMOL/L (ref 136–145)
SOURCE, BLOOD GAS: ABNORMAL
SOURCE, BLOOD GAS: ABNORMAL
SP GR UR STRIP: 1.01 (ref 1–1.03)
SPECIMEN VOL CSF: 12.5 ML
TEST INFORMATION: NORMAL
TEST NAME: NORMAL
THB: 15.3 G/DL (ref 11.5–16.5)
THB: 16.3 G/DL (ref 11.5–16.5)
TIME ANALYZED: 1433
TIME ANALYZED: 1630
TOXIC TRICYCLIC SC,BLOOD: NEGATIVE
TUBE # CSF: 3
UROBILINOGEN UR STRIP-ACNC: 1 EU/DL (ref 0–1)
VT MECHANICAL: 400 ML
WBC #/AREA URNS HPF: ABNORMAL /HPF
WBC OTHER # BLD: 6.4 K/UL (ref 4.5–11.5)
WBC OTHER # BLD: 6.5 K/UL (ref 4.5–11.5)

## 2025-05-21 PROCEDURE — 82595 ASSAY OF CRYOGLOBULIN: CPT

## 2025-05-21 PROCEDURE — 84132 ASSAY OF SERUM POTASSIUM: CPT

## 2025-05-21 PROCEDURE — 85610 PROTHROMBIN TIME: CPT

## 2025-05-21 PROCEDURE — 02HV33Z INSERTION OF INFUSION DEVICE INTO SUPERIOR VENA CAVA, PERCUTANEOUS APPROACH: ICD-10-PCS | Performed by: NEUROLOGICAL SURGERY

## 2025-05-21 PROCEDURE — 96375 TX/PRO/DX INJ NEW DRUG ADDON: CPT

## 2025-05-21 PROCEDURE — 00NW0ZZ RELEASE CERVICAL SPINAL CORD, OPEN APPROACH: ICD-10-PCS | Performed by: NEUROLOGICAL SURGERY

## 2025-05-21 PROCEDURE — 87086 URINE CULTURE/COLONY COUNT: CPT

## 2025-05-21 PROCEDURE — 86341 ISLET CELL ANTIBODY: CPT

## 2025-05-21 PROCEDURE — 0RG6071 FUSION OF THORACIC VERTEBRAL JOINT WITH AUTOLOGOUS TISSUE SUBSTITUTE, POSTERIOR APPROACH, POSTERIOR COLUMN, OPEN APPROACH: ICD-10-PCS | Performed by: NEUROLOGICAL SURGERY

## 2025-05-21 PROCEDURE — 96374 THER/PROPH/DIAG INJ IV PUSH: CPT

## 2025-05-21 PROCEDURE — 94002 VENT MGMT INPAT INIT DAY: CPT

## 2025-05-21 PROCEDURE — 74177 CT ABD & PELVIS W/CONTRAST: CPT

## 2025-05-21 PROCEDURE — 6360000002 HC RX W HCPCS: Performed by: PHYSICIAN ASSISTANT

## 2025-05-21 PROCEDURE — 83605 ASSAY OF LACTIC ACID: CPT

## 2025-05-21 PROCEDURE — 87077 CULTURE AEROBIC IDENTIFY: CPT

## 2025-05-21 PROCEDURE — 6360000002 HC RX W HCPCS

## 2025-05-21 PROCEDURE — 84165 PROTEIN E-PHORESIS SERUM: CPT

## 2025-05-21 PROCEDURE — 37799 UNLISTED PX VASCULAR SURGERY: CPT

## 2025-05-21 PROCEDURE — 71260 CT THORAX DX C+: CPT

## 2025-05-21 PROCEDURE — 72170 X-RAY EXAM OF PELVIS: CPT

## 2025-05-21 PROCEDURE — 2500000003 HC RX 250 WO HCPCS

## 2025-05-21 PROCEDURE — 31500 INSERT EMERGENCY AIRWAY: CPT

## 2025-05-21 PROCEDURE — 72131 CT LUMBAR SPINE W/O DYE: CPT

## 2025-05-21 PROCEDURE — 86617 LYME DISEASE ANTIBODY: CPT

## 2025-05-21 PROCEDURE — 86038 ANTINUCLEAR ANTIBODIES: CPT

## 2025-05-21 PROCEDURE — 81001 URINALYSIS AUTO W/SCOPE: CPT

## 2025-05-21 PROCEDURE — 6360000002 HC RX W HCPCS: Performed by: EMERGENCY MEDICINE

## 2025-05-21 PROCEDURE — 87070 CULTURE OTHR SPECIMN AEROBIC: CPT

## 2025-05-21 PROCEDURE — 86039 ANTINUCLEAR ANTIBODIES (ANA): CPT

## 2025-05-21 PROCEDURE — 80307 DRUG TEST PRSMV CHEM ANLYZR: CPT

## 2025-05-21 PROCEDURE — 71045 X-RAY EXAM CHEST 1 VIEW: CPT

## 2025-05-21 PROCEDURE — 80143 DRUG ASSAY ACETAMINOPHEN: CPT

## 2025-05-21 PROCEDURE — 6360000004 HC RX CONTRAST MEDICATION: Performed by: RADIOLOGY

## 2025-05-21 PROCEDURE — 86850 RBC ANTIBODY SCREEN: CPT

## 2025-05-21 PROCEDURE — 85384 FIBRINOGEN ACTIVITY: CPT

## 2025-05-21 PROCEDURE — 2500000003 HC RX 250 WO HCPCS: Performed by: EMERGENCY MEDICINE

## 2025-05-21 PROCEDURE — 85027 COMPLETE CBC AUTOMATED: CPT

## 2025-05-21 PROCEDURE — 82784 ASSAY IGA/IGD/IGG/IGM EACH: CPT

## 2025-05-21 PROCEDURE — 2000000000 HC ICU R&B

## 2025-05-21 PROCEDURE — 99285 EMERGENCY DEPT VISIT HI MDM: CPT

## 2025-05-21 PROCEDURE — 3E033XZ INTRODUCTION OF VASOPRESSOR INTO PERIPHERAL VEIN, PERCUTANEOUS APPROACH: ICD-10-PCS | Performed by: SURGERY

## 2025-05-21 PROCEDURE — 83916 OLIGOCLONAL BANDS: CPT

## 2025-05-21 PROCEDURE — 84207 ASSAY OF VITAMIN B-6: CPT

## 2025-05-21 PROCEDURE — 5A1955Z RESPIRATORY VENTILATION, GREATER THAN 96 CONSECUTIVE HOURS: ICD-10-PCS | Performed by: SURGERY

## 2025-05-21 PROCEDURE — 87181 SC STD AGAR DILUTION PER AGT: CPT

## 2025-05-21 PROCEDURE — 82040 ASSAY OF SERUM ALBUMIN: CPT

## 2025-05-21 PROCEDURE — 99223 1ST HOSP IP/OBS HIGH 75: CPT | Performed by: SURGERY

## 2025-05-21 PROCEDURE — 0BH17EZ INSERTION OF ENDOTRACHEAL AIRWAY INTO TRACHEA, VIA NATURAL OR ARTIFICIAL OPENING: ICD-10-PCS | Performed by: EMERGENCY MEDICINE

## 2025-05-21 PROCEDURE — 72141 MRI NECK SPINE W/O DYE: CPT

## 2025-05-21 PROCEDURE — 86334 IMMUNOFIX E-PHORESIS SERUM: CPT

## 2025-05-21 PROCEDURE — 4A133J1 MONITORING OF ARTERIAL PULSE, PERIPHERAL, PERCUTANEOUS APPROACH: ICD-10-PCS | Performed by: SURGERY

## 2025-05-21 PROCEDURE — 2580000003 HC RX 258

## 2025-05-21 PROCEDURE — L0172 CERV COL SR FOAM 2PC PRE OTS: HCPCS

## 2025-05-21 PROCEDURE — 7100000011 HC PHASE II RECOVERY - ADDTL 15 MIN

## 2025-05-21 PROCEDURE — 4A133B1 MONITORING OF ARTERIAL PRESSURE, PERIPHERAL, PERCUTANEOUS APPROACH: ICD-10-PCS | Performed by: SURGERY

## 2025-05-21 PROCEDURE — 82945 GLUCOSE OTHER FLUID: CPT

## 2025-05-21 PROCEDURE — 04HY32Z INSERTION OF MONITORING DEVICE INTO LOWER ARTERY, PERCUTANEOUS APPROACH: ICD-10-PCS | Performed by: SURGERY

## 2025-05-21 PROCEDURE — 0RG2071 FUSION OF 2 OR MORE CERVICAL VERTEBRAL JOINTS WITH AUTOLOGOUS TISSUE SUBSTITUTE, POSTERIOR APPROACH, POSTERIOR COLUMN, OPEN APPROACH: ICD-10-PCS | Performed by: NEUROLOGICAL SURGERY

## 2025-05-21 PROCEDURE — 84157 ASSAY OF PROTEIN OTHER: CPT

## 2025-05-21 PROCEDURE — 36620 INSERTION CATHETER ARTERY: CPT | Performed by: SURGERY

## 2025-05-21 PROCEDURE — 83873 ASSAY OF CSF PROTEIN: CPT

## 2025-05-21 PROCEDURE — 36415 COLL VENOUS BLD VENIPUNCTURE: CPT

## 2025-05-21 PROCEDURE — 72125 CT NECK SPINE W/O DYE: CPT

## 2025-05-21 PROCEDURE — 86235 NUCLEAR ANTIGEN ANTIBODY: CPT

## 2025-05-21 PROCEDURE — 85730 THROMBOPLASTIN TIME PARTIAL: CPT

## 2025-05-21 PROCEDURE — 7100000010 HC PHASE II RECOVERY - FIRST 15 MIN

## 2025-05-21 PROCEDURE — 85025 COMPLETE CBC W/AUTO DIFF WBC: CPT

## 2025-05-21 PROCEDURE — 86900 BLOOD TYPING SEROLOGIC ABO: CPT

## 2025-05-21 PROCEDURE — 84155 ASSAY OF PROTEIN SERUM: CPT

## 2025-05-21 PROCEDURE — 6370000000 HC RX 637 (ALT 250 FOR IP)

## 2025-05-21 PROCEDURE — 82042 OTHER SOURCE ALBUMIN QUAN EA: CPT

## 2025-05-21 PROCEDURE — 87040 BLOOD CULTURE FOR BACTERIA: CPT

## 2025-05-21 PROCEDURE — 87205 SMEAR GRAM STAIN: CPT

## 2025-05-21 PROCEDURE — 89050 BODY FLUID CELL COUNT: CPT

## 2025-05-21 PROCEDURE — 62328 DX LMBR SPI PNXR W/FLUOR/CT: CPT

## 2025-05-21 PROCEDURE — 70450 CT HEAD/BRAIN W/O DYE: CPT

## 2025-05-21 PROCEDURE — 85576 BLOOD PLATELET AGGREGATION: CPT

## 2025-05-21 PROCEDURE — 86592 SYPHILIS TEST NON-TREP QUAL: CPT

## 2025-05-21 PROCEDURE — 87081 CULTURE SCREEN ONLY: CPT

## 2025-05-21 PROCEDURE — 3E043XZ INTRODUCTION OF VASOPRESSOR INTO CENTRAL VEIN, PERCUTANEOUS APPROACH: ICD-10-PCS | Performed by: SURGERY

## 2025-05-21 PROCEDURE — 82805 BLOOD GASES W/O2 SATURATION: CPT

## 2025-05-21 PROCEDURE — 70498 CT ANGIOGRAPHY NECK: CPT

## 2025-05-21 PROCEDURE — 84425 ASSAY OF VITAMIN B-1: CPT

## 2025-05-21 PROCEDURE — 80179 DRUG ASSAY SALICYLATE: CPT

## 2025-05-21 PROCEDURE — 0RG4071 FUSION OF CERVICOTHORACIC VERTEBRAL JOINT WITH AUTOLOGOUS TISSUE SUBSTITUTE, POSTERIOR APPROACH, POSTERIOR COLUMN, OPEN APPROACH: ICD-10-PCS | Performed by: NEUROLOGICAL SURGERY

## 2025-05-21 PROCEDURE — 85347 COAGULATION TIME ACTIVATED: CPT

## 2025-05-21 PROCEDURE — 86901 BLOOD TYPING SEROLOGIC RH(D): CPT

## 2025-05-21 PROCEDURE — 80053 COMPREHEN METABOLIC PANEL: CPT

## 2025-05-21 PROCEDURE — 72128 CT CHEST SPINE W/O DYE: CPT

## 2025-05-21 PROCEDURE — G0480 DRUG TEST DEF 1-7 CLASSES: HCPCS

## 2025-05-21 PROCEDURE — 85390 FIBRINOLYSINS SCREEN I&R: CPT

## 2025-05-21 PROCEDURE — 0DH67UZ INSERTION OF FEEDING DEVICE INTO STOMACH, VIA NATURAL OR ARTIFICIAL OPENING: ICD-10-PCS | Performed by: EMERGENCY MEDICINE

## 2025-05-21 RX ORDER — LIDOCAINE HYDROCHLORIDE 10 MG/ML
INJECTION, SOLUTION EPIDURAL; INFILTRATION; INTRACAUDAL; PERINEURAL PRN
Status: COMPLETED | OUTPATIENT
Start: 2025-05-21 | End: 2025-05-21

## 2025-05-21 RX ORDER — GUAIFENESIN 400 MG/1
400 TABLET ORAL 3 TIMES DAILY
Status: DISCONTINUED | OUTPATIENT
Start: 2025-05-21 | End: 2025-05-29 | Stop reason: HOSPADM

## 2025-05-21 RX ORDER — SODIUM CHLORIDE 0.9 % (FLUSH) 0.9 %
10 SYRINGE (ML) INJECTION
Status: DISCONTINUED | OUTPATIENT
Start: 2025-05-21 | End: 2025-05-29 | Stop reason: HOSPADM

## 2025-05-21 RX ORDER — PREGABALIN 75 MG/1
150 CAPSULE ORAL 3 TIMES DAILY
Status: DISCONTINUED | OUTPATIENT
Start: 2025-05-21 | End: 2025-05-29 | Stop reason: HOSPADM

## 2025-05-21 RX ORDER — SUCCINYLCHOLINE CHLORIDE 20 MG/ML
INJECTION INTRAMUSCULAR; INTRAVENOUS DAILY PRN
Status: COMPLETED | OUTPATIENT
Start: 2025-05-21 | End: 2025-05-21

## 2025-05-21 RX ORDER — SODIUM CHLORIDE 9 MG/ML
INJECTION, SOLUTION INTRAVENOUS PRN
Status: DISCONTINUED | OUTPATIENT
Start: 2025-05-21 | End: 2025-05-29 | Stop reason: HOSPADM

## 2025-05-21 RX ORDER — ALBUTEROL SULFATE 0.83 MG/ML
2.5 SOLUTION RESPIRATORY (INHALATION) EVERY 4 HOURS PRN
Status: DISCONTINUED | OUTPATIENT
Start: 2025-05-21 | End: 2025-05-23

## 2025-05-21 RX ORDER — ONDANSETRON 4 MG/1
4 TABLET, ORALLY DISINTEGRATING ORAL EVERY 8 HOURS PRN
Status: DISCONTINUED | OUTPATIENT
Start: 2025-05-21 | End: 2025-05-29 | Stop reason: HOSPADM

## 2025-05-21 RX ORDER — ONDANSETRON 2 MG/ML
4 INJECTION INTRAMUSCULAR; INTRAVENOUS EVERY 6 HOURS PRN
Status: DISCONTINUED | OUTPATIENT
Start: 2025-05-21 | End: 2025-05-29 | Stop reason: HOSPADM

## 2025-05-21 RX ORDER — SODIUM CHLORIDE 0.9 % (FLUSH) 0.9 %
10 SYRINGE (ML) INJECTION EVERY 12 HOURS SCHEDULED
Status: DISCONTINUED | OUTPATIENT
Start: 2025-05-21 | End: 2025-05-29 | Stop reason: HOSPADM

## 2025-05-21 RX ORDER — FENTANYL CITRATE 50 UG/ML
50 INJECTION, SOLUTION INTRAMUSCULAR; INTRAVENOUS
Status: DISCONTINUED | OUTPATIENT
Start: 2025-05-21 | End: 2025-05-28

## 2025-05-21 RX ORDER — SODIUM CHLORIDE 9 MG/ML
INJECTION, SOLUTION INTRAVENOUS CONTINUOUS
Status: DISCONTINUED | OUTPATIENT
Start: 2025-05-21 | End: 2025-05-24

## 2025-05-21 RX ORDER — BISACODYL 10 MG
10 SUPPOSITORY, RECTAL RECTAL DAILY
Status: DISCONTINUED | OUTPATIENT
Start: 2025-05-22 | End: 2025-05-26

## 2025-05-21 RX ORDER — SODIUM CHLORIDE 0.9 % (FLUSH) 0.9 %
10 SYRINGE (ML) INJECTION PRN
Status: DISCONTINUED | OUTPATIENT
Start: 2025-05-21 | End: 2025-05-29 | Stop reason: HOSPADM

## 2025-05-21 RX ORDER — NIFEDIPINE 60 MG/1
60 TABLET, EXTENDED RELEASE ORAL DAILY PRN
Status: DISCONTINUED | OUTPATIENT
Start: 2025-05-21 | End: 2025-05-29 | Stop reason: HOSPADM

## 2025-05-21 RX ORDER — IOPAMIDOL 755 MG/ML
90 INJECTION, SOLUTION INTRAVASCULAR
Status: COMPLETED | OUTPATIENT
Start: 2025-05-21 | End: 2025-05-21

## 2025-05-21 RX ORDER — PHENAZOPYRIDINE HYDROCHLORIDE 100 MG/1
100 TABLET, FILM COATED ORAL 3 TIMES DAILY PRN
Status: DISCONTINUED | OUTPATIENT
Start: 2025-05-21 | End: 2025-05-29 | Stop reason: HOSPADM

## 2025-05-21 RX ORDER — POLYETHYLENE GLYCOL 3350 17 G/17G
17 POWDER, FOR SOLUTION ORAL DAILY
Status: DISCONTINUED | OUTPATIENT
Start: 2025-05-21 | End: 2025-05-24

## 2025-05-21 RX ORDER — NOREPINEPHRINE BITARTRATE 0.06 MG/ML
1-100 INJECTION, SOLUTION INTRAVENOUS CONTINUOUS
Status: DISCONTINUED | OUTPATIENT
Start: 2025-05-21 | End: 2025-05-21

## 2025-05-21 RX ORDER — PROPOFOL 10 MG/ML
5-50 INJECTION, EMULSION INTRAVENOUS CONTINUOUS
Status: DISCONTINUED | OUTPATIENT
Start: 2025-05-21 | End: 2025-05-22

## 2025-05-21 RX ORDER — FUROSEMIDE 20 MG/1
20 TABLET ORAL DAILY
Status: DISCONTINUED | OUTPATIENT
Start: 2025-05-21 | End: 2025-05-29 | Stop reason: HOSPADM

## 2025-05-21 RX ORDER — MIDAZOLAM HYDROCHLORIDE 2 MG/2ML
0.5 INJECTION, SOLUTION INTRAMUSCULAR; INTRAVENOUS
Status: DISCONTINUED | OUTPATIENT
Start: 2025-05-21 | End: 2025-05-21

## 2025-05-21 RX ORDER — KETAMINE HYDROCHLORIDE 10 MG/ML
INJECTION, SOLUTION INTRAMUSCULAR; INTRAVENOUS DAILY PRN
Status: COMPLETED | OUTPATIENT
Start: 2025-05-21 | End: 2025-05-21

## 2025-05-21 RX ORDER — ACETAMINOPHEN 160 MG/5ML
650 LIQUID ORAL EVERY 6 HOURS SCHEDULED
Status: DISCONTINUED | OUTPATIENT
Start: 2025-05-21 | End: 2025-05-29 | Stop reason: HOSPADM

## 2025-05-21 RX ORDER — CHLORHEXIDINE GLUCONATE ORAL RINSE 1.2 MG/ML
15 SOLUTION DENTAL 2 TIMES DAILY
Status: DISCONTINUED | OUTPATIENT
Start: 2025-05-21 | End: 2025-05-22

## 2025-05-21 RX ORDER — NOREPINEPHRINE BITARTRATE 0.06 MG/ML
INJECTION, SOLUTION INTRAVENOUS CONTINUOUS PRN
Status: COMPLETED | OUTPATIENT
Start: 2025-05-21 | End: 2025-05-21

## 2025-05-21 RX ORDER — PROPOFOL 10 MG/ML
INJECTION, EMULSION INTRAVENOUS CONTINUOUS PRN
Status: COMPLETED | OUTPATIENT
Start: 2025-05-21 | End: 2025-05-21

## 2025-05-21 RX ORDER — NOREPINEPHRINE BITARTRATE 0.06 MG/ML
1-100 INJECTION, SOLUTION INTRAVENOUS CONTINUOUS
Status: DISCONTINUED | OUTPATIENT
Start: 2025-05-21 | End: 2025-05-29 | Stop reason: HOSPADM

## 2025-05-21 RX ADMIN — PREGABALIN 150 MG: 75 CAPSULE ORAL at 21:00

## 2025-05-21 RX ADMIN — Medication 5 MCG/MIN: at 14:41

## 2025-05-21 RX ADMIN — GUAIFENESIN 400 MG: 400 TABLET ORAL at 21:00

## 2025-05-21 RX ADMIN — SODIUM CHLORIDE: 0.9 INJECTION, SOLUTION INTRAVENOUS at 16:07

## 2025-05-21 RX ADMIN — KETAMINE HYDROCHLORIDE 150 MG: 10 INJECTION INTRAMUSCULAR; INTRAVENOUS at 14:54

## 2025-05-21 RX ADMIN — FAMOTIDINE 20 MG: 10 INJECTION, SOLUTION INTRAVENOUS at 20:10

## 2025-05-21 RX ADMIN — POLYETHYLENE GLYCOL 3350 17 G: 17 POWDER, FOR SOLUTION ORAL at 19:44

## 2025-05-21 RX ADMIN — ACETAMINOPHEN 650 MG: 650 SOLUTION ORAL at 23:26

## 2025-05-21 RX ADMIN — IOPAMIDOL 90 ML: 755 INJECTION, SOLUTION INTRAVENOUS at 15:35

## 2025-05-21 RX ADMIN — PROPOFOL 890 MCG: 10 INJECTION, EMULSION INTRAVENOUS at 15:04

## 2025-05-21 RX ADMIN — CHLORHEXIDINE GLUCONATE, 0.12% ORAL RINSE 15 ML: 1.2 SOLUTION DENTAL at 20:10

## 2025-05-21 RX ADMIN — SODIUM CHLORIDE: 0.9 INJECTION, SOLUTION INTRAVENOUS at 19:56

## 2025-05-21 RX ADMIN — SUCCINYLCHOLINE CHLORIDE 150 MG: 20 INJECTION, SOLUTION INTRAMUSCULAR; INTRAVENOUS at 14:54

## 2025-05-21 RX ADMIN — PROPOFOL 25 MCG/KG/MIN: 10 INJECTION, EMULSION INTRAVENOUS at 19:57

## 2025-05-21 RX ADMIN — LIDOCAINE HYDROCHLORIDE 4 ML: 10 INJECTION, SOLUTION EPIDURAL; INFILTRATION; INTRACAUDAL; PERINEURAL at 10:11

## 2025-05-21 RX ADMIN — Medication 3 MCG/MIN: at 23:33

## 2025-05-21 RX ADMIN — SODIUM CHLORIDE, PRESERVATIVE FREE 10 ML: 5 INJECTION INTRAVENOUS at 20:11

## 2025-05-21 RX ADMIN — ACETAMINOPHEN 650 MG: 650 SOLUTION ORAL at 19:44

## 2025-05-21 RX ADMIN — PIPERACILLIN AND TAZOBACTAM 3375 MG: 3; .375 INJECTION, POWDER, LYOPHILIZED, FOR SOLUTION INTRAVENOUS at 19:45

## 2025-05-21 ASSESSMENT — PAIN SCALES - GENERAL
PAINLEVEL_OUTOF10: 0
PAINLEVEL_OUTOF10: 9
PAINLEVEL_OUTOF10: 0

## 2025-05-21 ASSESSMENT — PULMONARY FUNCTION TESTS
PIF_VALUE: 36
PIF_VALUE: 37
PIF_VALUE: 35
PIF_VALUE: 39
PIF_VALUE: 38
PIF_VALUE: 43
PIF_VALUE: 39
PIF_VALUE: 38
PIF_VALUE: 37
PIF_VALUE: 35
PIF_VALUE: 39
PIF_VALUE: 39
PIF_VALUE: 36
PIF_VALUE: 50
PIF_VALUE: 35

## 2025-05-21 ASSESSMENT — PAIN - FUNCTIONAL ASSESSMENT
PAIN_FUNCTIONAL_ASSESSMENT: 0-10
PAIN_FUNCTIONAL_ASSESSMENT: 0-10
PAIN_FUNCTIONAL_ASSESSMENT: NONE - DENIES PAIN
PAIN_FUNCTIONAL_ASSESSMENT: 0-10
PAIN_FUNCTIONAL_ASSESSMENT: 0-10

## 2025-05-21 NOTE — PROGRESS NOTES
Progress Note  Date:2025       Room:[address] [place] [address]  Patient Name:Alena Busby     YOB: 1955     Age:70 y.o.        Subjective    Subjective   Review of Systems  Objective         Vitals Last 24 Hours:  TEMPERATURE:  Temp  Av.4 °F (35.8 °C)  Min: 95.4 °F (35.2 °C)  Max: 98 °F (36.7 °C)  RESPIRATIONS RANGE: Resp  Av.5  Min: 15  Max: 25  PULSE OXIMETRY RANGE: SpO2  Av.8 %  Min: 87 %  Max: 100 %  PULSE RANGE: Pulse  Av.7  Min: 56  Max: 83  BLOOD PRESSURE RANGE: Systolic (24hrs), Av , Min:62 , Max:202 ; Diastolic (24hrs), Av, Min:39, Max:83  I/O (24Hr):  No intake or output data in the 24 hours ending 25 1554  Objective  Labs/Imaging/Diagnostics    Labs:  CBC:  Recent Labs     25  0825  1425   WBC 6.5 6.4   RBC 5.51* 4.83   HGB 16.4* 14.5   HCT 51.4* 45.2   MCV 93.3 93.6   RDW 17.0* 16.7*    179     CHEMISTRIES:  Recent Labs     25  1430     --    K 3.7 3.63   *  --    CO2 28  --    BUN 16  --    CREATININE 0.6  --    GLUCOSE 116*  --      PT/INR:  Recent Labs     25  0825  142   PROTIME 11.0 12.1   INR 1.1 1.1     APTT:  Recent Labs     25  0825  142   APTT 33.6 33.2     LIVER PROFILE:  Recent Labs     25  142   AST 26   ALT 18   BILITOT 0.6   ALKPHOS 94       Imaging Last 24 Hours:  XR CHEST 1 VIEW  Result Date: 2025  EXAMINATION: ONE XRAY VIEW OF THE CHEST 2025 2:37 pm COMPARISON: None. HISTORY: ORDERING SYSTEM PROVIDED HISTORY: trauma TECHNOLOGIST PROVIDED HISTORY: Reason for exam:->trauma FINDINGS: Heart is enlarged.  Pulmonary vessels are congested.  No airspace consolidation.  No pneumothorax or pleural effusion.     Cardiomegaly with pulmonary vascular congestion.     FL LUMBAR PUNCTURE DIAG  Result Date: 2025  EXAMINATION: FLUOROSCOPIC GUIDED LUMBAR PUNCTURE 2025 10:26 am HISTORY: ORDERING SYSTEM PROVIDED HISTORY: Weakness of distal arms and legs

## 2025-05-21 NOTE — PROCEDURES
PROCEDURE NOTE  Date: 5/21/2025   Name: Alena Busby  YOB: 1955    Insert Arterial Line    Date/Time: 5/21/2025 4:18 PM    Performed by: Radha Amaya MD  Authorized by: Omer Navarro MD  Consent: The procedure was performed in an emergent situation.  Indications: hemodynamic monitoring  Location: left femoral  Seldinger technique: Seldinger technique used  Number of attempts: 3  Post-procedure: line sutured and dressing applied  Patient tolerance: patient tolerated the procedure well with no immediate complications

## 2025-05-21 NOTE — ED PROVIDER NOTES
[facility]  EMERGENCY DEPARTMENT ENCOUNTER        Pt Name: Alena Busby  MRN: [id number]  Birthdate 1955  Date of evaluation: 5/21/2025  Provider: Jian Adrian MD  PCP: Eufemia Ahmadi DO  Note Started: 3:06 PM EDT 5/21/25    CHIEF COMPLAINT       Chief Complaint   Patient presents with    Fall     Pt was in her wheelchair going into the wheelchair van and fell.        HISTORY OF PRESENT ILLNESS: 1 or more Elements   History From: Patient, EMS crew    Limitations to history : None    Alena Busby is a 70 y.o. female who presents trauma evaluation.     Nursing Notes were all reviewed and agreed with or any disagreements were addressed in the HPI.    HPI:  5/21/25, Time: 3:06 PM EDT     Patient presenting to the ED as a trauma team now, beginning just prior to arrival .  Patient was in a wheelchair being loaded into a wheelchair accessible van utilizing the ramp and subsequently fell out of wheelchair and off the ramp and struck her head on the ground.  EMS crew states on their arrival patient complains of bilateral arm pain and was found to be hypotensive.  Patient was placed c-collar prior to arrival patient complains mainly of neck and bilateral upper extremity pain on arrival he denies any chest pain, abdominal pain, hip/pelvis pain.  Denies any headache, blurry vision, blurry vision, nausea, vomit, diarrhea.  Patient denies any loss of consciousness.    Please note, this patient arrived as a Trauma team now and the trauma service assumed the care of this patient on their arrival    Initial evaluation occurred with trauma services at bedside.      This patient’s disposition will be determined by trauma services.      Glascow Coma Scale at time of initial examination  Best Eye Response 4 - Opens eyes on own   Best Verbal Response 5 - Alert and oriented   Best Motor Response 6 - Follows simple motor commands; but is not moving the lower extremities   Total 15       Review of Systems:   A  [id number]     Time Analyzed 1630     Source: Blood Arterial     pH, Blood Gas 7.417 7.350 - 7.450    PCO2 38.1 35.0 - 45.0 mmHg    PO2 357.9 (H) 75.0 - 100.0 mmHg    HCO3 24.0 22.0 - 26.0 mmol/L    B.E. -0.2 -3.0 - 3.0 mmol/L    O2 Sat 99.8 (H) 92.0 - 98.5 %    PO2/FIO2 3.58 mmHg/%    AaDO2 317.0 mmHg    RI(T) 0.89     O2Hb 98.2 (H) 94.0 - 97.0 %    COHb 1.4 0.0 - 1.5 %    MetHb 0.2 0.0 - 1.5 %    HHb 0.2 0.0 - 5.0 %    tHb (est) 16.3 11.5 - 16.5 g/dL    Potassium 3.31 (L) 3.50 - 5.00 mmol/L    Mode AC     FIO2 100.0 %    Rr Mechanical 20.0 b/min    Vt Mechanical 400.0 mL    Peep/Cpap 8.0 cmH2O    Date Of Collection 20250521     Time Collected 1622     Pt Temp 37.0 C     ID [id number]     Lab 12521     Critical(s) Notified . No Critical Values    TYPE AND SCREEN   Result Value Ref Range    Blood Bank Sample Expiration 05/24/2025,2359     Arm Band Number [id number]     ABO/Rh O POSITIVE     Antibody Screen NEGATIVE        RADIOLOGY:  Interpreted by Radiologist.  MRI CERVICAL SPINE WO CONTRAST   Final Result   1. Stable solid osseous fusion of C3 through C5 in anatomic alignment with C4   corpectomy.   2. New grade 1-2 anterior subluxation of C5 on C6 with fluid throughout the   C5-6 disc space consistent with a pseudoarthrosis. There is no sign of   osseous edema in the adjacent C5 and C6 vertebral bodies. There is disruption   of the C5-6 facet joints and posterior elements, with traumatic disruption of   the interspinous ligament. There is prominent edema throughout the posterior   paraspinous soft tissues extending superiorly from the C5-6 level. There is   new moderate fluid anterior to the inferior cervical spine inferior to C5-6.   3. New severe spinal stenosis at C5-6 from the grade 1-2 anterior subluxation   of C5 on C6. There is new prominent edema throughout the posterior cervical   cord extending from the C2 through the superior C7 levels consistent with   compressive myelopathy. There is no sign of

## 2025-05-21 NOTE — ED NOTES
Patient complains of neck and back pain, unable to feel touch on legs. Cannot feel past mid sternum

## 2025-05-21 NOTE — FLOWSHEET NOTE
Patient will reach for lines and tubes that are integral to their care despite redirection and explanation. Pt is a risk of harm to self and is in restraints for their safety.

## 2025-05-21 NOTE — CARE COORDINATION
Social Work/ Transition of Care:    Pt presents to the ED as trauma team secondary to a fall at [facility].  Per report, pt was returning to the facility from an appointment and fell out of her wheel chair as she was coming out of the wheel chair transport van.    Pt was intubated upon arrival to the ED.  Pt's daughter, Katia, who lives in [place], and pt's sister, Nadja, who lives locally, were both updated by trauma physicians.  HCPOA paperwork was scanned into pt's chart.  Pt's daughter is named POA.  Pt's  recently passed away in January 2025.  Pt's family reports pt has been at [facility] for the past 3 years.    SW/CM will follow.

## 2025-05-21 NOTE — PROGRESS NOTES
4 Eyes Skin Assessment     NAME:  Alena Busby  YOB: 1955  MEDICAL RECORD NUMBER:  [id number]    The patient is being assessed for  Admission    I agree that at least one RN has performed a thorough Head to Toe Skin Assessment on the patient. ALL assessment sites listed below have been assessed.      Areas assessed by both nurses:    Head, Face, Ears, Shoulders, Back, Chest, Arms, Elbows, Hands, Sacrum. Buttock, Coccyx, Ischium, Legs. Feet and Heels, and Under Medical Devices         Reddened Axilla Bilat  Flaky/Dry Generalized  Erosion Periarea/Inner Thighs  Redness Under R Breast  Redness/Abrasion/Fred Knees  Scabbing to Toes on Fred Feet  Redness to face  Hematoma R Forearm  Small Lac R Forehead w steristrips  Redness/Discoloration/Erosion Fred Buttocks  Lumbar Puncture Site with Band-Aid  Flakey/Fissures Fred Feet  Abrasions Fred Buttocks      Does the Patient have a Wound? Yes wound(s) were present on assessment. LDA wound assessment was Initiated and completed by RN       Ramon Prevention initiated by RN: Yes  Wound Care Orders initiated by RN: Yes    Pressure Injury (Stage 3,4, Unstageable, DTI, NWPT, and Complex wounds) if present, place Wound referral order by RN under : Yes    New Ostomies, if present place, Ostomy referral order under : No     Nurse 1 eSignature: Electronically signed by Kiley Vargas RN on 5/21/25 at 3:56 PM EDT    **SHARE this note so that the co-signing nurse can place an eSignature**    Nurse 2 eSignature: Electronically signed by Paulette Strickland RN on 5/21/25 at 3:48 PM EDT

## 2025-05-21 NOTE — PROGRESS NOTES
4 Eyes Skin Assessment     NAME:  Alena Busby  YOB: 1955  MEDICAL RECORD NUMBER:  [id number]    The patient is being assessed for  {Reason for Assessment:93103}    I agree that at least one RN has performed a thorough Head to Toe Skin Assessment on the patient. ALL assessment sites listed below have been assessed.      Areas assessed by both nurses:    {Pressure Areas Assessed:80704}        Does the Patient have a Wound? {Action Wound:69823}       Ramon Prevention initiated by RN: {YES/NO:19726}  Wound Care Orders initiated by RN: {YES/NO:19726}    Pressure Injury (Stage 3,4, Unstageable, DTI, NWPT, and Complex wounds) if present, place Wound referral order by RN under : {YES/NO:19726}    New Ostomies, if present place, Ostomy referral order under : {YES/NO:19726}     Nurse 1 eSignature: {Esignature:[id number]}    **SHARE this note so that the co-signing nurse can place an eSignature**    Nurse 2 eSignature: {Esignature:[id number]}

## 2025-05-21 NOTE — PROGRESS NOTES
4 Eyes Skin Assessment     NAME:  Alena Busby  YOB: 1955  MEDICAL RECORD NUMBER:  [id number]    The patient is being assessed for  Admission    I agree that at least one RN has performed a thorough Head to Toe Skin Assessment on the patient. ALL assessment sites listed below have been assessed.      Areas assessed by both nurses:    Head, Face, Ears, Shoulders, Back, Chest, Arms, Elbows, Hands, Sacrum. Buttock, Coccyx, Ischium, Legs. Feet and Heels, and Under Medical Devices         Does the Patient have a Wound? Yes wound(s) were present on assessment. LDA wound assessment was Initiated and completed by RN       Ramon Prevention initiated by RN: Yes  Wound Care Orders initiated by RN: Yes    Pressure Injury (Stage 3,4, Unstageable, DTI, NWPT, and Complex wounds) if present, place Wound referral order by RN under : Yes    New Ostomies, if present place, Ostomy referral order under : No     Nurse 1 eSignature: Electronically signed by Dior Miranda RN on 5/21/25 at 4:07 PM EDT    **SHARE this note so that the co-signing nurse can place an eSignature**    Nurse 2 eSignature: Electronically signed by Geeta Cooper RN on 5/22/25 at 2:57 AM EDT

## 2025-05-21 NOTE — PROCEDURES
PROCEDURE NOTE  Date: 5/21/2025   Name: Alena Busby  YOB: 1955    CENTRAL LINE    Date/Time: 5/21/2025 5:35 PM    Performed by: Sue Currie DO  Authorized by: Sue Currie DO  Consent: Written consent obtained.  Risks and benefits: risks, benefits and alternatives were discussed  Consent given by: power of   Required items: required blood products, implants, devices, and special equipment available  Patient identity confirmed: hospital-assigned identification number and arm band  Time out: Immediately prior to procedure a \"time out\" was called to verify the correct patient, procedure, equipment, support staff and site/side marked as required.  Indications: vascular access    Sedation:  Patient sedated: yes  Sedatives: propofol    Preparation: skin prepped with ChloraPrep  Skin prep agent dried: skin prep agent completely dried prior to procedure  Sterile barriers: all five maximum sterile barriers used - cap, mask, sterile gown, sterile gloves, and large sterile sheet  Hand hygiene: hand hygiene performed prior to central venous catheter insertion  Location details: left subclavian  Patient position: flat  Catheter type: triple lumen  Catheter size: 14 Fr  Pre-procedure: landmarks identified  Ultrasound guidance: no  Number of attempts: 2  Successful placement: yes  Post-procedure: line sutured and dressing applied  Assessment: blood return through all ports and free fluid flow  Patient tolerance: patient tolerated the procedure well with no immediate complications  Comments: Dr. Schulte was available for the procedure. Tip of catheter in SVC.

## 2025-05-21 NOTE — SIGNIFICANT EVENT
Spoke with POA daughter on the phone regarding concern for significant cervical spine injury requiring surgical intervention by neurosurgeon Dr. Rodas tomorrow.  Discussed that she will be getting a stat MRI and that she is in strict spinal precautions currently.  Discussed that she is on the ventilator due to her inability to protect her airway and worsening respiratory failure.    Additionally discussed that currently she is a full code, daughter states that her mother recently stated,\" save me at all cost\".  She reports that she recently lost her father in January.  She is from [place] and cannot be present until the weekend.  She is okay with proceeding with any invasive procedures and/or surgery if clinically indicated.    Electronically signed by Sue Currie DO on 5/21/2025 at 4:33 PM

## 2025-05-21 NOTE — CONSULTS
Alejandra Quincy KM 1941     Office/Outpatient Visit    Visit Date: Fri, Nov 2, 2018 08:32 am    Provider: Jose Valle MD (Assistant: Beryl Shukla LPN)    Location: [facility]        Electronically signed by Jose Valle MD on  11/04/2018 11:03:18 PM                             SUBJECTIVE:        CC:     Russ is a 77 year old White male.  ONLY TAKING 1/2 OF DOXAZOSIN, NOT TAKING CLONADINE         HPI:         Russ is here for a Medicare wellness visit.  The required HRA questions are integrated within this visit note. Family medical history and individual medical/surgical history were reviewed and updated.  A current height, weight, BMI, blood pressure, and pulse were recorded in the vitals section of the note and have been reviewed. Patient's medications, including supplements, were recorded in the chart and reviewed.  Current providers and suppliers were reviewed and updated.          Self-Assessment of Health: He rates his health as very good. He rates his confidence of being able to control/manage most of his health problems as I do not have any health problems. His physical/emotional health has limited his social activites not at all.  A review of cognitive impairment was performed, including ability to drive a car, manage finances, and any memory changes, and was found to be negative.  A review of functional ability, including bathing, dressing, walking, and urine/bowel continence as well as level of safety was performed and was found to be negative.  Falls Risk: Has not had any falls or only one fall without injury in the past year.  He denies having trouble hearing the TV/radio when others do not, having to strain to hear or understand conversations and wearing hearing aid(s).  Concerning home safety, he reports that at home he DOES have adequate lighting, a skid resistant shower/tub, grab bars in the bath, handrails on stairs and functioning smoke alarms, but not absence of  NEUROSURGERY INPATIENT CONSULT NOTE    Chief Complaint: Fall from a wheelchair, inability to move upper and lower extremities, decreased respiratory status.    HPI:   Alena Busby is a 70 y.o.  female who presents today with a fall from a wheelchair.  Supposedly there was no loss of consciousness.  Patient had underwent recent lumbar puncture.  By report she was no movement in upper or lower extremities, patient had T5 sensory level.    Currently patient is intubated, under mild sedation, she has shoulder shrug, I do not see any movement below shoulder level.  Patient is in spinal shock, with support from Levophed and IV fluids.    Past Medical History:   Diagnosis Date    Arthritis     COVID-19     2/2022    Depression     Dysphagia, oropharyngeal phase     Headache     multiple within a month, 7/10 on the pain scale.    Hearing loss     Hematuria, unspecified     Metabolic encephalopathy     Morbid obesity (HCC)     Muscle wasting and atrophy, not elsewhere classified, multiple sites     Neuropathy     Other abnormalities of gait and mobility     Pneumonia     Sepsis (HCC)     Severe protein-calorie malnutrition     Unspecified dementia, unspecified severity, with other behavioral disturbance (HCC)     Urinary tract infection     Vitamin D deficiency      Past Surgical History:   Procedure Laterality Date    CATARACT REMOVAL      CERVICAL FUSION N/A 6/3/2019    ANTERIOR CERVICAL FUSION C3-C4, C4-C5  AND CORPECTOMY C4  WITH PLATES, SCREWS, BONE GRAFT, SSEP -- GLOBUS performed by Daniele Daniels MD at [facility] OR      No family history on file.     Medications:   Current Facility-Administered Medications   Medication Dose Route Frequency Provider Last Rate Last Admin    sodium chloride flush 0.9 % injection 10 mL  10 mL IntraVENous Once PRN DaronBarb MD        sodium chloride flush 0.9 % injection 10 mL  10 mL IntraVENous 2 times per day Sue Currie DO        sodium chloride flush 0.9 % injection 10  throw rugs.  Physical Activity: He exercises for at least 20 minutes 3 or more days/week.; Type of diet patient normally eats is described as well-balanced with fruits and vegetables Advance Care Directive updated today in [facility] ADVANCE DIRECTIVES: Living will Returning to health checkup, preventative Health updated today.      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  He seems accepting of the decision....it is time he says.         PHQ-9 Depression Screening: Completed form scanned and in chart; Total Score 0 Alcohol Consumption Screening: Completed form scanned and in chart; Total Score 0         Dx with hypertension; he did not bring his blood pressure diary, but says that pressures have been okay.  He is tolerating the medication well without side effects.  Compliance with treatment has been good.      He saw Urologist and had PSA tested.  He got a good report regarding review of the CT for the renal cyst too.     He has hx of renal cysts and if followed by Urologist     ROS:     CONSTITUTIONAL:  Negative for chills, fatigue, fever and weight change.      CARDIOVASCULAR:  Negative for chest pain, orthopnea, paroxysmal nocturnal dyspnea and pedal edema.      RESPIRATORY:  Negative for dyspnea and cough.      GASTROINTESTINAL:  Negative for abdominal pain, heartburn, constipation, diarrhea, and stool changes.      GENITOURINARY:  Negative for dysuria and polyuria.      PSYCHIATRIC:  Negative for anxiety and depression.          [facility]/SH:     Last Reviewed on 11/02/2018 09:02 AM by Jose Valle    Past Medical History:             PAST MEDICAL HISTORY             CURRENT MEDICAL PROVIDERS:    Cardiologist: Jocelynn    Dermatologist: Brandt    Gastroenterologist: Ebony    Ophthalmologist: [facility]    [facility]    Urologist: [facility]             ADVANCE DIRECTIVES: Living will [facility] MAINTENANCE              COLORECTAL CANCER SCREENING: Up to date (colonoscopy q10y; sigmoidoscopy q5y; Cologuard q3y) was last done 8-, Results are in chart; colonoscopy with normal results     EYE EXAM: was last done 12/2016     PSA: was last done 5/1/18         PAST MEDICAL HISTORY         Positive for    Prostate cancer: dx'd in 2015;  s/p XRT; ;         Surgical History:         Biopsy of colonic polyp; '95, '97, '99    Sinus Surg;    Cardiac Cath 8/02-  Myocardial bridge of LAD;    Breast Bx (Dr Collins Barnard);    Prostate Bx Dec 2014, with radiation;         Procedures:    Left rotator cuff ~ 2000         Family History: Has a Twin Brother         Positive for Congestive Heart Failure ( father ), Hypertension ( father; mother ) and Myocardial Infarction ( father ).      Positive for Breast Cancer ( mother ).      Positive for Alzheimer's Disease ( mother ) and Pituitary Adenomy - Dad.          Social History:     Occupation: Farmer     Marital Status:      Children: 3 children         Tobacco/Alcohol/Supplements:     Last Reviewed on 11/02/2018 08:37 AM by Beryl Shukla April    Tobacco: He has never smoked.          Alcohol:  Does not drink alcohol and never has.              Allergies:     Last Reviewed on 11/02/2018 08:37 AM by Beryl Shukla    Reglan:        Current Medications:     Last Reviewed on 11/02/2018 08:39 AM by Beryl Shukla April    Amlodipine  5mg Tablet Take 1 tablet in the morning and one-half tabet qhs     Cardura 8mg Tablet Take 1 tablet(s) by mouth daily     Clonidine HCl 0.1mg Tablet 1 tab po q 4 hrs prn systolic blood pressure greater than 180     Nexium 20mg Capsules, Delayed Release Take 1 capsule(s) by mouth BID     Vitamin D3 1,000IU Tablet 1 tab daily     niacin- enduracin 250 mg qd     Multivitamin/Mineral Supplement     Aspirin 81mg Tablets, Enteric Coated Take 1 tablet(s) by mouth qacory     ProBiotic Jr.* powder for mixing 1 packet daily     wintertime drink, vitamin c,  calcium, magnesium     Vitamin B6 25mg Tablet Take 1 tablet(s) by mouth daily     Folic Acid/fish oil pill         OBJECTIVE:        Vitals:         Current: 11/2/2018 8:37:33 AM    Ht:  5 ft, 9 in;  Wt: 183.2 lbs;  BMI: 27.1    T: 97.7 F (oral);  BP: 140/57 mm Hg (left arm, sitting);  P: 58 bpm (left arm (BP Cuff), sitting);  sCr: 1.31 mg/dL;  GFR: 45.31        Exams:     PHYSICAL EXAM:     GENERAL:  well developed and nourished; appropriately groomed; in no apparent distress;     EYES: Nonicteric and with unremarkable lids, iris and pupils;     E/N/T:  normal EACs, TMs, nasal/oral mucosa, teeth, gingiva, and oropharynx;     NECK: carotid exam reveals no bruits;     RESPIRATORY: normal respiratory rate and pattern with no distress; normal breath sounds with no rales, rhonchi, wheezes or rubs;     CARDIOVASCULAR: normal rate; rhythm is regular;  no systolic murmur;     GASTROINTESTINAL: nontender, nondistended; no hepatosplenomegaly or masses; no bruits;     LYMPHATIC: no enlargement of cervical or facial nodes;     MUSCULOSKELETAL: normal overall tone No pedal edema.;     NEUROLOGIC: No lateralizing deficit.;     PSYCHIATRIC:  appropriate affect and demeanor; normal speech pattern; grossly normal memory;         ASSESSMENT           V70.0   Z00.00  Health checkup              DDx:     V79.0   Z13.89  Screening for depression              DDx:     401.1   I10  Hypertension              DDx:     V10.46   Z85.46  History of prostate cancer              DDx:     593.2   N28.1  Acquired renal cyst              DDx:         ORDERS:         Meds Prescribed:       Refill of: Cardura (Doxazosin Mesylate) 8mg Tablet Take 1/2 tablet(s) by mouth at hs  #30 (Thirty) tablet(s) Refills: 0         Lab Orders:       [id number]  [facility] Basic Metabolic Panel  (Send-Out)         99003  BDCB2 - [facility] CBC w/o diff  (Send-Out)           Other Orders:         Depression screen negative  (In-House)           Subsequent Annual Well  Visit Medicare (x1)                 PLAN:          Health checkup Reviewed preventive service recommendations and created handout          Screening for depression     MIPS Negative Depression Screen           Orders:         Depression screen negative  (In-House)            Hypertension BP borderline, but okay for now     LABORATORY:  Labs ordered to be performed today include basic metabolic panel and CBC W/O DIFF.            Prescriptions:       Refill of: Cardura (Doxazosin Mesylate) 8mg Tablet Take 1/2 tablet(s) by mouth at hs  #30 (Thirty) tablet(s) Refills: 0           Orders:       [id number]  [facility] Basic Metabolic Panel  (Send-Out)         72799  BDCB2 - [facility] CBC w/o diff  (Send-Out)             Patient Education Handouts:       [facility] Medication Compliance           History of prostate cancer cont follow up with Urologist.  Will send for records          Acquired renal cyst Will send for Urologist records.  He says they reviewed his CT             Other Orders:       [id number]  Advance care planning first 30 mins  (In-House)           CHARGE CAPTURE           **Please note: ICD descriptions below are intended for billing purposes only and may not represent clinical diagnoses**        Primary Diagnosis:         V70.0 Health checkup            Z00.00    Encounter for general adult medical examination without abnormal findings              Orders:          [id number]   Preventive medicine, established patient, age 65+ years  (In-House)                                           Subsequent Annual Well Visit Medicare (x1)         V79.0 Screening for depression            Z13.89    Encounter for screening for other disorder              Orders:          49571 -25  Office/outpatient visit; established patient, level 3  (In-House)                Depression screen negative  (In-House)           401.1 Hypertension            I10    Essential (primary) hypertension    V10.46 History of prostate cancer             Z85.46    Personal history of malignant neoplasm of prostate    593.2 Acquired renal cyst            N28.1    Cyst of kidney, acquired        Other Orders:           [id number]   Advance care planning first 30 mins  (In-House)           ADDENDUMS:      ____________________________________    Date: 01/16/2019 10:30 AM    Author: Alrene Puri         Visit Note Faxed to:        Adolph Martell ; Number [phone]

## 2025-05-21 NOTE — OR NURSING
Patient arrival from [facility] to radiology for lumbar puncture. Vitals taken, consent obtained, and Aditi Barbosa PA-C in to speak with the patient about the procedure. Patient placed prone on Fluoroscopy table, patient prepped and draped. Using fluoroscopy imaging, needle placed to lower back by Aditi Barbosa Pa-C @ 1011. Opening pressure 16 mmHG . Tube #1 @ 1013- 2.5 ml, tube #2 @ 1015- 3.5 ml, tube #3 @ 1016- 3.5 ml, tube #4 @ 1017- 3 ml. Total amount of spinal fluid taken is 12.5 ml , colorless and clear. Spinal fluid removed and sent to lab. Needle removed, site cleansed, and band aid applied to site. Patient placed on cart and taken to PACU. Nurse handoff report called to PACU.

## 2025-05-21 NOTE — PROGRESS NOTES
Patient admitted to SICU with the following belongings: None - Patient arrived to the unit with no belongings.. The following belongings were sent home with the patient's family:  None - belongings noted on admission remain in patient's room..

## 2025-05-21 NOTE — PROGRESS NOTES
Patient admitted with a temperature within the mild hypothermia range (95-98.5 degrees Fahrenheit). The following interventions have been implemented Warm Environment, Warm Blankets, and Forced Air [place]. Temperature will be monitored every 15 minutes until goal temperature of 98.6 degrees Fahrenheit has been recorded.

## 2025-05-21 NOTE — PROCEDURES
Procedure: Image Guided Lumbar Puncture    Diagnosis: to obtain spinal fluid for diagnostic testing     Findings: Successful intrathecal needle placement with clear, colorless CSF return, Opening Pressure 16 cm of H2O    Specimen: 11.5 cc     Anesthesia: Local infiltration of Lidocaine 1% 9ml without epinephrine    EBL: Minimal    Complication: None immediately post procedure.    Plan: 3 hrs bedrest in recovery/PACU then discharge to nursing home facility     Comments:    See radiology dictation in PACs for image review and additional procedural information.

## 2025-05-21 NOTE — ED NOTES
Dr. Yang educating patient on the need for intubation. Patient is suspected to have spinal injury at this time
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
## 2025-05-21 NOTE — PROGRESS NOTES
Patient arrived to the ICU from ED with monahan catheter intact and patent. Securing device applied. Monahan bag is hanging below the level of the bladder, safety clip attached to the bed sheet, tamper seal is intact, drainage bag is not on the floor, lack of dependent loop in tubing, and the drainage bag is less than half full.
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
## 2025-05-22 ENCOUNTER — ANESTHESIA EVENT (OUTPATIENT)
Dept: OPERATING ROOM | Age: 70
End: 2025-05-22
Payer: MEDICARE

## 2025-05-22 ENCOUNTER — APPOINTMENT (OUTPATIENT)
Dept: GENERAL RADIOLOGY | Age: 70
DRG: 450 | End: 2025-05-22
Payer: MEDICARE

## 2025-05-22 ENCOUNTER — ANESTHESIA (OUTPATIENT)
Dept: OPERATING ROOM | Age: 70
End: 2025-05-22
Payer: MEDICARE

## 2025-05-22 LAB
AADO2: 209.9 MMHG
AADO2: 218.3 MMHG
ALBUMIN SERPL-MCNC: 3 G/DL (ref 3.5–5.2)
ALP SERPL-CCNC: 95 U/L (ref 35–104)
ALT SERPL-CCNC: 23 U/L (ref 0–35)
ANA SER QL IA: NEGATIVE
ANION GAP SERPL CALCULATED.3IONS-SCNC: 11 MMOL/L (ref 7–16)
ANION GAP SERPL CALCULATED.3IONS-SCNC: 14 MMOL/L (ref 7–16)
AST SERPL-CCNC: 41 U/L (ref 0–35)
B.E.: -3.8 MMOL/L (ref -3–3)
B.E.: 2.5 MMOL/L (ref -3–3)
BASOPHILS # BLD: 0.02 K/UL (ref 0–0.2)
BASOPHILS # BLD: 0.02 K/UL (ref 0–0.2)
BASOPHILS NFR BLD: 0 % (ref 0–2)
BASOPHILS NFR BLD: 0 % (ref 0–2)
BILIRUB SERPL-MCNC: 1.1 MG/DL (ref 0–1.2)
BUN SERPL-MCNC: 11 MG/DL (ref 8–23)
BUN SERPL-MCNC: 14 MG/DL (ref 8–23)
CA-I BLD-SCNC: 0.97 MMOL/L (ref 1.15–1.33)
CA-I BLD-SCNC: 1 MMOL/L (ref 1.15–1.33)
CALCIUM SERPL-MCNC: 6.9 MG/DL (ref 8.8–10.2)
CALCIUM SERPL-MCNC: 6.9 MG/DL (ref 8.8–10.2)
CHLORIDE SERPL-SCNC: 110 MMOL/L (ref 98–107)
CHLORIDE SERPL-SCNC: 115 MMOL/L (ref 98–107)
CO2 SERPL-SCNC: 18 MMOL/L (ref 22–29)
CO2 SERPL-SCNC: 21 MMOL/L (ref 22–29)
COHB: 0.5 % (ref 0–1.5)
COHB: 0.6 % (ref 0–1.5)
CORTIS SERPL-MCNC: 16.7 UG/DL (ref 2.7–18.4)
CORTISOL COLLECTION INFO: NORMAL
CREAT SERPL-MCNC: 0.5 MG/DL (ref 0.5–1)
CREAT SERPL-MCNC: 0.6 MG/DL (ref 0.5–1)
CRITICAL: ABNORMAL
CRITICAL: ABNORMAL
DATE ANALYZED: ABNORMAL
DATE ANALYZED: ABNORMAL
DATE OF COLLECTION: ABNORMAL
DATE OF COLLECTION: ABNORMAL
EKG ATRIAL RATE: 50 BPM
EKG P AXIS: 68 DEGREES
EKG P-R INTERVAL: 192 MS
EKG Q-T INTERVAL: 524 MS
EKG QRS DURATION: 74 MS
EKG QTC CALCULATION (BAZETT): 477 MS
EKG R AXIS: -52 DEGREES
EKG T AXIS: 9 DEGREES
EKG VENTRICULAR RATE: 50 BPM
ENA SM AB SER QL: NEGATIVE
EOSINOPHIL # BLD: 0 K/UL (ref 0.05–0.5)
EOSINOPHIL # BLD: 0.02 K/UL (ref 0.05–0.5)
EOSINOPHILS RELATIVE PERCENT: 0 % (ref 0–6)
EOSINOPHILS RELATIVE PERCENT: 0 % (ref 0–6)
ERYTHROCYTE [DISTWIDTH] IN BLOOD BY AUTOMATED COUNT: 16.2 % (ref 11.5–15)
ERYTHROCYTE [DISTWIDTH] IN BLOOD BY AUTOMATED COUNT: 16.3 % (ref 11.5–15)
FIO2: 50 %
FIO2: 50 %
GFR, ESTIMATED: >90 ML/MIN/1.73M2
GFR, ESTIMATED: >90 ML/MIN/1.73M2
GLUCOSE SERPL-MCNC: 117 MG/DL (ref 74–99)
GLUCOSE SERPL-MCNC: 149 MG/DL (ref 74–99)
HCO3: 18.5 MMOL/L (ref 22–26)
HCO3: 23.3 MMOL/L (ref 22–26)
HCT VFR BLD AUTO: 40 % (ref 34–48)
HCT VFR BLD AUTO: 43.4 % (ref 34–48)
HGB BLD-MCNC: 13.7 G/DL (ref 11.5–15.5)
HGB BLD-MCNC: 15 G/DL (ref 11.5–15.5)
HHB: 1.4 % (ref 0–5)
HHB: 1.4 % (ref 0–5)
IMM GRANULOCYTES # BLD AUTO: 0.03 K/UL (ref 0–0.58)
IMM GRANULOCYTES # BLD AUTO: 0.05 K/UL (ref 0–0.58)
IMM GRANULOCYTES NFR BLD: 0 % (ref 0–5)
IMM GRANULOCYTES NFR BLD: 0 % (ref 0–5)
JO-1 ANTIBODY: NEGATIVE
LAB: ABNORMAL
LAB: ABNORMAL
LACTATE BLDV-SCNC: 1.6 MMOL/L (ref 0.5–2.2)
LYMPHOCYTES NFR BLD: 1.41 K/UL (ref 1.5–4)
LYMPHOCYTES NFR BLD: 3.61 K/UL (ref 1.5–4)
LYMPHOCYTES RELATIVE PERCENT: 12 % (ref 20–42)
LYMPHOCYTES RELATIVE PERCENT: 31 % (ref 20–42)
Lab: 1537
Lab: 430
MAGNESIUM SERPL-MCNC: 1.7 MG/DL (ref 1.6–2.4)
MAGNESIUM SERPL-MCNC: 2.2 MG/DL (ref 1.6–2.4)
MCH RBC QN AUTO: 30 PG (ref 26–35)
MCH RBC QN AUTO: 30.2 PG (ref 26–35)
MCHC RBC AUTO-ENTMCNC: 34.3 G/DL (ref 32–34.5)
MCHC RBC AUTO-ENTMCNC: 34.6 G/DL (ref 32–34.5)
MCV RBC AUTO: 86.8 FL (ref 80–99.9)
MCV RBC AUTO: 88.1 FL (ref 80–99.9)
METHB: 0.2 % (ref 0–1.5)
METHB: 0.2 % (ref 0–1.5)
MODE: AC
MODE: AC
MONOCYTES NFR BLD: 0.96 K/UL (ref 0.1–0.95)
MONOCYTES NFR BLD: 1.04 K/UL (ref 0.1–0.95)
MONOCYTES NFR BLD: 8 % (ref 2–12)
MONOCYTES NFR BLD: 9 % (ref 2–12)
NEUTROPHILS NFR BLD: 59 % (ref 43–80)
NEUTROPHILS NFR BLD: 80 % (ref 43–80)
NEUTS SEG NFR BLD: 6.92 K/UL (ref 1.8–7.3)
NEUTS SEG NFR BLD: 9.75 K/UL (ref 1.8–7.3)
O2 SATURATION: 98.6 % (ref 92–98.5)
O2 SATURATION: 98.6 % (ref 92–98.5)
O2HB: 97.8 % (ref 94–97)
O2HB: 97.9 % (ref 94–97)
OPERATOR ID: 2962
OPERATOR ID: 7292
PATIENT TEMP: 37 C
PATIENT TEMP: 37 C
PCO2: 26.7 MMHG (ref 35–45)
PCO2: 26.9 MMHG (ref 35–45)
PEEP/CPAP: 8 CMH2O
PEEP/CPAP: 8 CMH2O
PFO2: 2.16 MMHG/%
PFO2: 2.33 MMHG/%
PH BLOOD GAS: 7.46 (ref 7.35–7.45)
PH BLOOD GAS: 7.56 (ref 7.35–7.45)
PHOSPHATE SERPL-MCNC: 2.4 MG/DL (ref 2.5–4.5)
PHOSPHATE SERPL-MCNC: 3.7 MG/DL (ref 2.5–4.5)
PLATELET # BLD AUTO: 163 K/UL (ref 130–450)
PLATELET # BLD AUTO: 198 K/UL (ref 130–450)
PMV BLD AUTO: 10.6 FL (ref 7–12)
PMV BLD AUTO: 10.8 FL (ref 7–12)
PO2: 108.2 MMHG (ref 75–100)
PO2: 116.3 MMHG (ref 75–100)
POTASSIUM SERPL-SCNC: 3 MMOL/L (ref 3.5–5.1)
POTASSIUM SERPL-SCNC: 4.7 MMOL/L (ref 3.5–5.1)
PROT SERPL-MCNC: 6 G/DL (ref 6.4–8.3)
RBC # BLD AUTO: 4.54 M/UL (ref 3.5–5.5)
RBC # BLD AUTO: 5 M/UL (ref 3.5–5.5)
RI(T): 1.81
RI(T): 2.02
RR MECHANICAL: 18 B/MIN
RR MECHANICAL: 20 B/MIN
SCLERODERMA (SCL-70) AB: NEGATIVE
SEND OUT REPORT: NORMAL
SJOGREN'S ANTIBODIES (SSA): POSITIVE
SJOGREN'S ANTIBODIES (SSB): NEGATIVE
SODIUM SERPL-SCNC: 145 MMOL/L (ref 136–145)
SODIUM SERPL-SCNC: 145 MMOL/L (ref 136–145)
SOURCE, BLOOD GAS: ABNORMAL
SOURCE, BLOOD GAS: ABNORMAL
TEST NAME: NORMAL
THB: 14.1 G/DL (ref 11.5–16.5)
THB: 15.8 G/DL (ref 11.5–16.5)
TIME ANALYZED: 1545
TIME ANALYZED: 433
U1 SNRNP IGG SER-ACNC: NEGATIVE
VT MECHANICAL: 400 ML
VT MECHANICAL: 400 ML
WBC OTHER # BLD: 11.6 K/UL (ref 4.5–11.5)
WBC OTHER # BLD: 12.2 K/UL (ref 4.5–11.5)

## 2025-05-22 PROCEDURE — 6360000002 HC RX W HCPCS

## 2025-05-22 PROCEDURE — 7100000000 HC PACU RECOVERY - FIRST 15 MIN

## 2025-05-22 PROCEDURE — 6370000000 HC RX 637 (ALT 250 FOR IP)

## 2025-05-22 PROCEDURE — 2000000000 HC ICU R&B

## 2025-05-22 PROCEDURE — 83605 ASSAY OF LACTIC ACID: CPT

## 2025-05-22 PROCEDURE — 95938 SOMATOSENSORY TESTING: CPT | Performed by: AUDIOLOGIST

## 2025-05-22 PROCEDURE — 3700000001 HC ADD 15 MINUTES (ANESTHESIA): Performed by: NEUROLOGICAL SURGERY

## 2025-05-22 PROCEDURE — 2500000003 HC RX 250 WO HCPCS: Performed by: STUDENT IN AN ORGANIZED HEALTH CARE EDUCATION/TRAINING PROGRAM

## 2025-05-22 PROCEDURE — 94003 VENT MGMT INPAT SUBQ DAY: CPT

## 2025-05-22 PROCEDURE — 95939 C MOTOR EVOKED UPR&LWR LIMBS: CPT | Performed by: AUDIOLOGIST

## 2025-05-22 PROCEDURE — 94640 AIRWAY INHALATION TREATMENT: CPT

## 2025-05-22 PROCEDURE — 3600000014 HC SURGERY LEVEL 4 ADDTL 15MIN: Performed by: NEUROLOGICAL SURGERY

## 2025-05-22 PROCEDURE — 71045 X-RAY EXAM CHEST 1 VIEW: CPT

## 2025-05-22 PROCEDURE — C1713 ANCHOR/SCREW BN/BN,TIS/BN: HCPCS | Performed by: NEUROLOGICAL SURGERY

## 2025-05-22 PROCEDURE — 93010 ELECTROCARDIOGRAM REPORT: CPT | Performed by: INTERNAL MEDICINE

## 2025-05-22 PROCEDURE — 2500000003 HC RX 250 WO HCPCS: Performed by: NEUROLOGICAL SURGERY

## 2025-05-22 PROCEDURE — 6360000002 HC RX W HCPCS: Performed by: NEUROLOGICAL SURGERY

## 2025-05-22 PROCEDURE — 0RG6071 FUSION OF THORACIC VERTEBRAL JOINT WITH AUTOLOGOUS TISSUE SUBSTITUTE, POSTERIOR APPROACH, POSTERIOR COLUMN, OPEN APPROACH: ICD-10-PCS | Performed by: NEUROLOGICAL SURGERY

## 2025-05-22 PROCEDURE — 00NW0ZZ RELEASE CERVICAL SPINAL CORD, OPEN APPROACH: ICD-10-PCS | Performed by: NEUROLOGICAL SURGERY

## 2025-05-22 PROCEDURE — 83735 ASSAY OF MAGNESIUM: CPT

## 2025-05-22 PROCEDURE — 7100000001 HC PACU RECOVERY - ADDTL 15 MIN

## 2025-05-22 PROCEDURE — 2580000003 HC RX 258

## 2025-05-22 PROCEDURE — 95940 IONM IN OPERATNG ROOM 15 MIN: CPT | Performed by: AUDIOLOGIST

## 2025-05-22 PROCEDURE — 85025 COMPLETE CBC W/AUTO DIFF WBC: CPT

## 2025-05-22 PROCEDURE — 0RG4071 FUSION OF CERVICOTHORACIC VERTEBRAL JOINT WITH AUTOLOGOUS TISSUE SUBSTITUTE, POSTERIOR APPROACH, POSTERIOR COLUMN, OPEN APPROACH: ICD-10-PCS | Performed by: NEUROLOGICAL SURGERY

## 2025-05-22 PROCEDURE — 6370000000 HC RX 637 (ALT 250 FOR IP): Performed by: NEUROLOGICAL SURGERY

## 2025-05-22 PROCEDURE — 2500000003 HC RX 250 WO HCPCS

## 2025-05-22 PROCEDURE — 99221 1ST HOSP IP/OBS SF/LOW 40: CPT | Performed by: NEUROLOGICAL SURGERY

## 2025-05-22 PROCEDURE — 82805 BLOOD GASES W/O2 SATURATION: CPT

## 2025-05-22 PROCEDURE — 6360000002 HC RX W HCPCS: Performed by: STUDENT IN AN ORGANIZED HEALTH CARE EDUCATION/TRAINING PROGRAM

## 2025-05-22 PROCEDURE — 3700000000 HC ANESTHESIA ATTENDED CARE: Performed by: NEUROLOGICAL SURGERY

## 2025-05-22 PROCEDURE — 80053 COMPREHEN METABOLIC PANEL: CPT

## 2025-05-22 PROCEDURE — 2709999900 HC NON-CHARGEABLE SUPPLY: Performed by: NEUROLOGICAL SURGERY

## 2025-05-22 PROCEDURE — 80048 BASIC METABOLIC PNL TOTAL CA: CPT

## 2025-05-22 PROCEDURE — 2500000003 HC RX 250 WO HCPCS: Performed by: ANESTHESIOLOGY

## 2025-05-22 PROCEDURE — 37799 UNLISTED PX VASCULAR SURGERY: CPT

## 2025-05-22 PROCEDURE — 2720000010 HC SURG SUPPLY STERILE: Performed by: NEUROLOGICAL SURGERY

## 2025-05-22 PROCEDURE — 0RG2071 FUSION OF 2 OR MORE CERVICAL VERTEBRAL JOINTS WITH AUTOLOGOUS TISSUE SUBSTITUTE, POSTERIOR APPROACH, POSTERIOR COLUMN, OPEN APPROACH: ICD-10-PCS | Performed by: NEUROLOGICAL SURGERY

## 2025-05-22 PROCEDURE — 82533 TOTAL CORTISOL: CPT

## 2025-05-22 PROCEDURE — 93005 ELECTROCARDIOGRAM TRACING: CPT

## 2025-05-22 PROCEDURE — 82330 ASSAY OF CALCIUM: CPT

## 2025-05-22 PROCEDURE — 84100 ASSAY OF PHOSPHORUS: CPT

## 2025-05-22 PROCEDURE — 3600000004 HC SURGERY LEVEL 4 BASE: Performed by: NEUROLOGICAL SURGERY

## 2025-05-22 DEVICE — QUARTEX 3.5MM POLYAXIAL SCREW, 30MM
Type: IMPLANTABLE DEVICE | Site: SPINE CERVICAL | Status: FUNCTIONAL
Brand: QUARTEX

## 2025-05-22 DEVICE — QUARTEX LATERAL CONNECTOR, SHORT, 3.5MM ROD
Type: IMPLANTABLE DEVICE | Site: SPINE CERVICAL | Status: FUNCTIONAL
Brand: QUARTEX

## 2025-05-22 DEVICE — 3.5MM CAPITOL CURVED ROD, 120MM
Type: IMPLANTABLE DEVICE | Site: SPINE CERVICAL | Status: FUNCTIONAL
Brand: CAPITOL

## 2025-05-22 DEVICE — QUARTEX THREADED LOCKING CAP
Type: IMPLANTABLE DEVICE | Site: SPINE CERVICAL | Status: FUNCTIONAL
Brand: QUARTEX

## 2025-05-22 DEVICE — QUARTEX 3.5MM POLYAXIAL SCREW, 28MM
Type: IMPLANTABLE DEVICE | Site: SPINE CERVICAL | Status: FUNCTIONAL
Brand: QUARTEX

## 2025-05-22 DEVICE — IMPLANTABLE DEVICE: Type: IMPLANTABLE DEVICE | Site: SPINE CERVICAL | Status: FUNCTIONAL

## 2025-05-22 DEVICE — QUARTEX 3.5MM POLYAXIAL SCREW, 32MM
Type: IMPLANTABLE DEVICE | Site: SPINE CERVICAL | Status: FUNCTIONAL
Brand: QUARTEX

## 2025-05-22 DEVICE — QUARTEX 3.5MM POLYAXIAL SCREW, 16MM
Type: IMPLANTABLE DEVICE | Site: SPINE CERVICAL | Status: FUNCTIONAL
Brand: QUARTEX

## 2025-05-22 DEVICE — QUARTEX 3.5MM POLYAXIAL SCREW, 12MM
Type: IMPLANTABLE DEVICE | Site: SPINE CERVICAL | Status: FUNCTIONAL
Brand: QUARTEX

## 2025-05-22 DEVICE — QUARTEX 3.5MM ROD TO ROD T-CONNECTOR, 33-39MM
Type: IMPLANTABLE DEVICE | Site: SPINE CERVICAL | Status: FUNCTIONAL
Brand: QUARTEX

## 2025-05-22 RX ORDER — ATROPINE SULFATE 0.1 MG/ML
INJECTION INTRAVENOUS
Status: COMPLETED
Start: 2025-05-22 | End: 2025-05-22

## 2025-05-22 RX ORDER — CALCIUM GLUCONATE 20 MG/ML
2000 INJECTION, SOLUTION INTRAVENOUS ONCE
Status: COMPLETED | OUTPATIENT
Start: 2025-05-22 | End: 2025-05-22

## 2025-05-22 RX ORDER — HYDROMORPHONE HYDROCHLORIDE 1 MG/ML
0.5 INJECTION, SOLUTION INTRAMUSCULAR; INTRAVENOUS; SUBCUTANEOUS EVERY 5 MIN PRN
Status: DISCONTINUED | OUTPATIENT
Start: 2025-05-22 | End: 2025-05-22 | Stop reason: HOSPADM

## 2025-05-22 RX ORDER — HYDRALAZINE HYDROCHLORIDE 20 MG/ML
10 INJECTION INTRAMUSCULAR; INTRAVENOUS
Status: DISCONTINUED | OUTPATIENT
Start: 2025-05-22 | End: 2025-05-22 | Stop reason: HOSPADM

## 2025-05-22 RX ORDER — ROCURONIUM BROMIDE 10 MG/ML
INJECTION, SOLUTION INTRAVENOUS
Status: DISCONTINUED | OUTPATIENT
Start: 2025-05-22 | End: 2025-05-22 | Stop reason: SDUPTHER

## 2025-05-22 RX ORDER — SODIUM CHLORIDE 9 MG/ML
INJECTION, SOLUTION INTRAVENOUS
Status: DISCONTINUED | OUTPATIENT
Start: 2025-05-22 | End: 2025-05-22 | Stop reason: SDUPTHER

## 2025-05-22 RX ORDER — CHLORHEXIDINE GLUCONATE ORAL RINSE 1.2 MG/ML
15 SOLUTION DENTAL EVERY 4 HOURS
Status: DISCONTINUED | OUTPATIENT
Start: 2025-05-22 | End: 2025-05-29 | Stop reason: HOSPADM

## 2025-05-22 RX ORDER — 0.9 % SODIUM CHLORIDE 0.9 %
500 INTRAVENOUS SOLUTION INTRAVENOUS ONCE
Status: COMPLETED | OUTPATIENT
Start: 2025-05-22 | End: 2025-05-23

## 2025-05-22 RX ORDER — MINERAL OIL AND WHITE PETROLATUM 150; 830 MG/G; MG/G
OINTMENT OPHTHALMIC EVERY 4 HOURS
Status: DISCONTINUED | OUTPATIENT
Start: 2025-05-22 | End: 2025-05-29 | Stop reason: HOSPADM

## 2025-05-22 RX ORDER — LABETALOL HYDROCHLORIDE 5 MG/ML
10 INJECTION, SOLUTION INTRAVENOUS
Status: DISCONTINUED | OUTPATIENT
Start: 2025-05-22 | End: 2025-05-22 | Stop reason: HOSPADM

## 2025-05-22 RX ORDER — DROPERIDOL 2.5 MG/ML
0.62 INJECTION, SOLUTION INTRAMUSCULAR; INTRAVENOUS
Status: DISCONTINUED | OUTPATIENT
Start: 2025-05-22 | End: 2025-05-22 | Stop reason: HOSPADM

## 2025-05-22 RX ORDER — SODIUM CHLORIDE 0.9 % (FLUSH) 0.9 %
5-40 SYRINGE (ML) INJECTION PRN
Status: DISCONTINUED | OUTPATIENT
Start: 2025-05-22 | End: 2025-05-22 | Stop reason: HOSPADM

## 2025-05-22 RX ORDER — SODIUM CHLORIDE 9 MG/ML
INJECTION, SOLUTION INTRAVENOUS PRN
Status: DISCONTINUED | OUTPATIENT
Start: 2025-05-22 | End: 2025-05-22 | Stop reason: HOSPADM

## 2025-05-22 RX ORDER — SODIUM CHLORIDE 9 MG/ML
INJECTION, SOLUTION INTRAVENOUS ONCE
Status: COMPLETED | OUTPATIENT
Start: 2025-05-23 | End: 2025-05-23

## 2025-05-22 RX ORDER — PROPOFOL 10 MG/ML
INJECTION, EMULSION INTRAVENOUS
Status: DISCONTINUED | OUTPATIENT
Start: 2025-05-22 | End: 2025-05-22 | Stop reason: SDUPTHER

## 2025-05-22 RX ORDER — ATROPINE SULFATE 0.4 MG/ML
1 INJECTION, SOLUTION INTRAVENOUS ONCE
Status: DISCONTINUED | OUTPATIENT
Start: 2025-05-22 | End: 2025-05-29 | Stop reason: HOSPADM

## 2025-05-22 RX ORDER — MAGNESIUM SULFATE IN WATER 40 MG/ML
2000 INJECTION, SOLUTION INTRAVENOUS ONCE
Status: COMPLETED | OUTPATIENT
Start: 2025-05-22 | End: 2025-05-22

## 2025-05-22 RX ORDER — NALOXONE HYDROCHLORIDE 0.4 MG/ML
INJECTION, SOLUTION INTRAMUSCULAR; INTRAVENOUS; SUBCUTANEOUS PRN
Status: DISCONTINUED | OUTPATIENT
Start: 2025-05-22 | End: 2025-05-22 | Stop reason: HOSPADM

## 2025-05-22 RX ORDER — SODIUM CHLORIDE 0.9 % (FLUSH) 0.9 %
5-40 SYRINGE (ML) INJECTION EVERY 12 HOURS SCHEDULED
Status: DISCONTINUED | OUTPATIENT
Start: 2025-05-22 | End: 2025-05-22 | Stop reason: HOSPADM

## 2025-05-22 RX ORDER — ONDANSETRON 2 MG/ML
4 INJECTION INTRAMUSCULAR; INTRAVENOUS
Status: DISCONTINUED | OUTPATIENT
Start: 2025-05-22 | End: 2025-05-22 | Stop reason: HOSPADM

## 2025-05-22 RX ORDER — GLYCOPYRROLATE 0.2 MG/ML
INJECTION INTRAMUSCULAR; INTRAVENOUS
Status: DISCONTINUED | OUTPATIENT
Start: 2025-05-22 | End: 2025-05-22 | Stop reason: SDUPTHER

## 2025-05-22 RX ORDER — ATROPINE SULFATE 0.4 MG/ML
0.5 INJECTION, SOLUTION INTRAVENOUS ONCE
Status: COMPLETED | OUTPATIENT
Start: 2025-05-22 | End: 2025-05-22

## 2025-05-22 RX ORDER — NEOSTIGMINE METHYLSULFATE 1 MG/ML
INJECTION, SOLUTION INTRAVENOUS
Status: DISCONTINUED | OUTPATIENT
Start: 2025-05-22 | End: 2025-05-22 | Stop reason: SDUPTHER

## 2025-05-22 RX ORDER — SODIUM CHLORIDE 9 MG/ML
INJECTION, SOLUTION INTRAVENOUS CONTINUOUS
Status: DISCONTINUED | OUTPATIENT
Start: 2025-05-22 | End: 2025-05-22 | Stop reason: HOSPADM

## 2025-05-22 RX ORDER — MEPERIDINE HYDROCHLORIDE 25 MG/ML
12.5 INJECTION INTRAMUSCULAR; INTRAVENOUS; SUBCUTANEOUS
Status: DISCONTINUED | OUTPATIENT
Start: 2025-05-22 | End: 2025-05-22 | Stop reason: HOSPADM

## 2025-05-22 RX ORDER — POTASSIUM CHLORIDE 29.8 MG/ML
20 INJECTION INTRAVENOUS
Status: COMPLETED | OUTPATIENT
Start: 2025-05-22 | End: 2025-05-22

## 2025-05-22 RX ORDER — IPRATROPIUM BROMIDE AND ALBUTEROL SULFATE 2.5; .5 MG/3ML; MG/3ML
1 SOLUTION RESPIRATORY (INHALATION)
Status: DISCONTINUED | OUTPATIENT
Start: 2025-05-22 | End: 2025-05-22 | Stop reason: HOSPADM

## 2025-05-22 RX ORDER — HYDROMORPHONE HYDROCHLORIDE 1 MG/ML
0.25 INJECTION, SOLUTION INTRAMUSCULAR; INTRAVENOUS; SUBCUTANEOUS EVERY 5 MIN PRN
Status: DISCONTINUED | OUTPATIENT
Start: 2025-05-22 | End: 2025-05-22 | Stop reason: HOSPADM

## 2025-05-22 RX ORDER — DIPHENHYDRAMINE HYDROCHLORIDE 50 MG/ML
12.5 INJECTION, SOLUTION INTRAMUSCULAR; INTRAVENOUS
Status: DISCONTINUED | OUTPATIENT
Start: 2025-05-22 | End: 2025-05-22 | Stop reason: HOSPADM

## 2025-05-22 RX ORDER — BACITRACIN ZINC 500 [USP'U]/G
OINTMENT TOPICAL PRN
Status: DISCONTINUED | OUTPATIENT
Start: 2025-05-22 | End: 2025-05-22 | Stop reason: ALTCHOICE

## 2025-05-22 RX ADMIN — MINERAL OIL, WHITE PETROLATUM: .03; .94 OINTMENT OPHTHALMIC at 09:25

## 2025-05-22 RX ADMIN — SODIUM CHLORIDE: 9 INJECTION, SOLUTION INTRAVENOUS at 09:54

## 2025-05-22 RX ADMIN — GLYCOPYRROLATE 0.2 MG: 0.2 INJECTION INTRAMUSCULAR; INTRAVENOUS at 10:08

## 2025-05-22 RX ADMIN — ACETAMINOPHEN 650 MG: 650 SOLUTION ORAL at 05:14

## 2025-05-22 RX ADMIN — GLYCOPYRROLATE 0.2 MG: 0.2 INJECTION INTRAMUSCULAR; INTRAVENOUS at 13:30

## 2025-05-22 RX ADMIN — SODIUM CHLORIDE, PRESERVATIVE FREE 10 ML: 5 INJECTION INTRAVENOUS at 20:21

## 2025-05-22 RX ADMIN — MINERAL OIL, WHITE PETROLATUM: .03; .94 OINTMENT OPHTHALMIC at 15:47

## 2025-05-22 RX ADMIN — CHLORHEXIDINE GLUCONATE, 0.12% ORAL RINSE 15 ML: 1.2 SOLUTION DENTAL at 14:28

## 2025-05-22 RX ADMIN — POTASSIUM CHLORIDE 20 MEQ: 29.8 INJECTION INTRAVENOUS at 08:31

## 2025-05-22 RX ADMIN — Medication 500 MG: at 14:27

## 2025-05-22 RX ADMIN — POLYETHYLENE GLYCOL 3350 17 G: 17 POWDER, FOR SOLUTION ORAL at 14:33

## 2025-05-22 RX ADMIN — CHLORHEXIDINE GLUCONATE, 0.12% ORAL RINSE 15 ML: 1.2 SOLUTION DENTAL at 00:58

## 2025-05-22 RX ADMIN — SODIUM CHLORIDE, PRESERVATIVE FREE 10 ML: 5 INJECTION INTRAVENOUS at 09:24

## 2025-05-22 RX ADMIN — ACETAMINOPHEN 650 MG: 650 SOLUTION ORAL at 14:33

## 2025-05-22 RX ADMIN — CHLORHEXIDINE GLUCONATE, 0.12% ORAL RINSE 15 ML: 1.2 SOLUTION DENTAL at 16:42

## 2025-05-22 RX ADMIN — ROCURONIUM BROMIDE 30 MG: 10 INJECTION, SOLUTION INTRAVENOUS at 11:40

## 2025-05-22 RX ADMIN — Medication 500 MG: at 16:42

## 2025-05-22 RX ADMIN — ROCURONIUM BROMIDE 20 MG: 10 INJECTION, SOLUTION INTRAVENOUS at 10:51

## 2025-05-22 RX ADMIN — SODIUM CHLORIDE 500 ML: 0.9 INJECTION, SOLUTION INTRAVENOUS at 23:14

## 2025-05-22 RX ADMIN — FENTANYL CITRATE 50 MCG: 50 INJECTION INTRAMUSCULAR; INTRAVENOUS at 16:57

## 2025-05-22 RX ADMIN — PROPOFOL 25 MCG/KG/MIN: 10 INJECTION, EMULSION INTRAVENOUS at 00:57

## 2025-05-22 RX ADMIN — ATROPINE SULFATE 1 MG: 0.1 INJECTION INTRAVENOUS at 14:44

## 2025-05-22 RX ADMIN — ACETAMINOPHEN 650 MG: 650 SOLUTION ORAL at 23:16

## 2025-05-22 RX ADMIN — PIPERACILLIN AND TAZOBACTAM 3375 MG: 3; .375 INJECTION, POWDER, LYOPHILIZED, FOR SOLUTION INTRAVENOUS at 11:03

## 2025-05-22 RX ADMIN — ATROPINE SULFATE 0.5 MG: 0.4 INJECTION, SOLUTION INTRAVENOUS at 03:25

## 2025-05-22 RX ADMIN — FENTANYL CITRATE 50 MCG: 50 INJECTION INTRAMUSCULAR; INTRAVENOUS at 09:11

## 2025-05-22 RX ADMIN — CALCIUM GLUCONATE 2000 MG: 20 INJECTION, SOLUTION INTRAVENOUS at 08:31

## 2025-05-22 RX ADMIN — REMIFENTANIL HYDROCHLORIDE 0.2 MCG/KG/MIN: 1 INJECTION, POWDER, LYOPHILIZED, FOR SOLUTION INTRAVENOUS at 10:01

## 2025-05-22 RX ADMIN — GLYCOPYRROLATE 0.2 MG: 0.2 INJECTION INTRAMUSCULAR; INTRAVENOUS at 11:52

## 2025-05-22 RX ADMIN — GUAIFENESIN 400 MG: 400 TABLET ORAL at 14:27

## 2025-05-22 RX ADMIN — FAMOTIDINE 20 MG: 10 INJECTION, SOLUTION INTRAVENOUS at 20:20

## 2025-05-22 RX ADMIN — Medication 3 MG: at 13:30

## 2025-05-22 RX ADMIN — FENTANYL CITRATE 50 MCG: 50 INJECTION INTRAMUSCULAR; INTRAVENOUS at 23:16

## 2025-05-22 RX ADMIN — FENTANYL CITRATE 50 MCG: 50 INJECTION INTRAMUSCULAR; INTRAVENOUS at 20:37

## 2025-05-22 RX ADMIN — PROPOFOL 70 MG: 10 INJECTION, EMULSION INTRAVENOUS at 09:58

## 2025-05-22 RX ADMIN — ALBUTEROL SULFATE 2.5 MG: 0.83 SOLUTION RESPIRATORY (INHALATION) at 16:37

## 2025-05-22 RX ADMIN — MINERAL OIL, WHITE PETROLATUM: .03; .94 OINTMENT OPHTHALMIC at 23:13

## 2025-05-22 RX ADMIN — ALBUTEROL SULFATE 2.5 MG: 0.83 SOLUTION RESPIRATORY (INHALATION) at 21:01

## 2025-05-22 RX ADMIN — CHLORHEXIDINE GLUCONATE, 0.12% ORAL RINSE 15 ML: 1.2 SOLUTION DENTAL at 03:24

## 2025-05-22 RX ADMIN — GUAIFENESIN 400 MG: 400 TABLET ORAL at 20:20

## 2025-05-22 RX ADMIN — PREGABALIN 150 MG: 75 CAPSULE ORAL at 20:20

## 2025-05-22 RX ADMIN — POTASSIUM CHLORIDE 20 MEQ: 29.8 INJECTION INTRAVENOUS at 09:23

## 2025-05-22 RX ADMIN — PIPERACILLIN AND TAZOBACTAM 3375 MG: 3; .375 INJECTION, POWDER, LYOPHILIZED, FOR SOLUTION INTRAVENOUS at 01:32

## 2025-05-22 RX ADMIN — SODIUM CHLORIDE, PRESERVATIVE FREE 10 ML: 5 INJECTION INTRAVENOUS at 09:25

## 2025-05-22 RX ADMIN — CEFAZOLIN 2000 MG: 1 INJECTION, POWDER, FOR SOLUTION INTRAMUSCULAR; INTRAVENOUS at 10:55

## 2025-05-22 RX ADMIN — BISACODYL 10 MG: 10 SUPPOSITORY RECTAL at 14:33

## 2025-05-22 RX ADMIN — POTASSIUM CHLORIDE 50 ML: 29.8 INJECTION INTRAVENOUS at 10:54

## 2025-05-22 RX ADMIN — MINERAL OIL, WHITE PETROLATUM: .03; .94 OINTMENT OPHTHALMIC at 00:58

## 2025-05-22 RX ADMIN — Medication 500 MG: at 20:20

## 2025-05-22 RX ADMIN — ACETAMINOPHEN 650 MG: 650 SOLUTION ORAL at 16:42

## 2025-05-22 RX ADMIN — MINERAL OIL, WHITE PETROLATUM: .03; .94 OINTMENT OPHTHALMIC at 03:24

## 2025-05-22 RX ADMIN — PROPOFOL 50 MCG/KG/MIN: 10 INJECTION, EMULSION INTRAVENOUS at 10:01

## 2025-05-22 RX ADMIN — MINERAL OIL, WHITE PETROLATUM: .03; .94 OINTMENT OPHTHALMIC at 20:21

## 2025-05-22 RX ADMIN — MAGNESIUM SULFATE HEPTAHYDRATE 2000 MG: 40 INJECTION, SOLUTION INTRAVENOUS at 08:41

## 2025-05-22 RX ADMIN — PREGABALIN 150 MG: 75 CAPSULE ORAL at 15:00

## 2025-05-22 RX ADMIN — FAMOTIDINE 20 MG: 10 INJECTION, SOLUTION INTRAVENOUS at 14:33

## 2025-05-22 RX ADMIN — CHLORHEXIDINE GLUCONATE, 0.12% ORAL RINSE 15 ML: 1.2 SOLUTION DENTAL at 09:24

## 2025-05-22 RX ADMIN — CHLORHEXIDINE GLUCONATE, 0.12% ORAL RINSE 15 ML: 1.2 SOLUTION DENTAL at 23:13

## 2025-05-22 RX ADMIN — PIPERACILLIN AND TAZOBACTAM 3375 MG: 3; .375 INJECTION, POWDER, LYOPHILIZED, FOR SOLUTION INTRAVENOUS at 18:08

## 2025-05-22 RX ADMIN — POTASSIUM CHLORIDE 50 ML: 29.8 INJECTION INTRAVENOUS at 11:50

## 2025-05-22 RX ADMIN — CHLORHEXIDINE GLUCONATE, 0.12% ORAL RINSE 15 ML: 1.2 SOLUTION DENTAL at 20:21

## 2025-05-22 ASSESSMENT — PULMONARY FUNCTION TESTS
PIF_VALUE: 38
PIF_VALUE: 33
PIF_VALUE: 41
PIF_VALUE: 30
PIF_VALUE: 34
PIF_VALUE: 35
PIF_VALUE: 29
PIF_VALUE: 37
PIF_VALUE: 48
PIF_VALUE: 30
PIF_VALUE: 6
PIF_VALUE: 33
PIF_VALUE: 30
PIF_VALUE: 35
PIF_VALUE: 40
PIF_VALUE: 44
PIF_VALUE: 35
PIF_VALUE: 31
PIF_VALUE: 32
PIF_VALUE: 31
PIF_VALUE: 31
PIF_VALUE: 35
PIF_VALUE: 38
PIF_VALUE: 35
PIF_VALUE: 39
PIF_VALUE: 38
PIF_VALUE: 31

## 2025-05-22 ASSESSMENT — LIFESTYLE VARIABLES: SMOKING_STATUS: 0

## 2025-05-22 NOTE — PROGRESS NOTES
OCCUPATIONAL THERAPY    Date:2025  Patient Name: Alena Busby  MRN: [id number]  : 1955  Room: [facility]              Chart reviewed. Pt on hold for tentative surgery today; on bedrest.   Will re-attempt at later time. Thank you for consult.    Maria C Pham, OTR/L [id number]

## 2025-05-22 NOTE — PROGRESS NOTES
Physical Therapy    PT order received and medical chart reviewed on 5/22/25. PT evaluation on hold due to pt being on bedrest and tentative plan for OR with neurosurgery later this date. Will re-attempt as able. Thank you.    Serena Forman, PT, DPT  [id number]

## 2025-05-22 NOTE — BRIEF OP NOTE
Brief Postoperative Note      Patient: Alena Busby  YOB: 1955  MRN: [id number]    Date of Procedure: 5/22/2025    Pre-Op Diagnosis Codes:      * Traumatic dislocation of facet joint between fifth and sixth cervical vertebrae [S13.161A]    Post-Op Diagnosis: Same, quadriparesis       Procedure(s):  C2-T2 POSTERIOR CERVICAL FUSION, C5 laminectomy, reduction of C5-6 subluxation with jumped facets    Surgeon(s):  Zach Rodas MD    Assistant:  Physician Assistant: Pam Torrez PA    Anesthesia: General    Estimated Blood Loss (mL): less than 100     Complications: None    Specimens:   * No specimens in log *    Implants:  Implant Name Type Inv. Item Serial No.  Lot No. LRB No. Used Action   ALLOGRAFT BNE 10 CC [employer] - [id number]  ALLOGRAFT BNE 10 CC F[employer]ER [employer]ORE [id number] [facility] [place] TRANSPLANT [facility]  N/A 1 Implanted   ALLOGRAFT BNE 10 CC [employer] - [id number]  ALLOGRAFT BNE 10 CC FIBER JASSI [id number] [facility] [place] TRANSPLANT [facility]-  N/A 1 Implanted   ALLOGRAFT BNE 10 CC [employer] - [id number]  ALLOGRAFT BNE 10 CC [employer] JASSI [id number] MUSCULOSKELETAL TRANSPLANT [facility]-  N/A 1 Implanted   ALLOGRAFT BNE 10 CC [employer] - [id number]  ALLOGRAFT BNE 10 CC [employer] JASSI [id number] MUSCULOSKELETAL TRANSPLANT [facility]  N/A 1 Implanted   ALLOGRAFT BNE 5 CC [employer]ASSI - [id number]  ALLOGRAFT BNE 5 CC [employer] JASSI [id number] MUSCULOSKELETAL TRANSPLANT [facility]-  N/A 1 Implanted   CAP SPNL OCCIPITOCERVICOTHORACIC BRUNO THRD QUARTEX - [id number]  CAP SPNL OCCIPITOCERVICOTHORACIC BRUNO THRD QUARTEX  [employer]-  N/A 14 Implanted   SCREW SPNL POLYAX 3.5X12 MM QUARTEX - [id number]  SCREW SPNL POLYAX 3.5X12 MM QUARTEX  [employer] [facility]-  N/A 8 Implanted   SCREW QUARTEX 3.5MM POLYAX 30MM - [id number]  SCREW QUARTEX 3.5MM POLYAX 30MM  [employer] [facility]-  N/A 1

## 2025-05-22 NOTE — ANESTHESIA POSTPROCEDURE EVALUATION
Department of Anesthesiology  Postprocedure Note    Patient: Alena Busby  MRN: [id number]  YOB: 1955  Date of evaluation: 5/22/2025    Procedure Summary       Date: 05/22/25 Room / Location: [address]    Anesthesia Start: 0954 Anesthesia Stop: 1353    Procedure: C2-T2 POSTERIOR CERVICAL FUSION (Spine Cervical) Diagnosis:       Traumatic dislocation of facet joint between fifth and sixth cervical vertebrae      (Traumatic dislocation of facet joint between fifth and sixth cervical vertebrae [S13.161A])    Surgeons: Zach Rodas MD Responsible Provider: Hui Shetty MD    Anesthesia Type: General ASA Status: 4 - Emergent            Anesthesia Type: General    Fili Phase I: Fili Score: 6    Fili Phase II:      Anesthesia Post Evaluation    Patient location during evaluation: ICU  Patient participation: complete - patient cannot participate  Level of consciousness: sedated and ventilated  Airway patency: patent  Nausea & Vomiting: no nausea and no vomiting  Cardiovascular status: blood pressure returned to baseline  Respiratory status: acceptable  Hydration status: euvolemic  Multimodal analgesia pain management approach    No notable events documented.

## 2025-05-22 NOTE — OP NOTE
[facility]              [address] [place] [address]                            OPERATIVE REPORT      PATIENT NAME: KARY SEGUNDO             : 1955  MED REC NO: [id number]                        ROOM: [id number]  ACCOUNT NO: [id number]                       ADMIT DATE: 2025  PROVIDER: Zach Rodas MD      DATE OF PROCEDURE:  2025    SURGEON:  Zach Rodas MD    This is an urgent surgical procedure.    PREOPERATIVE DIAGNOSES:  Status post previous cervical C3-5 instrumentation, status post trauma, quadriparesis, C5-6 subluxation with unilateral perched and opposite jumped facets, failure of conservative therapy, spinal shock, morbid obesity, osteoporosis.    POSTOPERATIVE DIAGNOSES:  Status post previous cervical C3-5 instrumentation, status post trauma, quadriparesis, C5-6 subluxation with unilateral perched and opposite jumped facets, failure of conservative therapy, spinal shock, morbid obesity, osteoporosis.    PROCEDURES:  C2-T2 posterior instrumentation utilizing Globus Spine instrumentation, C5 laminectomy, C5-6 bilateral Meggan osteotomies, reduction of spondylolisthesis C5-6, C2-T2 posterolateral arthrodesis utilizing autologous and allogenous bone graft, utilization of intraoperative somatosensory and motor-evoked potential monitoring, application of [place] head berry.    ANESTHESIA:  General endotracheal.    FIRST ASSISTANT:  ADILENE Samuel.    ESTIMATED BLOOD LOSS:  100 mL.    FLUIDS:  The patient received 1 L of crystalloid.    URINE OUTPUT:  300 mL.    COMPLICATIONS:  There were no intraoperative complications.    INDICATIONS FOR SURGERY:  The patient is a 70-year-old female, status post fall.  The patient presented in spinal shock, being quadriparetic.  The patient had TIN score A below C6.  The patient was stabilized in intensive care unit.  The patient's imaging demonstrated C5 subluxation with jumped facets.  The

## 2025-05-22 NOTE — PROGRESS NOTES
Trauma Tertiary Survey    Admit Date: 5/21/2025  Hospital day 1    CC:  Fall     Alcohol pre-screening:  Men: How many times in the past year have you had 5 or more drinks in a day?  Unable to assess  Women: How many times in the past year have you had 4 or more drinks in a day? Unable to assess  How much do you drink on a daily basis?     Drug Pre-screening:    How many times in the past year have you used a recreational drug or used a prescription medication for non medical reasons?  Unable to assess    Mood Prescreening:    During the past two weeks, have you been bothered by little interest or pleasure doing things?  Unable to assess  During the past two weeks, have you been bothered by feeling down, depressed or hopeless?  Unable to assess      Scheduled Meds:   chlorhexidine  15 mL Mouth/Throat Q4H    artificial tears   Both Eyes Q4H    potassium & sodium phosphates  2 packet Oral 4x Daily    atropine  1 mg IntraVENous Once    sodium chloride flush  10 mL IntraVENous 2 times per day    polyethylene glycol  17 g Oral Daily    famotidine (PEPCID) injection  20 mg IntraVENous BID    piperacillin-tazobactam  3,375 mg IntraVENous Q8H    acetaminophen  650 mg Oral 4 times per day    bisacodyl  10 mg Rectal Daily    [Held by provider] furosemide  20 mg Oral Daily    guaiFENesin  400 mg Oral TID    pregabalin  150 mg Oral TID     Continuous Infusions:   sodium chloride      sodium chloride 125 mL/hr at 05/22/25 0954    propofol 25 mcg/kg/min (05/22/25 0954)    norepinephrine 1 mcg/min (05/22/25 1309)     PRN Meds:sodium chloride flush, sodium chloride flush, sodium chloride, ondansetron **OR** ondansetron, fentanNYL, albuterol, [Held by provider] NIFEdipine, [Held by provider] phenazopyridine    Subjective:     Patient back from the OR; underwent C2-T2 posterior cervical fusion.  Surgery went well.  Patient intermittently bradycardic throughout the day, has required 2 doses of atropine.  Currently on low-dose of

## 2025-05-22 NOTE — DISCHARGE SUMMARY
Physician Discharge Summary     Patient ID:  Alena Busby  [id number]  70 y.o.  1955    Admit date: 5/21/2025    Discharge date and time: No discharge date for patient encounter.     Admitting Physician: Omer Navarro MD     Admission Diagnoses: Trauma [T14.90XA]  Neurogenic shock due to traumatic injury (HCC) [T79.4XXA]  Injury of cervical spinal cord, initial encounter ([facility]) [S14.109A]    Discharge Diagnoses: Principal Problem:    Trauma  Active Problems:    Fall from wheelchair    Quadriparesis (HCC)    Shock (HCC)    Acute respiratory failure with hypoxia and hypercapnia (HCC)    Cervical spinal cord injury (HCC)    Neurogenic shock due to traumatic injury (HCC)  Resolved Problems:    * No resolved hospital problems. *      Admission Condition: critical    Discharged Condition: stable    Indication for Admission: Fall    Hospital Course/Procedures/Operation/treatments:   5/21: 70 y.o. female Fall from wheelchair  Injury occurred Prior to arrival  Fell off the transport van while in facility. Head injury. Hypotensive on arrival. No LOC no thinner.  Patient found to have a C5/6 facet jump with quadriplegia and impending respiratory failure, intubated in the trauma bay.  Stat MRI cervical spine completed which again showed severe spinal stenosis at C5 and C6.  Admitted to SICU, on Levophed secondary to neurogenic shock.  5/22:Patient back from the OR; underwent C2-T2 posterior cervical fusion.  Surgery went well.  Patient intermittently bradycardic throughout the day, has required 2 doses of atropine.  Currently on low-dose of Levophed.  Alert and nodding appropriately to questions.   5/23/25  - Overnight increased rate of levo to 8. Vitals stable otherwise. On AC/VC. Leukocytosis trending up. Patient wakes up to name. Follows commands. Displays understanding. Denies any questions   5/24: did not tolerated pressure support. Not pulling enough volumes. Still no sensation in lower extremities   5/25 no  OF THE ABDOMEN AND PELVIS WITH CONTRAST 5/21/2025 3:35 pm TECHNIQUE: CT of the chest was performed with the administration of intravenous contrast. Multiplanar reformatted images are provided for review. Automated exposure control, iterative reconstruction, and/or weight based adjustment of the mA/kV was utilized to reduce the radiation dose to as low as reasonably achievable.; CT of the lumbar spine was performed without the administration of intravenous contrast. Multiplanar reformatted images are provided for review.  Adjustment of mA and/or kV according to patient size was utilized. Automated exposure control, iterative reconstruction, and/or weight based adjustment of the mA/kV was utilized to reduce the radiation dose to as low as reasonably achievable.; CT of the thoracic spine was performed without the administration of intravenous contrast. Multiplanar reformatted images are provided for review. Automated exposure control, iterative reconstruction, and/or weight based adjustment of the mA/kV was utilized to reduce the radiation dose to as low as reasonably achievable.; CT of the abdomen and pelvis was performed with the administration of intravenous contrast. Multiplanar reformatted images are provided for review. Automated exposure control, iterative reconstruction, and/or weight based adjustment of the mA/kV was utilized to reduce the radiation dose to as low as reasonably achievable. COMPARISON: None. HISTORY: ORDERING SYSTEM PROVIDED HISTORY: Trauma TECHNOLOGIST PROVIDED HISTORY: Additional Contrast?->None Reason for exam:->Trauma What reading provider will be dictating this exam?->CRC Chest: Mediastinum: Thyroid is homogeneous in attenuation. No bulky mediastinal adenopathy. Central airways are patent with endotracheal tube in place terminating above the level the raciel..  Esophagus is normal course and caliber. Patent nonaneurysmal thoracic aorta. Cardiac size within normal limits without pericardial

## 2025-05-22 NOTE — PROGRESS NOTES
INTRAOPERATIVE SSEP and MeP REPORT      Date of Surgery: 2025   Diagnosis: -  Procedure: C2-T2 posterior cervical fusion   Anesthesia: TIVA  Sureon: Zach Rodas MD    Intra-op Monitorin hours    Somatosensory evoked potentials (SSEPs) for ulnar and tibial nerve stimulation were used to monitor upper and lower limb function during surgery.  Responses were elicited at the aforementioned sites and were recorded peripherally, cervically and over the somatosensory cortex.  Standard elicitation and recording parameters were used.      Pre-incision responses were unidentifiable. Baseline data for upper and lower limb SSEPs were unidentifiable.  At closing, responses were unidentifiable .  The surgeon was informed of the status of all responses.    MeP responses were attempted from the right and left HT, delt, FDI, AT and AH.  Pre-incision responses were unidentifiable with the exception of a right deltoid response at 300 mA.  Closing responses were not obtained, a 0 of 4 TOF was noted.  Anesthesia asked if surgeon would like reversal , he noted no reversal.  Therefore MeP's were not obtained at closing.      EMG from the MeP sites was primarily quiet throughout the surgical procedure.        _____________________________________  Artie Florian MD Interpreting Physician

## 2025-05-22 NOTE — ANESTHESIA PRE PROCEDURE
mg IntraVENous Once Sue Currie DO 25 mL/hr at 05/22/25 0841 2,000 mg at 05/22/25 0841   • potassium & sodium phosphates (PHOS-NAK) 280-160-250 MG packet 500 mg  2 packet Oral 4x Daily Sue Currie DO       • calcium gluconate 2,000 mg in sodium chloride 100 mL  2,000 mg IntraVENous Once Sue Currie DO 50 mL/hr at 05/22/25 0831 2,000 mg at 05/22/25 0831   • naloxone 0.4 mg in 10 mL sodium chloride syringe   IntraVENous PRN Pablo Arboleda DO       • sodium chloride flush 0.9 % injection 5-40 mL  5-40 mL IntraVENous 2 times per day Pablo Arboleda DO       • sodium chloride flush 0.9 % injection 5-40 mL  5-40 mL IntraVENous PRN Pablo Arboleda DO       • 0.9 % sodium chloride infusion   IntraVENous PRN Pablo Arboleda DO       • meperidine (DEMEROL) injection 12.5 mg  12.5 mg IntraVENous Q15 Min PRN Pablo Arboleda DO       • HYDROmorphone HCl PF (DILAUDID) injection 0.25 mg  0.25 mg IntraVENous Q5 Min PRN Pablo Arboleda DO       • HYDROmorphone HCl PF (DILAUDID) injection 0.5 mg  0.5 mg IntraVENous Q5 Min PRN Pablo Arboleda DO       • ondansetron (ZOFRAN) injection 4 mg  4 mg IntraVENous Once PRN Pablo Arboleda DO       • droPERidol (INAPSINE) injection 0.625 mg  0.625 mg IntraVENous Once PRN Pablo Arboleda DO       • diphenhydrAMINE (BENADRYL) injection 12.5 mg  12.5 mg IntraVENous Once PRN Pablo Arboleda DO       • labetalol (NORMODYNE;TRANDATE) injection 10 mg  10 mg IntraVENous Q15 Min PRN Pablo Arboleda DO        Or   • hydrALAZINE (APRESOLINE) injection 10 mg  10 mg IntraVENous Q15 Min PRN Pablo Arboleda DO       • ipratropium 0.5 mg-albuterol 2.5 mg (DUONEB) nebulizer solution 1 Dose  1 Dose Inhalation Once PRN Pablo Arboleda DO       • sodium chloride flush 0.9 % injection 10 mL  10 mL IntraVENous Once PRN Barb Neri MD       • sodium chloride flush 0.9 % injection 10 mL  10 mL IntraVENous 2 times per day Kush

## 2025-05-22 NOTE — PLAN OF CARE
Problem: Discharge Planning  Goal: Discharge to home or other facility with appropriate resources  5/21/2025 2025 by Geeta Cooper RN  Outcome: Progressing     Problem: Pain  Goal: Verbalizes/displays adequate comfort level or baseline comfort level  5/21/2025 2025 by Geeta Cooper RN  Outcome: Progressing     Problem: Skin/Tissue Integrity  Goal: Skin integrity remains intact  Description: 1.  Monitor for areas of redness and/or skin breakdown2.  Assess vascular access sites hourly3.  Every 4-6 hours minimum:  Change oxygen saturation probe site4.  Every 4-6 hours:  If on nasal continuous positive airway pressure, respiratory therapy assess nares and determine need for appliance change or resting period  Outcome: Progressing     Problem: Safety - Adult  Goal: Free from fall injury  5/21/2025 2025 by Geeta Cooper RN  Outcome: Progressing     Problem: Chronic Conditions and Co-morbidities  Goal: Patient's chronic conditions and co-morbidity symptoms are monitored and maintained or improved  5/21/2025 2025 by Geeta Cooper RN  Outcome: Progressing     Problem: ABCDS Injury Assessment  Goal: Absence of physical injury  5/21/2025 2025 by Geeta Cooper RN  Outcome: Progressing     Problem: Safety - Medical Restraint  Goal: Remains free of injury from restraints (Restraint for Interference with Medical Device)  Description: INTERVENTIONS:1. Determine that other, less restrictive measures have been tried or would not be effective before applying the restraint2. Evaluate the patient's condition at the time of restraint application3. Inform patient/family regarding the reason for restraint4. Q2H: Monitor safety, psychosocial status, comfort, nutrition and hydration  Outcome: Progressing  Flowsheets  Taken 5/21/2025 2000 by Geeta Cooper RN  Remains free of injury from restraints (restraint for interference with medical device): Every 2 hours: Monitor safety, psychosocial status, comfort, nutrition and

## 2025-05-23 ENCOUNTER — APPOINTMENT (OUTPATIENT)
Dept: GENERAL RADIOLOGY | Age: 70
DRG: 450 | End: 2025-05-23
Payer: MEDICARE

## 2025-05-23 LAB
AADO2: 149.7 MMHG
AADO2: 220.6 MMHG
ALBUMIN SERPL-MCNC: 2.9 G/DL (ref 3.5–5.2)
ALBUMIN SERPL-MCNC: 3.1 G/DL (ref 3.5–4.7)
ALP SERPL-CCNC: 87 U/L (ref 35–104)
ALPHA1 GLOB SERPL ELPH-MCNC: 0.3 G/DL (ref 0.2–0.4)
ALPHA2 GLOB SERPL ELPH-MCNC: 0.8 G/DL (ref 0.5–1)
ALT SERPL-CCNC: 18 U/L (ref 0–35)
ANION GAP SERPL CALCULATED.3IONS-SCNC: 16 MMOL/L (ref 7–16)
AST SERPL-CCNC: 30 U/L (ref 0–35)
B-GLOBULIN SERPL ELPH-MCNC: 1.5 G/DL (ref 0.8–1.3)
B.E.: -5.4 MMOL/L (ref -3–3)
B.E.: -7.4 MMOL/L (ref -3–3)
BASOPHILS # BLD: 0.02 K/UL (ref 0–0.2)
BASOPHILS NFR BLD: 0 % (ref 0–2)
BILIRUB SERPL-MCNC: 1 MG/DL (ref 0–1.2)
BUN SERPL-MCNC: 11 MG/DL (ref 8–23)
CA-I BLD-SCNC: 0.93 MMOL/L (ref 1.15–1.33)
CALCIUM SERPL-MCNC: 6.4 MG/DL (ref 8.8–10.2)
CHLORIDE SERPL-SCNC: 114 MMOL/L (ref 98–107)
CO2 SERPL-SCNC: 16 MMOL/L (ref 22–29)
COHB: 0.1 % (ref 0–1.5)
COHB: 0.6 % (ref 0–1.5)
CREAT SERPL-MCNC: 0.6 MG/DL (ref 0.5–1)
CRITICAL: ABNORMAL
CRITICAL: ABNORMAL
DATE ANALYZED: ABNORMAL
DATE ANALYZED: ABNORMAL
DATE OF COLLECTION: ABNORMAL
DATE OF COLLECTION: ABNORMAL
EOSINOPHIL # BLD: 0 K/UL (ref 0.05–0.5)
EOSINOPHILS RELATIVE PERCENT: 0 % (ref 0–6)
ERYTHROCYTE [DISTWIDTH] IN BLOOD BY AUTOMATED COUNT: 17.2 % (ref 11.5–15)
FIO2: 45 %
FIO2: 50 %
GAMMA GLOB SERPL ELPH-MCNC: 1.2 G/DL (ref 0.7–1.6)
GFR, ESTIMATED: >90 ML/MIN/1.73M2
GLUCOSE BLD-MCNC: 136 MG/DL (ref 74–99)
GLUCOSE BLD-MCNC: 148 MG/DL (ref 74–99)
GLUCOSE BLD-MCNC: 157 MG/DL (ref 74–99)
GLUCOSE BLD-MCNC: 172 MG/DL (ref 74–99)
GLUCOSE SERPL-MCNC: 203 MG/DL (ref 74–99)
HCO3: 16.2 MMOL/L (ref 22–26)
HCO3: 18.8 MMOL/L (ref 22–26)
HCT VFR BLD AUTO: 39.9 % (ref 34–48)
HGB BLD-MCNC: 13.1 G/DL (ref 11.5–15.5)
HHB: 1.3 % (ref 0–5)
HHB: 2.1 % (ref 0–5)
IMM GRANULOCYTES # BLD AUTO: 0.1 K/UL (ref 0–0.58)
IMM GRANULOCYTES NFR BLD: 1 % (ref 0–5)
INTERPRETATION SERPL IFE-IMP: NORMAL
LAB: ABNORMAL
LAB: ABNORMAL
LYMPHOCYTES NFR BLD: 0.82 K/UL (ref 1.5–4)
LYMPHOCYTES RELATIVE PERCENT: 5 % (ref 20–42)
Lab: 1342
Lab: 442
MAGNESIUM SERPL-MCNC: 1.9 MG/DL (ref 1.6–2.4)
MCH RBC QN AUTO: 29.8 PG (ref 26–35)
MCHC RBC AUTO-ENTMCNC: 32.8 G/DL (ref 32–34.5)
MCV RBC AUTO: 90.9 FL (ref 80–99.9)
METHB: 0.2 % (ref 0–1.5)
METHB: 0.2 % (ref 0–1.5)
MICROORGANISM SPEC CULT: ABNORMAL
MODE: AC
MODE: AC
MONOCYTES NFR BLD: 1 K/UL (ref 0.1–0.95)
MONOCYTES NFR BLD: 6 % (ref 2–12)
NEUTROPHILS NFR BLD: 88 % (ref 43–80)
NEUTS SEG NFR BLD: 14.64 K/UL (ref 1.8–7.3)
O2 SATURATION: 97.9 % (ref 92–98.5)
O2 SATURATION: 98.7 % (ref 92–98.5)
O2HB: 97.1 % (ref 94–97)
O2HB: 98.4 % (ref 94–97)
OPERATOR ID: 1110
OPERATOR ID: ABNORMAL
PATH REV: NORMAL
PATHOLOGIST: ABNORMAL
PATIENT TEMP: 37 C
PATIENT TEMP: 37 C
PCO2: 28 MMHG (ref 35–45)
PCO2: 32.8 MMHG (ref 35–45)
PEEP/CPAP: 8 CMH2O
PEEP/CPAP: 8 CMH2O
PFO2: 2.09 MMHG/%
PFO2: 2.97 MMHG/%
PH BLOOD GAS: 7.38 (ref 7.35–7.45)
PH BLOOD GAS: 7.38 (ref 7.35–7.45)
PHOSPHATE SERPL-MCNC: 5.6 MG/DL (ref 2.5–4.5)
PLATELET # BLD AUTO: 200 K/UL (ref 130–450)
PMV BLD AUTO: 11.2 FL (ref 7–12)
PO2: 104.4 MMHG (ref 75–100)
PO2: 133.8 MMHG (ref 75–100)
POTASSIUM SERPL-SCNC: 3.8 MMOL/L (ref 3.5–5.1)
PROT PATTERN SERPL ELPH-IMP: ABNORMAL
PROT SERPL-MCNC: 5.7 G/DL (ref 6.4–8.3)
PROT SERPL-MCNC: 6.9 G/DL (ref 6.4–8.3)
RBC # BLD AUTO: 4.39 M/UL (ref 3.5–5.5)
RI(T): 1.12
RI(T): 2.11
RR MECHANICAL: 18 B/MIN
RR MECHANICAL: 18 B/MIN
SODIUM SERPL-SCNC: 146 MMOL/L (ref 136–145)
SOURCE, BLOOD GAS: ABNORMAL
SOURCE, BLOOD GAS: ABNORMAL
SPECIMEN DESCRIPTION: ABNORMAL
THB: 13.5 G/DL (ref 11.5–16.5)
THB: 14.2 G/DL (ref 11.5–16.5)
TIME ANALYZED: 1346
TIME ANALYZED: 443
TRIGL SERPL-MCNC: 121 MG/DL
VDRL CSF QL: NONREACTIVE
VT MECHANICAL: 400 ML
VT MECHANICAL: 400 ML
WBC OTHER # BLD: 16.6 K/UL (ref 4.5–11.5)

## 2025-05-23 PROCEDURE — 71045 X-RAY EXAM CHEST 1 VIEW: CPT

## 2025-05-23 PROCEDURE — 2000000000 HC ICU R&B

## 2025-05-23 PROCEDURE — 6360000002 HC RX W HCPCS: Performed by: SURGERY

## 2025-05-23 PROCEDURE — 82330 ASSAY OF CALCIUM: CPT

## 2025-05-23 PROCEDURE — 6370000000 HC RX 637 (ALT 250 FOR IP)

## 2025-05-23 PROCEDURE — 82962 GLUCOSE BLOOD TEST: CPT

## 2025-05-23 PROCEDURE — 99221 1ST HOSP IP/OBS SF/LOW 40: CPT

## 2025-05-23 PROCEDURE — 84478 ASSAY OF TRIGLYCERIDES: CPT

## 2025-05-23 PROCEDURE — 2580000003 HC RX 258

## 2025-05-23 PROCEDURE — 84100 ASSAY OF PHOSPHORUS: CPT

## 2025-05-23 PROCEDURE — 2500000003 HC RX 250 WO HCPCS

## 2025-05-23 PROCEDURE — 99291 CRITICAL CARE FIRST HOUR: CPT | Performed by: SURGERY

## 2025-05-23 PROCEDURE — 6360000002 HC RX W HCPCS

## 2025-05-23 PROCEDURE — 82805 BLOOD GASES W/O2 SATURATION: CPT

## 2025-05-23 PROCEDURE — 80053 COMPREHEN METABOLIC PANEL: CPT

## 2025-05-23 PROCEDURE — 37799 UNLISTED PX VASCULAR SURGERY: CPT

## 2025-05-23 PROCEDURE — 85025 COMPLETE CBC W/AUTO DIFF WBC: CPT

## 2025-05-23 PROCEDURE — 94003 VENT MGMT INPAT SUBQ DAY: CPT

## 2025-05-23 PROCEDURE — 6370000000 HC RX 637 (ALT 250 FOR IP): Performed by: SURGERY

## 2025-05-23 PROCEDURE — 83735 ASSAY OF MAGNESIUM: CPT

## 2025-05-23 PROCEDURE — 2500000003 HC RX 250 WO HCPCS: Performed by: SURGERY

## 2025-05-23 PROCEDURE — 94640 AIRWAY INHALATION TREATMENT: CPT

## 2025-05-23 RX ORDER — THEOPHYLLINE ANHYDROUS 80 MG/15ML
100 SOLUTION ORAL EVERY 12 HOURS
Status: DISCONTINUED | OUTPATIENT
Start: 2025-05-23 | End: 2025-05-29 | Stop reason: HOSPADM

## 2025-05-23 RX ORDER — SENNA AND DOCUSATE SODIUM 50; 8.6 MG/1; MG/1
2 TABLET, FILM COATED ORAL DAILY PRN
Status: DISCONTINUED | OUTPATIENT
Start: 2025-05-23 | End: 2025-05-26

## 2025-05-23 RX ORDER — INSULIN LISPRO 100 [IU]/ML
0-4 INJECTION, SOLUTION INTRAVENOUS; SUBCUTANEOUS EVERY 4 HOURS
Status: DISCONTINUED | OUTPATIENT
Start: 2025-05-23 | End: 2025-05-24

## 2025-05-23 RX ORDER — MUPIROCIN 20 MG/G
OINTMENT TOPICAL 2 TIMES DAILY
Status: COMPLETED | OUTPATIENT
Start: 2025-05-23 | End: 2025-05-27

## 2025-05-23 RX ORDER — LACTULOSE 10 G/15ML
20 SOLUTION ORAL 2 TIMES DAILY
Status: DISCONTINUED | OUTPATIENT
Start: 2025-05-23 | End: 2025-05-24

## 2025-05-23 RX ORDER — IPRATROPIUM BROMIDE AND ALBUTEROL SULFATE 2.5; .5 MG/3ML; MG/3ML
1 SOLUTION RESPIRATORY (INHALATION)
Status: DISCONTINUED | OUTPATIENT
Start: 2025-05-23 | End: 2025-05-29 | Stop reason: HOSPADM

## 2025-05-23 RX ORDER — DEXTROSE MONOHYDRATE 100 MG/ML
INJECTION, SOLUTION INTRAVENOUS CONTINUOUS PRN
Status: DISCONTINUED | OUTPATIENT
Start: 2025-05-23 | End: 2025-05-29 | Stop reason: HOSPADM

## 2025-05-23 RX ORDER — GLUCAGON 1 MG/ML
1 KIT INJECTION PRN
Status: DISCONTINUED | OUTPATIENT
Start: 2025-05-23 | End: 2025-05-29 | Stop reason: HOSPADM

## 2025-05-23 RX ADMIN — CHLORHEXIDINE GLUCONATE, 0.12% ORAL RINSE 15 ML: 1.2 SOLUTION DENTAL at 09:58

## 2025-05-23 RX ADMIN — THEOPHYLLINE 100 MG: 80 SOLUTION ORAL at 23:04

## 2025-05-23 RX ADMIN — FENTANYL CITRATE 50 MCG: 50 INJECTION INTRAMUSCULAR; INTRAVENOUS at 12:04

## 2025-05-23 RX ADMIN — HYDROCORTISONE SODIUM SUCCINATE 100 MG: 100 INJECTION INTRAMUSCULAR; INTRAVENOUS at 21:43

## 2025-05-23 RX ADMIN — ACETAMINOPHEN 650 MG: 650 SOLUTION ORAL at 06:06

## 2025-05-23 RX ADMIN — ACETAMINOPHEN 650 MG: 650 SOLUTION ORAL at 12:04

## 2025-05-23 RX ADMIN — IPRATROPIUM BROMIDE AND ALBUTEROL SULFATE 1 DOSE: 2.5; .5 SOLUTION RESPIRATORY (INHALATION) at 20:15

## 2025-05-23 RX ADMIN — Medication 10 MCG/MIN: at 14:34

## 2025-05-23 RX ADMIN — SODIUM CHLORIDE, PRESERVATIVE FREE 40 MG: 5 INJECTION INTRAVENOUS at 17:36

## 2025-05-23 RX ADMIN — PETROLATUM: 420 OINTMENT TOPICAL at 19:15

## 2025-05-23 RX ADMIN — GUAIFENESIN 400 MG: 400 TABLET ORAL at 09:58

## 2025-05-23 RX ADMIN — MINERAL OIL, WHITE PETROLATUM: .03; .94 OINTMENT OPHTHALMIC at 09:58

## 2025-05-23 RX ADMIN — CHLORHEXIDINE GLUCONATE, 0.12% ORAL RINSE 15 ML: 1.2 SOLUTION DENTAL at 19:15

## 2025-05-23 RX ADMIN — PREGABALIN 150 MG: 75 CAPSULE ORAL at 17:36

## 2025-05-23 RX ADMIN — CHLORHEXIDINE GLUCONATE, 0.12% ORAL RINSE 15 ML: 1.2 SOLUTION DENTAL at 23:28

## 2025-05-23 RX ADMIN — POLYETHYLENE GLYCOL 3350 17 G: 17 POWDER, FOR SOLUTION ORAL at 09:58

## 2025-05-23 RX ADMIN — CHLORHEXIDINE GLUCONATE, 0.12% ORAL RINSE 15 ML: 1.2 SOLUTION DENTAL at 17:31

## 2025-05-23 RX ADMIN — CALCIUM GLUCONATE 3000 MG: 98 INJECTION, SOLUTION INTRAVENOUS at 12:08

## 2025-05-23 RX ADMIN — INSULIN LISPRO 1 UNITS: 100 INJECTION, SOLUTION INTRAVENOUS; SUBCUTANEOUS at 22:13

## 2025-05-23 RX ADMIN — MICONAZOLE NITRATE: 20 POWDER TOPICAL at 19:15

## 2025-05-23 RX ADMIN — PREGABALIN 150 MG: 75 CAPSULE ORAL at 09:58

## 2025-05-23 RX ADMIN — GUAIFENESIN 400 MG: 400 TABLET ORAL at 21:41

## 2025-05-23 RX ADMIN — ACETAMINOPHEN 650 MG: 650 SOLUTION ORAL at 17:30

## 2025-05-23 RX ADMIN — MINERAL OIL, WHITE PETROLATUM: .03; .94 OINTMENT OPHTHALMIC at 12:05

## 2025-05-23 RX ADMIN — BISACODYL 10 MG: 10 SUPPOSITORY RECTAL at 09:58

## 2025-05-23 RX ADMIN — PIPERACILLIN AND TAZOBACTAM 3375 MG: 3; .375 INJECTION, POWDER, LYOPHILIZED, FOR SOLUTION INTRAVENOUS at 02:37

## 2025-05-23 RX ADMIN — THEOPHYLLINE 100 MG: 80 SOLUTION ORAL at 12:04

## 2025-05-23 RX ADMIN — ACETAMINOPHEN 650 MG: 650 SOLUTION ORAL at 22:12

## 2025-05-23 RX ADMIN — PIPERACILLIN AND TAZOBACTAM 3375 MG: 3; .375 INJECTION, POWDER, LYOPHILIZED, FOR SOLUTION INTRAVENOUS at 10:08

## 2025-05-23 RX ADMIN — WATER 100 MG: 1 INJECTION INTRAMUSCULAR; INTRAVENOUS; SUBCUTANEOUS at 00:44

## 2025-05-23 RX ADMIN — INSULIN LISPRO 1 UNITS: 100 INJECTION, SOLUTION INTRAVENOUS; SUBCUTANEOUS at 12:21

## 2025-05-23 RX ADMIN — FAMOTIDINE 20 MG: 10 INJECTION, SOLUTION INTRAVENOUS at 09:58

## 2025-05-23 RX ADMIN — WATER 100 MG: 1 INJECTION INTRAMUSCULAR; INTRAVENOUS; SUBCUTANEOUS at 06:06

## 2025-05-23 RX ADMIN — CHLORHEXIDINE GLUCONATE, 0.12% ORAL RINSE 15 ML: 1.2 SOLUTION DENTAL at 12:04

## 2025-05-23 RX ADMIN — MUPIROCIN: 20 OINTMENT TOPICAL at 19:15

## 2025-05-23 RX ADMIN — CHLORHEXIDINE GLUCONATE, 0.12% ORAL RINSE 15 ML: 1.2 SOLUTION DENTAL at 05:00

## 2025-05-23 RX ADMIN — MINERAL OIL, WHITE PETROLATUM: .03; .94 OINTMENT OPHTHALMIC at 23:05

## 2025-05-23 RX ADMIN — MUPIROCIN: 20 OINTMENT TOPICAL at 12:21

## 2025-05-23 RX ADMIN — SODIUM CHLORIDE, PRESERVATIVE FREE 10 ML: 5 INJECTION INTRAVENOUS at 10:03

## 2025-05-23 RX ADMIN — SODIUM CHLORIDE: 0.9 INJECTION, SOLUTION INTRAVENOUS at 00:43

## 2025-05-23 RX ADMIN — MINERAL OIL, WHITE PETROLATUM: .03; .94 OINTMENT OPHTHALMIC at 17:32

## 2025-05-23 RX ADMIN — LACTULOSE 20 G: 20 SOLUTION ORAL at 19:18

## 2025-05-23 RX ADMIN — WATER 1000 MG: 1 INJECTION INTRAMUSCULAR; INTRAVENOUS; SUBCUTANEOUS at 20:32

## 2025-05-23 RX ADMIN — MINERAL OIL, WHITE PETROLATUM: .03; .94 OINTMENT OPHTHALMIC at 19:15

## 2025-05-23 RX ADMIN — FENTANYL CITRATE 50 MCG: 50 INJECTION INTRAMUSCULAR; INTRAVENOUS at 09:58

## 2025-05-23 RX ADMIN — MINERAL OIL, WHITE PETROLATUM: .03; .94 OINTMENT OPHTHALMIC at 04:45

## 2025-05-23 RX ADMIN — HYDROCORTISONE SODIUM SUCCINATE 100 MG: 100 INJECTION INTRAMUSCULAR; INTRAVENOUS at 17:30

## 2025-05-23 RX ADMIN — IPRATROPIUM BROMIDE AND ALBUTEROL SULFATE 1 DOSE: 2.5; .5 SOLUTION RESPIRATORY (INHALATION) at 15:32

## 2025-05-23 RX ADMIN — PREGABALIN 150 MG: 75 CAPSULE ORAL at 21:27

## 2025-05-23 ASSESSMENT — PULMONARY FUNCTION TESTS
PIF_VALUE: 33
PIF_VALUE: 34
PIF_VALUE: 42
PIF_VALUE: 36
PIF_VALUE: 37
PIF_VALUE: 34
PIF_VALUE: 30
PIF_VALUE: 25
PIF_VALUE: 36
PIF_VALUE: 35
PIF_VALUE: 44
PIF_VALUE: 34
PIF_VALUE: 38
PIF_VALUE: 32
PIF_VALUE: 36
PIF_VALUE: 30
PIF_VALUE: 37
PIF_VALUE: 35
PIF_VALUE: 31
PIF_VALUE: 33
PIF_VALUE: 33
PIF_VALUE: 37
PIF_VALUE: 37
PIF_VALUE: 33
PIF_VALUE: 33
PIF_VALUE: 36
PIF_VALUE: 34
PIF_VALUE: 31
PIF_VALUE: 31
PIF_VALUE: 36
PIF_VALUE: 33
PIF_VALUE: 36
PIF_VALUE: 33

## 2025-05-23 NOTE — PROGRESS NOTES
Physical Therapy    PT order received and medical chart reviewed on 5/23/25. Discussed with bedside RN - PT evaluation on hold 2/2 medical status. Will re-attempt as able. Thank you.    Serena Forman, PT, DPT  [id number]

## 2025-05-23 NOTE — FLOWSHEET NOTE
Inpatient Wound Care (Initial consult) 3806    Admit Date: 5/21/2025  2:23 PM    Reason for consult:  Toes, feet, buttocks, groin    Significant history:  Per H&P    The patient reportedly fell out of her wheelchair at her facility. She was brought through triage and found to be hypotensive. A trauma team now was activated. The patient is a poor historian. However, the patient reported acute, constant, neck and bilateral arm pain that moderately severe.     Findings:     05/23/25 1548   Skin Integumentary    Skin Integrity Abrasion   Location Left and right foot toes   Skin Integrity Site 2   Skin Integrity Location 2   (old healed area)   Location 2 Left buttock   Skin Integrity Site 3   Skin Integrity Location 3 Excoriation    Location 3 breast folds   Skin Integrity Site 4   Skin Integrity Location 4   (Old healed areas, erosion)   Location 4 right posterior thigh       **Informed Consent**    photos taken of buttocks, toes, right posterior thigh and inserted into their chart as part of their permanent medical record for purposes of documentation, treatment management and/or medical review.   All Images taken on 5/23/25 of patient name: Alena Busby were transmitted and stored on secured Epic  Site located within Media Folder Tab by a registered Epic-Haiku Mobile Application Device.        Impression:  Skin assessment complete; See above documentation    Plan: Aquaphor  Antifungal powder  Medix heel boots  TAPS  Patient needs continued preventative care.      Darling Mukherjee RN 5/23/2025 3:51 PM

## 2025-05-23 NOTE — PROGRESS NOTES
Occupational Therapy    Date:2025  Patient Name: Alena Busby  MRN: [id number]  : 1955  Room: [address] [place] [address]-A     OT orders received and chart reviewed. OT eval on hold at this time per RN request 2/2 medical status.  OT will follow and re-attempt eval as appropriate at a later time/date.    Wilfredo Desai OTR/L; [id number]

## 2025-05-23 NOTE — CARE COORDINATION
5/23 Care Coordination: Pt remains in SICU. Was a fall at NH, [facility], fall off transport van in her Wheelchair.Remains on Vent, IV Levo..Patient found to have a C5/6 facet jump with quadriplegia.Pt underwent C2-T2 posterior cervical fusion yesterday.With Current status unclear on discharge needs.  Unsure if will return to [facility]. Will need further discussion with pt's daughter Katia, her POA.  CM/SW will continue to follow for discharge planning.   Dontae DE LA TORRE,RN--BC  [phone]

## 2025-05-23 NOTE — CONSULTS
Palliative Care Department  [phone]  Palliative Care Initial Consult  Provider Jamila Hancock, APRN - CNP     Alena Busby  [id number]  Hospital Day: 3  Date of Initial Consult: 5/23/25  Referring Provider: Dr. Currie  Palliative Medicine was consulted for assistance with: trauma, new paralysis from cervical fx, goals of care, overwhelming symptoms     HPI:   Alena Busby is a 70 y.o. with a medical history of COPD, dementia who was admitted on 5/21/2025 from [facility] with a CHIEF COMPLAINT of trauma.  Patient fell off of the transport van and her wheelchair at nursing facility. CTA cervical spine demonstrates C5-6 right perched facets, C5-6 left jumped facets with quadriplegia, she was intubated for impending respiratory failure.  Neurogenic shock, admitted to SICU.  5/22 OR for C2-T2 posterior cervical fusion, return to SICU on pressors and had intermittent bradycardia.  She remains intubated on Levophed.  Palliative consulted for further assistance with goals of care.    ASSESSMENT/PLAN:     Pertinent Hospital Diagnoses     C5 fracture subluxation status post C2-T2 PCF   Neurogenic shock  Acute respiratory failure secondary to spinal shock      Palliative Care Encounter / Counseling Regarding Goals of Care  Please see detailed goals of care discussion as below  At this time, Alena Busby, Does Not have capacity for medical decision-making.  Capacity is time limited and situation/question specific  During encounter Katia was surrogate medical decision-maker  Outcome of goals of care meeting:   Family meeting tomorrow (Sat) at 1 PM  Continue current medical management CODE STATUS for now  Code status Full Code  Advanced Directives:  POA or living will in [facility]  Surrogate/Legal NOK:  Katia Gaines (POA/daughter) [phone]        Spiritual assessment: no spiritual distress identified  Bereavement and grief: to be determined  Referrals to: none today  SUBJECTIVE:     Current medical issues

## 2025-05-23 NOTE — PROGRESS NOTES
[facility]  SURGICAL INTENSIVE CARE UNIT (SICU)  ATTENDING PHYSICIAN CRITICAL CARE PROGRESS NOTE     I have examined the patient, reviewed the record,and discussed the case with the APN/  Resident. I have reviewed all relevant labs and imaging data. The following summarizes my clinical findings and independent assessment.       Date of admission:  5/21/2025    CC:     HOSPITAL COURSE:   Patient back from the OR; underwent C2-T2 posterior cervical fusion.  Surgery went well.  Patient intermittently bradycardic throughout the day, has required 2 doses of atropine.  Currently on low-dose of Levophed.  Alert and nodding appropriately to questions.  Went to the OR yesterday for fusion  5/23 bradycardia and atropine steroid started hypotension relative adrenal insufficiency    New Imaging Reviewed and personally interpreted:    Chest x-ray ET tube in good position enteric tube under the diaphragm spinal hardware in place    New Labs reviewed   Recent Labs     05/21/25  0828 05/21/25  1425 05/22/25  0420 05/22/25  1529 05/23/25  0432   WBC 6.5 6.4 11.6* 12.2* 16.6*   HGB 16.4* 14.5 15.0 13.7 13.1    179 198 163 200   INR 1.1 1.1  --   --   --        Recent Labs     05/21/25  1425 05/21/25  1430 05/22/25  0420 05/22/25  1529 05/22/25  1540 05/23/25  0432     --  145 145  --  146*   K 3.7   < > 3.0* 4.7  --  3.8   MG  --   --  1.7  --  2.2 1.9   PHOS  --   --  2.4*  --  3.7 5.6*   BUN 16  --  14 11  --  11   CREATININE 0.6  --  0.6 0.5  --  0.6   GLUCOSE 116*  --  117* 149*  --  203*   LACTA 1.8  --   --  1.6  --   --    AST 26  --  41*  --   --  30   ALT 18  --  23  --   --  18   BILITOT 0.6  --  1.1  --   --  1.0    < > = values in this interval not displayed.       Recent Labs     05/23/25  1342   MODE AC   FIO2 45.0   RRMECH 18.0   VTMECH 400.0   PEEP 8.0   PFO2 2.97         Physical Exam  Eyes:      Pupils: Pupils are equal, round, and reactive to light.   Cardiovascular:      Rate and

## 2025-05-23 NOTE — PLAN OF CARE
Problem: Discharge Planning  Goal: Discharge to home or other facility with appropriate resources  5/23/2025 1941 by Anu Kay RN  Outcome: Progressing     Problem: Pain  Goal: Verbalizes/displays adequate comfort level or baseline comfort level  5/23/2025 1941 by Anu Kay RN  Outcome: Progressing     Problem: Skin/Tissue Integrity  Goal: Skin integrity remains intact  Description: 1.  Monitor for areas of redness and/or skin breakdown2.  Assess vascular access sites hourly3.  Every 4-6 hours minimum:  Change oxygen saturation probe site4.  Every 4-6 hours:  If on nasal continuous positive airway pressure, respiratory therapy assess nares and determine need for appliance change or resting period  5/23/2025 1941 by Anu Kay RN  Outcome: Progressing     Problem: Safety - Adult  Goal: Free from fall injury  5/23/2025 1941 by Anu Kay RN  Outcome: Progressing     Problem: Chronic Conditions and Co-morbidities  Goal: Patient's chronic conditions and co-morbidity symptoms are monitored and maintained or improved  5/23/2025 1941 by Anu Kay RN  Outcome: Progressing     Problem: ABCDS Injury Assessment  Goal: Absence of physical injury  5/23/2025 1941 by Anu Kay RN  Outcome: Progressing     Problem: Safety - Medical Restraint  Goal: Remains free of injury from restraints (Restraint for Interference with Medical Device)  Description: INTERVENTIONS:1. Determine that other, less restrictive measures have been tried or would not be effective before applying the restraint2. Evaluate the patient's condition at the time of restraint application3. Inform patient/family regarding the reason for restraint4. Q2H: Monitor safety, psychosocial status, comfort, nutrition and hydration  5/23/2025 1941 by Anu Kay RN  Outcome: Progressing     Problem: Skin/Tissue Integrity - Adult  Goal: Skin integrity remains intact  Description: 1.  Monitor for areas of redness and/or skin

## 2025-05-23 NOTE — PROGRESS NOTES
Department of Neurosurgery  Progress Note    CHIEF COMPLAINT: s/p C2-T2 fusion    SUBJECTIVE:  Remains intubated     REVIEW OF SYSTEMS :  Constitutional: Intubated    OBJECTIVE:   VITALS:  BP (!) 129/48   Pulse 53   Temp 99.3 °F (37.4 °C)   Resp 18   Ht 1.575 m (5' 2.01\")   Wt 93.4 kg (205 lb 14.6 oz)   SpO2 99%   BMI 37.65 kg/m²     PHYSICAL:  Intubated  Alert  Wakes up to name   Follows commands  PERRL  EOMI  Dressing c/d/i    DATA:  CBC:   Lab Results   Component Value Date/Time    WBC 16.6 05/23/2025 04:32 AM    RBC 4.39 05/23/2025 04:32 AM    HGB 13.1 05/23/2025 04:32 AM    HCT 39.9 05/23/2025 04:32 AM    MCV 90.9 05/23/2025 04:32 AM    MCH 29.8 05/23/2025 04:32 AM    MCHC 32.8 05/23/2025 04:32 AM    RDW 17.2 05/23/2025 04:32 AM     05/23/2025 04:32 AM    MPV 11.2 05/23/2025 04:32 AM     BMP:    Lab Results   Component Value Date/Time     05/23/2025 04:32 AM    K 3.8 05/23/2025 04:32 AM    K 4.0 02/03/2022 08:21 PM     05/23/2025 04:32 AM    CO2 16 05/23/2025 04:32 AM    BUN 11 05/23/2025 04:32 AM    CREATININE 0.6 05/23/2025 04:32 AM    CALCIUM 6.4 05/23/2025 04:32 AM    GFRAA >60 02/11/2022 05:17 AM    LABGLOM >90 05/23/2025 04:32 AM    LABGLOM >60 03/01/2024 07:27 AM    GLUCOSE 203 05/23/2025 04:32 AM     PT/INR:    Lab Results   Component Value Date/Time    PROTIME 12.1 05/21/2025 02:25 PM    INR 1.1 05/21/2025 02:25 PM     PTT:    Lab Results   Component Value Date/Time    APTT 33.2 05/21/2025 02:25 PM   [APTT}    Current Inpatient Medications  Current Facility-Administered Medications: mupirocin (BACTROBAN) 2 % ointment, , Each Nostril, BID  hydrocortisone sodium succinate PF (SOLU-CORTEF) 100 mg in sterile water 2 mL injection, 100 mg, IntraVENous, Q8H  insulin lispro (HUMALOG,ADMELOG) injection vial 0-4 Units, 0-4 Units, SubCUTAneous, Q4H  cefTRIAXone (ROCEPHIN) 1,000 mg in sterile water 10 mL IV syringe, 1,000 mg, IntraVENous, Q24H  theophylline 80 MG/15ML oral solution 100

## 2025-05-23 NOTE — PROGRESS NOTES
Comprehensive Nutrition Assessment    Type and Reason for Visit:  Initial, Consult, Nutrition support (TF recs only)    Nutrition Recommendations/Plan:     If Tube Feed Recommendation is needed,     Recommend Modifying Tube Feed to:    Standard w/ Fiber (Jevity 1.5) @ 55 ml/hr x 18 hrs holding for MAR interaction + 1 pro mod daily   Will provide: 990 ml tv, 1485 kcals, 63 gm pro (1557 kcals & 81 gm pro w/ mod), 752 ml free water     Regimen meets 100% est calorie and protein needs.     Noted current spiked BGL (203), continue to monitor trends and re-consult if needed.        Malnutrition Assessment:  Malnutrition Status:  At risk for malnutrition (05/23/25 1135)    Context:  Acute Illness     Findings of the 6 clinical characteristics of malnutrition:  Energy Intake:  Mild decrease in energy intake (since admit)  Weight Loss:  No weight loss     Body Fat Loss:  No body fat loss     Muscle Mass Loss:  No muscle mass loss    Fluid Accumulation:  No fluid accumulation     Strength:  Not Performed    Nutrition Assessment:    Pt admit 2/2 trauma fall out of wheelchair going up van ramp, stuck head +spinal cord injury/s shock/ quadriparesis now s/p cervial fusion 5/22. Note pt had just underwent LP prior to arrival 5/21. PMHx COPD, neuropathy, & previous cervical fusion ('19). Pt currently Intubated w/ TF initiated via OG 5/22. Will provide TF recs if needed based on current medical status and monitor.    Nutrition Related Findings:    pt intubated, currently off sedation, hypotension on pressor x 1, suction drain x 1, TF running via OG, I/O (+5.8L), obese abd, hypoactive BS, +1 edema, hypernatremia, hyperphosphatemia, hyperglycemia (203) Wound Type: Multiple, Surgical Incision, Wound Consult Pending       Current Nutrition Intake & Therapies:    Average Meal Intake: NPO     Current Tube Feeding (TF) Orders:  Feeding Route: Orogastric  Formula: Peptide Based  Schedule: Continuous  Feeding Regimen: 10 ml/hr,

## 2025-05-23 NOTE — PLAN OF CARE
Problem: Discharge Planning  Goal: Discharge to home or other facility with appropriate resources  Outcome: Progressing     Problem: Pain  Goal: Verbalizes/displays adequate comfort level or baseline comfort level  Outcome: Progressing     Problem: Skin/Tissue Integrity  Goal: Skin integrity remains intact  Description: 1.  Monitor for areas of redness and/or skin breakdown2.  Assess vascular access sites hourly3.  Every 4-6 hours minimum:  Change oxygen saturation probe site4.  Every 4-6 hours:  If on nasal continuous positive airway pressure, respiratory therapy assess nares and determine need for appliance change or resting period  Outcome: Progressing     Problem: Safety - Adult  Goal: Free from fall injury  Outcome: Progressing     Problem: Chronic Conditions and Co-morbidities  Goal: Patient's chronic conditions and co-morbidity symptoms are monitored and maintained or improved  Outcome: Progressing     Problem: Safety - Medical Restraint  Goal: Remains free of injury from restraints (Restraint for Interference with Medical Device)  Description: INTERVENTIONS:1. Determine that other, less restrictive measures have been tried or would not be effective before applying the restraint2. Evaluate the patient's condition at the time of restraint application3. Inform patient/family regarding the reason for restraint4. Q2H: Monitor safety, psychosocial status, comfort, nutrition and hydration  Outcome: Progressing

## 2025-05-23 NOTE — ADDENDUM NOTE
Addendum  created 05/23/25 0823 by Priscilla Farfan APRN - CRNA    Intraprocedure Event edited

## 2025-05-23 NOTE — PROGRESS NOTES
SURGICAL INTENSIVE CARE  PROGRESS NOTE    Date of admission:  5/21/2025  Reason for ICU transfer:  neurogenic hypotension 2/2 neck trauma from fall      HOSPITAL COURSE:  5/21: 70 y.o. female Fall from wheelchair  Injury occurred Prior to arrival  Fell off the transport van while in facility. Head injury. Hypotensive on arrival. No LOC no thinner.  Patient found to have a C5/6 facet jump with quadriplegia and impending respiratory failure, intubated in the trauma bay.  Stat MRI cervical spine completed which again showed severe spinal stenosis at C5 and C6.  Admitted to SICU, on Levophed secondary to neurogenic shock.  5/22:Patient back from the OR; underwent C2-T2 posterior cervical fusion.  Surgery went well.  Patient intermittently bradycardic throughout the day, has required 2 doses of atropine.  Currently on low-dose of Levophed.  Alert and nodding appropriately to questions.   5/23/25  - Overnight increased rate of levo to 8. Vitals stable otherwise. On AC/VC. Leukocytosis trending up. Patient wakes up to name. Follows commands. Displays understanding. Denies any questions      PHYSICAL EXAM:  BP (!) 97/46   Pulse 54   Temp 99.1 °F (37.3 °C) (Bladder)   Resp 18   Ht 1.575 m (5' 2\")   Wt 92.8 kg (204 lb 9.4 oz)   SpO2 98%   BMI 37.42 kg/m²     GENERAL:  NAD. Ventilated.   HEAD:  Normocephalic, atraumatic. In C-collar.  EYES:   No scleral icterus. PERRLA.  LUNGS:  No increased work of breathing. CTAB.  CARDIOVASCULAR: Warm throughout. bradycardic rate  ABDOMEN:  Soft, non distended, non tender. No guarding, rigidity, rebound.  EXTREMITIES:   Moves BUE and RLE Atraumatic. No LE edema.  SKIN:  Warm and dry  NEUROLOGIC:  moves UE fingers and hands. Moves R toes occasionally. Follows commands, appears alert when awake,     ASSESSMENT / PLAN:  Neuro:   C5 fracture subluxation status post C2-T2 PCF 5/22  Neurosurgery on board, may contain strict spinal precautions, custom collar in place.  Acute pain

## 2025-05-24 ENCOUNTER — APPOINTMENT (OUTPATIENT)
Dept: GENERAL RADIOLOGY | Age: 70
DRG: 450 | End: 2025-05-24
Payer: MEDICARE

## 2025-05-24 LAB
AADO2: 146.8 MMHG
ALB CSF/SERPL: 4.6 RATIO (ref 0–9)
ALBUMIN CSF-MCNC: 17 MG/DL (ref 0–35)
ALBUMIN SERPL-MCNC: 2.7 G/DL (ref 3.5–5.2)
ALBUMIN SERPL-MCNC: 3661 MG/DL (ref 3500–5200)
ALP SERPL-CCNC: 93 U/L (ref 35–104)
ALT SERPL-CCNC: 10 U/L (ref 0–35)
ANION GAP SERPL CALCULATED.3IONS-SCNC: 13 MMOL/L (ref 7–16)
AST SERPL-CCNC: 25 U/L (ref 0–35)
B BURGDOR IGM CSF QL IB: NEGATIVE
B.E.: -5.8 MMOL/L (ref -3–3)
BASOPHILS # BLD: 0.02 K/UL (ref 0–0.2)
BASOPHILS NFR BLD: 0 % (ref 0–2)
BILIRUB SERPL-MCNC: 0.8 MG/DL (ref 0–1.2)
BUN SERPL-MCNC: 9 MG/DL (ref 8–23)
CA-I BLD-SCNC: 0.98 MMOL/L (ref 1.15–1.33)
CALCIUM SERPL-MCNC: 6.7 MG/DL (ref 8.8–10.2)
CHLORIDE SERPL-SCNC: 117 MMOL/L (ref 98–107)
CO2 SERPL-SCNC: 18 MMOL/L (ref 22–29)
COHB: 0.2 % (ref 0–1.5)
CREAT SERPL-MCNC: 0.5 MG/DL (ref 0.5–1)
CRITICAL: ABNORMAL
DATE ANALYZED: ABNORMAL
DATE OF COLLECTION: ABNORMAL
EOSINOPHIL # BLD: 0 K/UL (ref 0.05–0.5)
EOSINOPHILS RELATIVE PERCENT: 0 % (ref 0–6)
ERYTHROCYTE [DISTWIDTH] IN BLOOD BY AUTOMATED COUNT: 16.8 % (ref 11.5–15)
FIO2: 45 %
GFR, ESTIMATED: >90 ML/MIN/1.73M2
GLUCOSE BLD-MCNC: 129 MG/DL (ref 74–99)
GLUCOSE BLD-MCNC: 133 MG/DL (ref 74–99)
GLUCOSE BLD-MCNC: 141 MG/DL (ref 74–99)
GLUCOSE BLD-MCNC: 142 MG/DL (ref 74–99)
GLUCOSE BLD-MCNC: 147 MG/DL (ref 74–99)
GLUCOSE SERPL-MCNC: 210 MG/DL (ref 74–99)
GLUTAMIC ACID DECARB AB: <5 IU/ML (ref 0–5)
HCO3: 18.2 MMOL/L (ref 22–26)
HCT VFR BLD AUTO: 37.3 % (ref 34–48)
HGB BLD-MCNC: 12.5 G/DL (ref 11.5–15.5)
HHB: 1 % (ref 0–5)
IGG CSF-MCNC: 3 MG/DL (ref 0–6)
IGG SERPL-MCNC: 1231 MG/DL (ref 768–1632)
IGG SYNTH RATE SER+CSF CALC-MRATE: <0 MG/D
IGG/ALB CLEAR SER+CSF-RTO: 0.52 RATIO (ref 0.28–0.66)
IGG/ALB CSF: 0.18 RATIO (ref 0.09–0.25)
IMM GRANULOCYTES # BLD AUTO: 0.19 K/UL (ref 0–0.58)
IMM GRANULOCYTES NFR BLD: 1 % (ref 0–5)
LAB: ABNORMAL
LYME WESTERN BLOT IGG CSF: NEGATIVE
LYMPHOCYTES NFR BLD: 0.74 K/UL (ref 1.5–4)
LYMPHOCYTES RELATIVE PERCENT: 5 % (ref 20–42)
Lab: 347
MAGNESIUM SERPL-MCNC: 1.7 MG/DL (ref 1.6–2.4)
MCH RBC QN AUTO: 30.1 PG (ref 26–35)
MCHC RBC AUTO-ENTMCNC: 33.5 G/DL (ref 32–34.5)
MCV RBC AUTO: 89.9 FL (ref 80–99.9)
METHB: 0.2 % (ref 0–1.5)
MICROORGANISM SPEC CULT: ABNORMAL
MODE: AC
MONOCYTES NFR BLD: 0.83 K/UL (ref 0.1–0.95)
MONOCYTES NFR BLD: 5 % (ref 2–12)
NEUTROPHILS NFR BLD: 89 % (ref 43–80)
NEUTS SEG NFR BLD: 14.51 K/UL (ref 1.8–7.3)
O2 SATURATION: 99 % (ref 92–98.5)
O2HB: 98.6 % (ref 94–97)
OLIGOCLONAL BANDS CSF ELPH-IMP: NEGATIVE
OLIGOCLONAL BANDS CSF ELPH-IMP: NORMAL
OLIGOCLONAL BANDS CSF IEF: NORMAL BANDS (ref 0–1)
OPERATOR ID: 2577
PATIENT TEMP: 37 C
PCO2: 31.4 MMHG (ref 35–45)
PEEP/CPAP: 8 CMH2O
PFO2: 3.07 MMHG/%
PH BLOOD GAS: 7.38 (ref 7.35–7.45)
PHOSPHATE SERPL-MCNC: 3.3 MG/DL (ref 2.5–4.5)
PLATELET # BLD AUTO: 162 K/UL (ref 130–450)
PMV BLD AUTO: 10.7 FL (ref 7–12)
PO2: 138.3 MMHG (ref 75–100)
POTASSIUM SERPL-SCNC: 3.3 MMOL/L (ref 3.5–5.1)
PROT SERPL-MCNC: 5.8 G/DL (ref 6.4–8.3)
RBC # BLD AUTO: 4.15 M/UL (ref 3.5–5.5)
RI(T): 1.06
RR MECHANICAL: 18 B/MIN
SERVICE CMNT-IMP: ABNORMAL
SODIUM SERPL-SCNC: 148 MMOL/L (ref 136–145)
SOURCE, BLOOD GAS: ABNORMAL
SPECIMEN DESCRIPTION: ABNORMAL
THB: 13.2 G/DL (ref 11.5–16.5)
TIME ANALYZED: 349
VT MECHANICAL: 400 ML
WBC OTHER # BLD: 16.3 K/UL (ref 4.5–11.5)

## 2025-05-24 PROCEDURE — 6360000002 HC RX W HCPCS: Performed by: SURGERY

## 2025-05-24 PROCEDURE — 84100 ASSAY OF PHOSPHORUS: CPT

## 2025-05-24 PROCEDURE — 2580000003 HC RX 258

## 2025-05-24 PROCEDURE — 6370000000 HC RX 637 (ALT 250 FOR IP)

## 2025-05-24 PROCEDURE — 82330 ASSAY OF CALCIUM: CPT

## 2025-05-24 PROCEDURE — 71045 X-RAY EXAM CHEST 1 VIEW: CPT

## 2025-05-24 PROCEDURE — 83735 ASSAY OF MAGNESIUM: CPT

## 2025-05-24 PROCEDURE — 94640 AIRWAY INHALATION TREATMENT: CPT

## 2025-05-24 PROCEDURE — 80053 COMPREHEN METABOLIC PANEL: CPT

## 2025-05-24 PROCEDURE — 99291 CRITICAL CARE FIRST HOUR: CPT | Performed by: SURGERY

## 2025-05-24 PROCEDURE — 82805 BLOOD GASES W/O2 SATURATION: CPT

## 2025-05-24 PROCEDURE — 94003 VENT MGMT INPAT SUBQ DAY: CPT

## 2025-05-24 PROCEDURE — 82962 GLUCOSE BLOOD TEST: CPT

## 2025-05-24 PROCEDURE — 37799 UNLISTED PX VASCULAR SURGERY: CPT

## 2025-05-24 PROCEDURE — 2000000000 HC ICU R&B

## 2025-05-24 PROCEDURE — 6370000000 HC RX 637 (ALT 250 FOR IP): Performed by: SURGERY

## 2025-05-24 PROCEDURE — 2500000003 HC RX 250 WO HCPCS

## 2025-05-24 PROCEDURE — 6360000002 HC RX W HCPCS

## 2025-05-24 PROCEDURE — 85025 COMPLETE CBC W/AUTO DIFF WBC: CPT

## 2025-05-24 PROCEDURE — 99233 SBSQ HOSP IP/OBS HIGH 50: CPT

## 2025-05-24 PROCEDURE — 2500000003 HC RX 250 WO HCPCS: Performed by: SURGERY

## 2025-05-24 RX ORDER — MAGNESIUM SULFATE IN WATER 40 MG/ML
4000 INJECTION, SOLUTION INTRAVENOUS ONCE
Status: COMPLETED | OUTPATIENT
Start: 2025-05-24 | End: 2025-05-24

## 2025-05-24 RX ORDER — HEPARIN SODIUM 5000 [USP'U]/ML
5000 INJECTION, SOLUTION INTRAVENOUS; SUBCUTANEOUS EVERY 8 HOURS SCHEDULED
Status: DISCONTINUED | OUTPATIENT
Start: 2025-05-24 | End: 2025-05-29 | Stop reason: HOSPADM

## 2025-05-24 RX ORDER — POLYETHYLENE GLYCOL 3350 17 G/17G
17 POWDER, FOR SOLUTION ORAL 2 TIMES DAILY
Status: DISCONTINUED | OUTPATIENT
Start: 2025-05-24 | End: 2025-05-29 | Stop reason: HOSPADM

## 2025-05-24 RX ORDER — INSULIN LISPRO 100 [IU]/ML
0-8 INJECTION, SOLUTION INTRAVENOUS; SUBCUTANEOUS EVERY 4 HOURS
Status: DISCONTINUED | OUTPATIENT
Start: 2025-05-24 | End: 2025-05-24

## 2025-05-24 RX ORDER — LACTULOSE 10 G/15ML
20 SOLUTION ORAL 3 TIMES DAILY
Status: DISCONTINUED | OUTPATIENT
Start: 2025-05-24 | End: 2025-05-29 | Stop reason: HOSPADM

## 2025-05-24 RX ORDER — INSULIN LISPRO 100 [IU]/ML
0-8 INJECTION, SOLUTION INTRAVENOUS; SUBCUTANEOUS EVERY 4 HOURS
Status: DISCONTINUED | OUTPATIENT
Start: 2025-05-24 | End: 2025-05-29 | Stop reason: HOSPADM

## 2025-05-24 RX ADMIN — ACETAMINOPHEN 650 MG: 650 SOLUTION ORAL at 12:14

## 2025-05-24 RX ADMIN — MINERAL OIL, WHITE PETROLATUM: .03; .94 OINTMENT OPHTHALMIC at 08:11

## 2025-05-24 RX ADMIN — CHLORHEXIDINE GLUCONATE, 0.12% ORAL RINSE 15 ML: 1.2 SOLUTION DENTAL at 03:25

## 2025-05-24 RX ADMIN — GUAIFENESIN 400 MG: 400 TABLET ORAL at 08:10

## 2025-05-24 RX ADMIN — PREGABALIN 150 MG: 75 CAPSULE ORAL at 08:11

## 2025-05-24 RX ADMIN — HYDROCORTISONE SODIUM SUCCINATE 100 MG: 100 INJECTION INTRAMUSCULAR; INTRAVENOUS at 14:24

## 2025-05-24 RX ADMIN — LACTULOSE 20 G: 20 SOLUTION ORAL at 14:24

## 2025-05-24 RX ADMIN — GUAIFENESIN 400 MG: 400 TABLET ORAL at 19:22

## 2025-05-24 RX ADMIN — CHLORHEXIDINE GLUCONATE, 0.12% ORAL RINSE 15 ML: 1.2 SOLUTION DENTAL at 19:22

## 2025-05-24 RX ADMIN — HEPARIN SODIUM 5000 UNITS: 5000 INJECTION INTRAVENOUS; SUBCUTANEOUS at 21:30

## 2025-05-24 RX ADMIN — IPRATROPIUM BROMIDE AND ALBUTEROL SULFATE 1 DOSE: 2.5; .5 SOLUTION RESPIRATORY (INHALATION) at 14:31

## 2025-05-24 RX ADMIN — ACETAMINOPHEN 650 MG: 650 SOLUTION ORAL at 17:55

## 2025-05-24 RX ADMIN — FENTANYL CITRATE 50 MCG: 50 INJECTION INTRAMUSCULAR; INTRAVENOUS at 01:38

## 2025-05-24 RX ADMIN — MICONAZOLE NITRATE: 20 POWDER TOPICAL at 08:12

## 2025-05-24 RX ADMIN — BISACODYL 10 MG: 10 SUPPOSITORY RECTAL at 08:10

## 2025-05-24 RX ADMIN — LACTULOSE 20 G: 20 SOLUTION ORAL at 08:11

## 2025-05-24 RX ADMIN — THEOPHYLLINE 100 MG: 80 SOLUTION ORAL at 22:08

## 2025-05-24 RX ADMIN — PREGABALIN 150 MG: 75 CAPSULE ORAL at 20:31

## 2025-05-24 RX ADMIN — INSULIN LISPRO 2 UNITS: 100 INJECTION, SOLUTION INTRAVENOUS; SUBCUTANEOUS at 04:51

## 2025-05-24 RX ADMIN — IPRATROPIUM BROMIDE AND ALBUTEROL SULFATE 1 DOSE: 2.5; .5 SOLUTION RESPIRATORY (INHALATION) at 11:24

## 2025-05-24 RX ADMIN — SODIUM CHLORIDE: 0.9 INJECTION, SOLUTION INTRAVENOUS at 12:22

## 2025-05-24 RX ADMIN — WATER 1000 MG: 1 INJECTION INTRAMUSCULAR; INTRAVENOUS; SUBCUTANEOUS at 20:30

## 2025-05-24 RX ADMIN — ACETAMINOPHEN 650 MG: 650 SOLUTION ORAL at 22:08

## 2025-05-24 RX ADMIN — CHLORHEXIDINE GLUCONATE, 0.12% ORAL RINSE 15 ML: 1.2 SOLUTION DENTAL at 08:10

## 2025-05-24 RX ADMIN — FENTANYL CITRATE 50 MCG: 50 INJECTION INTRAMUSCULAR; INTRAVENOUS at 08:28

## 2025-05-24 RX ADMIN — CALCIUM GLUCONATE 3000 MG: 98 INJECTION INTRAVENOUS at 06:29

## 2025-05-24 RX ADMIN — POLYETHYLENE GLYCOL 3350 17 G: 17 POWDER, FOR SOLUTION ORAL at 08:11

## 2025-05-24 RX ADMIN — IPRATROPIUM BROMIDE AND ALBUTEROL SULFATE 1 DOSE: 2.5; .5 SOLUTION RESPIRATORY (INHALATION) at 19:36

## 2025-05-24 RX ADMIN — MAGNESIUM SULFATE HEPTAHYDRATE 4000 MG: 40 INJECTION, SOLUTION INTRAVENOUS at 08:12

## 2025-05-24 RX ADMIN — MINERAL OIL, WHITE PETROLATUM: .03; .94 OINTMENT OPHTHALMIC at 12:15

## 2025-05-24 RX ADMIN — PETROLATUM: 420 OINTMENT TOPICAL at 08:12

## 2025-05-24 RX ADMIN — IPRATROPIUM BROMIDE AND ALBUTEROL SULFATE 1 DOSE: 2.5; .5 SOLUTION RESPIRATORY (INHALATION) at 06:22

## 2025-05-24 RX ADMIN — PETROLATUM: 420 OINTMENT TOPICAL at 19:22

## 2025-05-24 RX ADMIN — THEOPHYLLINE 100 MG: 80 SOLUTION ORAL at 12:15

## 2025-05-24 RX ADMIN — LACTULOSE 20 G: 20 SOLUTION ORAL at 19:23

## 2025-05-24 RX ADMIN — CHLORHEXIDINE GLUCONATE, 0.12% ORAL RINSE 15 ML: 1.2 SOLUTION DENTAL at 16:14

## 2025-05-24 RX ADMIN — MUPIROCIN: 20 OINTMENT TOPICAL at 19:22

## 2025-05-24 RX ADMIN — POTASSIUM BICARBONATE 40 MEQ: 782 TABLET, EFFERVESCENT ORAL at 08:10

## 2025-05-24 RX ADMIN — MICONAZOLE NITRATE: 20 POWDER TOPICAL at 19:23

## 2025-05-24 RX ADMIN — SODIUM CHLORIDE, PRESERVATIVE FREE 10 ML: 5 INJECTION INTRAVENOUS at 19:23

## 2025-05-24 RX ADMIN — HEPARIN SODIUM 5000 UNITS: 5000 INJECTION INTRAVENOUS; SUBCUTANEOUS at 14:24

## 2025-05-24 RX ADMIN — GUAIFENESIN 400 MG: 400 TABLET ORAL at 14:24

## 2025-05-24 RX ADMIN — HYDROCORTISONE SODIUM SUCCINATE 100 MG: 100 INJECTION INTRAMUSCULAR; INTRAVENOUS at 21:09

## 2025-05-24 RX ADMIN — MINERAL OIL, WHITE PETROLATUM: .03; .94 OINTMENT OPHTHALMIC at 19:22

## 2025-05-24 RX ADMIN — ACETAMINOPHEN 650 MG: 650 SOLUTION ORAL at 04:12

## 2025-05-24 RX ADMIN — POTASSIUM BICARBONATE 40 MEQ: 782 TABLET, EFFERVESCENT ORAL at 19:22

## 2025-05-24 RX ADMIN — SODIUM CHLORIDE, PRESERVATIVE FREE 10 ML: 5 INJECTION INTRAVENOUS at 08:11

## 2025-05-24 RX ADMIN — MINERAL OIL, WHITE PETROLATUM: .03; .94 OINTMENT OPHTHALMIC at 22:52

## 2025-05-24 RX ADMIN — PREGABALIN 150 MG: 75 CAPSULE ORAL at 14:24

## 2025-05-24 RX ADMIN — CHLORHEXIDINE GLUCONATE, 0.12% ORAL RINSE 15 ML: 1.2 SOLUTION DENTAL at 12:19

## 2025-05-24 RX ADMIN — SODIUM CHLORIDE, PRESERVATIVE FREE 40 MG: 5 INJECTION INTRAVENOUS at 08:11

## 2025-05-24 RX ADMIN — MINERAL OIL, WHITE PETROLATUM: .03; .94 OINTMENT OPHTHALMIC at 03:25

## 2025-05-24 RX ADMIN — MUPIROCIN: 20 OINTMENT TOPICAL at 08:11

## 2025-05-24 RX ADMIN — POLYETHYLENE GLYCOL 3350 17 G: 17 POWDER, FOR SOLUTION ORAL at 19:22

## 2025-05-24 RX ADMIN — HYDROCORTISONE SODIUM SUCCINATE 100 MG: 100 INJECTION INTRAMUSCULAR; INTRAVENOUS at 04:24

## 2025-05-24 RX ADMIN — MINERAL OIL, WHITE PETROLATUM: .03; .94 OINTMENT OPHTHALMIC at 16:14

## 2025-05-24 ASSESSMENT — PULMONARY FUNCTION TESTS
PIF_VALUE: 29
PIF_VALUE: 35
PIF_VALUE: 41
PIF_VALUE: 37
PIF_VALUE: 38
PIF_VALUE: 24
PIF_VALUE: 34
PIF_VALUE: 33
PIF_VALUE: 32
PIF_VALUE: 31
PIF_VALUE: 28
PIF_VALUE: 34
PIF_VALUE: 37
PIF_VALUE: 36
PIF_VALUE: 39
PIF_VALUE: 35
PIF_VALUE: 27
PIF_VALUE: 32
PIF_VALUE: 32
PIF_VALUE: 28
PIF_VALUE: 37
PIF_VALUE: 37
PIF_VALUE: 33
PIF_VALUE: 39
PIF_VALUE: 36
PIF_VALUE: 31
PIF_VALUE: 27
PIF_VALUE: 34
PIF_VALUE: 31
PIF_VALUE: 32
PIF_VALUE: 36
PIF_VALUE: 33
PIF_VALUE: 36
PIF_VALUE: 36
PIF_VALUE: 35
PIF_VALUE: 34
PIF_VALUE: 26
PIF_VALUE: 35
PIF_VALUE: 36

## 2025-05-24 ASSESSMENT — PAIN SCALES - GENERAL
PAINLEVEL_OUTOF10: 0
PAINLEVEL_OUTOF10: 3

## 2025-05-24 NOTE — PROGRESS NOTES
05/24/25 1936   Patient Observation   Pulse 84   Respirations 18   SpO2 98 %   Vent Information   Ventilator ID [id number]   Vent Mode AC/VC   Ventilator Settings   Vt (Set, mL) 300 mL   Resp Rate (Set) 18 bpm   PEEP/CPAP (cmH2O) 8   FiO2  40 %   Peak Inspiratory Flow (Set) 60 L/sec   Vent Patient Data (Readings)   Vt (Measured) 283 mL   Peak Inspiratory Pressure (cmH2O) 28 cmH2O   Rate Measured 18 br/min   Minute Volume (L/min) 5.2 Liters   Peak Inspiratory Flow (lpm) 60 L/sec   Mean Airway Pressure (cmH2O) 13 cmH20   Plateau Pressure (cm H2O) 21 cm H2O   Driving Pressure 13   I:E Ratio 1:4.90   Flow Sensitivity 3 L/min   Static Compliance (L/cm H2O) 20   Backup Apnea On   Backup Rate 18 Breaths Per Minute   Backup Vt 300   Vent Alarm Settings   High Pressure (cmH2O) 45 cmH2O   Low Minute Volume (lpm) 5 L/min   High Minute Volume (lpm) 16 L/min   Low Exhaled Vt (ml) 300 mL   High Exhaled Vt (ml) 800 mL   RR High (bpm) 30 br/min   Apnea (secs) 20 secs   Additional Respiratoray Assessments   Humidification Source Heated wire   Humidification Temp 36.8   Ambu Bag With Mask At Bedside Yes   ETT    Placement Date/Time: 05/21/25 1423   Present on Admission/Arrival: No  Placed By: In ED  Preoxygenation: Yes  Mask Ventilation: Mask ventilation not attempted (0)  Airway Tube Size: 7.5 mm  Location: Oral  Secured At: 23 cm  Measured From: Lips  Atrau...   Secured At 23 cm   Measured From Lips   ETT Placement Left   Secured By Commercial tube berry   Site Assessment Dry

## 2025-05-24 NOTE — PROGRESS NOTES
Department of Neurosurgery  Progress Note    CHIEF COMPLAINT: s/p C2-T2 fusion    SUBJECTIVE:  Intubated, wakes up to name, follows commands    REVIEW OF SYSTEMS :  Constitutional: Intubated    OBJECTIVE:   VITALS:  BP (!) 141/53   Pulse 87   Temp 98.2 °F (36.8 °C) (Bladder)   Resp 20   Ht 1.575 m (5' 2.01\")   Wt 93.4 kg (205 lb 14.6 oz)   SpO2 100%   BMI 37.65 kg/m²     PHYSICAL:  Intubated  Alert  Wakes up to name   Follows commands  PERRL  EOMI  Dressing c/d/i    DATA:  CBC:   Lab Results   Component Value Date/Time    WBC 16.3 05/24/2025 03:45 AM    RBC 4.15 05/24/2025 03:45 AM    HGB 12.5 05/24/2025 03:45 AM    HCT 37.3 05/24/2025 03:45 AM    MCV 89.9 05/24/2025 03:45 AM    MCH 30.1 05/24/2025 03:45 AM    MCHC 33.5 05/24/2025 03:45 AM    RDW 16.8 05/24/2025 03:45 AM     05/24/2025 03:45 AM    MPV 10.7 05/24/2025 03:45 AM     BMP:    Lab Results   Component Value Date/Time     05/24/2025 03:45 AM    K 3.3 05/24/2025 03:45 AM    K 4.0 02/03/2022 08:21 PM     05/24/2025 03:45 AM    CO2 18 05/24/2025 03:45 AM    BUN 9 05/24/2025 03:45 AM    CREATININE 0.5 05/24/2025 03:45 AM    CALCIUM 6.7 05/24/2025 03:45 AM    GFRAA >60 02/11/2022 05:17 AM    LABGLOM >90 05/24/2025 03:45 AM    LABGLOM >60 03/01/2024 07:27 AM    GLUCOSE 210 05/24/2025 03:45 AM     PT/INR:    Lab Results   Component Value Date/Time    PROTIME 12.1 05/21/2025 02:25 PM    INR 1.1 05/21/2025 02:25 PM     PTT:    Lab Results   Component Value Date/Time    APTT 33.2 05/21/2025 02:25 PM   [APTT}    Current Inpatient Medications  Current Facility-Administered Medications: magnesium sulfate 4000 mg in 100 mL IVPB premix, 4,000 mg, IntraVENous, Once  potassium bicarb-citric acid (EFFER-K) effervescent tablet 40 mEq, 40 mEq, Oral, BID  mupirocin (BACTROBAN) 2 % ointment, , Each Nostril, BID  hydrocortisone sodium succinate PF (SOLU-CORTEF) 100 mg in sterile water 2 mL injection, 100 mg, IntraVENous, Q8H  insulin lispro

## 2025-05-24 NOTE — PLAN OF CARE
Problem: Discharge Planning  Goal: Discharge to home or other facility with appropriate resources  5/24/2025 1956 by Anu Kay RN  Outcome: Progressing     Problem: Pain  Goal: Verbalizes/displays adequate comfort level or baseline comfort level  Outcome: Progressing     Problem: Skin/Tissue Integrity  Goal: Skin integrity remains intact  Description: 1.  Monitor for areas of redness and/or skin breakdown2.  Assess vascular access sites hourly3.  Every 4-6 hours minimum:  Change oxygen saturation probe site4.  Every 4-6 hours:  If on nasal continuous positive airway pressure, respiratory therapy assess nares and determine need for appliance change or resting period  Outcome: Progressing     Problem: Safety - Adult  Goal: Free from fall injury  Outcome: Progressing     Problem: Chronic Conditions and Co-morbidities  Goal: Patient's chronic conditions and co-morbidity symptoms are monitored and maintained or improved  5/24/2025 1956 by Anu Kay RN  Outcome: Progressing     Problem: ABCDS Injury Assessment  Goal: Absence of physical injury  Outcome: Progressing     Problem: Respiratory - Adult  Goal: Achieves optimal ventilation and oxygenation  Outcome: Progressing     Problem: Skin/Tissue Integrity - Adult  Goal: Skin integrity remains intact  Description: 1.  Monitor for areas of redness and/or skin breakdown2.  Assess vascular access sites hourly3.  Every 4-6 hours minimum:  Change oxygen saturation probe site4.  Every 4-6 hours:  If on nasal continuous positive airway pressure, respiratory therapy assess nares and determine need for appliance change or resting period  Outcome: Progressing     Problem: Metabolic/Fluid and Electrolytes - Adult  Goal: Electrolytes maintained within normal limits  5/24/2025 1956 by Anu Kay RN  Outcome: Progressing     Problem: Metabolic/Fluid and Electrolytes - Adult  Goal: Hemodynamic stability and optimal renal function maintained  5/24/2025 1956 by

## 2025-05-24 NOTE — PROGRESS NOTES
05/24/25 1431   Patient Observation   Pulse 85   Respirations 22   SpO2 96 %   Vent Information   Ventilator ID [id number]   Vent Mode AC/VC   Ventilator Settings   Vt (Set, mL) 300 mL   Resp Rate (Set) 18 bpm   PEEP/CPAP (cmH2O) 8   FiO2  40 %   Peak Inspiratory Flow (Set) 60 L/sec   Vent Patient Data (Readings)   Vt (Measured) 304 mL   Peak Inspiratory Pressure (cmH2O) 27 cmH2O   Rate Measured 24 br/min   Minute Volume (L/min) 7.56 Liters   Peak Inspiratory Flow (lpm) 60 L/sec   Mean Airway Pressure (cmH2O) 13 cmH20   Plateau Pressure (cm H2O) 22 cm H2O   Driving Pressure 14   I:E Ratio 1:3.30   Flow Sensitivity 3 L/min   Static Compliance (L/cm H2O) 14   Backup Apnea On   Backup Rate 18 Breaths Per Minute   Backup Vt 400   Vent Alarm Settings   High Pressure (cmH2O) 45 cmH2O   Low Minute Volume (lpm) 5 L/min   High Minute Volume (lpm) 16 L/min   Low Exhaled Vt (ml) 300 mL   High Exhaled Vt (ml) 800 mL   RR High (bpm) 30 br/min   Apnea (secs) 20 secs   Additional Respiratoray Assessments   Humidification Source Heated wire   Humidification Temp 36.8   Ambu Bag With Mask At Bedside Yes   ETT    Placement Date/Time: 05/21/25 1423   Present on Admission/Arrival: No  Placed By: In ED  Preoxygenation: Yes  Mask Ventilation: Mask ventilation not attempted (0)  Airway Tube Size: 7.5 mm  Location: Oral  Secured At: 23 cm  Measured From: Lips  Atrau...   Secured At 23 cm   Measured From Lips   ETT Placement Left   Secured By Commercial tube berry   Site Assessment Dry

## 2025-05-24 NOTE — PROGRESS NOTES
05/24/25 1124   Patient Observation   Pulse 87   Respirations 20   SpO2 100 %   Vent Information   Ventilator ID [id number]   Equipment Changed Humidification   Ventilator Settings   Vt (Set, mL) 300 mL   Resp Rate (Set) 18 bpm   PEEP/CPAP (cmH2O) 8   FiO2  40 %   Peak Inspiratory Flow (Set) 60 L/sec   Vent Patient Data (Readings)   Vt (Measured) 298 mL   Peak Inspiratory Pressure (cmH2O) 32 cmH2O   Rate Measured 20 br/min   Minute Volume (L/min) 6.56 Liters   Peak Inspiratory Flow (lpm) 60 L/sec   Mean Airway Pressure (cmH2O) 14 cmH20   Plateau Pressure (cm H2O) 26 cm H2O   Driving Pressure 18   I:E Ratio 1:3.50   Flow Sensitivity 3 L/min   Static Compliance (L/cm H2O) 15   Backup Apnea On   Backup Rate 18 Breaths Per Minute   Backup Vt 400   Vent Alarm Settings   High Pressure (cmH2O) 45 cmH2O   Low Minute Volume (lpm) 5 L/min   High Minute Volume (lpm) 16 L/min   Low Exhaled Vt (ml) 300 mL   High Exhaled Vt (ml) 800 mL   RR High (bpm) 30 br/min   Apnea (secs) 20 secs   Additional Respiratoray Assessments   Humidification Source Heated wire   Humidification Temp 36.7   Ambu Bag With Mask At Bedside Yes   ETT    Placement Date/Time: 05/21/25 1423   Present on Admission/Arrival: No  Placed By: In ED  Preoxygenation: Yes  Mask Ventilation: Mask ventilation not attempted (0)  Airway Tube Size: 7.5 mm  Location: Oral  Secured At: 23 cm  Measured From: Lips  Atrau...   Secured At 23 cm   Measured From Lips   ETT Placement Left   Secured By Commercial tube berry   Site Assessment Dry   Treatment   $Treatment Type $Inhaled Therapy/Meds

## 2025-05-24 NOTE — PROGRESS NOTES
05/24/25 0353   Patient Observation   Pulse 86   SpO2 98 %   Vent Information   Vent Mode AC/VC   Ventilator Settings   Vt (Set, mL) 400 mL   Resp Rate (Set) 18 bpm   PEEP/CPAP (cmH2O) 8   FiO2  (S)  40 %   Vent Patient Data (Readings)   Vt (Measured) 418 mL   Peak Inspiratory Pressure (cmH2O) 31 cmH2O   Rate Measured 22 br/min   Minute Volume (L/min) 9.04 Liters   Mean Airway Pressure (cmH2O) 16 cmH20   Plateau Pressure (cm H2O) 27 cm H2O   Driving Pressure 19   I:E Ratio 1:3.30   Vent Alarm Settings   High Pressure (cmH2O) 45 cmH2O     Weaned to 40%

## 2025-05-24 NOTE — PROGRESS NOTES
[facility]  SURGICAL INTENSIVE CARE UNIT (SICU)  ATTENDING PHYSICIAN CRITICAL CARE PROGRESS NOTE     I have examined the patient, reviewed the record,and discussed the case with the APN/  Resident. I have reviewed all relevant labs and imaging data. The following summarizes my clinical findings and independent assessment.       Date of admission:  5/21/2025    CC:     HOSPITAL COURSE:   Patient back from the OR; underwent C2-T2 posterior cervical fusion.  Surgery went well.  Patient intermittently bradycardic throughout the day, has required 2 doses of atropine.  Currently on low-dose of Levophed.  Alert and nodding appropriately to questions.  Went to the OR yesterday for fusion  5/23 bradycardia and atropine steroid started hypotension relative adrenal insufficiency  5/24 No overnight issues     New Imaging Reviewed and personally interpreted:    Chest x-ray ET tube in good position enteric tube under the diaphragm spinal hardware in place    New Labs reviewed   Recent Labs     05/21/25  1425 05/22/25  0420 05/22/25  1529 05/23/25  0432 05/24/25  0345   WBC 6.4   < > 12.2* 16.6* 16.3*   HGB 14.5   < > 13.7 13.1 12.5      < > 163 200 162   INR 1.1  --   --   --   --     < > = values in this interval not displayed.       Recent Labs     05/21/25  1425 05/21/25  1430 05/22/25  0420 05/22/25  1529 05/22/25  1540 05/23/25  0432 05/24/25  0345     --  145 145  --  146* 148*   K 3.7   < > 3.0* 4.7  --  3.8 3.3*   MG  --    < > 1.7  --  2.2 1.9 1.7   PHOS  --    < > 2.4*  --  3.7 5.6* 3.3   BUN 16  --  14 11  --  11 9   CREATININE 0.6  --  0.6 0.5  --  0.6 0.5   GLUCOSE 116*  --  117* 149*  --  203* 210*   LACTA 1.8  --   --  1.6  --   --   --    AST 26  --  41*  --   --  30 25   ALT 18  --  23  --   --  18 10   BILITOT 0.6  --  1.1  --   --  1.0 0.8    < > = values in this interval not displayed.       Recent Labs     05/24/25  0347   MODE AC   FIO2 45.0   RRMECH 18.0   VTMECH 400.0   PEEP

## 2025-05-24 NOTE — PLAN OF CARE
Problem: Respiratory - Adult  Goal: Achieves optimal ventilation and oxygenation  5/24/2025 0143 by Alena Lopez RCP  Outcome: Progressing

## 2025-05-24 NOTE — PLAN OF CARE
Problem: Discharge Planning  Goal: Discharge to home or other facility with appropriate resources  Outcome: Progressing  Flowsheets (Taken 5/24/2025 0800)  Discharge to home or other facility with appropriate resources: Identify barriers to discharge with patient and caregiver     Problem: Chronic Conditions and Co-morbidities  Goal: Patient's chronic conditions and co-morbidity symptoms are monitored and maintained or improved  Outcome: Progressing  Flowsheets (Taken 5/24/2025 0800)  Care Plan - Patient's Chronic Conditions and Co-Morbidity Symptoms are Monitored and Maintained or Improved:   Monitor and assess patient's chronic conditions and comorbid symptoms for stability, deterioration, or improvement   Collaborate with multidisciplinary team to address chronic and comorbid conditions and prevent exacerbation or deterioration   Update acute care plan with appropriate goals if chronic or comorbid symptoms are exacerbated and prevent overall improvement and discharge     Problem: Safety - Medical Restraint  Goal: Remains free of injury from restraints (Restraint for Interference with Medical Device)  Description: INTERVENTIONS:1. Determine that other, less restrictive measures have been tried or would not be effective before applying the restraint2. Evaluate the patient's condition at the time of restraint application3. Inform patient/family regarding the reason for restraint4. Q2H: Monitor safety, psychosocial status, comfort, nutrition and hydration  Outcome: Progressing  Flowsheets (Taken 5/24/2025 0800)  Remains free of injury from restraints (restraint for interference with medical device):   Determine that other, less restrictive measures have been tried or would not be effective before applying the restraint   Evaluate the patient's condition at the time of restraint application   Every 2 hours: Monitor safety, psychosocial status, comfort, nutrition and hydration     Problem: Metabolic/Fluid and

## 2025-05-25 ENCOUNTER — APPOINTMENT (OUTPATIENT)
Dept: GENERAL RADIOLOGY | Age: 70
DRG: 450 | End: 2025-05-25
Payer: MEDICARE

## 2025-05-25 PROBLEM — N31.9 NEUROGENIC BLADDER: Status: ACTIVE | Noted: 2025-05-25

## 2025-05-25 LAB
AADO2: 150.6 MMHG
ALBUMIN SERPL-MCNC: 2.6 G/DL (ref 3.5–5.2)
ALP SERPL-CCNC: 82 U/L (ref 35–104)
ALT SERPL-CCNC: 13 U/L (ref 0–35)
ANION GAP SERPL CALCULATED.3IONS-SCNC: 9 MMOL/L (ref 7–16)
AST SERPL-CCNC: 17 U/L (ref 0–35)
B.E.: 0.9 MMOL/L (ref -3–3)
BASOPHILS # BLD: 0.02 K/UL (ref 0–0.2)
BASOPHILS NFR BLD: 0 % (ref 0–2)
BILIRUB SERPL-MCNC: 0.7 MG/DL (ref 0–1.2)
BNP SERPL-MCNC: 834 PG/ML (ref 0–125)
BUN SERPL-MCNC: 10 MG/DL (ref 8–23)
CA-I BLD-SCNC: 1 MMOL/L (ref 1.15–1.33)
CALCIUM SERPL-MCNC: 6.9 MG/DL (ref 8.8–10.2)
CHLORIDE SERPL-SCNC: 113 MMOL/L (ref 98–107)
CO2 SERPL-SCNC: 22 MMOL/L (ref 22–29)
COHB: 0.3 % (ref 0–1.5)
CREAT SERPL-MCNC: 0.4 MG/DL (ref 0.5–1)
CRITICAL: ABNORMAL
DATE ANALYZED: ABNORMAL
DATE OF COLLECTION: ABNORMAL
EOSINOPHIL # BLD: 0.01 K/UL (ref 0.05–0.5)
EOSINOPHILS RELATIVE PERCENT: 0 % (ref 0–6)
ERYTHROCYTE [DISTWIDTH] IN BLOOD BY AUTOMATED COUNT: 16.8 % (ref 11.5–15)
FIO2: 40 %
GFR, ESTIMATED: >90 ML/MIN/1.73M2
GLUCOSE BLD-MCNC: 100 MG/DL (ref 74–99)
GLUCOSE BLD-MCNC: 115 MG/DL (ref 74–99)
GLUCOSE BLD-MCNC: 128 MG/DL (ref 74–99)
GLUCOSE BLD-MCNC: 143 MG/DL (ref 74–99)
GLUCOSE BLD-MCNC: 159 MG/DL (ref 74–99)
GLUCOSE BLD-MCNC: 159 MG/DL (ref 74–99)
GLUCOSE BLD-MCNC: 96 MG/DL (ref 74–99)
GLUCOSE SERPL-MCNC: 139 MG/DL (ref 74–99)
HCO3: 23.2 MMOL/L (ref 22–26)
HCT VFR BLD AUTO: 34.6 % (ref 34–48)
HGB BLD-MCNC: 11.3 G/DL (ref 11.5–15.5)
HHB: 1.8 % (ref 0–5)
IMM GRANULOCYTES # BLD AUTO: 0.13 K/UL (ref 0–0.58)
IMM GRANULOCYTES NFR BLD: 1 % (ref 0–5)
LAB: ABNORMAL
LYMPHOCYTES NFR BLD: 1.98 K/UL (ref 1.5–4)
LYMPHOCYTES RELATIVE PERCENT: 13 % (ref 20–42)
Lab: 412
MAGNESIUM SERPL-MCNC: 1.9 MG/DL (ref 1.6–2.4)
MBP CSF-MCNC: 5.33 NG/ML (ref 0–5.5)
MCH RBC QN AUTO: 29.7 PG (ref 26–35)
MCHC RBC AUTO-ENTMCNC: 32.7 G/DL (ref 32–34.5)
MCV RBC AUTO: 91.1 FL (ref 80–99.9)
METHB: 0 % (ref 0–1.5)
MICROORGANISM SPEC CULT: NO GROWTH
MICROORGANISM/AGENT SPEC: NORMAL
MODE: AC
MONOCYTES NFR BLD: 1.16 K/UL (ref 0.1–0.95)
MONOCYTES NFR BLD: 8 % (ref 2–12)
NEUTROPHILS NFR BLD: 78 % (ref 43–80)
NEUTS SEG NFR BLD: 11.76 K/UL (ref 1.8–7.3)
O2 SATURATION: 98.2 % (ref 92–98.5)
O2HB: 97.9 % (ref 94–97)
OPERATOR ID: 8219
PATIENT TEMP: 37 C
PCO2: 30.2 MMHG (ref 35–45)
PEEP/CPAP: 8 CMH2O
PFO2: 2.5 MMHG/%
PH BLOOD GAS: 7.5 (ref 7.35–7.45)
PHOSPHATE SERPL-MCNC: 1.8 MG/DL (ref 2.5–4.5)
PLATELET # BLD AUTO: 191 K/UL (ref 130–450)
PMV BLD AUTO: 11.2 FL (ref 7–12)
PO2: 99.9 MMHG (ref 75–100)
POTASSIUM SERPL-SCNC: 3.9 MMOL/L (ref 3.5–5.1)
PROT SERPL-MCNC: 5.6 G/DL (ref 6.4–8.3)
RBC # BLD AUTO: 3.8 M/UL (ref 3.5–5.5)
RI(T): 1.51
RR MECHANICAL: 18 B/MIN
SODIUM SERPL-SCNC: 145 MMOL/L (ref 136–145)
SOURCE, BLOOD GAS: ABNORMAL
SPECIMEN DESCRIPTION: NORMAL
THB: 12.2 G/DL (ref 11.5–16.5)
TIME ANALYZED: 415
VIT B1 PYROPHOSHATE BLD-SCNC: 219 NMOL/L (ref 70–180)
VT MECHANICAL: 400 ML
WBC OTHER # BLD: 15.1 K/UL (ref 4.5–11.5)

## 2025-05-25 PROCEDURE — 2500000003 HC RX 250 WO HCPCS: Performed by: SURGERY

## 2025-05-25 PROCEDURE — 6370000000 HC RX 637 (ALT 250 FOR IP)

## 2025-05-25 PROCEDURE — 2580000003 HC RX 258

## 2025-05-25 PROCEDURE — 94003 VENT MGMT INPAT SUBQ DAY: CPT

## 2025-05-25 PROCEDURE — 2500000003 HC RX 250 WO HCPCS

## 2025-05-25 PROCEDURE — 82805 BLOOD GASES W/O2 SATURATION: CPT

## 2025-05-25 PROCEDURE — 83735 ASSAY OF MAGNESIUM: CPT

## 2025-05-25 PROCEDURE — 6370000000 HC RX 637 (ALT 250 FOR IP): Performed by: SURGERY

## 2025-05-25 PROCEDURE — 6360000002 HC RX W HCPCS

## 2025-05-25 PROCEDURE — 6360000002 HC RX W HCPCS: Performed by: SURGERY

## 2025-05-25 PROCEDURE — 99291 CRITICAL CARE FIRST HOUR: CPT | Performed by: SURGERY

## 2025-05-25 PROCEDURE — 84100 ASSAY OF PHOSPHORUS: CPT

## 2025-05-25 PROCEDURE — 83880 ASSAY OF NATRIURETIC PEPTIDE: CPT

## 2025-05-25 PROCEDURE — 80053 COMPREHEN METABOLIC PANEL: CPT

## 2025-05-25 PROCEDURE — 71045 X-RAY EXAM CHEST 1 VIEW: CPT

## 2025-05-25 PROCEDURE — 2000000000 HC ICU R&B

## 2025-05-25 PROCEDURE — 37799 UNLISTED PX VASCULAR SURGERY: CPT

## 2025-05-25 PROCEDURE — 85025 COMPLETE CBC W/AUTO DIFF WBC: CPT

## 2025-05-25 PROCEDURE — 94640 AIRWAY INHALATION TREATMENT: CPT

## 2025-05-25 PROCEDURE — 82962 GLUCOSE BLOOD TEST: CPT

## 2025-05-25 PROCEDURE — 82330 ASSAY OF CALCIUM: CPT

## 2025-05-25 RX ORDER — OXYCODONE HCL 5 MG/5 ML
5 SOLUTION, ORAL ORAL EVERY 4 HOURS PRN
Refills: 0 | Status: DISCONTINUED | OUTPATIENT
Start: 2025-05-25 | End: 2025-05-26

## 2025-05-25 RX ADMIN — PREGABALIN 150 MG: 75 CAPSULE ORAL at 20:32

## 2025-05-25 RX ADMIN — CHLORHEXIDINE GLUCONATE, 0.12% ORAL RINSE 15 ML: 1.2 SOLUTION DENTAL at 14:00

## 2025-05-25 RX ADMIN — FENTANYL CITRATE 50 MCG: 50 INJECTION INTRAMUSCULAR; INTRAVENOUS at 17:43

## 2025-05-25 RX ADMIN — IPRATROPIUM BROMIDE AND ALBUTEROL SULFATE 1 DOSE: 2.5; .5 SOLUTION RESPIRATORY (INHALATION) at 12:14

## 2025-05-25 RX ADMIN — PETROLATUM: 420 OINTMENT TOPICAL at 09:08

## 2025-05-25 RX ADMIN — IPRATROPIUM BROMIDE AND ALBUTEROL SULFATE 1 DOSE: 2.5; .5 SOLUTION RESPIRATORY (INHALATION) at 20:12

## 2025-05-25 RX ADMIN — LACTULOSE 20 G: 20 SOLUTION ORAL at 19:37

## 2025-05-25 RX ADMIN — GUAIFENESIN 400 MG: 400 TABLET ORAL at 09:07

## 2025-05-25 RX ADMIN — SODIUM CHLORIDE, PRESERVATIVE FREE 10 ML: 5 INJECTION INTRAVENOUS at 07:28

## 2025-05-25 RX ADMIN — MINERAL OIL, WHITE PETROLATUM: .03; .94 OINTMENT OPHTHALMIC at 14:00

## 2025-05-25 RX ADMIN — CHLORHEXIDINE GLUCONATE, 0.12% ORAL RINSE 15 ML: 1.2 SOLUTION DENTAL at 00:11

## 2025-05-25 RX ADMIN — SODIUM CHLORIDE, PRESERVATIVE FREE 40 MG: 5 INJECTION INTRAVENOUS at 09:07

## 2025-05-25 RX ADMIN — PREGABALIN 150 MG: 75 CAPSULE ORAL at 14:30

## 2025-05-25 RX ADMIN — FENTANYL CITRATE 50 MCG: 50 INJECTION INTRAMUSCULAR; INTRAVENOUS at 13:38

## 2025-05-25 RX ADMIN — MINERAL OIL, WHITE PETROLATUM: .03; .94 OINTMENT OPHTHALMIC at 19:16

## 2025-05-25 RX ADMIN — MINERAL OIL, WHITE PETROLATUM: .03; .94 OINTMENT OPHTHALMIC at 09:07

## 2025-05-25 RX ADMIN — LACTULOSE 20 G: 20 SOLUTION ORAL at 09:07

## 2025-05-25 RX ADMIN — IPRATROPIUM BROMIDE AND ALBUTEROL SULFATE 1 DOSE: 2.5; .5 SOLUTION RESPIRATORY (INHALATION) at 08:20

## 2025-05-25 RX ADMIN — CHLORHEXIDINE GLUCONATE, 0.12% ORAL RINSE 15 ML: 1.2 SOLUTION DENTAL at 04:17

## 2025-05-25 RX ADMIN — HEPARIN SODIUM 5000 UNITS: 5000 INJECTION INTRAVENOUS; SUBCUTANEOUS at 21:30

## 2025-05-25 RX ADMIN — IPRATROPIUM BROMIDE AND ALBUTEROL SULFATE 1 DOSE: 2.5; .5 SOLUTION RESPIRATORY (INHALATION) at 17:00

## 2025-05-25 RX ADMIN — THEOPHYLLINE 100 MG: 80 SOLUTION ORAL at 22:04

## 2025-05-25 RX ADMIN — HYDROCORTISONE SODIUM SUCCINATE 100 MG: 100 INJECTION INTRAMUSCULAR; INTRAVENOUS at 14:00

## 2025-05-25 RX ADMIN — CHLORHEXIDINE GLUCONATE, 0.12% ORAL RINSE 15 ML: 1.2 SOLUTION DENTAL at 09:07

## 2025-05-25 RX ADMIN — THEOPHYLLINE 100 MG: 80 SOLUTION ORAL at 14:00

## 2025-05-25 RX ADMIN — GUAIFENESIN 400 MG: 400 TABLET ORAL at 19:38

## 2025-05-25 RX ADMIN — FENTANYL CITRATE 50 MCG: 50 INJECTION INTRAMUSCULAR; INTRAVENOUS at 09:07

## 2025-05-25 RX ADMIN — CHLORHEXIDINE GLUCONATE, 0.12% ORAL RINSE 15 ML: 1.2 SOLUTION DENTAL at 19:16

## 2025-05-25 RX ADMIN — SODIUM CHLORIDE, PRESERVATIVE FREE 10 ML: 5 INJECTION INTRAVENOUS at 19:18

## 2025-05-25 RX ADMIN — MINERAL OIL, WHITE PETROLATUM: .03; .94 OINTMENT OPHTHALMIC at 23:35

## 2025-05-25 RX ADMIN — MICONAZOLE NITRATE: 20 POWDER TOPICAL at 19:17

## 2025-05-25 RX ADMIN — ACETAMINOPHEN 650 MG: 650 SOLUTION ORAL at 17:51

## 2025-05-25 RX ADMIN — BISACODYL 10 MG: 10 SUPPOSITORY RECTAL at 09:07

## 2025-05-25 RX ADMIN — LACTULOSE 20 G: 20 SOLUTION ORAL at 13:59

## 2025-05-25 RX ADMIN — HYDROCORTISONE SODIUM SUCCINATE 100 MG: 100 INJECTION INTRAMUSCULAR; INTRAVENOUS at 04:18

## 2025-05-25 RX ADMIN — ACETAMINOPHEN 650 MG: 650 SOLUTION ORAL at 22:03

## 2025-05-25 RX ADMIN — PREGABALIN 150 MG: 75 CAPSULE ORAL at 09:07

## 2025-05-25 RX ADMIN — HEPARIN SODIUM 5000 UNITS: 5000 INJECTION INTRAVENOUS; SUBCUTANEOUS at 05:32

## 2025-05-25 RX ADMIN — HYDROCORTISONE SODIUM SUCCINATE 100 MG: 100 INJECTION INTRAMUSCULAR; INTRAVENOUS at 20:15

## 2025-05-25 RX ADMIN — MUPIROCIN: 20 OINTMENT TOPICAL at 19:16

## 2025-05-25 RX ADMIN — PETROLATUM: 420 OINTMENT TOPICAL at 19:17

## 2025-05-25 RX ADMIN — POLYETHYLENE GLYCOL 3350 17 G: 17 POWDER, FOR SOLUTION ORAL at 09:07

## 2025-05-25 RX ADMIN — WATER 1000 MG: 1 INJECTION INTRAMUSCULAR; INTRAVENOUS; SUBCUTANEOUS at 20:32

## 2025-05-25 RX ADMIN — ACETAMINOPHEN 650 MG: 650 SOLUTION ORAL at 13:59

## 2025-05-25 RX ADMIN — MICONAZOLE NITRATE: 20 POWDER TOPICAL at 09:08

## 2025-05-25 RX ADMIN — ACETAMINOPHEN 650 MG: 650 SOLUTION ORAL at 04:16

## 2025-05-25 RX ADMIN — MINERAL OIL, WHITE PETROLATUM: .03; .94 OINTMENT OPHTHALMIC at 04:17

## 2025-05-25 RX ADMIN — MUPIROCIN: 20 OINTMENT TOPICAL at 09:07

## 2025-05-25 RX ADMIN — POLYETHYLENE GLYCOL 3350 17 G: 17 POWDER, FOR SOLUTION ORAL at 19:40

## 2025-05-25 RX ADMIN — MINERAL OIL, WHITE PETROLATUM: .03; .94 OINTMENT OPHTHALMIC at 17:51

## 2025-05-25 RX ADMIN — CHLORHEXIDINE GLUCONATE, 0.12% ORAL RINSE 15 ML: 1.2 SOLUTION DENTAL at 17:52

## 2025-05-25 RX ADMIN — FENTANYL CITRATE 50 MCG: 50 INJECTION INTRAMUSCULAR; INTRAVENOUS at 07:28

## 2025-05-25 RX ADMIN — HEPARIN SODIUM 5000 UNITS: 5000 INJECTION INTRAVENOUS; SUBCUTANEOUS at 14:00

## 2025-05-25 RX ADMIN — Medication 6 MCG/MIN: at 02:19

## 2025-05-25 RX ADMIN — GUAIFENESIN 400 MG: 400 TABLET ORAL at 14:00

## 2025-05-25 RX ADMIN — CHLORHEXIDINE GLUCONATE, 0.12% ORAL RINSE 15 ML: 1.2 SOLUTION DENTAL at 23:35

## 2025-05-25 ASSESSMENT — PULMONARY FUNCTION TESTS
PIF_VALUE: 23
PIF_VALUE: 29
PIF_VALUE: 34
PIF_VALUE: 22
PIF_VALUE: 27
PIF_VALUE: 29
PIF_VALUE: 29
PIF_VALUE: 23
PIF_VALUE: 28
PIF_VALUE: 34
PIF_VALUE: 21
PIF_VALUE: 27
PIF_VALUE: 29
PIF_VALUE: 23
PIF_VALUE: 28
PIF_VALUE: 30
PIF_VALUE: 36
PIF_VALUE: 26
PIF_VALUE: 28
PIF_VALUE: 33
PIF_VALUE: 25
PIF_VALUE: 42
PIF_VALUE: 29
PIF_VALUE: 30
PIF_VALUE: 33
PIF_VALUE: 37
PIF_VALUE: 33
PIF_VALUE: 31
PIF_VALUE: 23
PIF_VALUE: 32
PIF_VALUE: 34
PIF_VALUE: 30
PIF_VALUE: 23

## 2025-05-25 ASSESSMENT — PAIN - FUNCTIONAL ASSESSMENT: PAIN_FUNCTIONAL_ASSESSMENT: PREVENTS OR INTERFERES SOME ACTIVE ACTIVITIES AND ADLS

## 2025-05-25 ASSESSMENT — PAIN SCALES - GENERAL
PAINLEVEL_OUTOF10: 0
PAINLEVEL_OUTOF10: 9
PAINLEVEL_OUTOF10: 0

## 2025-05-25 NOTE — PROGRESS NOTES
Palliative Care Department  [phone]  Palliative Care Progress Note  Provider Jamila Hancock, APRN - CNP     Alena Busby  [id number]  Hospital Day: 4  Date of Initial Consult: 5/23/25  Referring Provider: Dr. Currie  Palliative Medicine was consulted for assistance with: trauma, new paralysis from cervical fx, goals of care, overwhelming symptoms     HPI:   Alena Busby is a 70 y.o. with a medical history of COPD, mobility dysfunction, who was admitted on 5/21/2025 from [facility] with a CHIEF COMPLAINT of trauma.  Patient fell off of the transport van in her wheelchair at nursing facility. CTA cervical spine demonstrates C5-6 right perched facets, C5-6 left jumped facets with quadriplegia, she was intubated for impending respiratory failure.  Neurogenic shock, admitted to SICU.  5/22 OR for C2-T2 posterior cervical fusion, return to SICU on pressors and had intermittent bradycardia.  She remains intubated on Levophed.  Palliative consulted for further assistance with goals of care.    ASSESSMENT/PLAN:     Pertinent Hospital Diagnoses     C5 fracture subluxation status post C2-T2 PCF   Neurogenic shock  Acute respiratory failure secondary to spinal shock      Palliative Care Encounter / Counseling Regarding Goals of Care  Please see detailed goals of care discussion as below  At this time, Alena Busby, Does Not have capacity for medical decision-making.  Capacity is time limited and situation/question specific  During encounter Katia was surrogate medical decision-maker  Outcome of goals of care meeting:   Continue current medical management and Full Code  Family discussing trach/peg VS. Comfort measures, want to monitor clinical progression and continue to discuss for a few days, will have a decision by mid-week  Code status Full Code  Advanced Directives:  POA or living will in [facility]  Surrogate/Legal NOK:  Katia Gaines (POA/daughter) [phone]        Spiritual assessment: no spiritual

## 2025-05-25 NOTE — PROGRESS NOTES
Department of Neurosurgery  Progress Note    CHIEF COMPLAINT: s/p C2-T2 fusion    SUBJECTIVE: Remains intubated, answers to yes/no questions. Denies any new complaints.     REVIEW OF SYSTEMS :  Constitutional: Intubated    OBJECTIVE:   VITALS:  BP (!) 110/47   Pulse 88   Temp 99.3 °F (37.4 °C) (Bladder)   Resp 18   Ht 1.575 m (5' 2.01\")   Wt 93.4 kg (205 lb 14.6 oz)   SpO2 94%   BMI 37.65 kg/m²     PHYSICAL:  Intubated  Alert  Wakes up to name   Follows commands  PERRL  EOMI  Dressing c/d/i    DATA:  CBC:   Lab Results   Component Value Date/Time    WBC 15.1 05/25/2025 04:15 AM    RBC 3.80 05/25/2025 04:15 AM    HGB 11.3 05/25/2025 04:15 AM    HCT 34.6 05/25/2025 04:15 AM    MCV 91.1 05/25/2025 04:15 AM    MCH 29.7 05/25/2025 04:15 AM    MCHC 32.7 05/25/2025 04:15 AM    RDW 16.8 05/25/2025 04:15 AM     05/25/2025 04:15 AM    MPV 11.2 05/25/2025 04:15 AM     BMP:    Lab Results   Component Value Date/Time     05/25/2025 04:15 AM    K 3.9 05/25/2025 04:15 AM    K 4.0 02/03/2022 08:21 PM     05/25/2025 04:15 AM    CO2 22 05/25/2025 04:15 AM    BUN 10 05/25/2025 04:15 AM    CREATININE 0.4 05/25/2025 04:15 AM    CALCIUM 6.9 05/25/2025 04:15 AM    GFRAA >60 02/11/2022 05:17 AM    LABGLOM >90 05/25/2025 04:15 AM    LABGLOM >60 03/01/2024 07:27 AM    GLUCOSE 139 05/25/2025 04:15 AM     PT/INR:    Lab Results   Component Value Date/Time    PROTIME 12.1 05/21/2025 02:25 PM    INR 1.1 05/21/2025 02:25 PM     PTT:    Lab Results   Component Value Date/Time    APTT 33.2 05/21/2025 02:25 PM   [APTT}    Current Inpatient Medications  Current Facility-Administered Medications: heparin (porcine) injection 5,000 Units, 5,000 Units, SubCUTAneous, 3 times per day  polyethylene glycol (GLYCOLAX) packet 17 g, 17 g, Oral, BID  lactulose (CHRONULAC) 10 GM/15ML solution 20 g, 20 g, Oral, TID  insulin lispro (HUMALOG,ADMELOG) injection vial 0-8 Units, 0-8 Units, SubCUTAneous, Q4H  mupirocin (BACTROBAN) 2 % ointment,

## 2025-05-25 NOTE — PROGRESS NOTES
[facility]  SURGICAL INTENSIVE CARE UNIT (SICU)  ATTENDING PHYSICIAN CRITICAL CARE PROGRESS NOTE     I have examined the patient, reviewed the record,and discussed the case with the APN/  Resident. I have reviewed all relevant labs and imaging data. The following summarizes my clinical findings and independent assessment.       Date of admission:  5/21/2025    CC: Fall    HOSPITAL COURSE:   Patient back from the OR; underwent C2-T2 posterior cervical fusion.  Surgery went well.  Patient intermittently bradycardic throughout the day, has required 2 doses of atropine.  Currently on low-dose of Levophed.  Alert and nodding appropriately to questions.  Went to the OR yesterday for fusion  5/23 bradycardia and atropine steroid started hypotension relative adrenal insufficiency  5/24 No overnight issues   5/25 no overnight issues    New Imaging Reviewed and personally interpreted:    Chest x-ray ET tube in good position enteric tube under the diaphragm spinal hardware in place left central line in place    New Labs reviewed   Recent Labs     05/23/25  0432 05/24/25  0345 05/25/25  0415   WBC 16.6* 16.3* 15.1*   HGB 13.1 12.5 11.3*    162 191       Recent Labs     05/22/25  1529 05/22/25  1540 05/23/25  0432 05/24/25  0345 05/25/25  0415     --  146* 148* 145   K 4.7  --  3.8 3.3* 3.9   MG  --    < > 1.9 1.7 1.9   PHOS  --    < > 5.6* 3.3 1.8*   BUN 11  --  11 9 10   CREATININE 0.5  --  0.6 0.5 0.4*   GLUCOSE 149*  --  203* 210* 139*   LACTA 1.6  --   --   --   --    AST  --   --  30 25 17   ALT  --   --  18 10 13   BILITOT  --   --  1.0 0.8 0.7    < > = values in this interval not displayed.       Recent Labs     05/25/25  0412   MODE AC   FIO2 40.0   RRMECH 18.0   VTMECH 400.0   PEEP 8.0   PFO2 2.50         Physical Exam  Eyes:      Pupils: Pupils are equal, round, and reactive to light.   Cardiovascular:      Rate and Rhythm: Bradycardia present.   Pulmonary:      Comments:

## 2025-05-26 ENCOUNTER — APPOINTMENT (OUTPATIENT)
Dept: GENERAL RADIOLOGY | Age: 70
DRG: 450 | End: 2025-05-26
Payer: MEDICARE

## 2025-05-26 LAB
AADO2: 152.1 MMHG
ALBUMIN SERPL-MCNC: 2.6 G/DL (ref 3.5–5.2)
ALP SERPL-CCNC: 82 U/L (ref 35–104)
ALT SERPL-CCNC: 12 U/L (ref 0–35)
ANION GAP SERPL CALCULATED.3IONS-SCNC: 10 MMOL/L (ref 7–16)
AST SERPL-CCNC: 17 U/L (ref 0–35)
B.E.: 1.1 MMOL/L (ref -3–3)
BASOPHILS # BLD: 0.02 K/UL (ref 0–0.2)
BASOPHILS NFR BLD: 0 % (ref 0–2)
BILIRUB SERPL-MCNC: 0.6 MG/DL (ref 0–1.2)
BUN SERPL-MCNC: 10 MG/DL (ref 8–23)
CA-I BLD-SCNC: 0.99 MMOL/L (ref 1.15–1.33)
CALCIUM SERPL-MCNC: 6.6 MG/DL (ref 8.8–10.2)
CHLORIDE SERPL-SCNC: 110 MMOL/L (ref 98–107)
CO2 SERPL-SCNC: 25 MMOL/L (ref 22–29)
COHB: 0.6 % (ref 0–1.5)
CREAT SERPL-MCNC: 0.3 MG/DL (ref 0.5–1)
CRITICAL: ABNORMAL
DATE ANALYZED: ABNORMAL
DATE OF COLLECTION: ABNORMAL
EOSINOPHIL # BLD: 0.01 K/UL (ref 0.05–0.5)
EOSINOPHILS RELATIVE PERCENT: 0 % (ref 0–6)
ERYTHROCYTE [DISTWIDTH] IN BLOOD BY AUTOMATED COUNT: 17 % (ref 11.5–15)
FIO2: 40 %
GFR, ESTIMATED: >90 ML/MIN/1.73M2
GLUCOSE BLD-MCNC: 130 MG/DL (ref 74–99)
GLUCOSE BLD-MCNC: 136 MG/DL (ref 74–99)
GLUCOSE BLD-MCNC: 137 MG/DL (ref 74–99)
GLUCOSE BLD-MCNC: 158 MG/DL (ref 74–99)
GLUCOSE BLD-MCNC: 162 MG/DL (ref 74–99)
GLUCOSE BLD-MCNC: 165 MG/DL (ref 74–99)
GLUCOSE SERPL-MCNC: 141 MG/DL (ref 74–99)
HCO3: 24.8 MMOL/L (ref 22–26)
HCT VFR BLD AUTO: 33.1 % (ref 34–48)
HGB BLD-MCNC: 10.9 G/DL (ref 11.5–15.5)
HHB: 2.5 % (ref 0–5)
IMM GRANULOCYTES # BLD AUTO: 0.09 K/UL (ref 0–0.58)
IMM GRANULOCYTES NFR BLD: 1 % (ref 0–5)
LAB: ABNORMAL
LYMPHOCYTES NFR BLD: 1.65 K/UL (ref 1.5–4)
LYMPHOCYTES RELATIVE PERCENT: 16 % (ref 20–42)
Lab: 425
MAGNESIUM SERPL-MCNC: 1.8 MG/DL (ref 1.6–2.4)
MCH RBC QN AUTO: 30.5 PG (ref 26–35)
MCHC RBC AUTO-ENTMCNC: 32.9 G/DL (ref 32–34.5)
MCV RBC AUTO: 92.7 FL (ref 80–99.9)
METHB: 0 % (ref 0–1.5)
MICROORGANISM SPEC CULT: NORMAL
MICROORGANISM SPEC CULT: NORMAL
MODE: AC
MONOCYTES NFR BLD: 0.73 K/UL (ref 0.1–0.95)
MONOCYTES NFR BLD: 7 % (ref 2–12)
NEUTROPHILS NFR BLD: 75 % (ref 43–80)
NEUTS SEG NFR BLD: 7.57 K/UL (ref 1.8–7.3)
O2 SATURATION: 97.5 % (ref 92–98.5)
O2HB: 96.9 % (ref 94–97)
OPERATOR ID: 8219
PATIENT TEMP: 37 C
PCO2: 36.1 MMHG (ref 35–45)
PEEP/CPAP: 8 CMH2O
PFO2: 2.29 MMHG/%
PH BLOOD GAS: 7.46 (ref 7.35–7.45)
PHOSPHATE SERPL-MCNC: 3.6 MG/DL (ref 2.5–4.5)
PLATELET # BLD AUTO: 160 K/UL (ref 130–450)
PMV BLD AUTO: 10.5 FL (ref 7–12)
PO2: 91.6 MMHG (ref 75–100)
POTASSIUM SERPL-SCNC: 4 MMOL/L (ref 3.5–5.1)
PROT SERPL-MCNC: 5.6 G/DL (ref 6.4–8.3)
RBC # BLD AUTO: 3.57 M/UL (ref 3.5–5.5)
RI(T): 1.66
RR MECHANICAL: 18 B/MIN
SERVICE CMNT-IMP: NORMAL
SERVICE CMNT-IMP: NORMAL
SODIUM SERPL-SCNC: 145 MMOL/L (ref 136–145)
SOURCE, BLOOD GAS: ABNORMAL
SPECIMEN DESCRIPTION: NORMAL
SPECIMEN DESCRIPTION: NORMAL
T3 SERPL-MCNC: 57 NG/DL (ref 80–200)
THB: 11.1 G/DL (ref 11.5–16.5)
TIME ANALYZED: 432
TSH SERPL DL<=0.05 MIU/L-ACNC: 0.12 UIU/ML (ref 0.27–4.2)
VT MECHANICAL: 300 ML
WBC OTHER # BLD: 10.1 K/UL (ref 4.5–11.5)

## 2025-05-26 PROCEDURE — 37799 UNLISTED PX VASCULAR SURGERY: CPT

## 2025-05-26 PROCEDURE — 94640 AIRWAY INHALATION TREATMENT: CPT

## 2025-05-26 PROCEDURE — 84100 ASSAY OF PHOSPHORUS: CPT

## 2025-05-26 PROCEDURE — 2000000000 HC ICU R&B

## 2025-05-26 PROCEDURE — 80053 COMPREHEN METABOLIC PANEL: CPT

## 2025-05-26 PROCEDURE — 82962 GLUCOSE BLOOD TEST: CPT

## 2025-05-26 PROCEDURE — 6370000000 HC RX 637 (ALT 250 FOR IP)

## 2025-05-26 PROCEDURE — 83735 ASSAY OF MAGNESIUM: CPT

## 2025-05-26 PROCEDURE — 36415 COLL VENOUS BLD VENIPUNCTURE: CPT

## 2025-05-26 PROCEDURE — 82330 ASSAY OF CALCIUM: CPT

## 2025-05-26 PROCEDURE — 2580000003 HC RX 258

## 2025-05-26 PROCEDURE — 71045 X-RAY EXAM CHEST 1 VIEW: CPT

## 2025-05-26 PROCEDURE — 2500000003 HC RX 250 WO HCPCS: Performed by: SURGERY

## 2025-05-26 PROCEDURE — 94003 VENT MGMT INPAT SUBQ DAY: CPT

## 2025-05-26 PROCEDURE — 2500000003 HC RX 250 WO HCPCS

## 2025-05-26 PROCEDURE — 6370000000 HC RX 637 (ALT 250 FOR IP): Performed by: SURGERY

## 2025-05-26 PROCEDURE — 82805 BLOOD GASES W/O2 SATURATION: CPT

## 2025-05-26 PROCEDURE — 84480 ASSAY TRIIODOTHYRONINE (T3): CPT

## 2025-05-26 PROCEDURE — 6360000002 HC RX W HCPCS: Performed by: SURGERY

## 2025-05-26 PROCEDURE — 6360000002 HC RX W HCPCS

## 2025-05-26 PROCEDURE — 85025 COMPLETE CBC W/AUTO DIFF WBC: CPT

## 2025-05-26 PROCEDURE — 99291 CRITICAL CARE FIRST HOUR: CPT | Performed by: SURGERY

## 2025-05-26 PROCEDURE — 84443 ASSAY THYROID STIM HORMONE: CPT

## 2025-05-26 RX ORDER — MIDODRINE HYDROCHLORIDE 5 MG/1
5 TABLET ORAL 3 TIMES DAILY
Status: DISCONTINUED | OUTPATIENT
Start: 2025-05-26 | End: 2025-05-27

## 2025-05-26 RX ORDER — CALCIUM GLUCONATE 20 MG/ML
2000 INJECTION, SOLUTION INTRAVENOUS ONCE
Status: COMPLETED | OUTPATIENT
Start: 2025-05-26 | End: 2025-05-26

## 2025-05-26 RX ORDER — METHOCARBAMOL 500 MG/1
500 TABLET, FILM COATED ORAL 4 TIMES DAILY
Status: DISCONTINUED | OUTPATIENT
Start: 2025-05-26 | End: 2025-05-29 | Stop reason: HOSPADM

## 2025-05-26 RX ORDER — SENNA AND DOCUSATE SODIUM 50; 8.6 MG/1; MG/1
2 TABLET, FILM COATED ORAL DAILY
Status: DISCONTINUED | OUTPATIENT
Start: 2025-05-26 | End: 2025-05-29 | Stop reason: HOSPADM

## 2025-05-26 RX ORDER — BISACODYL 10 MG
10 SUPPOSITORY, RECTAL RECTAL 2 TIMES DAILY
Status: DISCONTINUED | OUTPATIENT
Start: 2025-05-26 | End: 2025-05-29 | Stop reason: HOSPADM

## 2025-05-26 RX ORDER — MAGNESIUM SULFATE IN WATER 40 MG/ML
2000 INJECTION, SOLUTION INTRAVENOUS ONCE
Status: COMPLETED | OUTPATIENT
Start: 2025-05-26 | End: 2025-05-26

## 2025-05-26 RX ORDER — OXYCODONE HCL 5 MG/5 ML
2.5 SOLUTION, ORAL ORAL EVERY 4 HOURS PRN
Refills: 0 | Status: DISCONTINUED | OUTPATIENT
Start: 2025-05-26 | End: 2025-05-29 | Stop reason: HOSPADM

## 2025-05-26 RX ADMIN — MIDODRINE HYDROCHLORIDE 5 MG: 5 TABLET ORAL at 11:56

## 2025-05-26 RX ADMIN — MINERAL OIL, WHITE PETROLATUM: .03; .94 OINTMENT OPHTHALMIC at 03:04

## 2025-05-26 RX ADMIN — MINERAL OIL, WHITE PETROLATUM: .03; .94 OINTMENT OPHTHALMIC at 07:53

## 2025-05-26 RX ADMIN — HYDROCORTISONE SODIUM SUCCINATE 100 MG: 100 INJECTION INTRAMUSCULAR; INTRAVENOUS at 04:17

## 2025-05-26 RX ADMIN — SENNOSIDES AND DOCUSATE SODIUM 2 TABLET: 50; 8.6 TABLET ORAL at 07:53

## 2025-05-26 RX ADMIN — IPRATROPIUM BROMIDE AND ALBUTEROL SULFATE 1 DOSE: 2.5; .5 SOLUTION RESPIRATORY (INHALATION) at 11:48

## 2025-05-26 RX ADMIN — MIDODRINE HYDROCHLORIDE 5 MG: 5 TABLET ORAL at 16:27

## 2025-05-26 RX ADMIN — PETROLATUM: 420 OINTMENT TOPICAL at 19:33

## 2025-05-26 RX ADMIN — SODIUM CHLORIDE, PRESERVATIVE FREE 10 ML: 5 INJECTION INTRAVENOUS at 19:34

## 2025-05-26 RX ADMIN — PETROLATUM: 420 OINTMENT TOPICAL at 07:53

## 2025-05-26 RX ADMIN — PREGABALIN 150 MG: 75 CAPSULE ORAL at 16:28

## 2025-05-26 RX ADMIN — MICONAZOLE NITRATE: 20 POWDER TOPICAL at 19:33

## 2025-05-26 RX ADMIN — METHOCARBAMOL 500 MG: 500 TABLET ORAL at 11:56

## 2025-05-26 RX ADMIN — CHLORHEXIDINE GLUCONATE, 0.12% ORAL RINSE 15 ML: 1.2 SOLUTION DENTAL at 07:52

## 2025-05-26 RX ADMIN — CHLORHEXIDINE GLUCONATE, 0.12% ORAL RINSE 15 ML: 1.2 SOLUTION DENTAL at 03:04

## 2025-05-26 RX ADMIN — LACTULOSE 20 G: 20 SOLUTION ORAL at 07:52

## 2025-05-26 RX ADMIN — FENTANYL CITRATE 50 MCG: 50 INJECTION INTRAMUSCULAR; INTRAVENOUS at 11:56

## 2025-05-26 RX ADMIN — THEOPHYLLINE 100 MG: 80 SOLUTION ORAL at 22:03

## 2025-05-26 RX ADMIN — FENTANYL CITRATE 50 MCG: 50 INJECTION INTRAMUSCULAR; INTRAVENOUS at 14:44

## 2025-05-26 RX ADMIN — HYDROCORTISONE SODIUM SUCCINATE 100 MG: 100 INJECTION INTRAMUSCULAR; INTRAVENOUS at 22:02

## 2025-05-26 RX ADMIN — ACETAMINOPHEN 650 MG: 650 SOLUTION ORAL at 22:02

## 2025-05-26 RX ADMIN — CHLORHEXIDINE GLUCONATE, 0.12% ORAL RINSE 15 ML: 1.2 SOLUTION DENTAL at 23:00

## 2025-05-26 RX ADMIN — THEOPHYLLINE 100 MG: 80 SOLUTION ORAL at 12:01

## 2025-05-26 RX ADMIN — ACETAMINOPHEN 650 MG: 650 SOLUTION ORAL at 16:28

## 2025-05-26 RX ADMIN — POLYETHYLENE GLYCOL 3350 17 G: 17 POWDER, FOR SOLUTION ORAL at 07:57

## 2025-05-26 RX ADMIN — WATER 1000 MG: 1 INJECTION INTRAMUSCULAR; INTRAVENOUS; SUBCUTANEOUS at 20:31

## 2025-05-26 RX ADMIN — LACTULOSE 20 G: 20 SOLUTION ORAL at 16:27

## 2025-05-26 RX ADMIN — PREGABALIN 150 MG: 75 CAPSULE ORAL at 08:30

## 2025-05-26 RX ADMIN — BISACODYL 10 MG: 10 SUPPOSITORY RECTAL at 07:52

## 2025-05-26 RX ADMIN — MINERAL OIL, WHITE PETROLATUM: .03; .94 OINTMENT OPHTHALMIC at 16:29

## 2025-05-26 RX ADMIN — HEPARIN SODIUM 5000 UNITS: 5000 INJECTION INTRAVENOUS; SUBCUTANEOUS at 16:28

## 2025-05-26 RX ADMIN — FENTANYL CITRATE 50 MCG: 50 INJECTION INTRAMUSCULAR; INTRAVENOUS at 07:57

## 2025-05-26 RX ADMIN — MINERAL OIL, WHITE PETROLATUM: .03; .94 OINTMENT OPHTHALMIC at 12:01

## 2025-05-26 RX ADMIN — GUAIFENESIN 400 MG: 400 TABLET ORAL at 16:27

## 2025-05-26 RX ADMIN — METHOCARBAMOL 500 MG: 500 TABLET ORAL at 16:28

## 2025-05-26 RX ADMIN — POLYETHYLENE GLYCOL 3350 17 G: 17 POWDER, FOR SOLUTION ORAL at 19:31

## 2025-05-26 RX ADMIN — SODIUM CHLORIDE, PRESERVATIVE FREE 10 ML: 5 INJECTION INTRAVENOUS at 08:16

## 2025-05-26 RX ADMIN — MINERAL OIL, WHITE PETROLATUM: .03; .94 OINTMENT OPHTHALMIC at 22:57

## 2025-05-26 RX ADMIN — METHOCARBAMOL 500 MG: 500 TABLET ORAL at 07:52

## 2025-05-26 RX ADMIN — MAGNESIUM SULFATE HEPTAHYDRATE 2000 MG: 40 INJECTION, SOLUTION INTRAVENOUS at 08:12

## 2025-05-26 RX ADMIN — MUPIROCIN: 20 OINTMENT TOPICAL at 07:53

## 2025-05-26 RX ADMIN — MICONAZOLE NITRATE: 20 POWDER TOPICAL at 07:53

## 2025-05-26 RX ADMIN — IPRATROPIUM BROMIDE AND ALBUTEROL SULFATE 1 DOSE: 2.5; .5 SOLUTION RESPIRATORY (INHALATION) at 19:10

## 2025-05-26 RX ADMIN — HYDROCORTISONE SODIUM SUCCINATE 100 MG: 100 INJECTION INTRAMUSCULAR; INTRAVENOUS at 16:29

## 2025-05-26 RX ADMIN — IPRATROPIUM BROMIDE AND ALBUTEROL SULFATE 1 DOSE: 2.5; .5 SOLUTION RESPIRATORY (INHALATION) at 08:19

## 2025-05-26 RX ADMIN — PREGABALIN 150 MG: 75 CAPSULE ORAL at 20:34

## 2025-05-26 RX ADMIN — IPRATROPIUM BROMIDE AND ALBUTEROL SULFATE 1 DOSE: 2.5; .5 SOLUTION RESPIRATORY (INHALATION) at 16:00

## 2025-05-26 RX ADMIN — HEPARIN SODIUM 5000 UNITS: 5000 INJECTION INTRAVENOUS; SUBCUTANEOUS at 05:31

## 2025-05-26 RX ADMIN — SODIUM CHLORIDE, PRESERVATIVE FREE 40 MG: 5 INJECTION INTRAVENOUS at 07:52

## 2025-05-26 RX ADMIN — LACTULOSE 20 G: 20 SOLUTION ORAL at 19:33

## 2025-05-26 RX ADMIN — MINERAL OIL, WHITE PETROLATUM: .03; .94 OINTMENT OPHTHALMIC at 19:35

## 2025-05-26 RX ADMIN — ACETAMINOPHEN 650 MG: 650 SOLUTION ORAL at 04:17

## 2025-05-26 RX ADMIN — CHLORHEXIDINE GLUCONATE, 0.12% ORAL RINSE 15 ML: 1.2 SOLUTION DENTAL at 12:30

## 2025-05-26 RX ADMIN — BISACODYL 10 MG: 10 SUPPOSITORY RECTAL at 19:34

## 2025-05-26 RX ADMIN — ACETAMINOPHEN 650 MG: 650 SOLUTION ORAL at 12:00

## 2025-05-26 RX ADMIN — GUAIFENESIN 400 MG: 400 TABLET ORAL at 19:34

## 2025-05-26 RX ADMIN — CALCIUM GLUCONATE 2000 MG: 20 INJECTION, SOLUTION INTRAVENOUS at 08:12

## 2025-05-26 RX ADMIN — HEPARIN SODIUM 5000 UNITS: 5000 INJECTION INTRAVENOUS; SUBCUTANEOUS at 22:02

## 2025-05-26 RX ADMIN — GUAIFENESIN 400 MG: 400 TABLET ORAL at 07:53

## 2025-05-26 RX ADMIN — METHOCARBAMOL 500 MG: 500 TABLET ORAL at 19:34

## 2025-05-26 RX ADMIN — MIDODRINE HYDROCHLORIDE 5 MG: 5 TABLET ORAL at 19:34

## 2025-05-26 RX ADMIN — CHLORHEXIDINE GLUCONATE, 0.12% ORAL RINSE 15 ML: 1.2 SOLUTION DENTAL at 16:28

## 2025-05-26 RX ADMIN — CHLORHEXIDINE GLUCONATE, 0.12% ORAL RINSE 15 ML: 1.2 SOLUTION DENTAL at 19:33

## 2025-05-26 RX ADMIN — MUPIROCIN: 20 OINTMENT TOPICAL at 19:33

## 2025-05-26 ASSESSMENT — PULMONARY FUNCTION TESTS
PIF_VALUE: 27
PIF_VALUE: 27
PIF_VALUE: 26
PIF_VALUE: 27
PIF_VALUE: 27
PIF_VALUE: 26
PIF_VALUE: 27
PIF_VALUE: 28
PIF_VALUE: 34
PIF_VALUE: 29
PIF_VALUE: 27
PIF_VALUE: 34
PIF_VALUE: 26
PIF_VALUE: 27
PIF_VALUE: 27
PIF_VALUE: 26
PIF_VALUE: 29
PIF_VALUE: 18
PIF_VALUE: 30
PIF_VALUE: 26
PIF_VALUE: 26
PIF_VALUE: 31
PIF_VALUE: 27
PIF_VALUE: 34
PIF_VALUE: 26
PIF_VALUE: 27
PIF_VALUE: 30
PIF_VALUE: 27

## 2025-05-26 ASSESSMENT — PAIN SCALES - GENERAL
PAINLEVEL_OUTOF10: 0

## 2025-05-26 NOTE — PROGRESS NOTES
Department of Neurosurgery  Attending Progress Note    CHIEF COMPLAINT:    SUBJECTIVE:  no new events    ROS:    OBJECTIVE  Physical  VITALS:  BP (!) 127/53   Pulse 69   Temp 97.9 °F (36.6 °C) (Bladder)   Resp 18   Ht 1.575 m (5' 2.01\")   Wt 93.4 kg (205 lb 14.6 oz)   SpO2 96%   BMI 37.65 kg/m²   Intubated, PERRLA  2/5 in UE  1/5 in LE    Data  CBC:   Lab Results   Component Value Date/Time    WBC 10.1 05/26/2025 04:14 AM    RBC 3.57 05/26/2025 04:14 AM    HGB 10.9 05/26/2025 04:14 AM    HCT 33.1 05/26/2025 04:14 AM    MCV 92.7 05/26/2025 04:14 AM    MCH 30.5 05/26/2025 04:14 AM    MCHC 32.9 05/26/2025 04:14 AM    RDW 17.0 05/26/2025 04:14 AM     05/26/2025 04:14 AM    MPV 10.5 05/26/2025 04:14 AM     BMP:    Lab Results   Component Value Date/Time     05/26/2025 04:14 AM    K 4.0 05/26/2025 04:14 AM    K 4.0 02/03/2022 08:21 PM     05/26/2025 04:14 AM    CO2 25 05/26/2025 04:14 AM    BUN 10 05/26/2025 04:14 AM    CREATININE 0.3 05/26/2025 04:14 AM    CALCIUM 6.6 05/26/2025 04:14 AM    GFRAA >60 02/11/2022 05:17 AM    LABGLOM >90 05/26/2025 04:14 AM    LABGLOM >60 03/01/2024 07:27 AM    GLUCOSE 141 05/26/2025 04:14 AM     Current Inpatient Medications  Current Facility-Administered Medications: bisacodyl (DULCOLAX) suppository 10 mg, 10 mg, Rectal, BID  sennosides-docusate sodium (SENOKOT-S) 8.6-50 MG tablet 2 tablet, 2 tablet, Oral, Daily  oxyCODONE (ROXICODONE) 5 MG/5ML solution 2.5 mg, 2.5 mg, Oral, Q4H PRN  methocarbamol (ROBAXIN) tablet 500 mg, 500 mg, Oral, 4x Daily  midodrine (PROAMATINE) tablet 5 mg, 5 mg, Oral, TID  heparin (porcine) injection 5,000 Units, 5,000 Units, SubCUTAneous, 3 times per day  polyethylene glycol (GLYCOLAX) packet 17 g, 17 g, Oral, BID  lactulose (CHRONULAC) 10 GM/15ML solution 20 g, 20 g, Oral, TID  insulin lispro (HUMALOG,ADMELOG) injection vial 0-8 Units, 0-8 Units, SubCUTAneous, Q4H  mupirocin (BACTROBAN) 2 % ointment, , Each Nostril, BID  hydrocortisone

## 2025-05-26 NOTE — PLAN OF CARE
Problem: Discharge Planning  Goal: Discharge to home or other facility with appropriate resources  Outcome: Progressing     Problem: Pain  Goal: Verbalizes/displays adequate comfort level or baseline comfort level  Outcome: Progressing     Problem: Skin/Tissue Integrity  Goal: Skin integrity remains intact  Description: 1.  Monitor for areas of redness and/or skin breakdown2.  Assess vascular access sites hourly3.  Every 4-6 hours minimum:  Change oxygen saturation probe site4.  Every 4-6 hours:  If on nasal continuous positive airway pressure, respiratory therapy assess nares and determine need for appliance change or resting period  Outcome: Progressing     Problem: Safety - Adult  Goal: Free from fall injury  Outcome: Progressing     Problem: Chronic Conditions and Co-morbidities  Goal: Patient's chronic conditions and co-morbidity symptoms are monitored and maintained or improved  Outcome: Progressing     Problem: ABCDS Injury Assessment  Goal: Absence of physical injury  Outcome: Progressing     Problem: Respiratory - Adult  Goal: Achieves optimal ventilation and oxygenation  Outcome: Progressing     Problem: Skin/Tissue Integrity - Adult  Goal: Skin integrity remains intact  Description: 1.  Monitor for areas of redness and/or skin breakdown2.  Assess vascular access sites hourly3.  Every 4-6 hours minimum:  Change oxygen saturation probe site4.  Every 4-6 hours:  If on nasal continuous positive airway pressure, respiratory therapy assess nares and determine need for appliance change or resting period  Outcome: Progressing     Problem: Skin/Tissue Integrity - Adult  Goal: Incisions, wounds, or drain sites healing without S/S of infection  Outcome: Progressing     Problem: Metabolic/Fluid and Electrolytes - Adult  Goal: Hemodynamic stability and optimal renal function maintained  Outcome: Progressing     Problem: Metabolic/Fluid and Electrolytes - Adult  Goal: Electrolytes maintained within normal

## 2025-05-26 NOTE — PROGRESS NOTES
05/26/25 1600   Patient Observation   Pulse 74   Respirations 28   SpO2 94 %   Vent Information   Ventilator ID [id number]   Vent Mode CPAP/PS   Ventilator Settings   PEEP/CPAP (cmH2O) 8   FiO2  40 %   Pressure Support (cm H2O) 18 cm H2O   Vent Patient Data (Readings)   Vt (Measured) 373 mL   Peak Inspiratory Pressure (cmH2O) 27 cmH2O   Rate Measured 14 br/min   Minute Volume (L/min) 5.22 Liters   Mean Airway Pressure (cmH2O) 12 cmH20   Plateau Pressure (cm H2O) 28 cm H2O   Driving Pressure 20   I:E Ratio 1:4.00   Flow Sensitivity 3 L/min   Backup Apnea On   Backup Rate 18 Breaths Per Minute   Backup Vt 300   Vent Alarm Settings   High Pressure (cmH2O) 45 cmH2O   Low Minute Volume (lpm) 3 L/min   High Minute Volume (lpm) 16 L/min   Low Exhaled Vt (ml) 250 mL   High Exhaled Vt (ml) 800 mL   RR High (bpm) 30 br/min   Apnea (secs) 20 secs   Additional Respiratoray Assessments   Humidification Source Heated wire   Humidification Temp 35.8   Ambu Bag With Mask At Bedside Yes   ETT    Placement Date/Time: 05/21/25 1423   Present on Admission/Arrival: No  Placed By: In ED  Preoxygenation: Yes  Mask Ventilation: Mask ventilation not attempted (0)  Airway Tube Size: 7.5 mm  Location: Oral  Secured At: 23 cm  Measured From: Lips  Atrau...   Secured At 23 cm   Measured From Lips   ETT Placement Right   Secured By Commercial tube berry   Site Assessment Dry

## 2025-05-26 NOTE — PROGRESS NOTES
Chart reviewed. Patient seen at bedside, on PS, alert. No family at bedside. Discussed case with RN. Call placed to daughter/CECILE Montalvo. We will meet at bedside tomorrow (5/27) 1:30 pm for further goals of care discussions.

## 2025-05-26 NOTE — PROGRESS NOTES
05/26/25 1910   Patient Observation   Pulse 72   Respirations 26   SpO2 95 %   Vent Information   Ventilator ID [id number]   Equipment Changed (S)  Expiratory Filter   Vent Mode CPAP/PS   Ventilator Settings   PEEP/CPAP (cmH2O) 8   FiO2  40 %   Pressure Support (cm H2O) 18 cm H2O   Vent Patient Data (Readings)   Vt (Measured) 357 mL   Peak Inspiratory Pressure (cmH2O) 27 cmH2O   Rate Measured 23 br/min   Minute Volume (L/min) 7.96 Liters   Mean Airway Pressure (cmH2O) 13 cmH20   Plateau Pressure (cm H2O) 28 cm H2O   Driving Pressure 20   I:E Ratio 1:3.10   Flow Sensitivity 3 L/min   Backup Apnea On   Backup Rate 18 Breaths Per Minute   Backup Vt 300   Vent Alarm Settings   High Pressure (cmH2O) 45 cmH2O   Low Minute Volume (lpm) 3 L/min   High Minute Volume (lpm) 16 L/min   Low Exhaled Vt (ml) 250 mL   High Exhaled Vt (ml) 800 mL   RR High (bpm) 30 br/min   Apnea (secs) 20 secs   Additional Respiratoray Assessments   Humidification Source Heated wire   Humidification Temp 35.9   Ambu Bag With Mask At Bedside Yes   ETT    Placement Date/Time: 05/21/25 1423   Present on Admission/Arrival: No  Placed By: In ED  Preoxygenation: Yes  Mask Ventilation: Mask ventilation not attempted (0)  Airway Tube Size: 7.5 mm  Location: Oral  Secured At: 23 cm  Measured From: Lips  Atrau...   Secured At 23 cm   Measured From Lips   ETT Placement Right   Secured By Commercial tube berry   Site Assessment Dry

## 2025-05-26 NOTE — PROGRESS NOTES
[facility]  SURGICAL INTENSIVE CARE UNIT (SICU)  ATTENDING PHYSICIAN CRITICAL CARE PROGRESS NOTE     I have examined the patient, reviewed the record,and discussed the case with the APN/  Resident. I have reviewed all relevant labs and imaging data. The following summarizes my clinical findings and independent assessment.       Date of admission:  5/21/2025    CC: Fall    HOSPITAL COURSE:   Patient back from the OR; underwent C2-T2 posterior cervical fusion.  Surgery went well.  Patient intermittently bradycardic throughout the day, has required 2 doses of atropine.  Currently on low-dose of Levophed.  Alert and nodding appropriately to questions.  Went to the OR yesterday for fusion  5/23 bradycardia and atropine steroid started hypotension relative adrenal insufficiency  5/24 No overnight issues   5/25 no overnight issues    New Imaging Reviewed and personally interpreted:    Chest x-ray ET tube in good position enteric tube under the diaphragm spinal hardware in place left central line in place    New Labs reviewed   Recent Labs     05/24/25  0345 05/25/25  0415 05/26/25  0414   WBC 16.3* 15.1* 10.1   HGB 12.5 11.3* 10.9*    191 160       Recent Labs     05/24/25  0345 05/25/25  0415 05/26/25  0414   * 145 145   K 3.3* 3.9 4.0   MG 1.7 1.9 1.8   PHOS 3.3 1.8* 3.6   BUN 9 10 10   CREATININE 0.5 0.4* 0.3*   GLUCOSE 210* 139* 141*   AST 25 17 17   ALT 10 13 12   BILITOT 0.8 0.7 0.6       Recent Labs     05/26/25  0425   MODE AC   FIO2 40.0   RRMECH 18.0   VTMECH 300.0   PEEP 8.0   PFO2 2.29         Physical Exam  Eyes:      Pupils: Pupils are equal, round, and reactive to light.   Cardiovascular:      Rate and Rhythm: Bradycardia present.   Pulmonary:      Comments: intuabted  Abdominal:      General: Abdomen is flat. There is no distension.   Musculoskeletal:      Comments: Weak UEs no hand grasp some flexion at wrists elbows and shoulders, sensation UE , no sensation abdomen or LE

## 2025-05-26 NOTE — PROGRESS NOTES
05/26/25 0821   Patient Observation   Patient Observations (S)  pt placed on ps at this time/ pt required PS 18 to achieve sufficient VTs   Vent Information   Vent Mode (S)  NIV/PS   Ventilator Settings   Pressure Support (cm H2O) (S)  18 cm H2O   Vent Alarm Settings   Low Minute Volume (lpm) (S)  3 L/min

## 2025-05-27 ENCOUNTER — APPOINTMENT (OUTPATIENT)
Dept: GENERAL RADIOLOGY | Age: 70
DRG: 450 | End: 2025-05-27
Payer: MEDICARE

## 2025-05-27 ENCOUNTER — APPOINTMENT (OUTPATIENT)
Age: 70
DRG: 450 | End: 2025-05-27
Attending: SURGERY
Payer: MEDICARE

## 2025-05-27 LAB
ALBUMIN SERPL-MCNC: 2.7 G/DL (ref 3.5–5.2)
ALP SERPL-CCNC: 84 U/L (ref 35–104)
ALT SERPL-CCNC: 20 U/L (ref 0–35)
ANION GAP SERPL CALCULATED.3IONS-SCNC: 11 MMOL/L (ref 7–16)
AST SERPL-CCNC: 24 U/L (ref 0–35)
B.E.: -0.7 MMOL/L (ref -3–3)
BASOPHILS # BLD: 0.01 K/UL (ref 0–0.2)
BASOPHILS NFR BLD: 0 % (ref 0–2)
BILIRUB SERPL-MCNC: 0.5 MG/DL (ref 0–1.2)
BUN SERPL-MCNC: 13 MG/DL (ref 8–23)
CA-I BLD-SCNC: 1 MMOL/L (ref 1.15–1.33)
CALCIUM SERPL-MCNC: 6.9 MG/DL (ref 8.8–10.2)
CHLORIDE SERPL-SCNC: 106 MMOL/L (ref 98–107)
CO2 SERPL-SCNC: 25 MMOL/L (ref 22–29)
COHB: 0 % (ref 0–1.5)
CREAT SERPL-MCNC: 0.5 MG/DL (ref 0.5–1)
CRITICAL: NORMAL
CRYOGLOB SER-MCNC: NEGATIVE MG/DL
DATE ANALYZED: NORMAL
DATE OF COLLECTION: NORMAL
EKG ATRIAL RATE: 111 BPM
EKG P AXIS: 74 DEGREES
EKG P-R INTERVAL: 176 MS
EKG Q-T INTERVAL: 294 MS
EKG QRS DURATION: 80 MS
EKG QTC CALCULATION (BAZETT): 399 MS
EKG R AXIS: -39 DEGREES
EKG T AXIS: 132 DEGREES
EKG VENTRICULAR RATE: 111 BPM
EOSINOPHIL # BLD: 0 K/UL (ref 0.05–0.5)
EOSINOPHILS RELATIVE PERCENT: 0 % (ref 0–6)
ERYTHROCYTE [DISTWIDTH] IN BLOOD BY AUTOMATED COUNT: 16.2 % (ref 11.5–15)
GFR, ESTIMATED: >90 ML/MIN/1.73M2
GLUCOSE BLD-MCNC: 120 MG/DL (ref 74–99)
GLUCOSE BLD-MCNC: 122 MG/DL (ref 74–99)
GLUCOSE BLD-MCNC: 131 MG/DL (ref 74–99)
GLUCOSE BLD-MCNC: 167 MG/DL (ref 74–99)
GLUCOSE BLD-MCNC: 181 MG/DL (ref 74–99)
GLUCOSE SERPL-MCNC: 193 MG/DL (ref 74–99)
HCO3: 23.6 MMOL/L (ref 22–26)
HCT VFR BLD AUTO: 33.7 % (ref 34–48)
HGB BLD-MCNC: 11.1 G/DL (ref 11.5–15.5)
HHB: 3.4 % (ref 0–5)
IMM GRANULOCYTES # BLD AUTO: 0.11 K/UL (ref 0–0.58)
IMM GRANULOCYTES NFR BLD: 2 % (ref 0–5)
LAB: NORMAL
LYMPHOCYTES NFR BLD: 0.83 K/UL (ref 1.5–4)
LYMPHOCYTES RELATIVE PERCENT: 12 % (ref 20–42)
Lab: 340
MAGNESIUM SERPL-MCNC: 2 MG/DL (ref 1.6–2.4)
MCH RBC QN AUTO: 29.9 PG (ref 26–35)
MCHC RBC AUTO-ENTMCNC: 32.9 G/DL (ref 32–34.5)
MCV RBC AUTO: 90.8 FL (ref 80–99.9)
METHB: 0 % (ref 0–1.5)
MODE: AC
MONOCYTES NFR BLD: 0.37 K/UL (ref 0.1–0.95)
MONOCYTES NFR BLD: 6 % (ref 2–12)
NEUTROPHILS NFR BLD: 81 % (ref 43–80)
NEUTS SEG NFR BLD: 5.44 K/UL (ref 1.8–7.3)
O2 SATURATION: 96.6 % (ref 92–98.5)
O2HB: 96.6 % (ref 94–97)
OPERATOR ID: 421
PATIENT TEMP: 37 C
PCO2: 37.7 MMHG (ref 35–45)
PEEP/CPAP: 8 CMH2O
PH BLOOD GAS: 7.41 (ref 7.35–7.45)
PHOSPHATE SERPL-MCNC: 4.6 MG/DL (ref 2.5–4.5)
PLATELET # BLD AUTO: 150 K/UL (ref 130–450)
PMV BLD AUTO: 10.4 FL (ref 7–12)
PO2: 87.2 MMHG (ref 75–100)
POTASSIUM SERPL-SCNC: 3.9 MMOL/L (ref 3.5–5.1)
PROT SERPL-MCNC: 5.6 G/DL (ref 6.4–8.3)
PYRIDOXAL PHOS SERPL-SCNC: 9.5 NMOL/L (ref 20–125)
RBC # BLD AUTO: 3.71 M/UL (ref 3.5–5.5)
RR MECHANICAL: 18 B/MIN
SEND OUT REPORT: NORMAL
SODIUM SERPL-SCNC: 142 MMOL/L (ref 136–145)
SOURCE, BLOOD GAS: NORMAL
T4 SERPL-MCNC: 6.1 UG/DL (ref 4.5–11.7)
TEST NAME: NORMAL
THB: 12.3 G/DL (ref 11.5–16.5)
TIME ANALYZED: 346
VT MECHANICAL: 300 ML
WBC OTHER # BLD: 6.8 K/UL (ref 4.5–11.5)

## 2025-05-27 PROCEDURE — 93005 ELECTROCARDIOGRAM TRACING: CPT

## 2025-05-27 PROCEDURE — 2000000000 HC ICU R&B

## 2025-05-27 PROCEDURE — 94003 VENT MGMT INPAT SUBQ DAY: CPT

## 2025-05-27 PROCEDURE — 36415 COLL VENOUS BLD VENIPUNCTURE: CPT

## 2025-05-27 PROCEDURE — 99291 CRITICAL CARE FIRST HOUR: CPT | Performed by: SURGERY

## 2025-05-27 PROCEDURE — 6370000000 HC RX 637 (ALT 250 FOR IP)

## 2025-05-27 PROCEDURE — 80053 COMPREHEN METABOLIC PANEL: CPT

## 2025-05-27 PROCEDURE — 2580000003 HC RX 258

## 2025-05-27 PROCEDURE — 85025 COMPLETE CBC W/AUTO DIFF WBC: CPT

## 2025-05-27 PROCEDURE — 6360000002 HC RX W HCPCS

## 2025-05-27 PROCEDURE — 93010 ELECTROCARDIOGRAM REPORT: CPT | Performed by: INTERNAL MEDICINE

## 2025-05-27 PROCEDURE — 71045 X-RAY EXAM CHEST 1 VIEW: CPT

## 2025-05-27 PROCEDURE — 2500000003 HC RX 250 WO HCPCS: Performed by: SURGERY

## 2025-05-27 PROCEDURE — 94640 AIRWAY INHALATION TREATMENT: CPT

## 2025-05-27 PROCEDURE — 84436 ASSAY OF TOTAL THYROXINE: CPT

## 2025-05-27 PROCEDURE — 82805 BLOOD GASES W/O2 SATURATION: CPT

## 2025-05-27 PROCEDURE — 83735 ASSAY OF MAGNESIUM: CPT

## 2025-05-27 PROCEDURE — 99233 SBSQ HOSP IP/OBS HIGH 50: CPT

## 2025-05-27 PROCEDURE — 6360000002 HC RX W HCPCS: Performed by: SURGERY

## 2025-05-27 PROCEDURE — 82330 ASSAY OF CALCIUM: CPT

## 2025-05-27 PROCEDURE — 82962 GLUCOSE BLOOD TEST: CPT

## 2025-05-27 PROCEDURE — 93306 TTE W/DOPPLER COMPLETE: CPT

## 2025-05-27 PROCEDURE — 2500000003 HC RX 250 WO HCPCS

## 2025-05-27 PROCEDURE — 84100 ASSAY OF PHOSPHORUS: CPT

## 2025-05-27 PROCEDURE — 6370000000 HC RX 637 (ALT 250 FOR IP): Performed by: SURGERY

## 2025-05-27 PROCEDURE — 37799 UNLISTED PX VASCULAR SURGERY: CPT

## 2025-05-27 RX ORDER — MIDODRINE HYDROCHLORIDE 10 MG/1
10 TABLET ORAL
Status: DISCONTINUED | OUTPATIENT
Start: 2025-05-27 | End: 2025-05-29 | Stop reason: HOSPADM

## 2025-05-27 RX ADMIN — FENTANYL CITRATE 50 MCG: 50 INJECTION INTRAMUSCULAR; INTRAVENOUS at 18:06

## 2025-05-27 RX ADMIN — ACETAMINOPHEN 650 MG: 650 SOLUTION ORAL at 10:02

## 2025-05-27 RX ADMIN — BISACODYL 10 MG: 10 SUPPOSITORY RECTAL at 20:21

## 2025-05-27 RX ADMIN — PREGABALIN 150 MG: 75 CAPSULE ORAL at 07:51

## 2025-05-27 RX ADMIN — IPRATROPIUM BROMIDE AND ALBUTEROL SULFATE 1 DOSE: 2.5; .5 SOLUTION RESPIRATORY (INHALATION) at 08:43

## 2025-05-27 RX ADMIN — METHOCARBAMOL 500 MG: 500 TABLET ORAL at 12:01

## 2025-05-27 RX ADMIN — MINERAL OIL, WHITE PETROLATUM: .03; .94 OINTMENT OPHTHALMIC at 14:48

## 2025-05-27 RX ADMIN — CHLORHEXIDINE GLUCONATE, 0.12% ORAL RINSE 15 ML: 1.2 SOLUTION DENTAL at 07:51

## 2025-05-27 RX ADMIN — FENTANYL CITRATE 50 MCG: 50 INJECTION INTRAMUSCULAR; INTRAVENOUS at 20:27

## 2025-05-27 RX ADMIN — LACTULOSE 20 G: 20 SOLUTION ORAL at 20:20

## 2025-05-27 RX ADMIN — CHLORHEXIDINE GLUCONATE, 0.12% ORAL RINSE 15 ML: 1.2 SOLUTION DENTAL at 20:21

## 2025-05-27 RX ADMIN — MINERAL OIL, WHITE PETROLATUM: .03; .94 OINTMENT OPHTHALMIC at 07:52

## 2025-05-27 RX ADMIN — HEPARIN SODIUM 5000 UNITS: 5000 INJECTION INTRAVENOUS; SUBCUTANEOUS at 14:48

## 2025-05-27 RX ADMIN — BISACODYL 10 MG: 10 SUPPOSITORY RECTAL at 07:52

## 2025-05-27 RX ADMIN — FENTANYL CITRATE 50 MCG: 50 INJECTION INTRAMUSCULAR; INTRAVENOUS at 10:02

## 2025-05-27 RX ADMIN — MINERAL OIL, WHITE PETROLATUM: .03; .94 OINTMENT OPHTHALMIC at 20:22

## 2025-05-27 RX ADMIN — MIDODRINE HYDROCHLORIDE 5 MG: 5 TABLET ORAL at 07:51

## 2025-05-27 RX ADMIN — MICONAZOLE NITRATE: 20 POWDER TOPICAL at 20:21

## 2025-05-27 RX ADMIN — METHOCARBAMOL 500 MG: 500 TABLET ORAL at 07:51

## 2025-05-27 RX ADMIN — ACETAMINOPHEN 650 MG: 650 SOLUTION ORAL at 23:26

## 2025-05-27 RX ADMIN — LACTULOSE 20 G: 20 SOLUTION ORAL at 07:51

## 2025-05-27 RX ADMIN — CHLORHEXIDINE GLUCONATE, 0.12% ORAL RINSE 15 ML: 1.2 SOLUTION DENTAL at 23:28

## 2025-05-27 RX ADMIN — INSULIN LISPRO 2 UNITS: 100 INJECTION, SOLUTION INTRAVENOUS; SUBCUTANEOUS at 03:31

## 2025-05-27 RX ADMIN — GUAIFENESIN 400 MG: 400 TABLET ORAL at 14:47

## 2025-05-27 RX ADMIN — PETROLATUM: 420 OINTMENT TOPICAL at 20:22

## 2025-05-27 RX ADMIN — ACETAMINOPHEN 650 MG: 650 SOLUTION ORAL at 17:22

## 2025-05-27 RX ADMIN — HYDROCORTISONE SODIUM SUCCINATE 100 MG: 100 INJECTION INTRAMUSCULAR; INTRAVENOUS at 05:02

## 2025-05-27 RX ADMIN — CALCIUM GLUCONATE 3000 MG: 98 INJECTION INTRAVENOUS at 07:59

## 2025-05-27 RX ADMIN — GUAIFENESIN 400 MG: 400 TABLET ORAL at 20:21

## 2025-05-27 RX ADMIN — LACTULOSE 20 G: 20 SOLUTION ORAL at 14:48

## 2025-05-27 RX ADMIN — SODIUM CHLORIDE, PRESERVATIVE FREE 40 MG: 5 INJECTION INTRAVENOUS at 07:51

## 2025-05-27 RX ADMIN — MUPIROCIN: 20 OINTMENT TOPICAL at 20:22

## 2025-05-27 RX ADMIN — METHOCARBAMOL 500 MG: 500 TABLET ORAL at 20:21

## 2025-05-27 RX ADMIN — MINERAL OIL, WHITE PETROLATUM: .03; .94 OINTMENT OPHTHALMIC at 23:29

## 2025-05-27 RX ADMIN — CHLORHEXIDINE GLUCONATE, 0.12% ORAL RINSE 15 ML: 1.2 SOLUTION DENTAL at 17:22

## 2025-05-27 RX ADMIN — HEPARIN SODIUM 5000 UNITS: 5000 INJECTION INTRAVENOUS; SUBCUTANEOUS at 20:21

## 2025-05-27 RX ADMIN — ACETAMINOPHEN 650 MG: 650 SOLUTION ORAL at 05:03

## 2025-05-27 RX ADMIN — PREGABALIN 150 MG: 75 CAPSULE ORAL at 20:21

## 2025-05-27 RX ADMIN — MINERAL OIL, WHITE PETROLATUM: .03; .94 OINTMENT OPHTHALMIC at 12:01

## 2025-05-27 RX ADMIN — CHLORHEXIDINE GLUCONATE, 0.12% ORAL RINSE 15 ML: 1.2 SOLUTION DENTAL at 03:29

## 2025-05-27 RX ADMIN — PETROLATUM: 420 OINTMENT TOPICAL at 07:53

## 2025-05-27 RX ADMIN — FENTANYL CITRATE 50 MCG: 50 INJECTION INTRAMUSCULAR; INTRAVENOUS at 14:47

## 2025-05-27 RX ADMIN — THEOPHYLLINE 100 MG: 80 SOLUTION ORAL at 12:05

## 2025-05-27 RX ADMIN — FENTANYL CITRATE 50 MCG: 50 INJECTION INTRAMUSCULAR; INTRAVENOUS at 07:59

## 2025-05-27 RX ADMIN — MUPIROCIN: 20 OINTMENT TOPICAL at 07:52

## 2025-05-27 RX ADMIN — MIDODRINE HYDROCHLORIDE 10 MG: 10 TABLET ORAL at 12:01

## 2025-05-27 RX ADMIN — WATER 1000 MG: 1 INJECTION INTRAMUSCULAR; INTRAVENOUS; SUBCUTANEOUS at 20:21

## 2025-05-27 RX ADMIN — CHLORHEXIDINE GLUCONATE, 0.12% ORAL RINSE 15 ML: 1.2 SOLUTION DENTAL at 12:01

## 2025-05-27 RX ADMIN — HEPARIN SODIUM 5000 UNITS: 5000 INJECTION INTRAVENOUS; SUBCUTANEOUS at 05:30

## 2025-05-27 RX ADMIN — IPRATROPIUM BROMIDE AND ALBUTEROL SULFATE 1 DOSE: 2.5; .5 SOLUTION RESPIRATORY (INHALATION) at 15:01

## 2025-05-27 RX ADMIN — FENTANYL CITRATE 50 MCG: 50 INJECTION INTRAMUSCULAR; INTRAVENOUS at 12:07

## 2025-05-27 RX ADMIN — IPRATROPIUM BROMIDE AND ALBUTEROL SULFATE 1 DOSE: 2.5; .5 SOLUTION RESPIRATORY (INHALATION) at 20:15

## 2025-05-27 RX ADMIN — SODIUM CHLORIDE, PRESERVATIVE FREE 10 ML: 5 INJECTION INTRAVENOUS at 20:23

## 2025-05-27 RX ADMIN — PREGABALIN 150 MG: 75 CAPSULE ORAL at 14:47

## 2025-05-27 RX ADMIN — SENNOSIDES AND DOCUSATE SODIUM 2 TABLET: 50; 8.6 TABLET ORAL at 07:51

## 2025-05-27 RX ADMIN — SODIUM CHLORIDE, PRESERVATIVE FREE 10 ML: 5 INJECTION INTRAVENOUS at 07:53

## 2025-05-27 RX ADMIN — MINERAL OIL, WHITE PETROLATUM: .03; .94 OINTMENT OPHTHALMIC at 03:29

## 2025-05-27 RX ADMIN — POLYETHYLENE GLYCOL 3350 17 G: 17 POWDER, FOR SOLUTION ORAL at 07:51

## 2025-05-27 RX ADMIN — FENTANYL CITRATE 50 MCG: 50 INJECTION INTRAMUSCULAR; INTRAVENOUS at 02:04

## 2025-05-27 RX ADMIN — IPRATROPIUM BROMIDE AND ALBUTEROL SULFATE 1 DOSE: 2.5; .5 SOLUTION RESPIRATORY (INHALATION) at 11:00

## 2025-05-27 RX ADMIN — HYDROCORTISONE SODIUM SUCCINATE 100 MG: 100 INJECTION INTRAMUSCULAR; INTRAVENOUS at 20:33

## 2025-05-27 RX ADMIN — POLYETHYLENE GLYCOL 3350 17 G: 17 POWDER, FOR SOLUTION ORAL at 20:21

## 2025-05-27 RX ADMIN — GUAIFENESIN 400 MG: 400 TABLET ORAL at 07:51

## 2025-05-27 RX ADMIN — METHOCARBAMOL 500 MG: 500 TABLET ORAL at 17:26

## 2025-05-27 RX ADMIN — MIDODRINE HYDROCHLORIDE 10 MG: 10 TABLET ORAL at 17:26

## 2025-05-27 RX ADMIN — FENTANYL CITRATE 50 MCG: 50 INJECTION INTRAMUSCULAR; INTRAVENOUS at 23:26

## 2025-05-27 RX ADMIN — MICONAZOLE NITRATE: 20 POWDER TOPICAL at 07:52

## 2025-05-27 ASSESSMENT — PULMONARY FUNCTION TESTS
PIF_VALUE: 31
PIF_VALUE: 30
PIF_VALUE: 29
PIF_VALUE: 34
PIF_VALUE: 45
PIF_VALUE: 32
PIF_VALUE: 25
PIF_VALUE: 19
PIF_VALUE: 30
PIF_VALUE: 31
PIF_VALUE: 34
PIF_VALUE: 35
PIF_VALUE: 32
PIF_VALUE: 31
PIF_VALUE: 28
PIF_VALUE: 31
PIF_VALUE: 29
PIF_VALUE: 32
PIF_VALUE: 28
PIF_VALUE: 42
PIF_VALUE: 19
PIF_VALUE: 30
PIF_VALUE: 19
PIF_VALUE: 19
PIF_VALUE: 27
PIF_VALUE: 45
PIF_VALUE: 19
PIF_VALUE: 38
PIF_VALUE: 29
PIF_VALUE: 33
PIF_VALUE: 35
PIF_VALUE: 33
PIF_VALUE: 30
PIF_VALUE: 28

## 2025-05-27 ASSESSMENT — PAIN SCALES - GENERAL
PAINLEVEL_OUTOF10: 0
PAINLEVEL_OUTOF10: 4
PAINLEVEL_OUTOF10: 0

## 2025-05-27 NOTE — PROGRESS NOTES
05/27/25 0843   Patient Observation   Pulse 60   Respirations 18   SpO2 95 %   Vent Information   Ventilator ID [id number]   Vent Mode AC/VC   Ventilator Settings   Vt (Set, mL) 300 mL   Resp Rate (Set) 18 bpm   PEEP/CPAP (cmH2O) 8   FiO2  40 %   Peak Inspiratory Flow (Set) 45 L/sec   Vent Patient Data (Readings)   Vt (Measured) 328 mL   Peak Inspiratory Pressure (cmH2O) 35 cmH2O   Rate Measured 18 br/min   Minute Volume (L/min) 5.6 Liters   Peak Inspiratory Flow (lpm) 45 L/sec   Mean Airway Pressure (cmH2O) 14 cmH20   Plateau Pressure (cm H2O) 23 cm H2O   Driving Pressure 15   I:E Ratio 1:3.60   Flow Sensitivity 3 L/min   Static Compliance (L/cm H2O) 15   Backup Apnea On   Backup Rate 18 Breaths Per Minute   Backup Vt 300   Vent Alarm Settings   High Pressure (cmH2O) 45 cmH2O   Low Minute Volume (lpm) 3 L/min   High Minute Volume (lpm) 15 L/min   Low Exhaled Vt (ml) 250 mL   High Exhaled Vt (ml) 700 mL   RR High (bpm) 30 br/min   Apnea (secs) 20 secs   Additional Respiratoray Assessments   Humidification Source Heated wire   Humidification Temp 36.7   Ambu Bag With Mask At Bedside Yes   ETT    Placement Date/Time: 05/21/25 1423   Present on Admission/Arrival: No  Placed By: In ED  Preoxygenation: Yes  Mask Ventilation: Mask ventilation not attempted (0)  Airway Tube Size: 7.5 mm  Location: Oral  Secured At: 23 cm  Measured From: Lips  Atrau...   Secured At 23 cm   Measured From Lips   ETT Placement Left   Secured By Commercial tube berry   Site Assessment Dry
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
## 2025-05-27 NOTE — PROGRESS NOTES
Placed on AC settings for HS     05/27/25 0024   Patient Observation   Pulse 65   SpO2 97 %   Vent Information   Ventilator Day(s) 7   Ventilator ID [id number]   Vent Mode (S)  AC/VC   Ventilator Settings   Vt (Set, mL) 300 mL   Resp Rate (Set) 18 bpm   PEEP/CPAP (cmH2O) 8   FiO2  40 %   Peak Inspiratory Flow (Set) 45 L/sec   Vent Patient Data (Readings)   Vt (Measured) 328 mL   Peak Inspiratory Pressure (cmH2O) 31 cmH2O   Rate Measured 23 br/min   Minute Volume (L/min) 5.12 Liters   Mean Airway Pressure (cmH2O) 12 cmH20   Plateau Pressure (cm H2O) 23 cm H2O   Driving Pressure 15   I:E Ratio 1:3.60   Static Compliance (L/cm H2O) 14   Backup Apnea On   Backup Rate 18 Breaths Per Minute   Backup Vt 300   Vent Alarm Settings   High Pressure (cmH2O) 45 cmH2O   Low Minute Volume (lpm) 4 L/min   High Minute Volume (lpm) 15 L/min   Low Exhaled Vt (ml) 250 mL   High Exhaled Vt (ml) 700 mL   RR High (bpm) 30 br/min   Apnea (secs) 20 secs   Additional Respiratoray Assessments   Humidification Source Heated wire   Humidification Temp 36.8   Circuit Condensation Drained   Ambu Bag With Mask At Bedside Yes   ETT    Placement Date/Time: 05/21/25 1423   Present on Admission/Arrival: No  Placed By: In ED  Preoxygenation: Yes  Mask Ventilation: Mask ventilation not attempted (0)  Airway Tube Size: 7.5 mm  Location: Oral  Secured At: 23 cm  Measured From: Lips  Atrau...   Secured At 23 cm   Measured From Lips   ETT Placement Center   Secured By Commercial tube berry

## 2025-05-27 NOTE — PROGRESS NOTES
Occupational Therapy    Date:2025  Patient Name: Alena Busby  MRN: [id number]  : 1955  Room: [address] [place] [address]-A     OT orders received and chart reviewed. Discussed w/ RN - OT eval on hold at this time 2/2 pt intubation; pt not medically appropriate for skilled therapy services this date.  OT will follow and re-attempt eval as appropriate at a later time/date.    Wilfredo Desai OTR/L; [id number]
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
## 2025-05-27 NOTE — PROGRESS NOTES
Patient placed back on AC settings due to decreased tidal volumes.         05/27/25 1501   Patient Observation   Pulse (!) 101   Respirations 20   SpO2 96 %   Vent Information   Ventilator ID [id number]   Vent Mode (S)  AC/VC   Ventilator Settings   Vt (Set, mL) 300 mL   Resp Rate (Set) 18 bpm   PEEP/CPAP (cmH2O) 8   FiO2  40 %   Peak Inspiratory Flow (Set) 45 L/sec   Vent Patient Data (Readings)   Vt (Measured) 175 mL   Peak Inspiratory Pressure (cmH2O) 42 cmH2O   Rate Measured 19 br/min   Minute Volume (L/min) 3.6 Liters   Peak Inspiratory Flow (lpm) 45 L/sec   Mean Airway Pressure (cmH2O) 10 cmH20   Plateau Pressure (cm H2O) 30 cm H2O   Driving Pressure 22   I:E Ratio 1:2.70   Flow Sensitivity 3 L/min   PEEP Intrinsic (cm H2O) 1.9 cm H2O   Static Compliance (L/cm H2O) 15   Backup Apnea On   Backup Rate 18 Breaths Per Minute   Backup Vt 300   Vent Alarm Settings   High Pressure (cmH2O) 45 cmH2O   Low Minute Volume (lpm) 4 L/min   High Minute Volume (lpm) 15 L/min   Low Exhaled Vt (ml) 250 mL   High Exhaled Vt (ml) 700 mL   RR High (bpm) 30 br/min   Apnea (secs) 20 secs   Additional Respiratoray Assessments   Humidification Source Heated wire   Humidification Temp 36.8   Ambu Bag With Mask At Bedside Yes   ETT    Placement Date/Time: 05/21/25 1423   Present on Admission/Arrival: No  Placed By: In ED  Preoxygenation: Yes  Mask Ventilation: Mask ventilation not attempted (0)  Airway Tube Size: 7.5 mm  Location: Oral  Secured At: 23 cm  Measured From: Lips  Atrau...   Secured At 23 cm   Measured From Lips   ETT Placement Center   Secured By Commercial tube berry   Site Assessment Dry

## 2025-05-27 NOTE — PROGRESS NOTES
Surgical Intensive Care Unit   Daily Progress Note     Patient's name:  Alena Busby  Age/Gender: 70 y.o. female  Date of Admission: 5/21/2025  2:23 PM  Length of Stay: 6    Reason for ICU:     HPI:   Patient was admitted to the ICU after falling from a wheelchair, was found to be hypotensive, bradycardic, extremities.    Overnight Events:   No acute events overnight.  No fevers, no episodes of bradycardia.  Still requiring small amount of Levophed.    Hospital Course:   5/23 bradycardia and atropine steroid started hypotension relative adrenal insufficiency  5/24 No overnight issues   5/25 no overnight issues  5/26 no acute evetns  5/27 Continuing to wean pressors. Will discuss with family as pt will likely need trach/PEG    Problem List:   Patient Active Problem List   Diagnosis    Right leg pain    Chronic pain of both shoulders    Right arm pain    Fusion of spine of cervical region    Electrolyte abnormality    Delirium    Septic shock (HCC)    Palliative care encounter    Goals of care, counseling/discussion    Pneumonia due to organism    Acute hypoxic respiratory failure (HCC)    Acute respiratory failure with hypoxia (HCC)    Hypocalcemia    Acute pulmonary edema (HCC)    Trauma    Fall from wheelchair    Quadriparesis (HCC)    Shock (HCC)    Acute respiratory failure with hypoxia and hypercapnia (HCC)    Cervical spinal cord injury (HCC)    Neurogenic shock due to traumatic injury (HCC)    Neurogenic bladder       Surgical/Interventional Procedures:       Vent Settings: Additional Respiratory Assessments  Pulse: 61  Respirations: 30  SpO2: 94 %  ETCO2 (mmHg): 38 mmHg  Humidification Source: Heated wire  Humidification Temp: 37  Circuit Condensation: Drained  ABG:   Recent Labs     05/27/25  0340   PH 7.414   PCO2 37.7   PO2 87.2   HCO3 23.6   BE -0.7   O2SAT 96.6       I/O:  I/O last 3 completed shifts:  In: 1470.9 [I.V.:141.7; NG/GT:1203; IV Piggyback:126.2]  Out: 1805 [Urine:1805]  No intake/output

## 2025-05-27 NOTE — PLAN OF CARE
Problem: Discharge Planning  Goal: Discharge to home or other facility with appropriate resources  5/27/2025 0821 by Marianne Wyman RN  Outcome: Progressing  5/26/2025 2053 by Anu Kay RN  Outcome: Progressing     Problem: Pain  Goal: Verbalizes/displays adequate comfort level or baseline comfort level  5/27/2025 0821 by Marianne Wyman RN  Outcome: Progressing  5/26/2025 2053 by Anu Kay RN  Outcome: Progressing     Problem: Skin/Tissue Integrity  Goal: Skin integrity remains intact  Description: 1.  Monitor for areas of redness and/or skin breakdown2.  Assess vascular access sites hourly3.  Every 4-6 hours minimum:  Change oxygen saturation probe site4.  Every 4-6 hours:  If on nasal continuous positive airway pressure, respiratory therapy assess nares and determine need for appliance change or resting period  5/27/2025 0821 by Marianne Wyman RN  Outcome: Progressing  5/26/2025 2053 by Anu Kay RN  Outcome: Progressing     Problem: Safety - Adult  Goal: Free from fall injury  5/27/2025 0821 by Marianne Wyman RN  Outcome: Progressing  5/26/2025 2053 by Anu Kay RN  Outcome: Progressing     Problem: Chronic Conditions and Co-morbidities  Goal: Patient's chronic conditions and co-morbidity symptoms are monitored and maintained or improved  5/27/2025 0821 by Marianne Wyman RN  Outcome: Progressing  5/26/2025 2053 by Anu Kay RN  Outcome: Progressing     Problem: ABCDS Injury Assessment  Goal: Absence of physical injury  5/27/2025 0821 by Marianne Wyman RN  Outcome: Progressing  5/26/2025 2053 by Anu Kay RN  Outcome: Progressing     Problem: Respiratory - Adult  Goal: Achieves optimal ventilation and oxygenation  5/27/2025 0821 by Marianne Wyman RN  Outcome: Progressing  5/26/2025 2053 by Anu Kay RN  Outcome: Progressing     Problem: Skin/Tissue Integrity - Adult  Goal: Skin integrity remains intact  Description: 1.  Monitor for areas of redness and/or skin

## 2025-05-27 NOTE — PROGRESS NOTES
Physical Therapy    PT order received and medical chart reviewed on 5/27/25. Discussed with bedside RN - PT evaluation is on hold 2/2 pt being intubated and not appropriate for skilled PT at this time. Will re-attempt as able. Thank you.    Serena Forman, PT, DPT  [id number]

## 2025-05-27 NOTE — PLAN OF CARE
Problem: Discharge Planning  Goal: Discharge to home or other facility with appropriate resources  5/26/2025 2053 by Anu Kay RN  Outcome: Progressing     Problem: Pain  Goal: Verbalizes/displays adequate comfort level or baseline comfort level  5/26/2025 2053 by Anu Kay RN  Outcome: Progressing     Problem: Skin/Tissue Integrity  Goal: Skin integrity remains intact  Description: 1.  Monitor for areas of redness and/or skin breakdown2.  Assess vascular access sites hourly3.  Every 4-6 hours minimum:  Change oxygen saturation probe site4.  Every 4-6 hours:  If on nasal continuous positive airway pressure, respiratory therapy assess nares and determine need for appliance change or resting period  5/26/2025 2053 by Anu Kay RN  Outcome: Progressing     Problem: Safety - Adult  Goal: Free from fall injury  5/26/2025 2053 by Anu Kay RN  Outcome: Progressing     Problem: Chronic Conditions and Co-morbidities  Goal: Patient's chronic conditions and co-morbidity symptoms are monitored and maintained or improved  5/26/2025 2053 by Anu Kay RN  Outcome: Progressing     Problem: ABCDS Injury Assessment  Goal: Absence of physical injury  5/26/2025 2053 by Anu Kay RN  Outcome: Progressing     Problem: Respiratory - Adult  Goal: Achieves optimal ventilation and oxygenation  5/26/2025 2053 by Anu Kay RN  Outcome: Progressing     Problem: Skin/Tissue Integrity - Adult  Goal: Skin integrity remains intact  Description: 1.  Monitor for areas of redness and/or skin breakdown2.  Assess vascular access sites hourly3.  Every 4-6 hours minimum:  Change oxygen saturation probe site4.  Every 4-6 hours:  If on nasal continuous positive airway pressure, respiratory therapy assess nares and determine need for appliance change or resting period  5/26/2025 2053 by Anu Kay RN  Outcome: Progressing     Problem: Skin/Tissue Integrity - Adult  Goal: Incisions, wounds, or

## 2025-05-27 NOTE — PROGRESS NOTES
05/27/25 1100   Patient Observation   Pulse (!) 108   Respirations 20   SpO2 91 %   Vent Information   Ventilator ID [id number]   Vent Mode (S)  CPAP/PS   Ventilator Settings   PEEP/CPAP (cmH2O) 8   FiO2  40 %   Pressure Support (cm H2O) (S)  10 cm H2O   Vent Patient Data (Readings)   Vt (Measured) 189 mL   Peak Inspiratory Pressure (cmH2O) 19 cmH2O   Rate Measured 22 br/min   Minute Volume (L/min) 5.64 Liters   Mean Airway Pressure (cmH2O) 11 cmH20   Plateau Pressure (cm H2O) 30 cm H2O   Driving Pressure 22   I:E Ratio 1:2.90   Flow Sensitivity 3 L/min   Backup Apnea On   Backup Rate 18 Breaths Per Minute   Backup Vt 300   Vent Alarm Settings   High Pressure (cmH2O) 45 cmH2O   Low Minute Volume (lpm) 3 L/min   High Minute Volume (lpm) 15 L/min   Low Exhaled Vt (ml) 250 mL   High Exhaled Vt (ml) 700 mL   RR High (bpm) 30 br/min   Apnea (secs) 20 secs   Additional Respiratoray Assessments   Humidification Source Heated wire   Humidification Temp 36.5   Ambu Bag With Mask At Bedside Yes   ETT    Placement Date/Time: 05/21/25 1423   Present on Admission/Arrival: No  Placed By: In ED  Preoxygenation: Yes  Mask Ventilation: Mask ventilation not attempted (0)  Airway Tube Size: 7.5 mm  Location: Oral  Secured At: 23 cm  Measured From: Lips  Atrau...   Secured At 23 cm   Measured From Lips   ETT Placement Center   Secured By Commercial tube berry   Site Assessment Dry

## 2025-05-27 NOTE — CARE COORDINATION
5/27 Care Coordination: Pt remains in SICU. S/P fall from Wheelchair off Transport van.Remains on Vent. Quadriparesis, C-spine injury s/p posterior cervical fusion. Remains on IV Levo. Plan for Trach/Peg. Pt was at [facility]. With Current status unclear on discharge needs.  [facility] to [facility].   CM/SW will continue to follow for discharge planning.   Dontae DE LA TORRE,RN-CV-BC  [phone]

## 2025-05-28 ENCOUNTER — APPOINTMENT (OUTPATIENT)
Dept: GENERAL RADIOLOGY | Age: 70
DRG: 450 | End: 2025-05-28
Payer: MEDICARE

## 2025-05-28 PROBLEM — I95.9 HYPOTENSION: Status: ACTIVE | Noted: 2025-05-28

## 2025-05-28 PROBLEM — R94.6 ABNORMAL THYROID FUNCTION TEST: Status: ACTIVE | Noted: 2025-05-28

## 2025-05-28 LAB
ALBUMIN SERPL-MCNC: 2.7 G/DL (ref 3.5–5.2)
ALP SERPL-CCNC: 83 U/L (ref 35–104)
ALT SERPL-CCNC: 22 U/L (ref 0–32)
ANION GAP SERPL CALCULATED.3IONS-SCNC: 10 MMOL/L (ref 7–16)
ANION GAP SERPL CALCULATED.3IONS-SCNC: 15 MMOL/L (ref 7–16)
AST SERPL-CCNC: 22 U/L (ref 0–31)
BASOPHILS # BLD: 0.04 K/UL (ref 0–0.2)
BASOPHILS NFR BLD: 0 % (ref 0–2)
BILIRUB SERPL-MCNC: 0.5 MG/DL (ref 0–1.2)
BUN SERPL-MCNC: 10 MG/DL (ref 8–23)
BUN SERPL-MCNC: 11 MG/DL (ref 6–23)
CA-I BLD-SCNC: 1 MMOL/L (ref 1.15–1.33)
CA-I BLD-SCNC: 1.02 MMOL/L (ref 1.15–1.33)
CALCIUM SERPL-MCNC: 6.8 MG/DL (ref 8.8–10.2)
CALCIUM SERPL-MCNC: 6.9 MG/DL (ref 8.6–10.2)
CHLORIDE SERPL-SCNC: 103 MMOL/L (ref 98–107)
CHLORIDE SERPL-SCNC: 104 MMOL/L (ref 98–107)
CO2 SERPL-SCNC: 24 MMOL/L (ref 22–29)
CO2 SERPL-SCNC: 27 MMOL/L (ref 22–29)
CREAT SERPL-MCNC: 0.4 MG/DL (ref 0.5–1)
CREAT SERPL-MCNC: 0.5 MG/DL (ref 0.5–1)
ECHO AO ASC DIAM: 2.4 CM
ECHO AO ASCENDING AORTA INDEX: 1.24 CM/M2
ECHO AR MAX VEL PISA: 3.9 M/S
ECHO AV AREA PEAK VELOCITY: 2.5 CM2
ECHO AV AREA VTI: 3 CM2
ECHO AV AREA/BSA PEAK VELOCITY: 1.3 CM2/M2
ECHO AV AREA/BSA VTI: 1.6 CM2/M2
ECHO AV CUSP MM: 1.3 CM
ECHO AV MEAN GRADIENT: 7 MMHG
ECHO AV MEAN VELOCITY: 1.2 M/S
ECHO AV PEAK GRADIENT: 16 MMHG
ECHO AV PEAK VELOCITY: 2 M/S
ECHO AV REGURGITANT PHT: 206 MS
ECHO AV VELOCITY RATIO: 0.85
ECHO AV VTI: 31 CM
ECHO BSA: 2.02 M2
ECHO LA DIAMETER INDEX: 1.92 CM/M2
ECHO LA DIAMETER: 3.7 CM
ECHO LA VOL A-L A2C: 50 ML (ref 22–52)
ECHO LA VOL A-L A4C: 44 ML (ref 22–52)
ECHO LA VOL BP: 49 ML (ref 22–52)
ECHO LA VOL MOD A2C: 48 ML (ref 22–52)
ECHO LA VOL MOD A4C: 42 ML (ref 22–52)
ECHO LA VOL/BSA BIPLANE: 25 ML/M2 (ref 16–34)
ECHO LA VOLUME AREA LENGTH: 52 ML
ECHO LA VOLUME INDEX A-L A2C: 26 ML/M2 (ref 16–34)
ECHO LA VOLUME INDEX A-L A4C: 23 ML/M2 (ref 16–34)
ECHO LA VOLUME INDEX AREA LENGTH: 27 ML/M2 (ref 16–34)
ECHO LA VOLUME INDEX MOD A2C: 25 ML/M2 (ref 16–34)
ECHO LA VOLUME INDEX MOD A4C: 22 ML/M2 (ref 16–34)
ECHO LV EDV A2C: 127 ML
ECHO LV EDV A4C: 94 ML
ECHO LV EDV BP: 111 ML (ref 56–104)
ECHO LV EDV INDEX A4C: 49 ML/M2
ECHO LV EDV INDEX BP: 58 ML/M2
ECHO LV EDV NDEX A2C: 66 ML/M2
ECHO LV EJECTION FRACTION 3D: 65 %
ECHO LV EJECTION FRACTION A2C: 75 %
ECHO LV EJECTION FRACTION A4C: 72 %
ECHO LV EJECTION FRACTION BIPLANE: 74 % (ref 55–100)
ECHO LV ESV A2C: 32 ML
ECHO LV ESV A4C: 27 ML
ECHO LV ESV BP: 29 ML (ref 19–49)
ECHO LV ESV INDEX A2C: 17 ML/M2
ECHO LV ESV INDEX A4C: 14 ML/M2
ECHO LV ESV INDEX BP: 15 ML/M2
ECHO LV FRACTIONAL SHORTENING: 36 % (ref 28–44)
ECHO LV INTERNAL DIMENSION DIASTOLE INDEX: 2.28 CM/M2
ECHO LV INTERNAL DIMENSION DIASTOLIC: 4.4 CM (ref 3.9–5.3)
ECHO LV INTERNAL DIMENSION SYSTOLIC INDEX: 1.45 CM/M2
ECHO LV INTERNAL DIMENSION SYSTOLIC: 2.8 CM
ECHO LV ISOVOLUMETRIC RELAXATION TIME (IVRT): 64.7 MS
ECHO LV IVSD: 1 CM (ref 0.6–0.9)
ECHO LV IVSS: 1.2 CM
ECHO LV MASS 2D: 147.8 G (ref 67–162)
ECHO LV MASS INDEX 2D: 76.6 G/M2 (ref 43–95)
ECHO LV POSTERIOR WALL DIASTOLIC: 1 CM (ref 0.6–0.9)
ECHO LV POSTERIOR WALL SYSTOLIC: 1.2 CM
ECHO LV RELATIVE WALL THICKNESS RATIO: 0.45
ECHO LVOT AREA: 2.8 CM2
ECHO LVOT AV VTI INDEX: 1.02
ECHO LVOT DIAM: 1.9 CM
ECHO LVOT MEAN GRADIENT: 5 MMHG
ECHO LVOT PEAK GRADIENT: 12 MMHG
ECHO LVOT PEAK VELOCITY: 1.7 M/S
ECHO LVOT STROKE VOLUME INDEX: 46.3 ML/M2
ECHO LVOT SV: 89.3 ML
ECHO LVOT VTI: 31.5 CM
ECHO MV A VELOCITY: 1.08 M/S
ECHO MV AREA PHT: 5.9 CM2
ECHO MV AREA VTI: 3.3 CM2
ECHO MV E DECELERATION TIME (DT): 156.3 MS
ECHO MV E VELOCITY: 1.25 M/S
ECHO MV E/A RATIO: 1.16
ECHO MV LVOT VTI INDEX: 0.85
ECHO MV MAX VELOCITY: 1.5 M/S
ECHO MV MEAN GRADIENT: 4 MMHG
ECHO MV MEAN VELOCITY: 1 M/S
ECHO MV PEAK GRADIENT: 8 MMHG
ECHO MV PRESSURE HALF TIME (PHT): 37.3 MS
ECHO MV VTI: 26.9 CM
ECHO PV MAX VELOCITY: 1.7 M/S
ECHO PV MEAN GRADIENT: 6 MMHG
ECHO PV MEAN VELOCITY: 1.1 M/S
ECHO PV PEAK GRADIENT: 11 MMHG
ECHO PV VTI: 27.8 CM
ECHO RV INTERNAL DIMENSION: 2.4 CM
ECHO RV LONGITUDINAL DIMENSION: 5.6 CM
ECHO RV MID DIMENSION: 2.2 CM
ECHO RVOT MEAN GRADIENT: 1 MMHG
ECHO RVOT PEAK GRADIENT: 3 MMHG
ECHO RVOT PEAK VELOCITY: 0.9 M/S
ECHO RVOT VTI: 15.2 CM
ECHO TV REGURGITANT MAX VELOCITY: 2.78 M/S
ECHO TV REGURGITANT PEAK GRADIENT: 31 MMHG
EOSINOPHIL # BLD: 0.07 K/UL (ref 0.05–0.5)
EOSINOPHILS RELATIVE PERCENT: 1 % (ref 0–6)
ERYTHROCYTE [DISTWIDTH] IN BLOOD BY AUTOMATED COUNT: 16.5 % (ref 11.5–15)
FIO2: 40
GFR, ESTIMATED: >90 ML/MIN/1.73M2
GFR, ESTIMATED: >90 ML/MIN/1.73M2
GLUCOSE BLD-MCNC: 115 MG/DL (ref 74–99)
GLUCOSE BLD-MCNC: 141 MG/DL (ref 74–99)
GLUCOSE BLD-MCNC: 152 MG/DL (ref 74–99)
GLUCOSE BLD-MCNC: 219 MG/DL (ref 74–99)
GLUCOSE SERPL-MCNC: 120 MG/DL (ref 74–99)
GLUCOSE SERPL-MCNC: 239 MG/DL (ref 74–99)
HCT VFR BLD AUTO: 34.5 % (ref 34–48)
HCT VFR BLD AUTO: 35 % (ref 37–54)
HGB BLD-MCNC: 11.2 G/DL (ref 11.5–15.5)
IMM GRANULOCYTES # BLD AUTO: 0.28 K/UL (ref 0–0.58)
IMM GRANULOCYTES NFR BLD: 3 % (ref 0–5)
LYMPHOCYTES NFR BLD: 2.89 K/UL (ref 1.5–4)
LYMPHOCYTES RELATIVE PERCENT: 28 % (ref 20–42)
MAGNESIUM SERPL-MCNC: 1.6 MG/DL (ref 1.6–2.4)
MAGNESIUM SERPL-MCNC: 1.7 MG/DL (ref 1.6–2.6)
MCH RBC QN AUTO: 30.2 PG (ref 26–35)
MCHC RBC AUTO-ENTMCNC: 32.5 G/DL (ref 32–34.5)
MCV RBC AUTO: 93 FL (ref 80–99.9)
MODE: AC
MONOCYTES NFR BLD: 1.05 K/UL (ref 0.1–0.95)
MONOCYTES NFR BLD: 10 % (ref 2–12)
NEUTROPHILS NFR BLD: 59 % (ref 43–80)
NEUTS SEG NFR BLD: 6.14 K/UL (ref 1.8–7.3)
O2 DELIVERY DEVICE: ABNORMAL
PEEP: 8
PHOSPHATE SERPL-MCNC: 3.3 MG/DL (ref 2.5–4.5)
PHOSPHATE SERPL-MCNC: 4 MG/DL (ref 2.5–4.5)
PLATELET # BLD AUTO: 226 K/UL (ref 130–450)
PMV BLD AUTO: 10.5 FL (ref 7–12)
POC HCO3: 31.8 MMOL/L (ref 22–26)
POC HEMOGLOBIN (CALC): 11.8 G/DL (ref 12.5–15.5)
POC O2 SATURATION: 96.2 % (ref 92–98.5)
POC PCO2: 47.9 MM HG (ref 35–45)
POC PH: 7.43 (ref 7.35–7.45)
POC PO2: 82.3 MM HG (ref 60–80)
POSITIVE BASE EXCESS, ART: 6.4 MMOL/L (ref 0–3)
POTASSIUM SERPL-SCNC: 3.3 MMOL/L (ref 3.5–5)
POTASSIUM SERPL-SCNC: 3.5 MMOL/L (ref 3.5–5.1)
PROT SERPL-MCNC: 5.5 G/DL (ref 6.4–8.3)
RBC # BLD AUTO: 3.71 M/UL (ref 3.5–5.5)
SEND OUT REPORT: NORMAL
SET RATE, POC: 18
SODIUM SERPL-SCNC: 141 MMOL/L (ref 136–145)
SODIUM SERPL-SCNC: 143 MMOL/L (ref 132–146)
T3 SERPL-MCNC: 74 NG/DL (ref 80–200)
TEST NAME: NORMAL
TIDAL VOLUME: 300
WBC OTHER # BLD: 10.5 K/UL (ref 4.5–11.5)

## 2025-05-28 PROCEDURE — 84482 T3 REVERSE: CPT

## 2025-05-28 PROCEDURE — 6370000000 HC RX 637 (ALT 250 FOR IP)

## 2025-05-28 PROCEDURE — 2500000003 HC RX 250 WO HCPCS

## 2025-05-28 PROCEDURE — 2580000003 HC RX 258

## 2025-05-28 PROCEDURE — 80048 BASIC METABOLIC PNL TOTAL CA: CPT

## 2025-05-28 PROCEDURE — 84100 ASSAY OF PHOSPHORUS: CPT

## 2025-05-28 PROCEDURE — 2720000010 HC SURG SUPPLY STERILE

## 2025-05-28 PROCEDURE — 2000000000 HC ICU R&B

## 2025-05-28 PROCEDURE — 93306 TTE W/DOPPLER COMPLETE: CPT | Performed by: INTERNAL MEDICINE

## 2025-05-28 PROCEDURE — 31646 BRNCHSC W/THER ASPIR SBSQ: CPT

## 2025-05-28 PROCEDURE — 6370000000 HC RX 637 (ALT 250 FOR IP): Performed by: SURGERY

## 2025-05-28 PROCEDURE — 99232 SBSQ HOSP IP/OBS MODERATE 35: CPT | Performed by: INTERNAL MEDICINE

## 2025-05-28 PROCEDURE — 37799 UNLISTED PX VASCULAR SURGERY: CPT

## 2025-05-28 PROCEDURE — 71045 X-RAY EXAM CHEST 1 VIEW: CPT

## 2025-05-28 PROCEDURE — 6360000002 HC RX W HCPCS

## 2025-05-28 PROCEDURE — 0BCB8ZZ EXTIRPATION OF MATTER FROM LEFT LOWER LOBE BRONCHUS, VIA NATURAL OR ARTIFICIAL OPENING ENDOSCOPIC: ICD-10-PCS

## 2025-05-28 PROCEDURE — 80053 COMPREHEN METABOLIC PANEL: CPT

## 2025-05-28 PROCEDURE — 83735 ASSAY OF MAGNESIUM: CPT

## 2025-05-28 PROCEDURE — 84480 ASSAY TRIIODOTHYRONINE (T3): CPT

## 2025-05-28 PROCEDURE — 85025 COMPLETE CBC W/AUTO DIFF WBC: CPT

## 2025-05-28 PROCEDURE — 82803 BLOOD GASES ANY COMBINATION: CPT

## 2025-05-28 PROCEDURE — 99233 SBSQ HOSP IP/OBS HIGH 50: CPT

## 2025-05-28 PROCEDURE — 94003 VENT MGMT INPAT SUBQ DAY: CPT

## 2025-05-28 PROCEDURE — 0BC88ZZ EXTIRPATION OF MATTER FROM LEFT UPPER LOBE BRONCHUS, VIA NATURAL OR ARTIFICIAL OPENING ENDOSCOPIC: ICD-10-PCS

## 2025-05-28 PROCEDURE — 82962 GLUCOSE BLOOD TEST: CPT

## 2025-05-28 PROCEDURE — 82330 ASSAY OF CALCIUM: CPT

## 2025-05-28 PROCEDURE — 94640 AIRWAY INHALATION TREATMENT: CPT

## 2025-05-28 PROCEDURE — 85014 HEMATOCRIT: CPT

## 2025-05-28 PROCEDURE — 99291 CRITICAL CARE FIRST HOUR: CPT | Performed by: SURGERY

## 2025-05-28 RX ORDER — WATER 10 ML/10ML
INJECTION INTRAMUSCULAR; INTRAVENOUS; SUBCUTANEOUS
Status: DISPENSED
Start: 2025-05-28 | End: 2025-05-28

## 2025-05-28 RX ORDER — VECURONIUM BROMIDE 1 MG/ML
10 INJECTION, POWDER, LYOPHILIZED, FOR SOLUTION INTRAVENOUS
Status: DISPENSED | OUTPATIENT
Start: 2025-05-28 | End: 2025-05-29

## 2025-05-28 RX ORDER — MIDAZOLAM HYDROCHLORIDE 2 MG/2ML
4 INJECTION, SOLUTION INTRAMUSCULAR; INTRAVENOUS
Status: COMPLETED | OUTPATIENT
Start: 2025-05-28 | End: 2025-05-28

## 2025-05-28 RX ORDER — ACETYLCYSTEINE 200 MG/ML
600 SOLUTION ORAL; RESPIRATORY (INHALATION)
Status: DISCONTINUED | OUTPATIENT
Start: 2025-05-28 | End: 2025-05-29 | Stop reason: HOSPADM

## 2025-05-28 RX ORDER — FENTANYL CITRATE 50 UG/ML
100 INJECTION, SOLUTION INTRAMUSCULAR; INTRAVENOUS
Refills: 0 | Status: COMPLETED | OUTPATIENT
Start: 2025-05-28 | End: 2025-05-28

## 2025-05-28 RX ORDER — FENTANYL CITRATE 50 UG/ML
100 INJECTION, SOLUTION INTRAMUSCULAR; INTRAVENOUS
Status: DISCONTINUED | OUTPATIENT
Start: 2025-05-28 | End: 2025-05-29 | Stop reason: HOSPADM

## 2025-05-28 RX ORDER — LORAZEPAM 2 MG/ML
2 INJECTION INTRAMUSCULAR EVERY 4 HOURS PRN
Status: DISCONTINUED | OUTPATIENT
Start: 2025-05-28 | End: 2025-05-29 | Stop reason: HOSPADM

## 2025-05-28 RX ADMIN — FENTANYL CITRATE 50 MCG: 50 INJECTION INTRAMUSCULAR; INTRAVENOUS at 08:11

## 2025-05-28 RX ADMIN — GUAIFENESIN 400 MG: 400 TABLET ORAL at 20:08

## 2025-05-28 RX ADMIN — METHOCARBAMOL 500 MG: 500 TABLET ORAL at 08:44

## 2025-05-28 RX ADMIN — INSULIN LISPRO 2 UNITS: 100 INJECTION, SOLUTION INTRAVENOUS; SUBCUTANEOUS at 20:18

## 2025-05-28 RX ADMIN — ACETYLCYSTEINE 600 MG: 200 INHALANT RESPIRATORY (INHALATION) at 09:41

## 2025-05-28 RX ADMIN — FENTANYL CITRATE 100 MCG: 50 INJECTION INTRAMUSCULAR; INTRAVENOUS at 15:38

## 2025-05-28 RX ADMIN — CHLORHEXIDINE GLUCONATE, 0.12% ORAL RINSE 15 ML: 1.2 SOLUTION DENTAL at 08:46

## 2025-05-28 RX ADMIN — METHOCARBAMOL 500 MG: 500 TABLET ORAL at 15:38

## 2025-05-28 RX ADMIN — THEOPHYLLINE 100 MG: 80 SOLUTION ORAL at 00:18

## 2025-05-28 RX ADMIN — GUAIFENESIN 400 MG: 400 TABLET ORAL at 08:44

## 2025-05-28 RX ADMIN — SENNOSIDES AND DOCUSATE SODIUM 2 TABLET: 50; 8.6 TABLET ORAL at 08:44

## 2025-05-28 RX ADMIN — METHOCARBAMOL 500 MG: 500 TABLET ORAL at 20:09

## 2025-05-28 RX ADMIN — LACTULOSE 20 G: 20 SOLUTION ORAL at 12:47

## 2025-05-28 RX ADMIN — FENTANYL CITRATE 100 MCG: 50 INJECTION INTRAMUSCULAR; INTRAVENOUS at 08:31

## 2025-05-28 RX ADMIN — MINERAL OIL, WHITE PETROLATUM: .03; .94 OINTMENT OPHTHALMIC at 23:59

## 2025-05-28 RX ADMIN — THEOPHYLLINE 100 MG: 80 SOLUTION ORAL at 12:47

## 2025-05-28 RX ADMIN — MINERAL OIL, WHITE PETROLATUM: .03; .94 OINTMENT OPHTHALMIC at 15:38

## 2025-05-28 RX ADMIN — MICONAZOLE NITRATE: 20 POWDER TOPICAL at 20:08

## 2025-05-28 RX ADMIN — IPRATROPIUM BROMIDE AND ALBUTEROL SULFATE 1 DOSE: 2.5; .5 SOLUTION RESPIRATORY (INHALATION) at 18:02

## 2025-05-28 RX ADMIN — PETROLATUM: 420 OINTMENT TOPICAL at 20:07

## 2025-05-28 RX ADMIN — ACETAMINOPHEN 650 MG: 650 SOLUTION ORAL at 12:47

## 2025-05-28 RX ADMIN — HYDROCORTISONE SODIUM SUCCINATE 100 MG: 100 INJECTION INTRAMUSCULAR; INTRAVENOUS at 06:07

## 2025-05-28 RX ADMIN — IPRATROPIUM BROMIDE AND ALBUTEROL SULFATE 1 DOSE: 2.5; .5 SOLUTION RESPIRATORY (INHALATION) at 13:40

## 2025-05-28 RX ADMIN — PETROLATUM: 420 OINTMENT TOPICAL at 08:47

## 2025-05-28 RX ADMIN — MINERAL OIL, WHITE PETROLATUM: .03; .94 OINTMENT OPHTHALMIC at 03:30

## 2025-05-28 RX ADMIN — FENTANYL CITRATE 50 MCG: 50 INJECTION INTRAMUSCULAR; INTRAVENOUS at 03:07

## 2025-05-28 RX ADMIN — MINERAL OIL, WHITE PETROLATUM: .03; .94 OINTMENT OPHTHALMIC at 20:08

## 2025-05-28 RX ADMIN — IPRATROPIUM BROMIDE AND ALBUTEROL SULFATE 1 DOSE: 2.5; .5 SOLUTION RESPIRATORY (INHALATION) at 20:30

## 2025-05-28 RX ADMIN — MICONAZOLE NITRATE: 20 POWDER TOPICAL at 08:46

## 2025-05-28 RX ADMIN — MINERAL OIL, WHITE PETROLATUM: .03; .94 OINTMENT OPHTHALMIC at 08:46

## 2025-05-28 RX ADMIN — HYDROCORTISONE SODIUM SUCCINATE 100 MG: 100 INJECTION INTRAMUSCULAR; INTRAVENOUS at 21:47

## 2025-05-28 RX ADMIN — HEPARIN SODIUM 5000 UNITS: 5000 INJECTION INTRAVENOUS; SUBCUTANEOUS at 12:47

## 2025-05-28 RX ADMIN — HEPARIN SODIUM 5000 UNITS: 5000 INJECTION INTRAVENOUS; SUBCUTANEOUS at 06:09

## 2025-05-28 RX ADMIN — FENTANYL CITRATE 50 MCG: 50 INJECTION INTRAMUSCULAR; INTRAVENOUS at 01:16

## 2025-05-28 RX ADMIN — MIDAZOLAM HYDROCHLORIDE 2 MG: 1 INJECTION, SOLUTION INTRAMUSCULAR; INTRAVENOUS at 08:31

## 2025-05-28 RX ADMIN — PREGABALIN 150 MG: 75 CAPSULE ORAL at 08:46

## 2025-05-28 RX ADMIN — POTASSIUM BICARBONATE 40 MEQ: 782 TABLET, EFFERVESCENT ORAL at 22:36

## 2025-05-28 RX ADMIN — MIDODRINE HYDROCHLORIDE 10 MG: 10 TABLET ORAL at 15:37

## 2025-05-28 RX ADMIN — IPRATROPIUM BROMIDE AND ALBUTEROL SULFATE 1 DOSE: 2.5; .5 SOLUTION RESPIRATORY (INHALATION) at 09:41

## 2025-05-28 RX ADMIN — PREGABALIN 150 MG: 75 CAPSULE ORAL at 15:37

## 2025-05-28 RX ADMIN — GUAIFENESIN 400 MG: 400 TABLET ORAL at 15:37

## 2025-05-28 RX ADMIN — POLYETHYLENE GLYCOL 3350 17 G: 17 POWDER, FOR SOLUTION ORAL at 08:46

## 2025-05-28 RX ADMIN — CHLORHEXIDINE GLUCONATE, 0.12% ORAL RINSE 15 ML: 1.2 SOLUTION DENTAL at 23:59

## 2025-05-28 RX ADMIN — MAGNESIUM HYDROXIDE 30 ML: 400 SUSPENSION ORAL at 08:46

## 2025-05-28 RX ADMIN — ACETAMINOPHEN 650 MG: 650 SOLUTION ORAL at 17:29

## 2025-05-28 RX ADMIN — CHLORHEXIDINE GLUCONATE, 0.12% ORAL RINSE 15 ML: 1.2 SOLUTION DENTAL at 12:47

## 2025-05-28 RX ADMIN — LACTULOSE 20 G: 20 SOLUTION ORAL at 08:46

## 2025-05-28 RX ADMIN — CHLORHEXIDINE GLUCONATE, 0.12% ORAL RINSE 15 ML: 1.2 SOLUTION DENTAL at 03:30

## 2025-05-28 RX ADMIN — ACETAMINOPHEN 650 MG: 650 SOLUTION ORAL at 23:59

## 2025-05-28 RX ADMIN — Medication 10 MCG/MIN: at 13:10

## 2025-05-28 RX ADMIN — CHLORHEXIDINE GLUCONATE, 0.12% ORAL RINSE 15 ML: 1.2 SOLUTION DENTAL at 15:38

## 2025-05-28 RX ADMIN — HYDROCORTISONE SODIUM SUCCINATE 100 MG: 100 INJECTION INTRAMUSCULAR; INTRAVENOUS at 15:37

## 2025-05-28 RX ADMIN — MIDODRINE HYDROCHLORIDE 10 MG: 10 TABLET ORAL at 12:47

## 2025-05-28 RX ADMIN — BISACODYL 10 MG: 10 SUPPOSITORY RECTAL at 08:46

## 2025-05-28 RX ADMIN — SODIUM CHLORIDE, PRESERVATIVE FREE 10 ML: 5 INJECTION INTRAVENOUS at 09:00

## 2025-05-28 RX ADMIN — CALCIUM GLUCONATE 3000 MG: 98 INJECTION INTRAVENOUS at 22:36

## 2025-05-28 RX ADMIN — PREGABALIN 150 MG: 75 CAPSULE ORAL at 20:08

## 2025-05-28 RX ADMIN — ACETYLCYSTEINE 600 MG: 200 INHALANT RESPIRATORY (INHALATION) at 20:30

## 2025-05-28 RX ADMIN — ACETAMINOPHEN 650 MG: 650 SOLUTION ORAL at 06:07

## 2025-05-28 RX ADMIN — MINERAL OIL, WHITE PETROLATUM: .03; .94 OINTMENT OPHTHALMIC at 12:47

## 2025-05-28 RX ADMIN — FENTANYL CITRATE 50 MCG: 50 INJECTION INTRAMUSCULAR; INTRAVENOUS at 06:07

## 2025-05-28 RX ADMIN — MIDODRINE HYDROCHLORIDE 10 MG: 10 TABLET ORAL at 08:44

## 2025-05-28 RX ADMIN — SODIUM CHLORIDE, PRESERVATIVE FREE 40 MG: 5 INJECTION INTRAVENOUS at 08:45

## 2025-05-28 RX ADMIN — CHLORHEXIDINE GLUCONATE, 0.12% ORAL RINSE 15 ML: 1.2 SOLUTION DENTAL at 20:09

## 2025-05-28 RX ADMIN — HEPARIN SODIUM 5000 UNITS: 5000 INJECTION INTRAVENOUS; SUBCUTANEOUS at 21:47

## 2025-05-28 RX ADMIN — LORAZEPAM 2 MG: 2 INJECTION INTRAMUSCULAR; INTRAVENOUS at 12:46

## 2025-05-28 RX ADMIN — METHOCARBAMOL 500 MG: 500 TABLET ORAL at 12:47

## 2025-05-28 RX ADMIN — SODIUM CHLORIDE, PRESERVATIVE FREE 10 ML: 5 INJECTION INTRAVENOUS at 20:07

## 2025-05-28 RX ADMIN — LORAZEPAM 2 MG: 2 INJECTION INTRAMUSCULAR; INTRAVENOUS at 21:49

## 2025-05-28 RX ADMIN — FENTANYL CITRATE 100 MCG: 50 INJECTION INTRAMUSCULAR; INTRAVENOUS at 12:44

## 2025-05-28 ASSESSMENT — PULMONARY FUNCTION TESTS
PIF_VALUE: 35
PIF_VALUE: 37
PIF_VALUE: 30
PIF_VALUE: 35
PIF_VALUE: 41
PIF_VALUE: 37
PIF_VALUE: 50
PIF_VALUE: 36
PIF_VALUE: 41
PIF_VALUE: 42
PIF_VALUE: 37
PIF_VALUE: 36
PIF_VALUE: 31
PIF_VALUE: 46
PIF_VALUE: 43
PIF_VALUE: 47
PIF_VALUE: 34
PIF_VALUE: 25
PIF_VALUE: 37
PIF_VALUE: 34
PIF_VALUE: 30
PIF_VALUE: 44
PIF_VALUE: 39
PIF_VALUE: 28
PIF_VALUE: 27
PIF_VALUE: 37
PIF_VALUE: 31
PIF_VALUE: 40
PIF_VALUE: 39
PIF_VALUE: 32
PIF_VALUE: 35
PIF_VALUE: 46
PIF_VALUE: 34
PIF_VALUE: 31
PIF_VALUE: 37
PIF_VALUE: 31
PIF_VALUE: 39
PIF_VALUE: 30
PIF_VALUE: 100
PIF_VALUE: 36
PIF_VALUE: 34
PIF_VALUE: 29
PIF_VALUE: 37
PIF_VALUE: 40

## 2025-05-28 ASSESSMENT — PAIN SCALES - GENERAL
PAINLEVEL_OUTOF10: 0
PAINLEVEL_OUTOF10: 0
PAINLEVEL_OUTOF10: 3
PAINLEVEL_OUTOF10: 0
PAINLEVEL_OUTOF10: 6
PAINLEVEL_OUTOF10: 3
PAINLEVEL_OUTOF10: 0
PAINLEVEL_OUTOF10: 4
PAINLEVEL_OUTOF10: 0
PAINLEVEL_OUTOF10: 3

## 2025-05-28 NOTE — PROGRESS NOTES
Surgical Intensive Care Unit   Daily Progress Note     Patient's name:  Alena Busby  Age/Gender: 70 y.o. female  Date of Admission: 5/21/2025  2:23 PM  Length of Stay: 7    Reason for ICU:     HPI:   Patient was admitted to the ICU after falling from a wheelchair, was found to be hypotensive, bradycardic, extremities.    Overnight Events:   No acute overnight events.  No fevers overnight, good urine output, patient not complaining of any pain.    Hospital Course:   5/23 bradycardia and atropine steroid started hypotension relative adrenal insufficiency  5/24 No overnight issues   5/25 no overnight issues  5/26 no acute evetns  5/27 Continuing to wean pressors. Will discuss with family as pt will likely need trach/PEG.  5/28: Patient and family met with palliative, change CODE STATUS to DNR CCA, agreeable to trach and PEG.    Problem List:   Patient Active Problem List   Diagnosis    Right leg pain    Chronic pain of both shoulders    Right arm pain    Fusion of spine of cervical region    Electrolyte abnormality    Delirium    Septic shock (HCC)    Palliative care encounter    Goals of care, counseling/discussion    Pneumonia due to organism    Acute hypoxic respiratory failure (HCC)    Acute respiratory failure with hypoxia (HCC)    Hypocalcemia    Acute pulmonary edema (HCC)    Trauma    Fall from wheelchair    Quadriparesis (HCC)    Shock (HCC)    Acute respiratory failure with hypoxia and hypercapnia (HCC)    Cervical spinal cord injury (HCC)    Neurogenic shock due to traumatic injury (HCC)    Neurogenic bladder       Surgical/Interventional Procedures:       Vent Settings: Additional Respiratory Assessments  Pulse: 67  Respirations: 18  SpO2: 93 %  ETCO2 (mmHg): 38 mmHg  Humidification Source: Heated wire  Humidification Temp: 37  Circuit Condensation: Drained  ABG:   Recent Labs     05/27/25  0340   PH 7.414   PCO2 37.7   PO2 87.2   HCO3 23.6   BE -0.7   O2SAT 96.6       I/O:  I/O last 3 completed

## 2025-05-28 NOTE — CONSULTS
ENDOCRINOLOGY INITIAL CONSULTATION NOTE     Date of Admission: 5/21/2025  Date of Service: 5/27/2025  Admitting Physician: Omer Navarro MD   Primary Care Physician: Eufemia Ahmadi DO  Consultant physician: Amilcar Mao MD     Reason for the consultation:  Abnormal thyroid function test, evaluation for possible adrenal insufficiency    History of Present Illness:  The history was mainly obtained from the medical records.  The patient currently intubated    Alena Busby is a very pleasant 70 y.o. old female a nursing home resident with PMH of obesity, COPD, dementia and other listed below admitted to [facility] on 5/21/2025 after she fell off of the transport van and her wheelchair at nursing facility and found to have multiple spine fracture.  The patient underwent C2-T2 posterior cervical fusion on May 22, 2025, endocrine service was consulted for evaluation management of possible thyroid dysfunction and adrenal sufficiency    The patient also was found to be hypotensive on arrival and started on Levophed    Baseline cortisol level was obtained on May 22, 2025 and before started on stress dose steroids and found to be 16.7    The patient currently on hydrocortisone 100 mg IV every 8 hours    Thyroid function test also obtained which showed a low TSH of 0.12 and low normal free T4 of 0.9 with low T3 of 57  Lab Results   Component Value Date/Time    TSH 0.12 (L) 05/26/2025 07:46 AM    T4FREE 1.3 03/28/2025 12:10 PM    [id number] 57 (L) 05/26/2025 12:01 PM     There is no history of thyroid disease in the past    Past Medical History   Past Medical History:   Diagnosis Date    Arthritis     COVID-19     2/2022    Depression     Dysphagia, oropharyngeal phase     Headache     multiple within a month, 7/10 on the pain scale.    Hearing loss     Hematuria, unspecified     Metabolic encephalopathy     Morbid obesity (HCC)     Muscle wasting and atrophy, not elsewhere classified, multiple sites     Neuropathy     Other

## 2025-05-28 NOTE — PROGRESS NOTES
Physical Therapy    PT order received and medical chart reviewed on 5/28/25. Discussed with Dr. Currie - Pt is discharging to hospice care. Will discontinue order at this time. Thank you.    Serena Forman, PT, DPT  [id number]

## 2025-05-28 NOTE — PROCEDURES
Date: 5/28/2025   Name: Alena Busby  YOB: 1955    [facility]  BRONCHOSCOPY PROCEDURE NOTE    Procedure Date: 5/28/2025    Pre-op Diagnosis: left bronchial mucus plug    Post-op Diagnosis: same     Procedure:  Flexible bronchoscopy     Specimen: Sent purulent fluid     Complications: None    Condition: Critical    Procedure:  At the bedside in the ICU, the patient was sedated with versed and fentanyl.  Heart rate, blood pressure, respiratory rate, and oxygen saturation were monitored.  BIS monitor additionally placed. The bronchoscope was inserted via the endotracheal tube.  First the right main stem and segmental bronchi were viewed. There was some clear mucus noted in the segmental bronchi but otherwise no significant findings.  The scope was slowly withdrawn and passed into the left mainstem and segmental bronchi which was found to have a large mucus plug. This was suctioned clear as well as the upper and lower lobes until clear. The bronchoscope was completely withdrawn.  The patient tolerated the procedure well with no readily apparent complications.    Dr. Paniagua was present for the procedure.       Sue Currie DO

## 2025-05-28 NOTE — PLAN OF CARE
Problem: Discharge Planning  Goal: Discharge to home or other facility with appropriate resources  5/28/2025 0718 by Marianne Wyman RN  Outcome: Progressing  5/27/2025 2108 by Geeta Cooper RN  Outcome: Progressing     Problem: Pain  Goal: Verbalizes/displays adequate comfort level or baseline comfort level  5/28/2025 0718 by Marianne Wyman RN  Outcome: Progressing  5/27/2025 2108 by Geeta Cooper RN  Outcome: Progressing     Problem: Skin/Tissue Integrity  Goal: Skin integrity remains intact  Description: 1.  Monitor for areas of redness and/or skin breakdown2.  Assess vascular access sites hourly3.  Every 4-6 hours minimum:  Change oxygen saturation probe site4.  Every 4-6 hours:  If on nasal continuous positive airway pressure, respiratory therapy assess nares and determine need for appliance change or resting period  5/28/2025 0718 by Marianne Wyman RN  Outcome: Progressing  5/27/2025 2108 by Geeta Cooper RN  Outcome: Progressing     Problem: Safety - Adult  Goal: Free from fall injury  5/28/2025 0718 by Marianne Wyman RN  Outcome: Progressing  5/27/2025 2108 by Geeta Cooper RN  Outcome: Progressing     Problem: Chronic Conditions and Co-morbidities  Goal: Patient's chronic conditions and co-morbidity symptoms are monitored and maintained or improved  5/28/2025 0718 by Marianne Wyman RN  Outcome: Progressing  5/27/2025 2108 by Geeta Cooper RN  Outcome: Progressing     Problem: ABCDS Injury Assessment  Goal: Absence of physical injury  5/28/2025 0718 by Marianne Wyman RN  Outcome: Progressing  5/27/2025 2108 by Geeta Cooper RN  Outcome: Progressing     Problem: Respiratory - Adult  Goal: Achieves optimal ventilation and oxygenation  5/28/2025 0718 by Marianne Wyman RN  Outcome: Progressing  5/27/2025 2108 by Geeta Cooper RN  Outcome: Progressing     Problem: Skin/Tissue Integrity - Adult  Goal: Skin integrity remains intact  Description: 1.  Monitor for areas of redness and/or skin breakdown2.

## 2025-05-28 NOTE — PROGRESS NOTES
Palliative Care Department  [phone]  Palliative Care Progress Note  Provider Jamila Hancock, APRN - CNP     Alena Busby  [id number]  Hospital Day: 8  Date of Initial Consult: 5/23/25  Referring Provider: Dr. Currie  Palliative Medicine was consulted for assistance with: trauma, new paralysis from cervical fx, goals of care, overwhelming symptoms     HPI:   Alena Busby is a 70 y.o. with a medical history of COPD, mobility dysfunction, who was admitted on 5/21/2025 from [facility] with a CHIEF COMPLAINT of trauma.  Patient fell off of the transport van in her wheelchair at nursing facility. CTA cervical spine demonstrates C5-6 right perched facets, C5-6 left jumped facets with quadriplegia, she was intubated for impending respiratory failure.  Neurogenic shock, admitted to SICU.  5/22 OR for C2-T2 posterior cervical fusion, return to SICU on pressors and had intermittent bradycardia.  She remains intubated on Levophed.  Palliative consulted for further assistance with goals of care.    ASSESSMENT/PLAN:     Pertinent Hospital Diagnoses     C5 fracture subluxation status post C2-T2 PCF   Neurogenic shock  Acute respiratory failure secondary to spinal shock      Palliative Care Encounter / Counseling Regarding Goals of Care  Please see detailed goals of care discussion as below  At this time, Alena Busby, Does Not have capacity for medical decision-making.  Capacity is time limited and situation/question specific  During encounter Katia was surrogate medical decision-maker  Outcome of goals of care meeting:   Continue CODE STATUS of DNR CCA with no escalation of care  Family meeting with patient, POA/daughter Katia, sister and son-in-law.    Patient/family opting for compassionate extubation and comfort measures, family request to proceed tomorrow 5/29.   Continue current medical management, no escalation of care, increased comfort medications.   Code status DNR-CCA  Advanced Directives:  POA or

## 2025-05-28 NOTE — PROGRESS NOTES
Date:2025  Patient Name: Alena Busby  MRN: [id number]  : 1955  ROOM #: [id number]    Occupational Therapy    OT orders received and chart reviewed. Discussed w/ DO - pt to d/c to hospice care. OT will discontinue orders at this time.   Please re-order OT if there is a change in medical POC & OT is warranted.    Thank you,    Wilfredo Desai OTR/L; [id number]

## 2025-05-28 NOTE — PROGRESS NOTES
[facility]  SURGICAL INTENSIVE CARE UNIT (SICU)  ATTENDING PHYSICIAN CRITICAL CARE PROGRESS NOTE     I have personally examined the patient, personally reviewed the record, and personally discussed the case with the resident. I have personally reviewed all relevant labs and imaging data. I am actively managing this patient's medications.  Please refer to the resident's note. I agree with the assessment and plan with the following corrections/additions. The following summarizes my clinical findings and independent assessment.     CC: critical care management after fall    Hospital Course/Overnight Events:  5/21: 70 y.o. female Fall from wheelchair  Injury occurred Prior to arrival  Fell off the transport van while in facility. Head injury. Hypotensive on arrival. No LOC no thinner.  Patient found to have a C5/6 facet jump with quadriplegia and impending respiratory failure, intubated in the trauma bay.  Stat MRI cervical spine completed which again showed severe spinal stenosis at C5 and C6.  Admitted to SICU, on Levophed secondary to neurogenic shock.  5/22:Patient back from the OR; underwent C2-T2 posterior cervical fusion.  Surgery went well.  Patient intermittently bradycardic throughout the day, has required 2 doses of atropine.  Currently on low-dose of Levophed.  Alert and nodding appropriately to questions.   5/23/25  - Overnight increased rate of levo to 8. Vitals stable otherwise. On AC/VC. Leukocytosis trending up. Patient wakes up to name. Follows commands. Displays understanding. Denies any questions   5/24: did not tolerated pressure support. Not pulling enough volumes. Still no sensation in lower extremities   5/25 no overnight issues  5/26 no acute evetns  5/27 Continuing to wean pressors. Will discuss with family as pt will likely need trach/PEG  5/28--no new issues    Medications   Scheduled Meds:    magnesium hydroxide  30 mL Oral Daily    midodrine  10 mg Oral TID WC

## 2025-05-28 NOTE — PLAN OF CARE
Problem: Discharge Planning  Goal: Discharge to home or other facility with appropriate resources  5/27/2025 2108 by Geeta Cooper RN  Outcome: Progressing     Problem: Pain  Goal: Verbalizes/displays adequate comfort level or baseline comfort level  5/27/2025 2108 by Geeta Cooper RN  Outcome: Progressing     Problem: Skin/Tissue Integrity  Goal: Skin integrity remains intact  Description: 1.  Monitor for areas of redness and/or skin breakdown2.  Assess vascular access sites hourly3.  Every 4-6 hours minimum:  Change oxygen saturation probe site4.  Every 4-6 hours:  If on nasal continuous positive airway pressure, respiratory therapy assess nares and determine need for appliance change or resting period  5/27/2025 2108 by Geeta Cooper RN  Outcome: Progressing     Problem: Safety - Adult  Goal: Free from fall injury  5/27/2025 2108 by Geeta Cooper RN  Outcome: Progressing     Problem: Chronic Conditions and Co-morbidities  Goal: Patient's chronic conditions and co-morbidity symptoms are monitored and maintained or improved  5/27/2025 2108 by Geeta Cooper RN  Outcome: Progressing     Problem: ABCDS Injury Assessment  Goal: Absence of physical injury  5/27/2025 2108 by Geeta Cooper RN  Outcome: Progressing     Problem: Respiratory - Adult  Goal: Achieves optimal ventilation and oxygenation  5/27/2025 2108 by Geeta Cooper RN  Outcome: Progressing     Problem: Skin/Tissue Integrity - Adult  Goal: Skin integrity remains intact  Description: 1.  Monitor for areas of redness and/or skin breakdown2.  Assess vascular access sites hourly3.  Every 4-6 hours minimum:  Change oxygen saturation probe site4.  Every 4-6 hours:  If on nasal continuous positive airway pressure, respiratory therapy assess nares and determine need for appliance change or resting period  5/27/2025 2108 by Geeta Cooper, RN  Outcome: Progressing     Problem: Gastrointestinal - Adult  Goal: Maintains adequate nutritional

## 2025-05-28 NOTE — CARE COORDINATION
5/28 Care Coordination: Care Port referral sent to [facility] via [facility]. CM/SW will continue to follow for discharge planning.   Dontae DE LA TORRE,RN--BC  [phone]

## 2025-05-28 NOTE — PROGRESS NOTES
05/28/25 1830   Patient Observation   Pulse 87   SpO2 96 %   Breath Sounds   Breath Sounds Bilateral Rhonchi   Vent Information   Equipment Changed Expiratory Filter   Vent Mode AC/VC   Ventilator Settings   Vt (Set, mL) 300 mL   Resp Rate (Set) 18 bpm   PEEP/CPAP (cmH2O) 8   FiO2  55 %   Peak Inspiratory Flow (Set) 60 L/sec   Vent Patient Data (Readings)   Vt (Measured) 310 mL   Peak Inspiratory Pressure (cmH2O) 30 cmH2O   Rate Measured 19 br/min   Minute Volume (L/min) 5.8 Liters   Mean Airway Pressure (cmH2O) 12 cmH20   Plateau Pressure (cm H2O) 24 cm H2O   Driving Pressure 16   I:E Ratio 1:5.20   Flow Sensitivity 3 L/min   Static Compliance (L/cm H2O) 16   Backup Apnea On   Backup Rate 18 Breaths Per Minute   Backup Vt 300   Vent Alarm Settings   High Pressure (cmH2O) 50 cmH2O   Low Minute Volume (lpm) 4.8 L/min   High Minute Volume (lpm) 12 L/min   Low Exhaled Vt (ml) 250 mL   High Exhaled Vt (ml) 700 mL   RR High (bpm) 30 br/min   Apnea (secs) 20 secs   Additional Respiratoray Assessments   Humidification Source Heated wire   Humidification Temp 37   Ambu Bag With Mask At Bedside Yes   Airway Clearance   Suction ET Tube   Subglottic Suction Done Yes   Suction Device Inline suction catheter;Rajendra   Sputum Method Obtained Endotracheal   Sputum Amount Small   Sputum Color/Odor White   Sputum Consistency Thick   ETT    Placement Date/Time: 05/21/25 1423   Present on Admission/Arrival: No  Placed By: In ED  Preoxygenation: Yes  Mask Ventilation: Mask ventilation not attempted (0)  Airway Tube Size: 7.5 mm  Location: Oral  Secured At: 23 cm  Measured From: Lips  Atrau...   Secured At 23 cm     DAILY VENTILATOR WEANING ASSESSMENT PERFORMED    P/FIO2 Ratio =  205        (<100= do not Wean)                  Cs =   16                       (<32= Instability)  Plat. Pressure = 26    Was SAT completed pt on no sedation ,     If yes proceed with the Spontaneous Breathing Trial (SBT) as long as none of the following

## 2025-05-28 NOTE — PROGRESS NOTES
Spoke with daughter/CECILE Montalvo. She would like to speak with [facility] of potential [facility] to [facility] and proceed with compassionate extubation there. Consult placed for hospice. Will await their final decision.

## 2025-05-29 VITALS
TEMPERATURE: 98.4 F | RESPIRATION RATE: 18 BRPM | SYSTOLIC BLOOD PRESSURE: 116 MMHG | WEIGHT: 222.44 LBS | DIASTOLIC BLOOD PRESSURE: 48 MMHG | BODY MASS INDEX: 40.93 KG/M2 | HEART RATE: 66 BPM | OXYGEN SATURATION: 97 % | HEIGHT: 62 IN

## 2025-05-29 LAB
AADO2: 202.4 MMHG
ALBUMIN SERPL-MCNC: 2.4 G/DL (ref 3.5–5.2)
ALP SERPL-CCNC: 83 U/L (ref 35–104)
ALT SERPL-CCNC: 20 U/L (ref 0–35)
ANION GAP SERPL CALCULATED.3IONS-SCNC: 10 MMOL/L (ref 7–16)
AST SERPL-CCNC: 18 U/L (ref 0–35)
B.E.: 2.5 MMOL/L (ref -3–3)
BASOPHILS # BLD: 0 K/UL (ref 0–0.2)
BASOPHILS NFR BLD: 0 % (ref 0–2)
BILIRUB SERPL-MCNC: 0.5 MG/DL (ref 0–1.2)
BUN SERPL-MCNC: 9 MG/DL (ref 8–23)
CA-I BLD-SCNC: 1.05 MMOL/L (ref 1.15–1.33)
CALCIUM SERPL-MCNC: 7.4 MG/DL (ref 8.8–10.2)
CHLORIDE SERPL-SCNC: 104 MMOL/L (ref 98–107)
CO2 SERPL-SCNC: 28 MMOL/L (ref 22–29)
COHB: 0.2 % (ref 0–1.5)
CREAT SERPL-MCNC: 0.4 MG/DL (ref 0.5–1)
CRITICAL: ABNORMAL
DATE ANALYZED: ABNORMAL
DATE OF COLLECTION: ABNORMAL
EOSINOPHIL # BLD: 0 K/UL (ref 0.05–0.5)
EOSINOPHILS RELATIVE PERCENT: 0 % (ref 0–6)
ERYTHROCYTE [DISTWIDTH] IN BLOOD BY AUTOMATED COUNT: 16.3 % (ref 11.5–15)
FIO2: 50 %
GFR, ESTIMATED: >90 ML/MIN/1.73M2
GLUCOSE BLD-MCNC: 142 MG/DL (ref 74–99)
GLUCOSE BLD-MCNC: 161 MG/DL (ref 74–99)
GLUCOSE BLD-MCNC: 239 MG/DL (ref 74–99)
GLUCOSE SERPL-MCNC: 149 MG/DL (ref 74–99)
HCO3: 27.6 MMOL/L (ref 22–26)
HCT VFR BLD AUTO: 34.6 % (ref 34–48)
HGB BLD-MCNC: 11.4 G/DL (ref 11.5–15.5)
HHB: 1.9 % (ref 0–5)
LAB: ABNORMAL
LYMPHOCYTES NFR BLD: 1.43 K/UL (ref 1.5–4)
LYMPHOCYTES RELATIVE PERCENT: 10 % (ref 20–42)
Lab: 502
MAGNESIUM SERPL-MCNC: 1.7 MG/DL (ref 1.6–2.4)
MCH RBC QN AUTO: 30.2 PG (ref 26–35)
MCHC RBC AUTO-ENTMCNC: 32.9 G/DL (ref 32–34.5)
MCV RBC AUTO: 91.5 FL (ref 80–99.9)
METHB: 0.1 % (ref 0–1.5)
MODE: AC
MONOCYTES NFR BLD: 0.65 K/UL (ref 0.1–0.95)
MONOCYTES NFR BLD: 4 % (ref 2–12)
NEUTROPHILS NFR BLD: 86 % (ref 43–80)
NEUTS SEG NFR BLD: 12.83 K/UL (ref 1.8–7.3)
O2 SATURATION: 98.1 % (ref 92–98.5)
O2HB: 97.8 % (ref 94–97)
OPERATOR ID: 8219
PATIENT TEMP: 37 C
PCO2: 44.7 MMHG (ref 35–45)
PEEP/CPAP: 8 CMH2O
PFO2: 2.08 MMHG/%
PH BLOOD GAS: 7.41 (ref 7.35–7.45)
PHOSPHATE SERPL-MCNC: 3.3 MG/DL (ref 2.5–4.5)
PLATELET # BLD AUTO: 201 K/UL (ref 130–450)
PMV BLD AUTO: 10.1 FL (ref 7–12)
PO2: 103.8 MMHG (ref 75–100)
POTASSIUM SERPL-SCNC: 3.8 MMOL/L (ref 3.5–5.1)
PROT SERPL-MCNC: 5.4 G/DL (ref 6.4–8.3)
RBC # BLD AUTO: 3.78 M/UL (ref 3.5–5.5)
RBC # BLD: ABNORMAL 10*6/UL
RI(T): 1.95
RR MECHANICAL: 18 B/MIN
SODIUM SERPL-SCNC: 142 MMOL/L (ref 136–145)
SOURCE, BLOOD GAS: ABNORMAL
THB: 12.4 G/DL (ref 11.5–16.5)
TIME ANALYZED: 505
VT MECHANICAL: 300 ML
WBC OTHER # BLD: 14.9 K/UL (ref 4.5–11.5)

## 2025-05-29 PROCEDURE — 85025 COMPLETE CBC W/AUTO DIFF WBC: CPT

## 2025-05-29 PROCEDURE — 6360000002 HC RX W HCPCS

## 2025-05-29 PROCEDURE — 84100 ASSAY OF PHOSPHORUS: CPT

## 2025-05-29 PROCEDURE — 6370000000 HC RX 637 (ALT 250 FOR IP): Performed by: SURGERY

## 2025-05-29 PROCEDURE — 6370000000 HC RX 637 (ALT 250 FOR IP)

## 2025-05-29 PROCEDURE — 2500000003 HC RX 250 WO HCPCS

## 2025-05-29 PROCEDURE — 80053 COMPREHEN METABOLIC PANEL: CPT

## 2025-05-29 PROCEDURE — 94640 AIRWAY INHALATION TREATMENT: CPT

## 2025-05-29 PROCEDURE — 82962 GLUCOSE BLOOD TEST: CPT

## 2025-05-29 PROCEDURE — 82805 BLOOD GASES W/O2 SATURATION: CPT

## 2025-05-29 PROCEDURE — 82330 ASSAY OF CALCIUM: CPT

## 2025-05-29 PROCEDURE — 2580000003 HC RX 258

## 2025-05-29 PROCEDURE — 83735 ASSAY OF MAGNESIUM: CPT

## 2025-05-29 PROCEDURE — 94003 VENT MGMT INPAT SUBQ DAY: CPT

## 2025-05-29 PROCEDURE — 99291 CRITICAL CARE FIRST HOUR: CPT | Performed by: SURGERY

## 2025-05-29 RX ORDER — MAGNESIUM SULFATE IN WATER 40 MG/ML
2000 INJECTION, SOLUTION INTRAVENOUS ONCE
Status: COMPLETED | OUTPATIENT
Start: 2025-05-29 | End: 2025-05-29

## 2025-05-29 RX ADMIN — PETROLATUM: 420 OINTMENT TOPICAL at 08:17

## 2025-05-29 RX ADMIN — BISACODYL 10 MG: 10 SUPPOSITORY RECTAL at 08:16

## 2025-05-29 RX ADMIN — HYDROCORTISONE SODIUM SUCCINATE 100 MG: 100 INJECTION INTRAMUSCULAR; INTRAVENOUS at 05:05

## 2025-05-29 RX ADMIN — MAGNESIUM SULFATE HEPTAHYDRATE 2000 MG: 40 INJECTION, SOLUTION INTRAVENOUS at 08:12

## 2025-05-29 RX ADMIN — MINERAL OIL, WHITE PETROLATUM: .03; .94 OINTMENT OPHTHALMIC at 05:05

## 2025-05-29 RX ADMIN — CHLORHEXIDINE GLUCONATE, 0.12% ORAL RINSE 15 ML: 1.2 SOLUTION DENTAL at 05:05

## 2025-05-29 RX ADMIN — THEOPHYLLINE 100 MG: 80 SOLUTION ORAL at 01:01

## 2025-05-29 RX ADMIN — MIDODRINE HYDROCHLORIDE 10 MG: 10 TABLET ORAL at 08:14

## 2025-05-29 RX ADMIN — MINERAL OIL, WHITE PETROLATUM: .03; .94 OINTMENT OPHTHALMIC at 08:16

## 2025-05-29 RX ADMIN — MAGNESIUM HYDROXIDE 30 ML: 400 SUSPENSION ORAL at 08:15

## 2025-05-29 RX ADMIN — IPRATROPIUM BROMIDE AND ALBUTEROL SULFATE 1 DOSE: 2.5; .5 SOLUTION RESPIRATORY (INHALATION) at 08:55

## 2025-05-29 RX ADMIN — PREGABALIN 150 MG: 75 CAPSULE ORAL at 08:15

## 2025-05-29 RX ADMIN — METHOCARBAMOL 500 MG: 500 TABLET ORAL at 08:15

## 2025-05-29 RX ADMIN — INSULIN LISPRO 2 UNITS: 100 INJECTION, SOLUTION INTRAVENOUS; SUBCUTANEOUS at 00:06

## 2025-05-29 RX ADMIN — GUAIFENESIN 400 MG: 400 TABLET ORAL at 08:16

## 2025-05-29 RX ADMIN — CHLORHEXIDINE GLUCONATE, 0.12% ORAL RINSE 15 ML: 1.2 SOLUTION DENTAL at 08:14

## 2025-05-29 RX ADMIN — SENNOSIDES AND DOCUSATE SODIUM 2 TABLET: 50; 8.6 TABLET ORAL at 08:15

## 2025-05-29 RX ADMIN — LACTULOSE 20 G: 20 SOLUTION ORAL at 08:16

## 2025-05-29 RX ADMIN — MICONAZOLE NITRATE: 20 POWDER TOPICAL at 08:17

## 2025-05-29 RX ADMIN — ACETAMINOPHEN 650 MG: 650 SOLUTION ORAL at 05:05

## 2025-05-29 RX ADMIN — POLYETHYLENE GLYCOL 3350 17 G: 17 POWDER, FOR SOLUTION ORAL at 08:16

## 2025-05-29 RX ADMIN — SODIUM CHLORIDE, PRESERVATIVE FREE 10 ML: 5 INJECTION INTRAVENOUS at 08:16

## 2025-05-29 RX ADMIN — ACETYLCYSTEINE 600 MG: 200 INHALANT RESPIRATORY (INHALATION) at 08:55

## 2025-05-29 RX ADMIN — SODIUM CHLORIDE, PRESERVATIVE FREE 40 MG: 5 INJECTION INTRAVENOUS at 08:16

## 2025-05-29 RX ADMIN — HEPARIN SODIUM 5000 UNITS: 5000 INJECTION INTRAVENOUS; SUBCUTANEOUS at 05:06

## 2025-05-29 ASSESSMENT — PULMONARY FUNCTION TESTS
PIF_VALUE: 35
PIF_VALUE: 35
PIF_VALUE: 34
PIF_VALUE: 37
PIF_VALUE: 30
PIF_VALUE: 36
PIF_VALUE: 32
PIF_VALUE: 42
PIF_VALUE: 32
PIF_VALUE: 26
PIF_VALUE: 34
PIF_VALUE: 32
PIF_VALUE: 30
PIF_VALUE: 39
PIF_VALUE: 33
PIF_VALUE: 35

## 2025-05-29 ASSESSMENT — PAIN SCALES - GENERAL: PAINLEVEL_OUTOF10: 0

## 2025-05-29 NOTE — PROGRESS NOTES
Report called to  [facility] . Transport here. The following patient belongings: Other rosary  beads, were gathered and remain with the patient on discharge. Family at bedside.

## 2025-05-29 NOTE — PROGRESS NOTES
ENDOCRINOLOGY PROGRESS NOTE     Date of Admission: 5/21/2025  Date of Service: 5/28/2025  Admitting Physician: Omer Navarro MD   Primary Care Physician: Eufemia Ahmadi DO  Consultant physician: Amilcar Mao MD     Reason for the consultation:  Abnormal thyroid function test, evaluation for possible adrenal insufficiency    History of Present Illness:  The history was mainly obtained from the medical records.  The patient currently intubated    Alena Busby is a very pleasant 70 y.o. old female a nursing home resident with PMH of obesity, COPD, dementia and other listed below admitted to [facility] on 5/21/2025 after she fell off of the transport van and her wheelchair at nursing facility and found to have multiple spine fracture.  The patient underwent C2-T2 posterior cervical fusion on May 22, 2025, endocrine service was consulted for evaluation management of possible thyroid dysfunction and adrenal sufficiency    Subjective  The patient was seen and examined at bedside this afternoon.  Still intubated on vasopressors.      Allergies and Drug reactions  No Known Allergies    Scheduled Meds:   magnesium hydroxide  30 mL Oral Daily    vecuronium  10 mg IntraVENous On Call    acetylcysteine  600 mg Inhalation BID RT    midodrine  10 mg Oral TID WC    bisacodyl  10 mg Rectal BID    sennosides-docusate sodium  2 tablet Oral Daily    methocarbamol  500 mg Oral 4x Daily    heparin (porcine)  5,000 Units SubCUTAneous 3 times per day    polyethylene glycol  17 g Oral BID    lactulose  20 g Oral TID    insulin lispro  0-8 Units SubCUTAneous Q4H    hydrocortisone sodium succinate PF (SOLU-CORTEF) 100 mg in sterile water 2 mL injection  100 mg IntraVENous Q8H    theophylline  100 mg Oral Q12H    ipratropium 0.5 mg-albuterol 2.5 mg  1 Dose Inhalation 4x Daily RT    pantoprazole (PROTONIX) 40 mg in sodium chloride (PF) 0.9 % 10 mL injection  40 mg IntraVENous Daily    white petrolatum   Topical BID    miconazole   Topical BID

## 2025-05-29 NOTE — PLAN OF CARE
Problem: Discharge Planning  Goal: Discharge to home or other facility with appropriate resources  5/29/2025 0736 by Michelle Copeland RN  Outcome: Progressing  5/28/2025 2100 by Rosalina Zamora RN  Outcome: Progressing     Problem: Pain  Goal: Verbalizes/displays adequate comfort level or baseline comfort level  5/29/2025 0736 by Michelle Copeland RN  Outcome: Progressing  5/28/2025 2100 by Rosalina Zamora RN  Outcome: Progressing     Problem: Skin/Tissue Integrity  Goal: Skin integrity remains intact  Description: 1.  Monitor for areas of redness and/or skin breakdown2.  Assess vascular access sites hourly3.  Every 4-6 hours minimum:  Change oxygen saturation probe site4.  Every 4-6 hours:  If on nasal continuous positive airway pressure, respiratory therapy assess nares and determine need for appliance change or resting period  5/29/2025 0736 by Michelle Copeland RN  Outcome: Progressing  5/28/2025 2100 by Rosalina Zamora RN  Outcome: Progressing     Problem: Safety - Adult  Goal: Free from fall injury  5/29/2025 0736 by Michelle Copeland RN  Outcome: Progressing  5/28/2025 2100 by Rosalina Zamora RN  Outcome: Progressing     Problem: Chronic Conditions and Co-morbidities  Goal: Patient's chronic conditions and co-morbidity symptoms are monitored and maintained or improved  5/29/2025 0736 by Michelle Copeland RN  Outcome: Progressing  5/28/2025 2100 by Rosalina Zamora RN  Outcome: Progressing     Problem: ABCDS Injury Assessment  Goal: Absence of physical injury  5/29/2025 0736 by Michelle Copeland RN  Outcome: Progressing  5/28/2025 2100 by Rosalina Zamora RN  Outcome: Progressing     Problem: Respiratory - Adult  Goal: Achieves optimal ventilation and oxygenation  5/29/2025 0736 by Michelle Copeland RN  Outcome: Progressing  5/28/2025 2100 by Rosalina Zamora RN  Outcome: Progressing     Problem: Skin/Tissue Integrity - Adult  Goal: Skin integrity remains intact  Description: 1.  Monitor for areas of redness and/or
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
## 2025-05-31 LAB
T3 REVERSE: 33.4 NG/DL (ref 9–27)
T3 REVERSE: 34.7 NG/DL (ref 9–27)

## 2025-06-02 DIAGNOSIS — Z98.1 S/P CERVICAL SPINAL FUSION: Primary | ICD-10-CM

## 2025-06-06 ENCOUNTER — RESULTS FOLLOW-UP (OUTPATIENT)
Age: 70
End: 2025-06-06

## (undated) DEVICE — DRAIN CHN 19FR L0.25MM DIA6.3MM SIL RND HUBLESS FULL FLUT

## (undated) DEVICE — PIN ADLT MAYFIELD RIGID MOLD FINGER

## (undated) DEVICE — SET SPINAL NEURO STNEU1

## (undated) DEVICE — BUR CUT RND FLUT SFT TOUCH 4.0MM

## (undated) DEVICE — 1.5L THIN WALL CAN: Brand: CRD

## (undated) DEVICE — DISPOSABLE IRRIGATION CASSETTE: Brand: CORE

## (undated) DEVICE — PRECISION THIN (27.0 X 0.38MM)

## (undated) DEVICE — DOUBLE BASIN SET: Brand: MEDLINE INDUSTRIES, INC.

## (undated) DEVICE — ELECTRODE PT RET AD L9FT HI MOIST COND ADH HYDRGEL CORDED

## (undated) DEVICE — DRILL BIT FOR 3.5MM SCREW

## (undated) DEVICE — HARNESS CERV VIS 920877000000] ALIMED INC]

## (undated) DEVICE — GLOVE ORANGE PI 7 1/2   MSG9075

## (undated) DEVICE — TAP FOR 3.5MM SCREW

## (undated) DEVICE — BOWL ASSY BM210 DUAL BLADE DISPOSABLE: Brand: MIDAS REX™

## (undated) DEVICE — SYRINGE MED 10ML LUERLOCK TIP W/O SFTY DISP

## (undated) DEVICE — Device

## (undated) DEVICE — SCREW DISTRCTN ST 14 MM DISP

## (undated) DEVICE — DRAPE,REIN 53X77,STERILE: Brand: MEDLINE

## (undated) DEVICE — SURGICAL PROCEDURE PACK LAMINECTOMY LUMBAR

## (undated) DEVICE — GARMENT,MEDLINE,DVT,INT,CALF,MED, GEN2: Brand: MEDLINE

## (undated) DEVICE — CLOTH SURG PREP PREOPERATIVE CHLORHEXIDINE GLUC 2% READYPREP

## (undated) DEVICE — INSTRUMENT EXTRAS ACF SINGLE ORANGE OR W

## (undated) DEVICE — NEEDLE SPNL 20GA L3.5IN YEL HUB S STL REG WALL FIT STYL W/

## (undated) DEVICE — SPONGE,DISSECTOR,K,XRAY,9/16"X1/4",STRL: Brand: MEDLINE

## (undated) DEVICE — COLLAR CERV SERP SM XLN 23 X 3.5 IN CONTOURED

## (undated) DEVICE — GOWN,SIRUS,FABRNF,L,20/CS: Brand: MEDLINE

## (undated) DEVICE — GAUZE,SPONGE,4"X4",16PLY,XRAY,STRL,LF: Brand: MEDLINE

## (undated) DEVICE — LIQUIBAND RAPID ADHESIVE 36/CS 0.8ML: Brand: MEDLINE

## (undated) DEVICE — STRIP,CLOSURE,WOUND,MEDI-STRIP,1/4X3: Brand: MEDLINE

## (undated) DEVICE — TUBING, SUCTION, 3/16" X 12', STRAIGHT: Brand: MEDLINE

## (undated) DEVICE — GOWN,SIRUS,FABRNF,XL,20/CS: Brand: MEDLINE

## (undated) DEVICE — LUMBAR LAMINECTOMY: Brand: MEDLINE INDUSTRIES, INC.

## (undated) DEVICE — 3M™ MEDIPORE™ + PAD 3564: Brand: 3M™ MEDIPORE™

## (undated) DEVICE — GLOVE SURG SZ 65 THK91MIL LTX FREE SYN POLYISOPRENE

## (undated) DEVICE — DRESSING ADH W3.5XL6IN SFT CLTH PD COMP MEDIPORE +

## (undated) DEVICE — ELECTRODE ES AD PED L2.5IN TEF INSUL MOD NONCORDED BLDE TIP

## (undated) DEVICE — 3M™ IOBAN™ 2 ANTIMICROBIAL INCISE DRAPE 6640EZ: Brand: IOBAN™ 2

## (undated) DEVICE — 3M(TM) MEDIPORE(TM) +PAD SOFT CLOTH ADHESIVE WOUND DRESSING 3569: Brand: 3M™ MEDIPORE™

## (undated) DEVICE — SYRINGE MED 20ML STD CLR PLAS LUERLOCK TIP N CTRL DISP

## (undated) DEVICE — SWABSTICK SURG PREP BENZOIN TINCTURE SINGLE ST

## (undated) DEVICE — DRILL BIT, 12MM

## (undated) DEVICE — GAUZE,SPONGE,AVANT,4"X4",4PLY,STRL,10/TR: Brand: MEDLINE

## (undated) DEVICE — MINOR PROCEDURE DRAPE: Brand: CONVERTORS

## (undated) DEVICE — BLADE CLP TAPR HD WET DRY CAPABILITY GTT IN CHARGING USE

## (undated) DEVICE — TOWEL,OR,DSP,ST,BLUE,DLX,10/PK,8PK/CS: Brand: MEDLINE

## (undated) DEVICE — CODMAN® SURGICAL PATTIES 1/2" X 1" (1.27CM X 2.54CM): Brand: CODMAN®

## (undated) DEVICE — NEEDLE SPNL L3.5IN PNK HUB S STL REG WALL FIT STYL W/ QNCKE

## (undated) DEVICE — DRAPE MICSCP 65MM LENS FOR LEICA VARI-LENS2

## (undated) DEVICE — KIT SURG W7XL11IN 2 PKT UNTREATED NA

## (undated) DEVICE — BLADE CLIPPER GEN PURP NS

## (undated) DEVICE — GLOVE ORANGE PI 8   MSG9080

## (undated) DEVICE — GLOVE SURG SZ 7 L12IN FNGR THK79MIL GRN LTX FREE

## (undated) DEVICE — BLADE ES L6IN ELASTOMERIC COAT EXT DURABLE BEND UPTO 90DEG

## (undated) DEVICE — SKIN PREP TRAY 4 COMPARTM TRAY: Brand: MEDLINE INDUSTRIES, INC.

## (undated) DEVICE — 1000 S-DRAPE TOWEL DRAPE 10/BX: Brand: STERI-DRAPE™

## (undated) DEVICE — 3.0MM PRECISION NEURO (MATCH HEAD)

## (undated) DEVICE — NEPTUNE E-SEP 125MM SUCTION SLEEVE: Brand: NEPTUNE E-SEP

## (undated) DEVICE — DRAPE C ARM UNIV W41XL74IN CLR PLAS XR VELC CLSR POLY STRP

## (undated) DEVICE — ELECTRODE ES L3IN S STL BLDE INSUL DISP VALLEYLAB EDGE

## (undated) DEVICE — 3M™ STERI-DRAPE™ INCISE DRAPE 1040 (35CM X 35CM): Brand: STERI-DRAPE™

## (undated) DEVICE — GOWN,SIRUS,FABRNF,2XL,18/CS: Brand: MEDLINE

## (undated) DEVICE — COVER,TABLE,60X90,STERILE: Brand: MEDLINE

## (undated) DEVICE — EXTRAS KOHLI

## (undated) DEVICE — TUBING

## (undated) DEVICE — RETRACTOR AFC SHADOW LINE